# Patient Record
Sex: FEMALE | Race: WHITE | NOT HISPANIC OR LATINO | Employment: OTHER | URBAN - METROPOLITAN AREA
[De-identification: names, ages, dates, MRNs, and addresses within clinical notes are randomized per-mention and may not be internally consistent; named-entity substitution may affect disease eponyms.]

---

## 2017-02-06 ENCOUNTER — HOSPITAL ENCOUNTER (OUTPATIENT)
Dept: GENERAL RADIOLOGY | Facility: HOSPITAL | Age: 67
Discharge: HOME | End: 2017-02-06
Admitting: INTERNAL MEDICINE

## 2017-02-06 ENCOUNTER — TRANSCRIBE ORDERS (OUTPATIENT)
Dept: GENERAL RADIOLOGY | Facility: HOSPITAL | Age: 67
End: 2017-02-06

## 2017-02-06 DIAGNOSIS — R05.9 COUGH: ICD-10-CM

## 2017-02-06 DIAGNOSIS — R05.9 COUGH: Primary | ICD-10-CM

## 2017-02-06 PROCEDURE — 71020 HC CHEST PA AND LATERAL: CPT

## 2017-02-10 ENCOUNTER — TRANSCRIBE ORDERS (OUTPATIENT)
Dept: OBSTETRICS AND GYNECOLOGY | Facility: CLINIC | Age: 67
End: 2017-02-10

## 2017-02-10 DIAGNOSIS — Z12.31 VISIT FOR SCREENING MAMMOGRAM: Primary | ICD-10-CM

## 2017-02-14 ENCOUNTER — OFFICE VISIT (OUTPATIENT)
Dept: OBSTETRICS AND GYNECOLOGY | Facility: CLINIC | Age: 67
End: 2017-02-14

## 2017-02-14 VITALS
DIASTOLIC BLOOD PRESSURE: 80 MMHG | SYSTOLIC BLOOD PRESSURE: 122 MMHG | BODY MASS INDEX: 51.91 KG/M2 | HEIGHT: 63 IN | WEIGHT: 293 LBS

## 2017-02-14 DIAGNOSIS — K46.9 ENTEROCELE: ICD-10-CM

## 2017-02-14 DIAGNOSIS — Z00.00 ANNUAL PHYSICAL EXAM: Primary | ICD-10-CM

## 2017-02-14 DIAGNOSIS — J40 BRONCHITIS: ICD-10-CM

## 2017-02-14 PROBLEM — E66.01 MORBID OBESITY (HCC): Status: ACTIVE | Noted: 2017-02-14

## 2017-02-14 PROBLEM — Z78.0 POST-MENOPAUSAL: Status: ACTIVE | Noted: 2017-02-14

## 2017-02-14 PROCEDURE — G0328 FECAL BLOOD SCRN IMMUNOASSAY: HCPCS | Performed by: OBSTETRICS & GYNECOLOGY

## 2017-02-14 PROCEDURE — G0101 CA SCREEN;PELVIC/BREAST EXAM: HCPCS | Performed by: OBSTETRICS & GYNECOLOGY

## 2017-02-14 RX ORDER — ESTRADIOL 0.1 MG/G
CREAM VAGINAL
Qty: 1 EACH | Refills: 4 | Status: SHIPPED | OUTPATIENT
Start: 2017-02-14 | End: 2017-02-15 | Stop reason: SDUPTHER

## 2017-02-14 RX ORDER — ATORVASTATIN CALCIUM 10 MG/1
10 TABLET, FILM COATED ORAL NIGHTLY
COMMUNITY
Start: 2017-02-13

## 2017-02-14 RX ORDER — VENLAFAXINE HYDROCHLORIDE 75 MG/1
75 CAPSULE, EXTENDED RELEASE ORAL DAILY
COMMUNITY
Start: 2017-02-10

## 2017-02-14 RX ORDER — LOSARTAN POTASSIUM 25 MG/1
25 TABLET ORAL DAILY
COMMUNITY
Start: 2017-02-06 | End: 2018-06-04 | Stop reason: SDUPTHER

## 2017-02-14 RX ORDER — ESTRADIOL 0.5 MG/1
0.5 TABLET ORAL DAILY
Status: ON HOLD | COMMUNITY
Start: 2017-01-09 | End: 2017-04-17

## 2017-02-14 NOTE — PROGRESS NOTES
"Subjective   Chief Complaint   Patient presents with   • Gynecologic Exam   • Bladder Prolapse     Imelda Elaine is a 66 y.o. year old  menopausal female presenting to be seen for her annual exam.  There has not been vaginal bleeding in the last 12 months.  Hot flashes and night sweats are not a significant problem.    SEXUAL Hx:  She is not sexually active.  Vaginal dryness is not a problem. Her bladder dropped after her incisional hernia surgery ( Dr MAZA) then she has had bronchitis since December and the bladder got worse,    HEALTH Hx:  She exercises regularly: no.  She wears her seat belt:yes.  She has concerns about domestic violence: no.  She has noticed changes in height: no.              Calcium intake is not adequate    The following portions of the patient's history were reviewed and updated as appropriate:problem list, current medications, allergies, past family history, past medical history, past social history and past surgical history.    Smoking status: Former Smoker                                                              Packs/day: 1.50      Years: 15.00        Types: Cigarettes     Quit date: 1986  Smokeless status: Never Used                        Review of Systems normal bowels on Miralax; bladder no leakage + frequency she is s/p anterior repair     Objective   Visit Vitals   • /80   • Ht 63\" (160 cm)   • Wt 293 lb (133 kg)   • BMI 51.9 kg/m2       General:  well developed; well nourished  no acute distress   Skin:  No suspicious lesions seen   Thyroid: normal to inspection and palpation   Breasts:  Examined in supine position  Symmetric without masses or skin dimpling  Nipples normal without inversion, lesions or discharge   Abdomen: soft, non-tender; no masses  no umbilical or inginual hernias are present  no hepato-splenomegaly  She has a fairly fresh vertical scar.   Pelvis: Clinical staff was present for exam  External genitalia:  normal appearance of the external " genitalia including Bartholin's and Central Aguirre's glands. There is a bulge consistent with either cystocele or enterocele that protrudes through the introitus  :  urethral meatus normal; urethral hypermobility is absent.  Vaginal:  atrophic mucosal changes are present;  Rectal:  anus visually normal appearing. recto-vaginal exam unremarkable and confirms findings; guaiac negative;  Cystocele GRADE 1  Rectocele GRADE 1  Enterocele GRADE 4        Assessment   1. Symptomatic enterocele.  She is status post abdominal hysterectomy in 1989.  In 2004 she underwent a laparotomy with lysis of adhesions abdominal sacral colpopexy and a posterior vaginal repair.  She was doing well until her recent hernia surgery.  I think this is more coincidental than anything else since its approximate 13 years later.  In any event the enterocele is protruding through the vagina.  I discussed repair.  Given the extensive adhesions Dr. Nj Painter encountered.  I would suggest really try to attempt this vaginally.  She is not sexually active and I discussed foreshortening of the vagina which might make any future attempts at intercourse uncomfortable.     Plan   1. I will add the estrogen cream 1/2 g nightly to thicken the vaginal mucosa prior to a vaginal enterocele and cystocele repair.  There is minimal rectocele.    New Medications Ordered This Visit   Medications   • atorvastatin (LIPITOR) 10 MG tablet     Sig: Take 10 mg by mouth Daily.   • estradiol (ESTRACE) 0.5 MG tablet     Sig: Take 0.5 mg by mouth Daily.   • HYDROcod Polst-CPM Polst ER (TUSSIONEX PENNKINETIC) 10-8 MG/5ML ER suspension     Sig: Take 1 mL by mouth 2 (Two) Times a Day.   • losartan (COZAAR) 25 MG tablet     Sig: Take 25 mg by mouth Daily.   • venlafaxine XR (EFFEXOR-XR) 75 MG 24 hr capsule     Sig: Take 75 mg by mouth Daily.          This note was electronically signed.    Jose Maria Chowdhury M.D.  February 14, 2017

## 2017-02-15 PROBLEM — Z90.710 STATUS POST ABDOMINAL HYSTERECTOMY: Status: ACTIVE | Noted: 2017-02-15

## 2017-02-15 RX ORDER — ESTRADIOL 0.1 MG/G
CREAM VAGINAL
Qty: 1 EACH | Refills: 4 | Status: SHIPPED | OUTPATIENT
Start: 2017-02-15 | End: 2017-04-03 | Stop reason: SDUPTHER

## 2017-02-17 DIAGNOSIS — K46.9 ENTEROCELE: Primary | ICD-10-CM

## 2017-02-17 RX ORDER — SODIUM CHLORIDE 0.9 % (FLUSH) 0.9 %
1-10 SYRINGE (ML) INJECTION AS NEEDED
Status: CANCELLED | OUTPATIENT
Start: 2017-02-17

## 2017-02-22 ENCOUNTER — RESULTS ENCOUNTER (OUTPATIENT)
Dept: OBSTETRICS AND GYNECOLOGY | Facility: CLINIC | Age: 67
End: 2017-02-22

## 2017-02-22 DIAGNOSIS — K46.9 ENTEROCELE: ICD-10-CM

## 2017-02-24 ENCOUNTER — TELEPHONE (OUTPATIENT)
Dept: OBSTETRICS AND GYNECOLOGY | Facility: CLINIC | Age: 67
End: 2017-02-24

## 2017-03-20 ENCOUNTER — APPOINTMENT (OUTPATIENT)
Dept: MAMMOGRAPHY | Facility: HOSPITAL | Age: 67
End: 2017-03-20
Attending: OBSTETRICS & GYNECOLOGY

## 2017-03-23 ENCOUNTER — HOSPITAL ENCOUNTER (OUTPATIENT)
Dept: MAMMOGRAPHY | Facility: HOSPITAL | Age: 67
Discharge: HOME | End: 2017-03-23
Attending: OBSTETRICS & GYNECOLOGY | Admitting: OBSTETRICS & GYNECOLOGY

## 2017-03-23 DIAGNOSIS — Z12.31 VISIT FOR SCREENING MAMMOGRAM: ICD-10-CM

## 2017-03-23 PROCEDURE — 77063 BREAST TOMOSYNTHESIS BI: CPT

## 2017-03-23 PROCEDURE — G0202 SCR MAMMO BI INCL CAD: HCPCS | Performed by: RADIOLOGY

## 2017-03-23 PROCEDURE — 77063 BREAST TOMOSYNTHESIS BI: CPT | Performed by: RADIOLOGY

## 2017-03-23 PROCEDURE — G0202 SCR MAMMO BI INCL CAD: HCPCS

## 2017-04-03 ENCOUNTER — TELEPHONE (OUTPATIENT)
Dept: OBSTETRICS AND GYNECOLOGY | Facility: CLINIC | Age: 67
End: 2017-04-03

## 2017-04-03 RX ORDER — ESTRADIOL 0.1 MG/G
CREAM VAGINAL
Qty: 1 EACH | Refills: 4 | Status: SHIPPED | OUTPATIENT
Start: 2017-04-03 | End: 2017-05-03

## 2017-04-03 NOTE — TELEPHONE ENCOUNTER
----- Message from Ruthy Christopher sent at 4/3/2017  2:51 PM EDT -----  PATIENT IS REQUESTING SAMPLE OF ESTRACE CREAM. CANT GET SCRIPT FILLED BECAUSE IT IS TOO SOON FOR INSURANCE TO PAY AND SHE WOULD HAVE TO PAY OUT OF POCKET    PLEASE CALL PATIENT [phone] TO DISCUSS

## 2017-04-05 ENCOUNTER — HOSPITAL ENCOUNTER (OUTPATIENT)
Dept: MAMMOGRAPHY | Facility: HOSPITAL | Age: 67
Discharge: HOME | End: 2017-04-05
Admitting: OBSTETRICS & GYNECOLOGY

## 2017-04-05 DIAGNOSIS — R92.8 ABNORMAL MAMMOGRAM: ICD-10-CM

## 2017-04-05 PROCEDURE — G0279 TOMOSYNTHESIS, MAMMO: HCPCS | Performed by: RADIOLOGY

## 2017-04-05 PROCEDURE — G0206 DX MAMMO INCL CAD UNI: HCPCS | Performed by: RADIOLOGY

## 2017-04-05 PROCEDURE — G0206 DX MAMMO INCL CAD UNI: HCPCS

## 2017-04-05 PROCEDURE — G0279 TOMOSYNTHESIS, MAMMO: HCPCS

## 2017-04-12 ENCOUNTER — OFFICE VISIT (OUTPATIENT)
Dept: OBSTETRICS AND GYNECOLOGY | Facility: CLINIC | Age: 67
End: 2017-04-12

## 2017-04-12 ENCOUNTER — APPOINTMENT (OUTPATIENT)
Dept: PREADMISSION TESTING | Facility: HOSPITAL | Age: 67
End: 2017-04-12

## 2017-04-12 VITALS
WEIGHT: 293 LBS | DIASTOLIC BLOOD PRESSURE: 80 MMHG | SYSTOLIC BLOOD PRESSURE: 128 MMHG | HEIGHT: 63 IN | BODY MASS INDEX: 51.91 KG/M2

## 2017-04-12 VITALS — WEIGHT: 293 LBS | HEIGHT: 64 IN | BODY MASS INDEX: 50.02 KG/M2

## 2017-04-12 DIAGNOSIS — N81.6 RECTOCELE: ICD-10-CM

## 2017-04-12 DIAGNOSIS — N81.11 MIDLINE CYSTOCELE: Primary | ICD-10-CM

## 2017-04-12 LAB
BASOPHILS # BLD AUTO: 0.02 10*3/MM3 (ref 0–0.2)
BASOPHILS NFR BLD AUTO: 0.3 % (ref 0–1)
DEPRECATED RDW RBC AUTO: 50.1 FL (ref 37–54)
EOSINOPHIL # BLD AUTO: 0.26 10*3/MM3 (ref 0.1–0.3)
EOSINOPHIL NFR BLD AUTO: 3.4 % (ref 0–3)
ERYTHROCYTE [DISTWIDTH] IN BLOOD BY AUTOMATED COUNT: 15 % (ref 11.3–14.5)
HCT VFR BLD AUTO: 43.1 % (ref 34.5–44)
HGB BLD-MCNC: 13.9 G/DL (ref 11.5–15.5)
IMM GRANULOCYTES # BLD: 0.01 10*3/MM3 (ref 0–0.03)
IMM GRANULOCYTES NFR BLD: 0.1 % (ref 0–0.6)
LYMPHOCYTES # BLD AUTO: 1.42 10*3/MM3 (ref 0.6–4.8)
LYMPHOCYTES NFR BLD AUTO: 18.8 % (ref 24–44)
MCH RBC QN AUTO: 29.3 PG (ref 27–31)
MCHC RBC AUTO-ENTMCNC: 32.3 G/DL (ref 32–36)
MCV RBC AUTO: 90.7 FL (ref 80–99)
MONOCYTES # BLD AUTO: 0.61 10*3/MM3 (ref 0–1)
MONOCYTES NFR BLD AUTO: 8.1 % (ref 0–12)
NEUTROPHILS # BLD AUTO: 5.24 10*3/MM3 (ref 1.5–8.3)
NEUTROPHILS NFR BLD AUTO: 69.3 % (ref 41–71)
PLATELET # BLD AUTO: 188 10*3/MM3 (ref 150–450)
PMV BLD AUTO: 11.6 FL (ref 6–12)
POTASSIUM BLD-SCNC: 4.4 MMOL/L (ref 3.5–5.5)
RBC # BLD AUTO: 4.75 10*6/MM3 (ref 3.89–5.14)
WBC NRBC COR # BLD: 7.56 10*3/MM3 (ref 3.5–10.8)

## 2017-04-12 PROCEDURE — 84132 ASSAY OF SERUM POTASSIUM: CPT

## 2017-04-12 PROCEDURE — 85025 COMPLETE CBC W/AUTO DIFF WBC: CPT | Performed by: OBSTETRICS & GYNECOLOGY

## 2017-04-12 PROCEDURE — S0260 H&P FOR SURGERY: HCPCS | Performed by: OBSTETRICS & GYNECOLOGY

## 2017-04-12 PROCEDURE — 93005 ELECTROCARDIOGRAM TRACING: CPT

## 2017-04-12 PROCEDURE — 93010 ELECTROCARDIOGRAM REPORT: CPT | Performed by: INTERNAL MEDICINE

## 2017-04-12 PROCEDURE — 36415 COLL VENOUS BLD VENIPUNCTURE: CPT

## 2017-04-12 RX ORDER — TRAMADOL HYDROCHLORIDE 50 MG/1
50 TABLET ORAL AS NEEDED
COMMUNITY
Start: 2017-03-15 | End: 2017-04-12

## 2017-04-12 NOTE — H&P
"Subjective   Imelda Elaine is a 66 y.o. year old  who is scheduled  for surgery due to pelvic relaxation and cystocele and symptomatic rectocele. She is s/p Hysterectomy and BSO. She had a bladder sling with mesh approximately 4590-1910. No stool or urine incontinence.Miralx helpful. She declined a pessary and likely would not have stayed in place. Not sexually active. She has to sit back and push rectocele in place.    Past Medical History:   Diagnosis Date   • Abdominal hernia    • Arthritis    • Depression    • GERD (gastroesophageal reflux disease)    • Hiatal hernia    • High cholesterol    • Hypertension    • Knee pain     BILATERAL   • Menopausal state    • Seasonal allergies    • Wears glasses      Past Surgical History:   Procedure Laterality Date   • BLADDER AUGMENTATION      MESH PLACED   • COLONOSCOPY     • EXPLORATORY LAPAROTOMY      \"BOWEL INTO PELVIC BONE\"  WRAPPED WITH MESH????   • HYSTERECTOMY      WITH BSO    • KNEE SURGERY      Left   • LAPAROSCOPIC CHOLECYSTECTOMY     • OOPHORECTOMY     • TONSILLECTOMY     • VENTRAL HERNIA REPAIR N/A 2016    Procedure: LAPAROSCOPIC REPAIR OF ABDOMINAL WALL HERNIA WITH MESH, CONVERTED TO OPEN ;  Surgeon: Bertha Delacruz MD;  Location: [facility];  Service:      Social History     Social History   • Marital status:      Spouse name: N/A   • Number of children: N/A   • Years of education: N/A     Social History Main Topics   • Smoking status: Former Smoker     Packs/day: 1.50     Years: 15.00     Types: Cigarettes     Quit date: 1986   • Smokeless tobacco: Never Used   • Alcohol use No   • Drug use: No   • Sexual activity: No     Other Topics Concern   • None     Social History Narrative       Current Outpatient Prescriptions:   •  atorvastatin (LIPITOR) 10 MG tablet, Take 10 mg by mouth Daily., Disp: , Rfl:   •  celecoxib (CeleBREX) 200 MG capsule, Take 200 mg by mouth daily., Disp: , Rfl:   •  cetirizine (ZyrTEC) 10 MG " "tablet, Take 10 mg by mouth daily., Disp: , Rfl:   •  estradiol (ESTRACE VAGINAL) 0.1 MG/GM vaginal cream, Insert 0.5  gm intravaginally nightly, Disp: 1 each, Rfl: 4  •  fluticasone (FLONASE) 50 MCG/ACT nasal spray, 2 sprays into each nostril daily., Disp: , Rfl:   •  lansoprazole (PREVACID) 30 MG capsule, Take 30 mg by mouth daily., Disp: , Rfl:   •  losartan (COZAAR) 25 MG tablet, Take 25 mg by mouth Daily., Disp: , Rfl:   •  traMADol (ULTRAM) 50 MG tablet, 50 mg As Needed., Disp: , Rfl:   •  venlafaxine XR (EFFEXOR-XR) 75 MG 24 hr capsule, Take 75 mg by mouth Daily., Disp: , Rfl:   •  estradiol (ESTRACE) 0.5 MG tablet, Take 0.5 mg by mouth Daily., Disp: , Rfl:     No Known Allergies  Smoking status: Former Smoker                                                              Packs/day: 1.50      Years: 15.00        Types: Cigarettes     Quit date: 6/17/1986  Smokeless status: Never Used                        Review of Systems normal bladder / bowels tend towards constipation.       Objective   /80  Ht 63\" (160 cm)  Wt (!) 304 lb (138 kg)  BMI 53.85 kg/m2  General: well developed; well nourished  no acute distress  obese - Body mass index is 53.85 kg/(m^2).   Heart: regular rate and rhythm, S1, S2 normal, no murmur, click, rub or gallop   Lungs: breathing is unlabored  clear to auscultation bilaterally   Abdomen: soft, non-tender; no masses  no umbilical or inginual hernias are present  no hepato-splenomegaly  multiple scars ( mesh for hernia repair)   Pelvis:: Not performed. she has significant pelvic relaxation on past exams        Assessment   1. Symptomatic cystocele and rectocele- too many adhesions and abdominal mesh etc to consider sacrocolpopexy and pessary not option.     Plan            Anterior and posterior repair          Take her Cozaar, Effexor and Prevacid Monday am with a sip of water          PAT Weds 4/12/17    Jose Maria Chowdhury M.D.  4/12/2017             "

## 2017-04-17 ENCOUNTER — ANESTHESIA EVENT (OUTPATIENT)
Dept: PERIOP | Facility: HOSPITAL | Age: 67
End: 2017-04-17

## 2017-04-17 ENCOUNTER — HOSPITAL ENCOUNTER (OUTPATIENT)
Facility: HOSPITAL | Age: 67
Discharge: HOME | End: 2017-04-18
Attending: OBSTETRICS & GYNECOLOGY | Admitting: OBSTETRICS & GYNECOLOGY

## 2017-04-17 ENCOUNTER — ANESTHESIA (OUTPATIENT)
Dept: PERIOP | Facility: HOSPITAL | Age: 67
End: 2017-04-17

## 2017-04-17 DIAGNOSIS — K46.9 ENTEROCELE: ICD-10-CM

## 2017-04-17 LAB — POTASSIUM BLD-SCNC: 4.3 MMOL/L (ref 3.5–5.5)

## 2017-04-17 PROCEDURE — 25010000002 PROPOFOL 1000 MG/ML EMULSION: Performed by: NURSE ANESTHETIST, CERTIFIED REGISTERED

## 2017-04-17 PROCEDURE — 25010000003 CEFAZOLIN IN DEXTROSE 2-4 GM/100ML-% SOLUTION: Performed by: OBSTETRICS & GYNECOLOGY

## 2017-04-17 PROCEDURE — 25010000002 NEOSTIGMINE 10 MG/10ML SOLUTION: Performed by: NURSE ANESTHETIST, CERTIFIED REGISTERED

## 2017-04-17 PROCEDURE — 25010000002 PROPOFOL 10 MG/ML EMULSION: Performed by: NURSE ANESTHETIST, CERTIFIED REGISTERED

## 2017-04-17 PROCEDURE — 94799 UNLISTED PULMONARY SVC/PX: CPT

## 2017-04-17 PROCEDURE — 84132 ASSAY OF SERUM POTASSIUM: CPT | Performed by: ANESTHESIOLOGY

## 2017-04-17 PROCEDURE — 25010000002 DEXAMETHASONE PER 1 MG: Performed by: NURSE ANESTHETIST, CERTIFIED REGISTERED

## 2017-04-17 PROCEDURE — 57265 CMBN AP COLPRHY W/NTRCL RPR: CPT | Performed by: OBSTETRICS & GYNECOLOGY

## 2017-04-17 PROCEDURE — 99999 PR OFFICE/OUTPT VISIT,PROCEDURE ONLY: CPT | Performed by: OBSTETRICS & GYNECOLOGY

## 2017-04-17 PROCEDURE — 25010000002 ONDANSETRON PER 1 MG: Performed by: NURSE ANESTHETIST, CERTIFIED REGISTERED

## 2017-04-17 PROCEDURE — 25010000002 MIDAZOLAM PER 1 MG: Performed by: NURSE ANESTHETIST, CERTIFIED REGISTERED

## 2017-04-17 PROCEDURE — 25010000002 FENTANYL CITRATE (PF) 100 MCG/2ML SOLUTION: Performed by: NURSE ANESTHETIST, CERTIFIED REGISTERED

## 2017-04-17 PROCEDURE — G0378 HOSPITAL OBSERVATION PER HR: HCPCS

## 2017-04-17 RX ORDER — IBUPROFEN 400 MG/1
400 TABLET ORAL EVERY 6 HOURS PRN
Status: DISCONTINUED | OUTPATIENT
Start: 2017-04-17 | End: 2017-04-18 | Stop reason: HOSPADM

## 2017-04-17 RX ORDER — ONDANSETRON 4 MG/1
4 TABLET, FILM COATED ORAL EVERY 6 HOURS PRN
Status: DISCONTINUED | OUTPATIENT
Start: 2017-04-17 | End: 2017-04-18 | Stop reason: HOSPADM

## 2017-04-17 RX ORDER — BUPIVACAINE HYDROCHLORIDE AND EPINEPHRINE 2.5; 5 MG/ML; UG/ML
INJECTION, SOLUTION EPIDURAL; INFILTRATION; INTRACAUDAL; PERINEURAL AS NEEDED
Status: DISCONTINUED | OUTPATIENT
Start: 2017-04-17 | End: 2017-04-17 | Stop reason: HOSPADM

## 2017-04-17 RX ORDER — DOCUSATE SODIUM 100 MG/1
100 CAPSULE, LIQUID FILLED ORAL 2 TIMES DAILY PRN
Status: DISCONTINUED | OUTPATIENT
Start: 2017-04-17 | End: 2017-04-18 | Stop reason: HOSPADM

## 2017-04-17 RX ORDER — NEOSTIGMINE METHYLSULFATE 1 MG/ML
INJECTION, SOLUTION INTRAVENOUS AS NEEDED
Status: DISCONTINUED | OUTPATIENT
Start: 2017-04-17 | End: 2017-04-17 | Stop reason: SURG

## 2017-04-17 RX ORDER — PROMETHAZINE HYDROCHLORIDE 25 MG/ML
12.5 INJECTION, SOLUTION INTRAMUSCULAR; INTRAVENOUS EVERY 6 HOURS PRN
Status: DISCONTINUED | OUTPATIENT
Start: 2017-04-17 | End: 2017-04-18 | Stop reason: HOSPADM

## 2017-04-17 RX ORDER — SODIUM CHLORIDE, SODIUM LACTATE, POTASSIUM CHLORIDE, CALCIUM CHLORIDE 600; 310; 30; 20 MG/100ML; MG/100ML; MG/100ML; MG/100ML
9 INJECTION, SOLUTION INTRAVENOUS CONTINUOUS PRN
Status: DISCONTINUED | OUTPATIENT
Start: 2017-04-17 | End: 2017-04-17 | Stop reason: HOSPADM

## 2017-04-17 RX ORDER — PROMETHAZINE HYDROCHLORIDE 25 MG/1
25 TABLET ORAL ONCE AS NEEDED
Status: DISCONTINUED | OUTPATIENT
Start: 2017-04-17 | End: 2017-04-17 | Stop reason: HOSPADM

## 2017-04-17 RX ORDER — ONDANSETRON 2 MG/ML
4 INJECTION INTRAMUSCULAR; INTRAVENOUS ONCE AS NEEDED
Status: DISCONTINUED | OUTPATIENT
Start: 2017-04-17 | End: 2017-04-17 | Stop reason: HOSPADM

## 2017-04-17 RX ORDER — BISACODYL 5 MG/1
10 TABLET, DELAYED RELEASE ORAL DAILY PRN
Status: DISCONTINUED | OUTPATIENT
Start: 2017-04-17 | End: 2017-04-18 | Stop reason: HOSPADM

## 2017-04-17 RX ORDER — LOSARTAN POTASSIUM 25 MG/1
25 TABLET ORAL DAILY
Status: DISCONTINUED | OUTPATIENT
Start: 2017-04-18 | End: 2017-04-18 | Stop reason: HOSPADM

## 2017-04-17 RX ORDER — KETOROLAC TROMETHAMINE 30 MG/ML
30 INJECTION, SOLUTION INTRAMUSCULAR; INTRAVENOUS EVERY 6 HOURS PRN
Status: DISCONTINUED | OUTPATIENT
Start: 2017-04-17 | End: 2017-04-18 | Stop reason: HOSPADM

## 2017-04-17 RX ORDER — CEFAZOLIN SODIUM 2 G/100ML
2 INJECTION, SOLUTION INTRAVENOUS
Status: DISCONTINUED | OUTPATIENT
Start: 2017-04-17 | End: 2017-04-17 | Stop reason: HOSPADM

## 2017-04-17 RX ORDER — HYDROCODONE BITARTRATE AND ACETAMINOPHEN 10; 325 MG/1; MG/1
1 TABLET ORAL EVERY 4 HOURS PRN
Status: DISCONTINUED | OUTPATIENT
Start: 2017-04-17 | End: 2017-04-18 | Stop reason: HOSPADM

## 2017-04-17 RX ORDER — VENLAFAXINE HYDROCHLORIDE 75 MG/1
75 CAPSULE, EXTENDED RELEASE ORAL DAILY
Status: DISCONTINUED | OUTPATIENT
Start: 2017-04-18 | End: 2017-04-18 | Stop reason: HOSPADM

## 2017-04-17 RX ORDER — FLUTICASONE PROPIONATE 50 MCG
2 SPRAY, SUSPENSION (ML) NASAL DAILY
Status: DISCONTINUED | OUTPATIENT
Start: 2017-04-17 | End: 2017-04-18 | Stop reason: HOSPADM

## 2017-04-17 RX ORDER — ACETAMINOPHEN 650 MG/1
650 SUPPOSITORY RECTAL EVERY 4 HOURS PRN
Status: DISCONTINUED | OUTPATIENT
Start: 2017-04-17 | End: 2017-04-18 | Stop reason: HOSPADM

## 2017-04-17 RX ORDER — FAMOTIDINE 20 MG/1
20 TABLET, FILM COATED ORAL
Status: DISCONTINUED | OUTPATIENT
Start: 2017-04-17 | End: 2017-04-17 | Stop reason: HOSPADM

## 2017-04-17 RX ORDER — ACETAMINOPHEN 325 MG/1
650 TABLET ORAL EVERY 4 HOURS PRN
Status: DISCONTINUED | OUTPATIENT
Start: 2017-04-17 | End: 2017-04-18 | Stop reason: HOSPADM

## 2017-04-17 RX ORDER — PROMETHAZINE HYDROCHLORIDE 25 MG/ML
6.25 INJECTION, SOLUTION INTRAMUSCULAR; INTRAVENOUS ONCE AS NEEDED
Status: DISCONTINUED | OUTPATIENT
Start: 2017-04-17 | End: 2017-04-17 | Stop reason: HOSPADM

## 2017-04-17 RX ORDER — HYDROMORPHONE HYDROCHLORIDE 1 MG/ML
0.5 INJECTION, SOLUTION INTRAMUSCULAR; INTRAVENOUS; SUBCUTANEOUS
Status: DISCONTINUED | OUTPATIENT
Start: 2017-04-17 | End: 2017-04-17 | Stop reason: HOSPADM

## 2017-04-17 RX ORDER — FENTANYL CITRATE 50 UG/ML
INJECTION, SOLUTION INTRAMUSCULAR; INTRAVENOUS AS NEEDED
Status: DISCONTINUED | OUTPATIENT
Start: 2017-04-17 | End: 2017-04-17 | Stop reason: SURG

## 2017-04-17 RX ORDER — HYDRALAZINE HYDROCHLORIDE 20 MG/ML
5 INJECTION INTRAMUSCULAR; INTRAVENOUS
Status: DISCONTINUED | OUTPATIENT
Start: 2017-04-17 | End: 2017-04-17 | Stop reason: HOSPADM

## 2017-04-17 RX ORDER — DEXTROSE AND SODIUM CHLORIDE 5; .45 G/100ML; G/100ML
100 INJECTION, SOLUTION INTRAVENOUS CONTINUOUS
Status: DISCONTINUED | OUTPATIENT
Start: 2017-04-17 | End: 2017-04-18 | Stop reason: HOSPADM

## 2017-04-17 RX ORDER — FENTANYL CITRATE 50 UG/ML
50 INJECTION, SOLUTION INTRAMUSCULAR; INTRAVENOUS
Status: DISCONTINUED | OUTPATIENT
Start: 2017-04-17 | End: 2017-04-17 | Stop reason: HOSPADM

## 2017-04-17 RX ORDER — ONDANSETRON 2 MG/ML
4 INJECTION INTRAMUSCULAR; INTRAVENOUS EVERY 6 HOURS PRN
Status: DISCONTINUED | OUTPATIENT
Start: 2017-04-17 | End: 2017-04-18 | Stop reason: HOSPADM

## 2017-04-17 RX ORDER — LIDOCAINE HYDROCHLORIDE 10 MG/ML
INJECTION, SOLUTION EPIDURAL; INFILTRATION; INTRACAUDAL; PERINEURAL AS NEEDED
Status: DISCONTINUED | OUTPATIENT
Start: 2017-04-17 | End: 2017-04-17 | Stop reason: SURG

## 2017-04-17 RX ORDER — PROPOFOL 10 MG/ML
VIAL (ML) INTRAVENOUS AS NEEDED
Status: DISCONTINUED | OUTPATIENT
Start: 2017-04-17 | End: 2017-04-17 | Stop reason: SURG

## 2017-04-17 RX ORDER — ROCURONIUM BROMIDE 10 MG/ML
INJECTION, SOLUTION INTRAVENOUS AS NEEDED
Status: DISCONTINUED | OUTPATIENT
Start: 2017-04-17 | End: 2017-04-17 | Stop reason: SURG

## 2017-04-17 RX ORDER — PROMETHAZINE HYDROCHLORIDE 12.5 MG/1
12.5 TABLET ORAL EVERY 6 HOURS PRN
Status: DISCONTINUED | OUTPATIENT
Start: 2017-04-17 | End: 2017-04-18 | Stop reason: HOSPADM

## 2017-04-17 RX ORDER — DEXAMETHASONE SODIUM PHOSPHATE 4 MG/ML
INJECTION, SOLUTION INTRA-ARTICULAR; INTRALESIONAL; INTRAMUSCULAR; INTRAVENOUS; SOFT TISSUE AS NEEDED
Status: DISCONTINUED | OUTPATIENT
Start: 2017-04-17 | End: 2017-04-17 | Stop reason: SURG

## 2017-04-17 RX ORDER — MAGNESIUM HYDROXIDE 1200 MG/15ML
LIQUID ORAL AS NEEDED
Status: DISCONTINUED | OUTPATIENT
Start: 2017-04-17 | End: 2017-04-17 | Stop reason: HOSPADM

## 2017-04-17 RX ORDER — SODIUM CHLORIDE 0.9 % (FLUSH) 0.9 %
1-10 SYRINGE (ML) INJECTION AS NEEDED
Status: DISCONTINUED | OUTPATIENT
Start: 2017-04-17 | End: 2017-04-17 | Stop reason: HOSPADM

## 2017-04-17 RX ORDER — ZOLPIDEM TARTRATE 5 MG/1
5 TABLET ORAL NIGHTLY PRN
Status: DISCONTINUED | OUTPATIENT
Start: 2017-04-17 | End: 2017-04-18 | Stop reason: HOSPADM

## 2017-04-17 RX ORDER — HYDROCODONE BITARTRATE AND ACETAMINOPHEN 5; 325 MG/1; MG/1
1 TABLET ORAL EVERY 4 HOURS PRN
Status: DISCONTINUED | OUTPATIENT
Start: 2017-04-17 | End: 2017-04-18 | Stop reason: HOSPADM

## 2017-04-17 RX ORDER — CETIRIZINE HYDROCHLORIDE 10 MG/1
10 TABLET ORAL DAILY
Status: DISCONTINUED | OUTPATIENT
Start: 2017-04-17 | End: 2017-04-18 | Stop reason: HOSPADM

## 2017-04-17 RX ORDER — MIDAZOLAM HYDROCHLORIDE 1 MG/ML
INJECTION INTRAMUSCULAR; INTRAVENOUS AS NEEDED
Status: DISCONTINUED | OUTPATIENT
Start: 2017-04-17 | End: 2017-04-17 | Stop reason: SURG

## 2017-04-17 RX ORDER — CEFAZOLIN SODIUM 2 G/100ML
2 INJECTION, SOLUTION INTRAVENOUS EVERY 8 HOURS
Status: COMPLETED | OUTPATIENT
Start: 2017-04-17 | End: 2017-04-18

## 2017-04-17 RX ORDER — PROMETHAZINE HYDROCHLORIDE 25 MG/1
25 SUPPOSITORY RECTAL ONCE AS NEEDED
Status: DISCONTINUED | OUTPATIENT
Start: 2017-04-17 | End: 2017-04-17 | Stop reason: HOSPADM

## 2017-04-17 RX ORDER — GLYCOPYRROLATE 0.2 MG/ML
INJECTION INTRAMUSCULAR; INTRAVENOUS AS NEEDED
Status: DISCONTINUED | OUTPATIENT
Start: 2017-04-17 | End: 2017-04-17 | Stop reason: SURG

## 2017-04-17 RX ORDER — SIMETHICONE 80 MG
80 TABLET,CHEWABLE ORAL 4 TIMES DAILY PRN
Status: DISCONTINUED | OUTPATIENT
Start: 2017-04-17 | End: 2017-04-18 | Stop reason: HOSPADM

## 2017-04-17 RX ORDER — LIDOCAINE HYDROCHLORIDE 10 MG/ML
0.5 INJECTION, SOLUTION EPIDURAL; INFILTRATION; INTRACAUDAL; PERINEURAL ONCE AS NEEDED
Status: COMPLETED | OUTPATIENT
Start: 2017-04-17 | End: 2017-04-17

## 2017-04-17 RX ORDER — PROMETHAZINE HYDROCHLORIDE 12.5 MG/1
12.5 SUPPOSITORY RECTAL EVERY 6 HOURS PRN
Status: DISCONTINUED | OUTPATIENT
Start: 2017-04-17 | End: 2017-04-18 | Stop reason: HOSPADM

## 2017-04-17 RX ORDER — ONDANSETRON 2 MG/ML
INJECTION INTRAMUSCULAR; INTRAVENOUS AS NEEDED
Status: DISCONTINUED | OUTPATIENT
Start: 2017-04-17 | End: 2017-04-17 | Stop reason: SURG

## 2017-04-17 RX ADMIN — FAMOTIDINE 20 MG: 20 TABLET ORAL at 11:27

## 2017-04-17 RX ADMIN — FENTANYL CITRATE 100 MCG: 50 INJECTION, SOLUTION INTRAMUSCULAR; INTRAVENOUS at 13:22

## 2017-04-17 RX ADMIN — ROBINUL 0.1 MG: 0.2 INJECTION INTRAMUSCULAR; INTRAVENOUS at 15:04

## 2017-04-17 RX ADMIN — LIDOCAINE HYDROCHLORIDE 100 MG: 10 INJECTION, SOLUTION EPIDURAL; INFILTRATION; INTRACAUDAL; PERINEURAL at 13:28

## 2017-04-17 RX ADMIN — FENTANYL CITRATE 50 MCG: 50 INJECTION, SOLUTION INTRAMUSCULAR; INTRAVENOUS at 14:30

## 2017-04-17 RX ADMIN — CEFAZOLIN SODIUM 2 G: 2 INJECTION, SOLUTION INTRAVENOUS at 21:12

## 2017-04-17 RX ADMIN — SODIUM CHLORIDE, POTASSIUM CHLORIDE, SODIUM LACTATE AND CALCIUM CHLORIDE: 600; 310; 30; 20 INJECTION, SOLUTION INTRAVENOUS at 14:00

## 2017-04-17 RX ADMIN — CETIRIZINE HYDROCHLORIDE 10 MG: 10 TABLET, FILM COATED ORAL at 17:36

## 2017-04-17 RX ADMIN — PROPOFOL 25 MCG/KG/MIN: 10 INJECTION, EMULSION INTRAVENOUS at 13:33

## 2017-04-17 RX ADMIN — ONDANSETRON 4 MG: 2 INJECTION INTRAMUSCULAR; INTRAVENOUS at 14:45

## 2017-04-17 RX ADMIN — IBUPROFEN 400 MG: 400 TABLET ORAL at 21:19

## 2017-04-17 RX ADMIN — DEXTROSE AND SODIUM CHLORIDE 100 ML/HR: 5; 450 INJECTION, SOLUTION INTRAVENOUS at 16:25

## 2017-04-17 RX ADMIN — PROPOFOL 200 MG: 10 INJECTION, EMULSION INTRAVENOUS at 13:28

## 2017-04-17 RX ADMIN — CEFAZOLIN SODIUM 2 G: 2 INJECTION, SOLUTION INTRAVENOUS at 13:21

## 2017-04-17 RX ADMIN — SODIUM CHLORIDE, POTASSIUM CHLORIDE, SODIUM LACTATE AND CALCIUM CHLORIDE: 600; 310; 30; 20 INJECTION, SOLUTION INTRAVENOUS at 13:27

## 2017-04-17 RX ADMIN — SODIUM CHLORIDE, POTASSIUM CHLORIDE, SODIUM LACTATE AND CALCIUM CHLORIDE 9 ML/HR: 600; 310; 30; 20 INJECTION, SOLUTION INTRAVENOUS at 11:27

## 2017-04-17 RX ADMIN — MIDAZOLAM HYDROCHLORIDE 2 MG: 1 INJECTION, SOLUTION INTRAMUSCULAR; INTRAVENOUS at 13:22

## 2017-04-17 RX ADMIN — NEOSTIGMINE METHYLSULFATE 1 MG: 1 INJECTION, SOLUTION INTRAVENOUS at 15:00

## 2017-04-17 RX ADMIN — SODIUM CHLORIDE 500 ML: 9 INJECTION, SOLUTION INTRAVENOUS at 17:36

## 2017-04-17 RX ADMIN — FENTANYL CITRATE 50 MCG: 50 INJECTION, SOLUTION INTRAMUSCULAR; INTRAVENOUS at 14:45

## 2017-04-17 RX ADMIN — DEXAMETHASONE SODIUM PHOSPHATE 8 MG: 4 INJECTION, SOLUTION INTRAMUSCULAR; INTRAVENOUS at 13:22

## 2017-04-17 RX ADMIN — FENTANYL CITRATE 50 MCG: 50 INJECTION, SOLUTION INTRAMUSCULAR; INTRAVENOUS at 13:30

## 2017-04-17 RX ADMIN — LIDOCAINE HYDROCHLORIDE 0.5 ML: 10 INJECTION, SOLUTION EPIDURAL; INFILTRATION; INTRACAUDAL; PERINEURAL at 11:27

## 2017-04-17 RX ADMIN — ROCURONIUM BROMIDE 50 MG: 10 INJECTION INTRAVENOUS at 13:28

## 2017-04-17 NOTE — ANESTHESIA POSTPROCEDURE EVALUATION
Patient: Imelda Elaine    Procedure Summary     Date Anesthesia Start Anesthesia Stop Room / Location    04/17/17 1321 1521  [facility] / [facility]       Procedure Diagnosis Surgeon Provider     Vaginal enterocele repair with anterior posterior colporrhaphy (N/A Uterus) Enterocele  (Enterocele [K46.9]) MD Polo Benz MD          Anesthesia Type: general  Last vitals  BP     Temp      Pulse     Resp      SpO2        Post Anesthesia Care and Evaluation    Patient location during evaluation: PACU  Patient participation: complete - patient participated  Level of consciousness: awake and alert  Pain score: 0  Pain management: adequate  Airway patency: patent  Anesthetic complications: No anesthetic complications  PONV Status: none  Cardiovascular status: hemodynamically stable and acceptable  Respiratory status: nonlabored ventilation, acceptable and nasal cannula  Hydration status: acceptable

## 2017-04-17 NOTE — PLAN OF CARE
Problem: Patient Care Overview (Adult)  Goal: Plan of Care Review  Outcome: Ongoing (interventions implemented as appropriate)    04/17/17 1836   Coping/Psychosocial Response Interventions   Plan Of Care Reviewed With patient   Patient Care Overview   Progress progress toward functional goals as expected   Outcome Evaluation   Outcome Summary/Follow up Plan pain controlled, vss         Problem: Pain, Acute (Adult)  Goal: Acceptable Pain Control/Comfort Level  Outcome: Ongoing (interventions implemented as appropriate)    04/17/17 1836   Pain, Acute (Adult)   Acceptable Pain Control/Comfort Level making progress toward outcome

## 2017-04-17 NOTE — OP NOTE
DATE OF PROCEDURE:  2017    PREOPERATIVE DIAGNOSES:  1. Symptomatic enterocele.   2. Status post hysterectomy, status post abdominal sacrocolpopexy posterior repair.    POSTOPERATIVE DIAGNOSES:  1. Symptomatic enterocele.   2. Status post hysterectomy, status post abdominal sacrocolpopexy posterior repair.    PROCEDURE PERFORMED: Vaginal enterocele repair.     SURGEON: Jose Maria Chowdhury MD    ESTIMATED BLOOD LOSS: Less than 100 mL.     ANESTHESIA: General anesthesia.     INDICATIONS: The patient is a 66-year-old  2, para 2 who is status post abdominal hysterectomy in . In , she underwent laparotomy, lysis of adhesions, abdominal sacrocolpopexy and posterior vaginal repair. She had been doing well until recent hernia surgery which may be coincidental. She reported having a bad case of bronchitis that is only controlled with Tussionex. She developed symptoms with the vagina protruding through her introitus. She has to replace this to have a bowel movement. She does not have any stress incontinence and is not sexually active. Options were discussed with the patient and due to recent hernia repair surgery with mesh the decision was made to avoid an abdominal approach in an attempt to correct this vaginally. She understood the risks and complications of the planned procedure, the options in her care and was agreeable to proceed. The large size of a pessary was not an option.     DESCRIPTION OF PROCEDURE: The patient was taken to the operating room and placed under general anesthesia without any complication. She was prepped and draped in the standard fashion. A large protrusion was noted through the introitus. I identified the apex of the vagina and did a rectovaginal examination confirming that this was an enterocele. After re-gloving, I grasped the midpoint of the enterocele with 2 Allis clamps and injected local anesthesia, and made linear incisions in both directions. I freed up the enterocele  contents which was fatty. No bowel was seen. The tissue was freed up using Metzenbaum scissors and wet Ray-Gauri over a gloved finger. I was able to reduce this and close the enterocele with a two pursestring  0 Ethibond sutures. In the dissection I did encounter prior Ethibond suture. The enterocele was closed adequately. I used Vicryl suture to reapproximate some of the more distal/ caudad portions of the defect where the fascia could be brought back together. For the most part, there was no obvious fascia to re-approximate in the defect. I closed these areas with interrupted Ethibond horizontal mattress sutures to reinforce the closure. The excess vaginal mucosa was trimmed away and the vaginal mucosa was closed with a running Vicryl suture. Interrupted Vicryl was used to reapproximate a few gaps in the vaginal closure. The vagina was irrigated and suctioned for blood clots and found to be hemostatic. It did freely admit 2  fingers. A Betadine pack was placed in the vagina. A Donald catheter had been in place the entire case. The patient was awakened and taken to the postoperative recovery room in stable condition. Sponge and needle counts were correct x2.      MD LESLIE Cobos/cam  DD: 04/17/2017 15:40:04  DT: 04/17/2017 16:47:15  Voice Rec. ID #[id number]  Voice Original ID #[id number]  Doc ID #[id number]  Rev. #0  cc:

## 2017-04-17 NOTE — ANESTHESIA PREPROCEDURE EVALUATION
Anesthesia Evaluation     Patient summary reviewed and Nursing notes reviewed      Airway   Mallampati: I  TM distance: <3 FB  Neck ROM: full  no difficulty expected  Dental - normal exam     Pulmonary - negative pulmonary ROS and normal exam   Cardiovascular - negative cardio ROS and normal exam        Neuro/Psych- negative ROS  GI/Hepatic/Renal/Endo - negative ROS     Musculoskeletal (-) negative ROS    Abdominal  - normal exam    Bowel sounds: normal.   Substance History - negative use     OB/GYN negative ob/gyn ROS         Other                                    Anesthesia Plan    ASA 3     general     intravenous induction   Anesthetic plan and risks discussed with patient.

## 2017-04-17 NOTE — H&P
There are no changes in her history or physical exam. She does not have any questions regarding the planned procedure; anterior and posterior vaginal repairs.

## 2017-04-17 NOTE — ANESTHESIA PROCEDURE NOTES
Airway  Urgency: elective    Date/Time: 4/17/2017 1:30 PM  Airway not difficult    General Information and Staff    Patient location during procedure: OR  Anesthesiologist: KRISTINA GUTIERREZ  CRNA: LUIS ENRIQUE CASTILLO    Indications and Patient Condition  Indications for airway management: airway protection    Preoxygenated: yes  MILS maintained throughout  Mask difficulty assessment: 1 - vent by mask    Final Airway Details  Final airway type: endotracheal airway      Successful airway: ETT  Cuffed: yes   Successful intubation technique: direct laryngoscopy  Facilitating devices/methods: cricoid pressure  Endotracheal tube insertion site: oral  Blade: Viola  Blade size: #3  ETT size: 7.0 mm  Cormack-Lehane Classification: grade IIb - view of arytenoids or posterior of glottis only  Placement verified by: chest auscultation   Cuff volume (mL): 6  Measured from: lips  ETT to lips (cm): 20  Number of attempts at approach: 1

## 2017-04-17 NOTE — BRIEF OP NOTE
ANTERIOR AND POSTERIOR VAGINAL REPAIR  Procedure Note    Imelda Elaine  4/17/2017    Pre-op Diagnosis:   Enterocele [K46.9]    Post-op Diagnosis:     Post-Op Diagnosis Codes:     * Enterocele [K46.9]    Procedure/CPT® Codes:      Procedure(s):   Vaginal enterocele repair with anterior posterior colporrhaphy    Surgeon(s):  Jose Maria Chowdhury MD    Anesthesia: General    Staff:   Circulator: Abimael Amaya RN; Sue Lemus RN  Scrub Person: Danin Corrales; Rocio Ashraf RN  Nursing Assistant: Camille Francis  Assistant: Farzaneh Wang PA-C; SHELLEY Angulo    Estimated Blood Loss: * No values recorded between 4/17/2017  1:21 PM and 4/17/2017  3:15 PM *  Urine Voided: * No values recorded between 4/17/2017  1:21 PM and 4/17/2017  3:15 PM *    Specimens:                * No specimens in log *      Drains:   Urethral Catheter 04/17/17 1343 100% silicone 16 (Active)       [REMOVED] Drain/Device Site 06/21/16 1213 other (see comments) lower abdomen collapsible closed device right ----1 (Removed)   Removed 04/17/17 1121       Findings:large enterocele    Complications: none      Jose Maria Chowdhury MD     Date: 4/17/2017  Time: 3:16 PM    Dictated note #[id number]

## 2017-04-18 VITALS
WEIGHT: 293 LBS | RESPIRATION RATE: 18 BRPM | HEIGHT: 63 IN | TEMPERATURE: 98.7 F | BODY MASS INDEX: 51.91 KG/M2 | SYSTOLIC BLOOD PRESSURE: 119 MMHG | OXYGEN SATURATION: 96 % | DIASTOLIC BLOOD PRESSURE: 53 MMHG | HEART RATE: 88 BPM

## 2017-04-18 PROCEDURE — 25010000002 ENOXAPARIN PER 10 MG: Performed by: OBSTETRICS & GYNECOLOGY

## 2017-04-18 PROCEDURE — G0378 HOSPITAL OBSERVATION PER HR: HCPCS

## 2017-04-18 PROCEDURE — 25010000003 CEFAZOLIN IN DEXTROSE 2-4 GM/100ML-% SOLUTION: Performed by: OBSTETRICS & GYNECOLOGY

## 2017-04-18 RX ORDER — ACETAMINOPHEN 325 MG/1
650 TABLET ORAL EVERY 4 HOURS PRN
Refills: 0
Start: 2017-04-18 | End: 2017-11-07

## 2017-04-18 RX ORDER — HYDROCODONE BITARTRATE AND ACETAMINOPHEN 5; 325 MG/1; MG/1
1 TABLET ORAL EVERY 6 HOURS PRN
Qty: 15 TABLET | Refills: 0 | Status: SHIPPED | OUTPATIENT
Start: 2017-04-18 | End: 2017-05-03

## 2017-04-18 RX ADMIN — VENLAFAXINE HYDROCHLORIDE 75 MG: 75 CAPSULE, EXTENDED RELEASE ORAL at 08:45

## 2017-04-18 RX ADMIN — DEXTROSE AND SODIUM CHLORIDE 100 ML/HR: 5; 450 INJECTION, SOLUTION INTRAVENOUS at 03:10

## 2017-04-18 RX ADMIN — ENOXAPARIN SODIUM 40 MG: 40 INJECTION SUBCUTANEOUS at 05:05

## 2017-04-18 RX ADMIN — LOSARTAN POTASSIUM 25 MG: 25 TABLET, FILM COATED ORAL at 08:45

## 2017-04-18 RX ADMIN — CETIRIZINE HYDROCHLORIDE 10 MG: 10 TABLET, FILM COATED ORAL at 08:45

## 2017-04-18 RX ADMIN — CEFAZOLIN SODIUM 2 G: 2 INJECTION, SOLUTION INTRAVENOUS at 05:05
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
## 2017-04-18 NOTE — PLAN OF CARE
Problem: Patient Care Overview (Adult)  Goal: Discharge Needs Assessment  Outcome: Ongoing (interventions implemented as appropriate)    04/17/17 1933 04/18/17 0323   Discharge Needs Assessment   Concerns To Be Addressed --  no discharge needs identified   Readmission Within The Last 30 Days --  no previous admission in last 30 days   Equipment Needed After Discharge --  none   Discharge Disposition --  home or self-care;still a patient   Discharge Planning Comments --  Shabana herrera dc'd this am with vaginal packing removed. IVFs. ABXC. Anticipate discharge today   Current Health   Anticipated Changes Related to Illness --  none   Self-Care   Equipment Currently Used at Home --  none   Living Environment   Transportation Available car;family or friend will provide --          Problem: Pain, Acute (Adult)  Goal: Identify Related Risk Factors and Signs and Symptoms  Outcome: Ongoing (interventions implemented as appropriate)

## 2017-05-03 ENCOUNTER — OFFICE VISIT (OUTPATIENT)
Dept: OBSTETRICS AND GYNECOLOGY | Facility: CLINIC | Age: 67
End: 2017-05-03

## 2017-05-03 VITALS
WEIGHT: 293 LBS | BODY MASS INDEX: 51.91 KG/M2 | SYSTOLIC BLOOD PRESSURE: 130 MMHG | RESPIRATION RATE: 14 BRPM | HEIGHT: 63 IN | DIASTOLIC BLOOD PRESSURE: 84 MMHG

## 2017-05-03 DIAGNOSIS — Z09 SURGERY FOLLOW-UP: Primary | ICD-10-CM

## 2017-05-03 PROCEDURE — 99024 POSTOP FOLLOW-UP VISIT: CPT | Performed by: OBSTETRICS & GYNECOLOGY

## 2017-05-03 RX ORDER — ESTRADIOL 0.5 MG/1
0.5 TABLET ORAL DAILY
COMMUNITY
End: 2017-06-05 | Stop reason: SDUPTHER

## 2017-05-17 ENCOUNTER — OFFICE VISIT (OUTPATIENT)
Dept: OBSTETRICS AND GYNECOLOGY | Facility: CLINIC | Age: 67
End: 2017-05-17

## 2017-05-17 VITALS
RESPIRATION RATE: 14 BRPM | BODY MASS INDEX: 54.28 KG/M2 | SYSTOLIC BLOOD PRESSURE: 120 MMHG | WEIGHT: 293 LBS | DIASTOLIC BLOOD PRESSURE: 80 MMHG

## 2017-05-17 DIAGNOSIS — Z09 SURGERY FOLLOW-UP: Primary | ICD-10-CM

## 2017-05-17 PROCEDURE — 99024 POSTOP FOLLOW-UP VISIT: CPT | Performed by: OBSTETRICS & GYNECOLOGY

## 2017-06-05 RX ORDER — ESTRADIOL 0.5 MG/1
TABLET ORAL
Qty: 90 TABLET | Refills: 0 | Status: SHIPPED | OUTPATIENT
Start: 2017-06-05 | End: 2017-06-28 | Stop reason: SDUPTHER

## 2017-06-28 ENCOUNTER — OFFICE VISIT (OUTPATIENT)
Dept: OBSTETRICS AND GYNECOLOGY | Facility: CLINIC | Age: 67
End: 2017-06-28

## 2017-06-28 VITALS
DIASTOLIC BLOOD PRESSURE: 80 MMHG | RESPIRATION RATE: 16 BRPM | SYSTOLIC BLOOD PRESSURE: 124 MMHG | WEIGHT: 293 LBS | BODY MASS INDEX: 54.1 KG/M2

## 2017-06-28 DIAGNOSIS — Z09 SURGERY FOLLOW-UP: Primary | ICD-10-CM

## 2017-06-28 PROCEDURE — 99024 POSTOP FOLLOW-UP VISIT: CPT | Performed by: OBSTETRICS & GYNECOLOGY

## 2017-06-28 RX ORDER — ESTRADIOL 0.5 MG/1
0.5 TABLET ORAL DAILY
Qty: 90 TABLET | Refills: 1 | Status: SHIPPED | OUTPATIENT
Start: 2017-06-28 | End: 2018-01-02 | Stop reason: SDUPTHER

## 2017-06-28 NOTE — PROGRESS NOTES
Subjective   Chief Complaint   Patient presents with   • Post-op     s/p vag enterocele repair 17     Imelda Elaine is a 66 y.o. year old  presenting to be seen for her post-operative visit.  She had a repair of enterocele.  Currently she reports no problems with eating, bowel movements, voiding, or wound drainage and pain is well controlled.    She is not sexually active.    The following portions of the patient's history were reviewed and updated as appropriate:current medications, allergies and past surgical history    Review of Systems no discharge      Objective   /80  Resp 16  Wt (!) 303 lb (137 kg)  BMI 54.1 kg/m2    General:  well developed; well nourished  no acute distress  obese - Body mass index is 54.1 kg/(m^2).   Abdomen: soft, non-tender; no masses  no umbilical or inginual hernias are present  no hepato-splenomegaly   Pelvis: Clinical staff was present for exam  External genitalia:  normal appearance of the external genitalia including Bartholin's and Anna's glands.  :  urethral meatus normal; urethral hypermobility is absent.  Vaginal:  atrophic mucosal changes are present; there is a bulge c/w rectocele  Adnexa:  normal bimanual exam of the adnexa.  Rectal:  anus visually normal appearing. recto-vaginal exam unremarkable and confirms findings; guaiac negative;   On r/v it confirms a bulge into the vagina ie rectocele          Assessment    clinically she is improved postoperatively.  She has no problems with her bowel movements or urination.  She is on a stool softer and not having problems with defecation no straining.  I can see a bulge in the vagina and an on rectovaginal examination today this is thinner.  I think this does represent a rectocele into the vagina which is asymptomatic.  I explained to her that there must a been a breakdown in the repair were some of the rectum was able to bulge through the sutures.  Since this is asymptomatic I don't think evening else  needs to be done will follow this up if symptoms do develop and we'll reassess what she would like to do.  She is not sexually active.    Plan   1. Follow-up in 6-12 months.  Sooner should she have any difficulties.    Medications Rx this encounter:  New Medications Ordered This Visit   Medications   • estradiol (ESTRACE) 0.5 MG tablet     Sig: Take 1 tablet by mouth Daily.     Dispense:  90 tablet     Refill:  1          This note was electronically signed.    Jose Maria Chowdhury MD  June 28, 2017

## 2017-07-27 ENCOUNTER — OFFICE VISIT (OUTPATIENT)
Dept: CARDIOLOGY | Facility: HOSPITAL | Age: 67
End: 2017-07-27

## 2017-07-27 ENCOUNTER — APPOINTMENT (OUTPATIENT)
Dept: CARDIOLOGY | Facility: HOSPITAL | Age: 67
End: 2017-07-27

## 2017-07-27 VITALS
BODY MASS INDEX: 51.91 KG/M2 | RESPIRATION RATE: 20 BRPM | TEMPERATURE: 97.8 F | DIASTOLIC BLOOD PRESSURE: 95 MMHG | HEIGHT: 63 IN | SYSTOLIC BLOOD PRESSURE: 146 MMHG | WEIGHT: 293 LBS | OXYGEN SATURATION: 98 % | HEART RATE: 91 BPM

## 2017-07-27 DIAGNOSIS — R00.2 PALPITATIONS: Primary | ICD-10-CM

## 2017-07-27 DIAGNOSIS — E66.01 MORBID OBESITY, UNSPECIFIED OBESITY TYPE (HCC): ICD-10-CM

## 2017-07-27 DIAGNOSIS — I10 ESSENTIAL HYPERTENSION: ICD-10-CM

## 2017-07-27 DIAGNOSIS — R00.2 PALPITATIONS: ICD-10-CM

## 2017-07-27 PROCEDURE — 99204 OFFICE O/P NEW MOD 45 MIN: CPT | Performed by: NURSE PRACTITIONER

## 2017-07-27 NOTE — PROGRESS NOTES
"[facility]  [facility]      Encounter Date:07/27/2017     Imelda Elaine  [address] [facility] [address]      1950    Comfort Colunga MD    Imelda Elaine is a 66 y.o. female.      Subjective:     Chief Complaint:  Palpitations       Palpitations    This is a recurrent (1-2 times per year) problem. The current episode started more than 1 month ago (56-8 weeks ago). The problem occurs intermittently. The problem has been gradually worsening. On average, each episode lasts 5 minutes (longer in duration). Exacerbated by: noticed at night.  After waking to go to restroom or waking to turn in bed then starts. Associated symptoms include an irregular heartbeat and shortness of breath (baseline). Pertinent negatives include no anxiety, chest pain, coughing, diaphoresis, dizziness, fever, malaise/fatigue, nausea, near-syncope, syncope, vomiting or weakness. Associated symptoms comments: Described palpitations as hard and fast, pounding.  Feels in neck, head, chest \"Very uncomfortable\"  No pain, dizziness, sob.  Reports being inactive.  Mild activity causes SOB, but not worsening.  No exertional Chest Pain or pressure.    Hx of stress test [facility], remote. No LHC or stents.  No MI. She has tried nothing for the symptoms. Risk factors include obesity, dyslipidemia, post menopause and sedentary lifestyle. There is no history of anemia, anxiety, drug use, heart disease, hyperthyroidism or a valve disorder.        No problems updated.    Past Surgical History:   Procedure Laterality Date   • ANTERIOR AND POSTERIOR VAGINAL REPAIR N/A 4/17/2017    Procedure:  Vaginal enterocele repair with anterior posterior colporrhaphy;  Surgeon: Jose Maria Chowdhury MD;  [facility];  Service:    • BLADDER AUGMENTATION      MESH PLACED   • COLONOSCOPY      2012   • EXPLORATORY LAPAROTOMY      \"BOWEL INTO PELVIC BONE\"  WRAPPED WITH MESH????   • HYSTERECTOMY  1989    WITH BSO    • KNEE ARTHROSCOPY Left  "    meniscus repair   • LAPAROSCOPIC CHOLECYSTECTOMY     • OOPHORECTOMY  1989   • TONSILLECTOMY     • TUBAL ABDOMINAL LIGATION     • VENTRAL HERNIA REPAIR N/A 6/21/2016    Procedure: LAPAROSCOPIC REPAIR OF ABDOMINAL WALL HERNIA WITH MESH, CONVERTED TO OPEN ;  Surgeon: Bertha Delacruz MD;  Location: [facility];  Service:        No Known Allergies      Current Outpatient Prescriptions:   •  acetaminophen (TYLENOL) 325 MG tablet, Take 2 tablets by mouth Every 4 (Four) Hours As Needed for Mild Pain (1-3)., Disp: , Rfl: 0  •  atorvastatin (LIPITOR) 10 MG tablet, Take 10 mg by mouth Daily., Disp: , Rfl:   •  celecoxib (CeleBREX) 200 MG capsule, Take 200 mg by mouth daily., Disp: , Rfl:   •  cetirizine (ZyrTEC) 10 MG tablet, Take 10 mg by mouth daily., Disp: , Rfl:   •  estradiol (ESTRACE) 0.5 MG tablet, Take 1 tablet by mouth Daily., Disp: 90 tablet, Rfl: 1  •  fluticasone (FLONASE) 50 MCG/ACT nasal spray, 2 sprays into each nostril daily., Disp: , Rfl:   •  lansoprazole (PREVACID) 30 MG capsule, Take 30 mg by mouth daily., Disp: , Rfl:   •  losartan (COZAAR) 25 MG tablet, Take 25 mg by mouth Daily., Disp: , Rfl:   •  venlafaxine XR (EFFEXOR-XR) 75 MG 24 hr capsule, Take 75 mg by mouth Daily., Disp: , Rfl:     The following portions of the patient's history were reviewed and updated as appropriate: allergies, current medications, past family history, past medical history, past social history, past surgical history and problem list.    Review of Systems   Constitution: Negative for chills, decreased appetite, diaphoresis, fever, weakness, malaise/fatigue, night sweats, weight gain and weight loss.   HENT: Negative for congestion, headaches and nosebleeds.    Eyes: Negative for blurred vision, visual disturbance and visual halos.   Cardiovascular: Positive for dyspnea on exertion, irregular heartbeat and palpitations (at night). Negative for chest pain, claudication, cyanosis, leg swelling, near-syncope, orthopnea,  "paroxysmal nocturnal dyspnea and syncope.   Respiratory: Positive for shortness of breath (baseline). Negative for cough, hemoptysis, sleep disturbances due to breathing, snoring, sputum production and wheezing.    Endocrine: Negative for cold intolerance, heat intolerance, polydipsia, polyphagia and polyuria.   Hematologic/Lymphatic: Does not bruise/bleed easily.   Skin: Negative for dry skin, itching and rash.   Musculoskeletal: Positive for back pain and joint pain (chronic back and knee pain.  ). Negative for falls, joint swelling, muscle weakness and myalgias.   Gastrointestinal: Positive for heartburn (controlled). Negative for bloating, abdominal pain, constipation, diarrhea, dysphagia, melena, nausea and vomiting.   Genitourinary: Negative for dysuria, flank pain, hematuria and nocturia.   Neurological: Negative for difficulty with concentration, excessive daytime sleepiness, dizziness and loss of balance.   Psychiatric/Behavioral: Negative for altered mental status and depression. The patient is not nervous/anxious.    Allergic/Immunologic: Positive for environmental allergies (seasonal allergies).       Objective:     Vitals:    07/27/17 0826 07/27/17 0828 07/27/17 0834   BP: 144/75 137/73 146/95   BP Location: Right arm Left arm Left arm   Patient Position: Sitting Sitting Sitting   Pulse: 85 83 91   Resp: 20     Temp: 97.8 °F (36.6 °C)     TempSrc: Temporal Artery      SpO2: 98%     Weight: (!) 310 lb 6.4 oz (141 kg)     Height: 63\" (160 cm)           Physical Exam   Constitutional: She is oriented to person, place, and time. She appears well-developed. No distress.   HENT:   Head: Normocephalic and atraumatic.   Mouth/Throat: Oropharynx is clear and moist.   Eyes: Conjunctivae are normal. Pupils are equal, round, and reactive to light. No scleral icterus.   Neck: No hepatojugular reflux and no JVD present. Carotid bruit is not present. No tracheal deviation present. No thyromegaly present. "   Cardiovascular: Normal rate, regular rhythm, normal heart sounds and intact distal pulses.  Exam reveals no friction rub.    No murmur heard.  Pulmonary/Chest: Effort normal and breath sounds normal.   Abdominal: Soft. Bowel sounds are normal. She exhibits no distension. There is no tenderness.   Musculoskeletal: She exhibits no edema.   Lymphadenopathy:     She has no cervical adenopathy.   Neurological: She is alert and oriented to person, place, and time.   Skin: Skin is warm, dry and intact. No rash noted. No cyanosis or erythema. No pallor.   Psychiatric: She has a normal mood and affect. Her behavior is normal. Thought content normal.   Vitals reviewed.      Lab and Diagnostic Review:  EKG sinus rhythm, 74 bpm, 7/12/17    Reports having recent labs with PCP, will request for review.  If needed will order labs.  Assessment and Plan:         1. Palpitations    - Cardiac Event Monitor; 30 day, Dr. Sanchez to read    - Adult Transthoracic Echo Complete; Future      Discussed risk for VINNIE.  Will discuss further at next office visit.      2. Essential hypertension  Controlled today  Losartan    3. Morbid obesity, unspecified obesity type  BMI 55  Diet and exercise modification for weight loss.    F/u 6 weeks and PRN      *Please note that portions of this note were completed with a voice recognition program. Efforts were made to edit the dictations, but occasionally words are mistranscribed.

## 2017-08-08 ENCOUNTER — HOSPITAL ENCOUNTER (OUTPATIENT)
Dept: CARDIOLOGY | Facility: HOSPITAL | Age: 67
Discharge: HOME | End: 2017-08-08
Admitting: NURSE PRACTITIONER

## 2017-08-08 VITALS — BODY MASS INDEX: 51.91 KG/M2 | HEIGHT: 63 IN | WEIGHT: 293 LBS

## 2017-08-08 DIAGNOSIS — R00.2 PALPITATIONS: ICD-10-CM

## 2017-08-08 LAB
BH CV ECHO MEAS - AO ROOT AREA (BSA CORRECTED): 1.3
BH CV ECHO MEAS - AO ROOT AREA: 7.1 CM^2
BH CV ECHO MEAS - AO ROOT DIAM: 3 CM
BH CV ECHO MEAS - BSA(HAYCOCK): 2.6 M^2
BH CV ECHO MEAS - BSA: 2.3 M^2
BH CV ECHO MEAS - BZI_BMI: 54.9 KILOGRAMS/M^2
BH CV ECHO MEAS - BZI_METRIC_HEIGHT: 160 CM
BH CV ECHO MEAS - BZI_METRIC_WEIGHT: 140.6 KG
BH CV ECHO MEAS - CONTRAST EF (2CH): 69.5 ML/M^2
BH CV ECHO MEAS - CONTRAST EF 4CH: 56.5 ML/M^2
BH CV ECHO MEAS - EDV(CUBED): 66.4 ML
BH CV ECHO MEAS - EDV(MOD-SP2): 82 ML
BH CV ECHO MEAS - EDV(MOD-SP4): 46 ML
BH CV ECHO MEAS - EDV(TEICH): 72.1 ML
BH CV ECHO MEAS - EF(CUBED): 72.6 %
BH CV ECHO MEAS - EF(MOD-SP2): 69.5 %
BH CV ECHO MEAS - EF(MOD-SP4): 56 %
BH CV ECHO MEAS - EF(TEICH): 64.9 %
BH CV ECHO MEAS - ESV(CUBED): 18.2 ML
BH CV ECHO MEAS - ESV(MOD-SP2): 25 ML
BH CV ECHO MEAS - ESV(MOD-SP4): 20 ML
BH CV ECHO MEAS - ESV(TEICH): 25.3 ML
BH CV ECHO MEAS - FS: 35.1 %
BH CV ECHO MEAS - IVS/LVPW: 1.1
BH CV ECHO MEAS - IVSD: 1.1 CM
BH CV ECHO MEAS - LA DIMENSION: 4.4 CM
BH CV ECHO MEAS - LA/AO: 1.5
BH CV ECHO MEAS - LAT PEAK E' VEL: 11.4 CM/SEC
BH CV ECHO MEAS - LV DIASTOLIC VOL/BSA (35-75): 19.7 ML/M^2
BH CV ECHO MEAS - LV MASS(C)D: 147.5 GRAMS
BH CV ECHO MEAS - LV MASS(C)DI: 63.3 GRAMS/M^2
BH CV ECHO MEAS - LV SYSTOLIC VOL/BSA (12-30): 8.6 ML/M^2
BH CV ECHO MEAS - LVIDD: 4.1 CM
BH CV ECHO MEAS - LVIDS: 2.6 CM
BH CV ECHO MEAS - LVLD AP2: 8.2 CM
BH CV ECHO MEAS - LVLD AP4: 7 CM
BH CV ECHO MEAS - LVLS AP2: 6.5 CM
BH CV ECHO MEAS - LVLS AP4: 6.4 CM
BH CV ECHO MEAS - LVPWD: 1.1 CM
BH CV ECHO MEAS - MED PEAK E' VEL: 7.9 CM/SEC
BH CV ECHO MEAS - MV A MAX VEL: 72.6 CM/SEC
BH CV ECHO MEAS - MV DEC SLOPE: 221 CM/SEC^2
BH CV ECHO MEAS - MV DEC TIME: 0.25 SEC
BH CV ECHO MEAS - MV E MAX VEL: 65.6 CM/SEC
BH CV ECHO MEAS - MV E/A: 0.9
BH CV ECHO MEAS - PA ACC SLOPE: 613 CM/SEC^2
BH CV ECHO MEAS - PA ACC TIME: 0.15 SEC
BH CV ECHO MEAS - PA PR(ACCEL): 11.1 MMHG
BH CV ECHO MEAS - RAP SYSTOLE: 3 MMHG
BH CV ECHO MEAS - RVDD: 3.6 CM
BH CV ECHO MEAS - RVSP: 31.6 MMHG
BH CV ECHO MEAS - SI(CUBED): 20.7 ML/M^2
BH CV ECHO MEAS - SI(MOD-SP2): 24.5 ML/M^2
BH CV ECHO MEAS - SI(MOD-SP4): 11.2 ML/M^2
BH CV ECHO MEAS - SI(TEICH): 20.1 ML/M^2
BH CV ECHO MEAS - SV(CUBED): 48.2 ML
BH CV ECHO MEAS - SV(MOD-SP2): 57 ML
BH CV ECHO MEAS - SV(MOD-SP4): 26 ML
BH CV ECHO MEAS - SV(TEICH): 46.8 ML
BH CV ECHO MEAS - TAPSE (>1.6): 2.4 CM2
BH CV ECHO MEAS - TR MAX VEL: 267 CM/SEC
BH CV VAS BP LEFT ARM: NORMAL MMHG
BH CV XLRA - RV BASE: 3.1 CM
BH CV XLRA - RV LENGTH: 6.8 CM
BH CV XLRA - RV MID: 2.6 CM
BH CV XLRA - TDI S': 18.9 CM/SEC
E/E' RATIO: 9.6
LEFT ATRIUM VOLUME INDEX: 21 ML/M2
LV EF 2D ECHO EST: 70 %

## 2017-08-08 PROCEDURE — 93306 TTE W/DOPPLER COMPLETE: CPT

## 2017-08-08 PROCEDURE — 93306 TTE W/DOPPLER COMPLETE: CPT | Performed by: INTERNAL MEDICINE

## 2017-08-15 PROBLEM — R00.2 PALPITATIONS: Status: ACTIVE | Noted: 2017-08-15

## 2017-09-07 ENCOUNTER — OFFICE VISIT (OUTPATIENT)
Dept: CARDIOLOGY | Facility: HOSPITAL | Age: 67
End: 2017-09-07

## 2017-09-07 VITALS
TEMPERATURE: 97.9 F | RESPIRATION RATE: 20 BRPM | OXYGEN SATURATION: 96 % | HEIGHT: 63 IN | DIASTOLIC BLOOD PRESSURE: 94 MMHG | SYSTOLIC BLOOD PRESSURE: 142 MMHG | WEIGHT: 293 LBS | HEART RATE: 89 BPM | BODY MASS INDEX: 51.91 KG/M2

## 2017-09-07 DIAGNOSIS — R53.83 OTHER FATIGUE: ICD-10-CM

## 2017-09-07 DIAGNOSIS — I10 ESSENTIAL HYPERTENSION: ICD-10-CM

## 2017-09-07 DIAGNOSIS — R06.83 SNORING: ICD-10-CM

## 2017-09-07 DIAGNOSIS — E66.01 MORBID OBESITY DUE TO EXCESS CALORIES (HCC): ICD-10-CM

## 2017-09-07 DIAGNOSIS — R00.2 PALPITATIONS: Primary | ICD-10-CM

## 2017-09-07 PROCEDURE — 99214 OFFICE O/P EST MOD 30 MIN: CPT | Performed by: NURSE PRACTITIONER

## 2017-09-07 RX ORDER — IPRATROPIUM BROMIDE 42 UG/1
1 SPRAY, METERED NASAL AS NEEDED
COMMUNITY
Start: 2017-09-06

## 2017-09-07 RX ORDER — METOPROLOL SUCCINATE 25 MG/1
25 TABLET, EXTENDED RELEASE ORAL DAILY
Qty: 30 TABLET | Refills: 3 | Status: SHIPPED | OUTPATIENT
Start: 2017-09-07 | End: 2020-10-15 | Stop reason: DRUGHIGH

## 2017-09-07 RX ORDER — LORATADINE 10 MG/1
10 TABLET ORAL DAILY
COMMUNITY
Start: 2017-07-27 | End: 2017-11-07

## 2017-09-07 NOTE — PROGRESS NOTES
"[facility]  [facility]      Encounter Date:09/07/2017     Imelda Elaine  [address]  [facility] [address]      1950    Comfort Colunga MD    Imelda Elaine is a 66 y.o. female.      Subjective:     Chief Complaint:  Follow-up (palpitations)       HPI     Palpitations    This is a recurrent (1-2 times per year) problem. The current episode started more than 2 months ago. The problem occurs intermittently. The problem waxing and waning.  Reports having 2 episodes since last office visit 07/27/17.   On average, each episode lasts 5 minutes or less. Exacerbated by: noticed more at night, but can occur during the day.  After waking to go to restroom or waking to turn in bed then starts. Associated symptoms include an irregular heartbeat and shortness of breath (baseline, mild with exertion). Pertinent negatives include no anxiety, exertional CP or worsening exertional dyspnea, coughing, diaphoresis, dizziness, fever, malaise/fatigue, nausea, near-syncope, syncope, vomiting or weakness. Associated symptoms comments: Described palpitations as hard and fast, pounding.  Feels in neck, head, chest \"Very uncomfortable\"  No pain, dizziness, sob.  Reports being inactive/sedintary (due to weight, knee and back pain).       Hx of stress test [facility], remote (unable to obtain records). No LHC or stents.  No MI. She has tried nothing for the symptoms. Risk factors include morbid obesity, dyslipidemia, post menopause and sedentary lifestyle, hypertesion.    Wore 30 day event monitor.  Only able to wear for 10 days (\"aggrevating\").  Recent echo completed as well.  Pt is not taking home HR or BP log.  She is participating in \"H[facility] weight loss program\".  Has lost 6 lbs since last office visit.    Pt reports occasional snoring (per spouse report), Wakes 2x per night.  Day time fatigue and napping.  No hx of sleep study.    Patient Active Problem List    Diagnosis   • Essential hypertension " "[I10]   • Palpitations [R00.2]     Overview Note:     · Echocardiogram 8/8/17: EF 70%, grade 1 diastolic dysfunction, mild MR, mild TR, aortic valve sclerosis noted without aortic regurgitation or stenosis  · 30 day event monitor 7/27/17.  Patient only wore for 10 days.  No manual detected events.  Sinus rhythm and sinus arrhythmia noted with one PVC.     • Status post abdominal hysterectomy [Z90.710]     Overview Note:     1989     • Post-menopausal [Z78.0]   • Enterocele [K46.9]   • Morbid obesity [E66.01]   • Incisional hernia [K43.2]         Past Surgical History:   Procedure Laterality Date   • ANTERIOR AND POSTERIOR VAGINAL REPAIR N/A 4/17/2017    Procedure:  Vaginal enterocele repair with anterior posterior colporrhaphy;  Surgeon: Jose Maria Chowdhury MD;  Location: [facility] OR;  Service:    • BLADDER AUGMENTATION      MESH PLACED   • COLONOSCOPY      2012   • EXPLORATORY LAPAROTOMY      \"BOWEL INTO PELVIC BONE\"  WRAPPED WITH MESH????   • HYSTERECTOMY  1989    WITH BSO    • KNEE ARTHROSCOPY Left     meniscus repair   • LAPAROSCOPIC CHOLECYSTECTOMY     • OOPHORECTOMY  1989   • TONSILLECTOMY     • TUBAL ABDOMINAL LIGATION     • VENTRAL HERNIA REPAIR N/A 6/21/2016    Procedure: LAPAROSCOPIC REPAIR OF ABDOMINAL WALL HERNIA WITH MESH, CONVERTED TO OPEN ;  Surgeon: Bertha Delacruz MD;  Location: [facility] OR;  Service:        No Known Allergies      Current Outpatient Prescriptions:   •  acetaminophen (TYLENOL) 325 MG tablet, Take 2 tablets by mouth Every 4 (Four) Hours As Needed for Mild Pain (1-3)., Disp: , Rfl: 0  •  atorvastatin (LIPITOR) 10 MG tablet, Take 10 mg by mouth Daily., Disp: , Rfl:   •  celecoxib (CeleBREX) 200 MG capsule, Take 200 mg by mouth daily., Disp: , Rfl:   •  cetirizine (ZyrTEC) 10 MG tablet, Take 10 mg by mouth daily., Disp: , Rfl:   •  estradiol (ESTRACE) 0.5 MG tablet, Take 1 tablet by mouth Daily., Disp: 90 tablet, Rfl: 1  •  fluticasone (FLONASE) 50 MCG/ACT nasal spray, 2 sprays into each " nostril daily., Disp: , Rfl:   •  lansoprazole (PREVACID) 30 MG capsule, Take 30 mg by mouth daily., Disp: , Rfl:   •  losartan (COZAAR) 25 MG tablet, Take 25 mg by mouth Daily., Disp: , Rfl:   •  venlafaxine XR (EFFEXOR-XR) 75 MG 24 hr capsule, Take 75 mg by mouth Daily., Disp: , Rfl:     The following portions of the patient's history were reviewed and updated as appropriate: allergies, current medications, past family history, past medical history, past social history, past surgical history and problem list.    Review of Systems   Constitution: Negative for chills, decreased appetite, diaphoresis, fever, weakness, malaise/fatigue, night sweats, weight gain and weight loss.   HENT: Negative for congestion, headaches and nosebleeds.    Eyes: Negative for blurred vision, visual disturbance and visual halos.   Cardiovascular: Positive for claudication, dyspnea on exertion, irregular heartbeat and leg swelling. Negative for chest pain, cyanosis, near-syncope, orthopnea, palpitations, paroxysmal nocturnal dyspnea and syncope.   Respiratory: Negative for cough, hemoptysis, shortness of breath, sleep disturbances due to breathing, snoring, sputum production and wheezing.    Endocrine: Negative for cold intolerance, heat intolerance, polydipsia, polyphagia and polyuria.   Hematologic/Lymphatic: Does not bruise/bleed easily.   Skin: Negative for dry skin, itching and rash.   Musculoskeletal: Positive for falls. Negative for joint pain, joint swelling, muscle weakness and myalgias.   Gastrointestinal: Negative for bloating, abdominal pain, constipation, diarrhea, dysphagia, heartburn, melena, nausea and vomiting.   Genitourinary: Negative for dysuria, flank pain, hematuria and nocturia.   Neurological: Negative for difficulty with concentration, excessive daytime sleepiness, dizziness and loss of balance.   Psychiatric/Behavioral: Negative for altered mental status and depression. The patient is not nervous/anxious.   "  Allergic/Immunologic: Positive for environmental allergies (seasonal).       Objective:     Vitals:    09/07/17 0928 09/07/17 0929 09/07/17 0931 09/07/17 0934   BP: 161/98 158/94 (!) 173/117 142/94   BP Location: Right arm Left arm Left arm Left arm   Patient Position: Sitting Sitting Standing Sitting   Pulse: 81 79 89    Resp: 20      Temp: 97.9 °F (36.6 °C)      TempSrc: Temporal Artery       SpO2: 96%      Weight: (!) 304 lb (138 kg)      Height: 63\" (160 cm)            Physical Exam   Constitutional: She is oriented to person, place, and time. No distress.   HENT:   Head: Normocephalic and atraumatic.   Mouth/Throat: Oropharynx is clear and moist.   Eyes: Conjunctivae are normal. Pupils are equal, round, and reactive to light. No scleral icterus.   Neck: No hepatojugular reflux and no JVD present. Carotid bruit is not present. No tracheal deviation present. No thyromegaly present.   Cardiovascular: Normal rate, regular rhythm, normal heart sounds and intact distal pulses.  Exam reveals no friction rub.    No murmur heard.  Pulmonary/Chest: Effort normal and breath sounds normal.   Abdominal: Soft. Bowel sounds are normal. She exhibits no distension. There is no tenderness.   Musculoskeletal: She exhibits no edema.   Lymphadenopathy:     She has no cervical adenopathy.   Neurological: She is alert and oriented to person, place, and time.   Skin: Skin is warm, dry and intact. No rash noted. No cyanosis or erythema. No pallor.   Psychiatric: She has a normal mood and affect. Her behavior is normal. Thought content normal.   Vitals reviewed.      Lab and Diagnostic Review:  Request recent labs from PCP  REquest recent stress test    Reviewed echo and event monitor results and discussed with pt.  Assessment and Plan:         1. Palpitations    - metoprolol succinate XL (TOPROL-XL) 25 MG 24 hr tablet; Take 1 tablet by mouth Daily.  Dispense: 30 tablet; Refill: 3    2. Essential hypertension  On ARB    Keep BP and HR " log    3. Other fatigue    - Ambulatory Referral to Sleep Lab    4. Snoring    5.  Morbid obesity  Discussed diet and exercise for weight loss  Encouraged pt to consider water exercises due to knee pain.    F/u 3 months.  ? Consideration for PET scan in future.    *Please note that portions of this note were completed with a voice recognition program. Efforts were made to edit the dictations, but occasionally words are mistranscribed.

## 2017-09-11 ENCOUNTER — DOCUMENTATION (OUTPATIENT)
Dept: CARDIOLOGY | Facility: HOSPITAL | Age: 67
End: 2017-09-11

## 2017-09-11 ENCOUNTER — TELEPHONE (OUTPATIENT)
Dept: CARDIOLOGY | Facility: HOSPITAL | Age: 67
End: 2017-09-11

## 2017-09-11 NOTE — PROGRESS NOTES
Requested Labs results reviewed    06/15/17:  Na 143, K 5.0, Chloride 105, Carbon dioxide 30, Glucose 100, Bun 17, creatinine 0.68, calcioum 9.5, GFR >60  TSH 0.46  AlT 11, AST 14  Cholesterol 183, Trig 83, HDL 74.6, LDL 92

## 2017-09-11 NOTE — TELEPHONE ENCOUNTER
----- Message from Peg Anaya MA sent at 9/7/2017  2:48 PM EDT -----  9/7/17- med rec has no stress test prior to epic on this pt.    Labs received from PCP.  ----- Message -----     From: FLOR Medina     Sent: 9/7/2017  10:35 AM       To: Peg Anaya MA    See if stress test in medical records for [facility] prior to epic.      PCP:  Request last CMP/BMP, Lipid, TSH, CBC

## 2017-11-07 ENCOUNTER — APPOINTMENT (OUTPATIENT)
Dept: PREADMISSION TESTING | Facility: HOSPITAL | Age: 67
End: 2017-11-07

## 2017-11-07 ENCOUNTER — HOSPITAL ENCOUNTER (OUTPATIENT)
Dept: GENERAL RADIOLOGY | Facility: HOSPITAL | Age: 67
Discharge: HOME | End: 2017-11-07
Admitting: ORTHOPAEDIC SURGERY

## 2017-11-07 VITALS — BODY MASS INDEX: 51.91 KG/M2 | HEIGHT: 63 IN | WEIGHT: 293 LBS

## 2017-11-07 LAB
ANION GAP SERPL CALCULATED.3IONS-SCNC: 1 MMOL/L (ref 3–11)
BUN BLD-MCNC: 19 MG/DL (ref 9–23)
BUN/CREAT SERPL: 27.1 (ref 7–25)
CALCIUM SPEC-SCNC: 8.9 MG/DL (ref 8.7–10.4)
CHLORIDE SERPL-SCNC: 107 MMOL/L (ref 99–109)
CO2 SERPL-SCNC: 31 MMOL/L (ref 20–31)
CREAT BLD-MCNC: 0.7 MG/DL (ref 0.6–1.3)
DEPRECATED RDW RBC AUTO: 47.7 FL (ref 37–54)
ERYTHROCYTE [DISTWIDTH] IN BLOOD BY AUTOMATED COUNT: 14.9 % (ref 11.3–14.5)
GFR SERPL CREATININE-BSD FRML MDRD: 84 ML/MIN/1.73
GLUCOSE BLD-MCNC: 88 MG/DL (ref 70–100)
HBA1C MFR BLD: 5.8 % (ref 4.8–5.6)
HCT VFR BLD AUTO: 40.2 % (ref 34.5–44)
HGB BLD-MCNC: 13.3 G/DL (ref 11.5–15.5)
MCH RBC QN AUTO: 29.2 PG (ref 27–31)
MCHC RBC AUTO-ENTMCNC: 33.1 G/DL (ref 32–36)
MCV RBC AUTO: 88.2 FL (ref 80–99)
PLATELET # BLD AUTO: 179 10*3/MM3 (ref 150–450)
PMV BLD AUTO: 11.3 FL (ref 6–12)
POTASSIUM BLD-SCNC: 4.6 MMOL/L (ref 3.5–5.5)
RBC # BLD AUTO: 4.56 10*6/MM3 (ref 3.89–5.14)
SODIUM BLD-SCNC: 139 MMOL/L (ref 132–146)
WBC NRBC COR # BLD: 6.46 10*3/MM3 (ref 3.5–10.8)

## 2017-11-07 PROCEDURE — 80048 BASIC METABOLIC PNL TOTAL CA: CPT | Performed by: ORTHOPAEDIC SURGERY

## 2017-11-07 PROCEDURE — 36415 COLL VENOUS BLD VENIPUNCTURE: CPT

## 2017-11-07 PROCEDURE — 71020 HC CHEST PA AND LATERAL: CPT

## 2017-11-07 PROCEDURE — 83036 HEMOGLOBIN GLYCOSYLATED A1C: CPT | Performed by: ORTHOPAEDIC SURGERY

## 2017-11-07 PROCEDURE — 85027 COMPLETE CBC AUTOMATED: CPT | Performed by: ORTHOPAEDIC SURGERY

## 2017-11-07 RX ORDER — LEVOCETIRIZINE DIHYDROCHLORIDE 5 MG/1
5 TABLET, FILM COATED ORAL EVERY EVENING
COMMUNITY
End: 2020-10-15

## 2017-11-07 RX ORDER — ECHINACEA PURPUREA EXTRACT 125 MG
2 TABLET ORAL DAILY
COMMUNITY
End: 2020-10-15

## 2017-11-07 ASSESSMENT — KOOS JR
KOOS JR SCORE: 15
KOOS JR SCORE: 50.012

## 2017-11-07 NOTE — DISCHARGE INSTRUCTIONS
The following information and instructions were given:    NPO after MN except sips of water with routine prescribed medication (except blood thinner, diabetes, or weight reducing medication) unless otherwise instructed by your physician.  Do not eat, drink, smoke or chew gum after MN the night before surgery. This also includes no mints.    DO NOT shave for two days before your procedure.  Do not wear makeup.  Fingernail polish, gel nail polish, acrylic nails must be removed for surgery.  If you are having surgery on a lower extremity, you must remove toenail polish from all toes on both feet.  (If you have had a recent manicure and do not want to remove polish or nails, you must remove polish or nails from the middle finger on each hand).    Remove all jewelry (advised to go to jeweler if unable to remove).    Leave anything you consider valuable at home.    Leave your suitcase in the car until after your surgery.    Bring the following with you (if applicable)   -picture ID and insurance cards   -Co-pay/deductible required by insurance   -Medications in the original bottles (not a list) including all over-the-counter  medications if not brought to PAT   -Copy of advance directive, living will or power of  documents if not  brought to PAT   -CPAP or BIPAP mask and tubing (do not bring machine)   -Skin prep instructions sheet   -PAT Pass   Education booklet, brochure, handout or binder given to patient.    Pain Control After Surgery handout given to patient.    Respirex use (handout given to patient) and pneumonia prevention.    Signs and Symptoms of infection.    DVT Prevention stressing the importance of ambulation.    Patient to apply Chlorhexadine wipes to surgical area (as instructed) the night before procedure and the AM of procedure.

## 2017-11-07 NOTE — PAT
MEASURED FOR TEDS/SCDS IN PAT    CALF MEASUREMENT    24IN  LENGTH MEASUREMENT 14IN    EKG ON CHART FROM 7-2017 PER PCP     PT ATTENDED JOINT CLASS TODAY    PASSED MEMORY SCREEN 5/5

## 2017-11-16 ENCOUNTER — APPOINTMENT (OUTPATIENT)
Dept: GENERAL RADIOLOGY | Facility: HOSPITAL | Age: 67
End: 2017-11-16

## 2017-11-16 ENCOUNTER — ANESTHESIA EVENT (OUTPATIENT)
Dept: PERIOP | Facility: HOSPITAL | Age: 67
End: 2017-11-16

## 2017-11-16 ENCOUNTER — HOSPITAL ENCOUNTER (INPATIENT)
Facility: HOSPITAL | Age: 67
LOS: 2 days | Discharge: HOME HEALTH | End: 2017-11-18
Attending: ORTHOPAEDIC SURGERY | Admitting: ORTHOPAEDIC SURGERY

## 2017-11-16 ENCOUNTER — ANESTHESIA (OUTPATIENT)
Dept: PERIOP | Facility: HOSPITAL | Age: 67
End: 2017-11-16

## 2017-11-16 DIAGNOSIS — M17.12 PRIMARY OSTEOARTHRITIS OF LEFT KNEE: ICD-10-CM

## 2017-11-16 DIAGNOSIS — Z74.09 IMPAIRED FUNCTIONAL MOBILITY, BALANCE, GAIT, AND ENDURANCE: Primary | ICD-10-CM

## 2017-11-16 DIAGNOSIS — Z96.652 S/P TOTAL KNEE ARTHROPLASTY, LEFT: ICD-10-CM

## 2017-11-16 DIAGNOSIS — E66.01 MORBID OBESITY (HCC): ICD-10-CM

## 2017-11-16 DIAGNOSIS — Z74.09 IMPAIRED MOBILITY AND ADLS: ICD-10-CM

## 2017-11-16 DIAGNOSIS — Z78.9 IMPAIRED MOBILITY AND ADLS: ICD-10-CM

## 2017-11-16 PROBLEM — R73.03 PREDIABETES: Status: ACTIVE | Noted: 2017-11-16

## 2017-11-16 PROBLEM — E78.5 HLD (HYPERLIPIDEMIA): Status: ACTIVE | Noted: 2017-11-16

## 2017-11-16 LAB
GLUCOSE BLDC GLUCOMTR-MCNC: 78 MG/DL (ref 70–130)
POTASSIUM BLDA-SCNC: 3.97 MMOL/L (ref 3.5–5.3)

## 2017-11-16 PROCEDURE — C1713 ANCHOR/SCREW BN/BN,TIS/BN: HCPCS | Performed by: ORTHOPAEDIC SURGERY

## 2017-11-16 PROCEDURE — 25010000002 PROPOFOL 1000 MG/ML EMULSION: Performed by: NURSE ANESTHETIST, CERTIFIED REGISTERED

## 2017-11-16 PROCEDURE — 25010000002 HYDROMORPHONE PER 4 MG: Performed by: ORTHOPAEDIC SURGERY

## 2017-11-16 PROCEDURE — 73560 X-RAY EXAM OF KNEE 1 OR 2: CPT

## 2017-11-16 PROCEDURE — C1755 CATHETER, INTRASPINAL: HCPCS | Performed by: ORTHOPAEDIC SURGERY

## 2017-11-16 PROCEDURE — 25010000002 ONDANSETRON PER 1 MG: Performed by: NURSE ANESTHETIST, CERTIFIED REGISTERED

## 2017-11-16 PROCEDURE — 0SRD0J9 REPLACEMENT OF LEFT KNEE JOINT WITH SYNTHETIC SUBSTITUTE, CEMENTED, OPEN APPROACH: ICD-10-PCS | Performed by: ORTHOPAEDIC SURGERY

## 2017-11-16 PROCEDURE — 25010000002 ROPIVACAINE PER 1 MG: Performed by: NURSE ANESTHETIST, CERTIFIED REGISTERED

## 2017-11-16 PROCEDURE — 25010000003 CEFAZOLIN IN DEXTROSE 2-4 GM/100ML-% SOLUTION: Performed by: ORTHOPAEDIC SURGERY

## 2017-11-16 PROCEDURE — 82962 GLUCOSE BLOOD TEST: CPT

## 2017-11-16 PROCEDURE — 84132 ASSAY OF SERUM POTASSIUM: CPT | Performed by: ORTHOPAEDIC SURGERY

## 2017-11-16 PROCEDURE — 25010000002 DEXAMETHASONE PER 1 MG: Performed by: NURSE ANESTHETIST, CERTIFIED REGISTERED

## 2017-11-16 PROCEDURE — 25010000002 DIPHENHYDRAMINE PER 50 MG: Performed by: ORTHOPAEDIC SURGERY

## 2017-11-16 PROCEDURE — C1776 JOINT DEVICE (IMPLANTABLE): HCPCS | Performed by: ORTHOPAEDIC SURGERY

## 2017-11-16 PROCEDURE — 25010000002 MORPHINE PER 10 MG: Performed by: ORTHOPAEDIC SURGERY

## 2017-11-16 PROCEDURE — 25010000002 ROPIVACAINE PER 1 MG: Performed by: ORTHOPAEDIC SURGERY

## 2017-11-16 DEVICE — ATTUNE PATELLA MEDIALIZED ANATOMIC 32MM CEMENTED AOX
Type: IMPLANTABLE DEVICE | Site: KNEE | Status: FUNCTIONAL
Brand: ATTUNE

## 2017-11-16 DEVICE — ATTUNE KNEE SYSTEM TIBIAL BASE FIXED BEARING SIZE 5 CEMENTED
Type: IMPLANTABLE DEVICE | Site: KNEE | Status: FUNCTIONAL
Brand: ATTUNE

## 2017-11-16 DEVICE — ATTUNE KNEE SYSTEM FEMORAL POSTERIOR STABILIZED NARROW SIZE 6N LEFT CEMENTED
Type: IMPLANTABLE DEVICE | Site: KNEE | Status: FUNCTIONAL
Brand: ATTUNE

## 2017-11-16 DEVICE — TOTL KN ATTUNE DEPUY 9527038: Type: IMPLANTABLE DEVICE | Site: KNEE | Status: FUNCTIONAL

## 2017-11-16 DEVICE — SMARTSET HIGH PERFORMANCE MV MEDIUM VISCOSITY BONE CEMENT 40G
Type: IMPLANTABLE DEVICE | Status: FUNCTIONAL
Brand: SMARTSET

## 2017-11-16 DEVICE — ATTUNE KNEE SYSTEM TIBIAL INSERT FIXED BEARING POSTERIOR STABILIZED 6 7MM AOX
Type: IMPLANTABLE DEVICE | Site: KNEE | Status: FUNCTIONAL
Brand: ATTUNE

## 2017-11-16 RX ORDER — CETIRIZINE HYDROCHLORIDE 10 MG/1
10 TABLET ORAL DAILY
Status: DISCONTINUED | OUTPATIENT
Start: 2017-11-16 | End: 2017-11-18 | Stop reason: HOSPADM

## 2017-11-16 RX ORDER — SODIUM CHLORIDE 0.9 % (FLUSH) 0.9 %
1-10 SYRINGE (ML) INJECTION AS NEEDED
Status: DISCONTINUED | OUTPATIENT
Start: 2017-11-16 | End: 2017-11-16 | Stop reason: HOSPADM

## 2017-11-16 RX ORDER — HYDROMORPHONE HYDROCHLORIDE 1 MG/ML
0.5 INJECTION, SOLUTION INTRAMUSCULAR; INTRAVENOUS; SUBCUTANEOUS
Status: DISCONTINUED | OUTPATIENT
Start: 2017-11-16 | End: 2017-11-18 | Stop reason: HOSPADM

## 2017-11-16 RX ORDER — DOCUSATE SODIUM 100 MG/1
100 CAPSULE, LIQUID FILLED ORAL 2 TIMES DAILY PRN
Status: DISCONTINUED | OUTPATIENT
Start: 2017-11-16 | End: 2017-11-18 | Stop reason: HOSPADM

## 2017-11-16 RX ORDER — LABETALOL HYDROCHLORIDE 5 MG/ML
5 INJECTION, SOLUTION INTRAVENOUS
Status: DISCONTINUED | OUTPATIENT
Start: 2017-11-16 | End: 2017-11-16 | Stop reason: HOSPADM

## 2017-11-16 RX ORDER — SODIUM CHLORIDE, SODIUM LACTATE, POTASSIUM CHLORIDE, CALCIUM CHLORIDE 600; 310; 30; 20 MG/100ML; MG/100ML; MG/100ML; MG/100ML
9 INJECTION, SOLUTION INTRAVENOUS CONTINUOUS
Status: DISCONTINUED | OUTPATIENT
Start: 2017-11-16 | End: 2017-11-18 | Stop reason: HOSPADM

## 2017-11-16 RX ORDER — DIPHENHYDRAMINE HCL 25 MG
25 CAPSULE ORAL EVERY 6 HOURS PRN
Status: DISCONTINUED | OUTPATIENT
Start: 2017-11-16 | End: 2017-11-18 | Stop reason: HOSPADM

## 2017-11-16 RX ORDER — HYDRALAZINE HYDROCHLORIDE 20 MG/ML
5 INJECTION INTRAMUSCULAR; INTRAVENOUS
Status: DISCONTINUED | OUTPATIENT
Start: 2017-11-16 | End: 2017-11-16 | Stop reason: HOSPADM

## 2017-11-16 RX ORDER — HYDROMORPHONE HYDROCHLORIDE 1 MG/ML
0.5 INJECTION, SOLUTION INTRAMUSCULAR; INTRAVENOUS; SUBCUTANEOUS
Status: DISCONTINUED | OUTPATIENT
Start: 2017-11-16 | End: 2017-11-16 | Stop reason: HOSPADM

## 2017-11-16 RX ORDER — MEPERIDINE HYDROCHLORIDE 25 MG/ML
12.5 INJECTION INTRAMUSCULAR; INTRAVENOUS; SUBCUTANEOUS
Status: DISCONTINUED | OUTPATIENT
Start: 2017-11-16 | End: 2017-11-16 | Stop reason: HOSPADM

## 2017-11-16 RX ORDER — IPRATROPIUM BROMIDE AND ALBUTEROL SULFATE 2.5; .5 MG/3ML; MG/3ML
3 SOLUTION RESPIRATORY (INHALATION) ONCE AS NEEDED
Status: DISCONTINUED | OUTPATIENT
Start: 2017-11-16 | End: 2017-11-16 | Stop reason: HOSPADM

## 2017-11-16 RX ORDER — ONDANSETRON 2 MG/ML
4 INJECTION INTRAMUSCULAR; INTRAVENOUS ONCE AS NEEDED
Status: DISCONTINUED | OUTPATIENT
Start: 2017-11-16 | End: 2017-11-16 | Stop reason: HOSPADM

## 2017-11-16 RX ORDER — ROPIVACAINE HYDROCHLORIDE 2 MG/ML
12 INJECTION, SOLUTION EPIDURAL; INFILTRATION CONTINUOUS
Status: DISCONTINUED | OUTPATIENT
Start: 2017-11-16 | End: 2017-11-17

## 2017-11-16 RX ORDER — MAGNESIUM HYDROXIDE 1200 MG/15ML
LIQUID ORAL AS NEEDED
Status: DISCONTINUED | OUTPATIENT
Start: 2017-11-16 | End: 2017-11-16 | Stop reason: HOSPADM

## 2017-11-16 RX ORDER — PROMETHAZINE HYDROCHLORIDE 25 MG/1
25 TABLET ORAL ONCE AS NEEDED
Status: DISCONTINUED | OUTPATIENT
Start: 2017-11-16 | End: 2017-11-16 | Stop reason: HOSPADM

## 2017-11-16 RX ORDER — ACETAMINOPHEN 325 MG/1
650 TABLET ORAL EVERY 4 HOURS PRN
Status: DISCONTINUED | OUTPATIENT
Start: 2017-11-16 | End: 2017-11-18 | Stop reason: HOSPADM

## 2017-11-16 RX ORDER — ONDANSETRON 2 MG/ML
INJECTION INTRAMUSCULAR; INTRAVENOUS AS NEEDED
Status: DISCONTINUED | OUTPATIENT
Start: 2017-11-16 | End: 2017-11-16 | Stop reason: SURG

## 2017-11-16 RX ORDER — BISACODYL 10 MG
10 SUPPOSITORY, RECTAL RECTAL DAILY PRN
Status: DISCONTINUED | OUTPATIENT
Start: 2017-11-16 | End: 2017-11-18 | Stop reason: HOSPADM

## 2017-11-16 RX ORDER — OXYCODONE AND ACETAMINOPHEN 7.5; 325 MG/1; MG/1
1 TABLET ORAL ONCE AS NEEDED
Status: DISCONTINUED | OUTPATIENT
Start: 2017-11-16 | End: 2017-11-16 | Stop reason: HOSPADM

## 2017-11-16 RX ORDER — DIPHENHYDRAMINE HYDROCHLORIDE 50 MG/ML
25 INJECTION INTRAMUSCULAR; INTRAVENOUS EVERY 6 HOURS PRN
Status: DISCONTINUED | OUTPATIENT
Start: 2017-11-16 | End: 2017-11-18 | Stop reason: HOSPADM

## 2017-11-16 RX ORDER — PROMETHAZINE HYDROCHLORIDE 25 MG/ML
6.25 INJECTION, SOLUTION INTRAMUSCULAR; INTRAVENOUS ONCE AS NEEDED
Status: DISCONTINUED | OUTPATIENT
Start: 2017-11-16 | End: 2017-11-16 | Stop reason: HOSPADM

## 2017-11-16 RX ORDER — BUPIVACAINE HYDROCHLORIDE 5 MG/ML
INJECTION, SOLUTION EPIDURAL; INTRACAUDAL AS NEEDED
Status: DISCONTINUED | OUTPATIENT
Start: 2017-11-16 | End: 2017-11-16 | Stop reason: SURG

## 2017-11-16 RX ORDER — SENNA AND DOCUSATE SODIUM 50; 8.6 MG/1; MG/1
2 TABLET, FILM COATED ORAL 2 TIMES DAILY
Status: DISCONTINUED | OUTPATIENT
Start: 2017-11-16 | End: 2017-11-18 | Stop reason: HOSPADM

## 2017-11-16 RX ORDER — ALBUTEROL SULFATE 90 UG/1
AEROSOL, METERED RESPIRATORY (INHALATION) AS NEEDED
Status: DISCONTINUED | OUTPATIENT
Start: 2017-11-16 | End: 2017-11-16 | Stop reason: SURG

## 2017-11-16 RX ORDER — FENTANYL CITRATE 50 UG/ML
50 INJECTION, SOLUTION INTRAMUSCULAR; INTRAVENOUS
Status: DISCONTINUED | OUTPATIENT
Start: 2017-11-16 | End: 2017-11-16 | Stop reason: HOSPADM

## 2017-11-16 RX ORDER — PROMETHAZINE HYDROCHLORIDE 25 MG/1
25 SUPPOSITORY RECTAL ONCE AS NEEDED
Status: DISCONTINUED | OUTPATIENT
Start: 2017-11-16 | End: 2017-11-16 | Stop reason: HOSPADM

## 2017-11-16 RX ORDER — HYDROCODONE BITARTRATE AND ACETAMINOPHEN 7.5; 325 MG/1; MG/1
1 TABLET ORAL EVERY 4 HOURS PRN
Status: DISCONTINUED | OUTPATIENT
Start: 2017-11-16 | End: 2017-11-18 | Stop reason: HOSPADM

## 2017-11-16 RX ORDER — VENLAFAXINE HYDROCHLORIDE 75 MG/1
75 CAPSULE, EXTENDED RELEASE ORAL DAILY
Status: DISCONTINUED | OUTPATIENT
Start: 2017-11-16 | End: 2017-11-18 | Stop reason: HOSPADM

## 2017-11-16 RX ORDER — ATORVASTATIN CALCIUM 10 MG/1
10 TABLET, FILM COATED ORAL NIGHTLY
Status: DISCONTINUED | OUTPATIENT
Start: 2017-11-16 | End: 2017-11-18 | Stop reason: HOSPADM

## 2017-11-16 RX ORDER — ESTRADIOL 0.5 MG/1
0.5 TABLET ORAL DAILY
Status: DISCONTINUED | OUTPATIENT
Start: 2017-11-16 | End: 2017-11-18 | Stop reason: HOSPADM

## 2017-11-16 RX ORDER — BISACODYL 5 MG/1
10 TABLET, DELAYED RELEASE ORAL DAILY PRN
Status: DISCONTINUED | OUTPATIENT
Start: 2017-11-16 | End: 2017-11-18 | Stop reason: HOSPADM

## 2017-11-16 RX ORDER — FAMOTIDINE 20 MG/1
20 TABLET, FILM COATED ORAL ONCE
Status: DISCONTINUED | OUTPATIENT
Start: 2017-11-16 | End: 2017-11-16

## 2017-11-16 RX ORDER — CEFAZOLIN SODIUM 2 G/100ML
2 INJECTION, SOLUTION INTRAVENOUS ONCE
Status: COMPLETED | OUTPATIENT
Start: 2017-11-16 | End: 2017-11-16

## 2017-11-16 RX ORDER — NALOXONE HCL 0.4 MG/ML
0.4 VIAL (ML) INJECTION AS NEEDED
Status: DISCONTINUED | OUTPATIENT
Start: 2017-11-16 | End: 2017-11-16 | Stop reason: HOSPADM

## 2017-11-16 RX ORDER — LOSARTAN POTASSIUM 25 MG/1
25 TABLET ORAL DAILY
Status: DISCONTINUED | OUTPATIENT
Start: 2017-11-17 | End: 2017-11-18 | Stop reason: HOSPADM

## 2017-11-16 RX ORDER — LIDOCAINE HYDROCHLORIDE 10 MG/ML
0.5 INJECTION, SOLUTION INFILTRATION; PERINEURAL AS NEEDED
Status: DISCONTINUED | OUTPATIENT
Start: 2017-11-16 | End: 2017-11-16

## 2017-11-16 RX ORDER — OXYCODONE HYDROCHLORIDE AND ACETAMINOPHEN 5; 325 MG/1; MG/1
1 TABLET ORAL ONCE AS NEEDED
Status: DISCONTINUED | OUTPATIENT
Start: 2017-11-16 | End: 2017-11-16 | Stop reason: HOSPADM

## 2017-11-16 RX ORDER — PANTOPRAZOLE SODIUM 40 MG/1
40 TABLET, DELAYED RELEASE ORAL EVERY MORNING
Status: DISCONTINUED | OUTPATIENT
Start: 2017-11-17 | End: 2017-11-18 | Stop reason: HOSPADM

## 2017-11-16 RX ORDER — CEFAZOLIN SODIUM IN 0.9 % NACL 3 G/100 ML
3 INTRAVENOUS SOLUTION, PIGGYBACK (ML) INTRAVENOUS EVERY 8 HOURS
Status: COMPLETED | OUTPATIENT
Start: 2017-11-16 | End: 2017-11-17

## 2017-11-16 RX ORDER — ONDANSETRON 4 MG/1
4 TABLET, FILM COATED ORAL EVERY 6 HOURS PRN
Status: DISCONTINUED | OUTPATIENT
Start: 2017-11-16 | End: 2017-11-18 | Stop reason: HOSPADM

## 2017-11-16 RX ORDER — NALOXONE HCL 0.4 MG/ML
0.1 VIAL (ML) INJECTION
Status: DISCONTINUED | OUTPATIENT
Start: 2017-11-16 | End: 2017-11-18 | Stop reason: HOSPADM

## 2017-11-16 RX ORDER — METOPROLOL SUCCINATE 25 MG/1
25 TABLET, EXTENDED RELEASE ORAL DAILY
Status: DISCONTINUED | OUTPATIENT
Start: 2017-11-17 | End: 2017-11-18 | Stop reason: HOSPADM

## 2017-11-16 RX ORDER — ONDANSETRON 2 MG/ML
4 INJECTION INTRAMUSCULAR; INTRAVENOUS EVERY 6 HOURS PRN
Status: DISCONTINUED | OUTPATIENT
Start: 2017-11-16 | End: 2017-11-18 | Stop reason: HOSPADM

## 2017-11-16 RX ORDER — SODIUM CHLORIDE 9 MG/ML
100 INJECTION, SOLUTION INTRAVENOUS CONTINUOUS
Status: DISCONTINUED | OUTPATIENT
Start: 2017-11-16 | End: 2017-11-18 | Stop reason: HOSPADM

## 2017-11-16 RX ORDER — HYDROCODONE BITARTRATE AND ACETAMINOPHEN 7.5; 325 MG/1; MG/1
2 TABLET ORAL EVERY 4 HOURS PRN
Status: DISCONTINUED | OUTPATIENT
Start: 2017-11-16 | End: 2017-11-18 | Stop reason: HOSPADM

## 2017-11-16 RX ORDER — DEXAMETHASONE SODIUM PHOSPHATE 4 MG/ML
INJECTION, SOLUTION INTRA-ARTICULAR; INTRALESIONAL; INTRAMUSCULAR; INTRAVENOUS; SOFT TISSUE AS NEEDED
Status: DISCONTINUED | OUTPATIENT
Start: 2017-11-16 | End: 2017-11-16 | Stop reason: SURG

## 2017-11-16 RX ADMIN — CETIRIZINE HYDROCHLORIDE 10 MG: 10 TABLET, FILM COATED ORAL at 18:48

## 2017-11-16 RX ADMIN — ALBUTEROL SULFATE 1 PUFF: 90 AEROSOL, METERED RESPIRATORY (INHALATION) at 15:49

## 2017-11-16 RX ADMIN — ALBUTEROL SULFATE 1 PUFF: 90 AEROSOL, METERED RESPIRATORY (INHALATION) at 15:15

## 2017-11-16 RX ADMIN — FAMOTIDINE 20 MG: 20 TABLET, FILM COATED ORAL at 12:49

## 2017-11-16 RX ADMIN — CEFAZOLIN SODIUM 2 G: 2 INJECTION, SOLUTION INTRAVENOUS at 14:23

## 2017-11-16 RX ADMIN — CEFAZOLIN SODIUM IN SODIUM CHLORIDE 0.9% IV SOLN 3 GM/100ML 3 G: 3-0.9/1 SOLUTION at 21:21

## 2017-11-16 RX ADMIN — HYDROMORPHONE HYDROCHLORIDE 0.5 MG: 1 INJECTION, SOLUTION INTRAMUSCULAR; INTRAVENOUS; SUBCUTANEOUS at 18:09

## 2017-11-16 RX ADMIN — ESTRADIOL 0.5 MG: 0.5 TABLET ORAL at 21:20

## 2017-11-16 RX ADMIN — LIDOCAINE HYDROCHLORIDE 0.5 ML: 10 INJECTION, SOLUTION EPIDURAL; INFILTRATION; INTRACAUDAL; PERINEURAL at 12:49

## 2017-11-16 RX ADMIN — HYDROCODONE BITARTRATE AND ACETAMINOPHEN 2 TABLET: 7.5; 325 TABLET ORAL at 21:21

## 2017-11-16 RX ADMIN — PROPOFOL 50 MCG/KG/MIN: 10 INJECTION, EMULSION INTRAVENOUS at 14:42

## 2017-11-16 RX ADMIN — DIPHENHYDRAMINE HYDROCHLORIDE 25 MG: 50 INJECTION INTRAMUSCULAR; INTRAVENOUS at 23:19

## 2017-11-16 RX ADMIN — ATORVASTATIN CALCIUM 10 MG: 10 TABLET, FILM COATED ORAL at 21:20

## 2017-11-16 RX ADMIN — SODIUM CHLORIDE, POTASSIUM CHLORIDE, SODIUM LACTATE AND CALCIUM CHLORIDE: 600; 310; 30; 20 INJECTION, SOLUTION INTRAVENOUS at 16:34

## 2017-11-16 RX ADMIN — Medication 2 TABLET: at 18:48

## 2017-11-16 RX ADMIN — ALBUTEROL SULFATE 1 PUFF: 90 AEROSOL, METERED RESPIRATORY (INHALATION) at 15:19

## 2017-11-16 RX ADMIN — ROPIVACAINE HYDROCHLORIDE 12 ML/HR: 2 INJECTION, SOLUTION EPIDURAL; INFILTRATION at 17:42

## 2017-11-16 RX ADMIN — TRANEXAMIC ACID 1000 MG: 100 INJECTION, SOLUTION INTRAVENOUS at 14:40

## 2017-11-16 RX ADMIN — VENLAFAXINE HYDROCHLORIDE 75 MG: 75 CAPSULE, EXTENDED RELEASE ORAL at 18:48

## 2017-11-16 RX ADMIN — SODIUM CHLORIDE, POTASSIUM CHLORIDE, SODIUM LACTATE AND CALCIUM CHLORIDE 9 ML/HR: 600; 310; 30; 20 INJECTION, SOLUTION INTRAVENOUS at 12:50

## 2017-11-16 RX ADMIN — HYDROMORPHONE HYDROCHLORIDE 0.5 MG: 1 INJECTION, SOLUTION INTRAMUSCULAR; INTRAVENOUS; SUBCUTANEOUS at 21:20

## 2017-11-16 RX ADMIN — DEXAMETHASONE SODIUM PHOSPHATE 4 MG: 4 INJECTION, SOLUTION INTRAMUSCULAR; INTRAVENOUS at 15:50

## 2017-11-16 RX ADMIN — BUPIVACAINE HYDROCHLORIDE 2.5 ML: 5 INJECTION, SOLUTION EPIDURAL; INTRACAUDAL; PERINEURAL at 14:42

## 2017-11-16 RX ADMIN — EPHEDRINE SULFATE 5 MG: 50 INJECTION INTRAMUSCULAR; INTRAVENOUS; SUBCUTANEOUS at 15:38

## 2017-11-16 RX ADMIN — TRANEXAMIC ACID 1000 MG: 100 INJECTION, SOLUTION INTRAVENOUS at 16:00

## 2017-11-16 RX ADMIN — ONDANSETRON 4 MG: 2 INJECTION INTRAMUSCULAR; INTRAVENOUS at 15:50

## 2017-11-16 RX ADMIN — ALBUTEROL SULFATE 1 PUFF: 90 AEROSOL, METERED RESPIRATORY (INHALATION) at 15:51

## 2017-11-16 RX ADMIN — ALBUTEROL SULFATE 1 PUFF: 90 AEROSOL, METERED RESPIRATORY (INHALATION) at 15:17

## 2017-11-16 RX ADMIN — HYDROMORPHONE HYDROCHLORIDE 0.5 MG: 1 INJECTION, SOLUTION INTRAMUSCULAR; INTRAVENOUS; SUBCUTANEOUS at 23:21

## 2017-11-16 NOTE — H&P
Patient Name: Imelda Elaine  MRN: [id number]  : 1950  DOS: 2017    Attending: Sunny Reynoso, *    Primary Care Provider: Comfort Colunga MD      Chief complaint:  Left knee pain    Subjective   Patient is a 66 y.o. female presented for scheduled surgery by Dr. Reynoso. She anticipates a left total knee replacement today. Her knee has been painful for many years. Conservative treatments failed to provide pain relief. She uses a cane for ambulation. She has a fall a few months ago due to the knee giving out.    When seen preop she is doing well. She denies pain or other complaints. She denies nausea, shortness of breath or chest pain. No hx of DVT or PE.     ( Above is noted/agree.  tolerated left TKA under SA, receiving ACB when seen in PACU. Doing well. No complaints. LEs under SA effects when seen. No sob, nausea , vomiting.)wy    Allergies:  Allergies   Allergen Reactions   • Pneumococcal Vaccines Swelling and Rash     Swelling on entire arm    • Other Swelling and Rash     Shingles vaccine  Swelling of injection site of arm,    • Tuberculin Tests Swelling and Rash     Pt has chest xrays to check        Meds:  Prescriptions Prior to Admission   Medication Sig Dispense Refill Last Dose   • atorvastatin (LIPITOR) 10 MG tablet Take 10 mg by mouth Every Night.   11/15/2017 at Unknown time   • estradiol (ESTRACE) 0.5 MG tablet Take 1 tablet by mouth Daily. 90 tablet 1 11/15/2017 at Unknown time   • ipratropium (ATROVENT) 0.06 % nasal spray 1 spray into each nostril Daily.   11/15/2017 at Unknown time   • lansoprazole (PREVACID) 30 MG capsule Take 30 mg by mouth daily.   11/15/2017 at Unknown time   • levocetirizine (XYZAL) 5 MG tablet Take 5 mg by mouth Every Evening.   11/15/2017 at Unknown time   • losartan (COZAAR) 25 MG tablet Take 25 mg by mouth Daily.   2017 at 0900   • metoprolol succinate XL (TOPROL-XL) 25 MG 24 hr tablet Take 1 tablet by mouth Daily. 30 tablet 3  "2017 at 0900   • sodium chloride (OCEAN) 0.65 % nasal spray 2 sprays into each nostril Daily.   11/15/2017 at Unknown time   • venlafaxine XR (EFFEXOR-XR) 75 MG 24 hr capsule Take 75 mg by mouth Daily.   11/15/2017 at Unknown time   • celecoxib (CeleBREX) 200 MG capsule Take 200 mg by mouth Daily. Pt to stop 5 days prior to surgery per dr parker's request per pt report   2017       History:   Past Medical History:   Diagnosis Date   • Anesthesia     low bp only with foot surgery, has had surgeries since and no issue per pt report   • Arthritis    • Back pain    • Depression    • GERD (gastroesophageal reflux disease)    • High cholesterol    • Hypertension    • Knee pain     BILATERAL   • Menopausal state    • Seasonal allergies    • Wears eyeglasses    • Wears glasses    • Wears partial dentures     implants     Past Surgical History:   Procedure Laterality Date   • ANTERIOR AND POSTERIOR VAGINAL REPAIR N/A 2017    Procedure:  Vaginal enterocele repair with anterior posterior colporrhaphy;  Surgeon: Jose Maria Chowdhury MD;  Location:  [facility];  Service:    • BLADDER AUGMENTATION      MESH PLACED   • COLONOSCOPY     • EXPLORATORY LAPAROTOMY      \"BOWEL INTO PELVIC BONE\"  WRAPPED WITH MESH????   • FOOT SURGERY Left    • HYSTERECTOMY      WITH BSO    • KNEE ARTHROSCOPY Left     meniscus repair   • LAPAROSCOPIC CHOLECYSTECTOMY     • OOPHORECTOMY     • TONSILLECTOMY     • TUBAL ABDOMINAL LIGATION     • VENTRAL HERNIA REPAIR N/A 2016    Procedure: LAPAROSCOPIC REPAIR OF ABDOMINAL WALL HERNIA WITH MESH, CONVERTED TO OPEN ;  Surgeon: Bertha Delacruz MD;  Location:  [facility];  Service:      Family History   Problem Relation Age of Onset   • Hypertension Mother    • Diabetes Mother    • Heart attack Mother 59      from MI   • Cancer Father    • Aneurysm Father    • Hepatitis Brother      Hep B   • Kidney disease Brother    • Alzheimer's disease Maternal Grandmother    • Pneumonia Maternal " "Grandmother    • Leukemia Maternal Grandfather    • No Known Problems Paternal Grandfather    • No Known Problems Daughter    • No Known Problems Son    • Ovarian cancer Neg Hx    • Breast cancer Neg Hx      Social History   Substance Use Topics   • Smoking status: Former Smoker     Packs/day: 1.50     Years: 15.00     Types: Cigarettes     Quit date: 6/17/1986   • Smokeless tobacco: Never Used   • Alcohol use No   She is  with 2 children. She is retired from the [employer].    Review of Systems  Pertinent items are noted in HPI, all other systems reviewed and negative    Vital Signs  /87 (BP Location: Right arm, Patient Position: Lying)  Pulse 97  Temp 98 °F (36.7 °C) (Oral)   Resp 16  Ht 63\" (160 cm)  Wt (!) 314 lb 2.5 oz (143 kg)  SpO2 92%  BMI 55.65 kg/m2    Physical Exam:    General Appearance:    Alert, cooperative, in no acute distress   Head:    Normocephalic, without obvious abnormality, atraumatic   Eyes:            Lids and lashes normal, conjunctivae and sclerae normal, no   icterus, no pallor, corneas clear, PERRLA   Ears:    Ears appear intact with no abnormalities noted   Neck:   No adenopathy, supple, trachea midline, no thyromegaly, no     carotid bruit, no JVD   Lungs:     Clear to auscultation,respirations regular, even and                   unlabored    Heart:    Regular rhythm and normal rate, normal S1 and S2, no            murmur, no gallop, no rub, no click   Abdomen:     Normal bowel sounds, no masses, no organomegaly, soft        non-tender, non-distended, no guarding, no rebound                 Tenderness. obese   Genitalia:    Deferred   Extremities:   Moves all extremities well, no edema, no cyanosis, no              Redness    ( Postop: CDI ACE on left knee. )   Pulses:   Pulses palpable and equal bilaterally   Skin:   No bleeding, bruising or rash   Neurologic:   Cranial nerves 2 - 12 grossly intact, sensation intact          I reviewed the patient's new clinical " results.     Results for GAIL KHAN (MRN [id number]) as of 11/16/2017 12:45   Ref. Range 11/7/2017 11:25   Glucose Latest Ref Range: 70 - 100 mg/dL 88   Sodium Latest Ref Range: 132 - 146 mmol/L 139   Potassium Latest Ref Range: 3.5 - 5.5 mmol/L 4.6   CO2 Latest Ref Range: 20.0 - 31.0 mmol/L 31.0   Chloride Latest Ref Range: 99 - 109 mmol/L 107   Anion Gap Latest Ref Range: 3.0 - 11.0 mmol/L 1.0 (L)   Creatinine Latest Ref Range: 0.60 - 1.30 mg/dL 0.70   BUN Latest Ref Range: 9 - 23 mg/dL 19   BUN/Creatinine Ratio Latest Ref Range: 7.0 - 25.0  27.1 (H)   Calcium Latest Ref Range: 8.7 - 10.4 mg/dL 8.9   eGFR Non African Amer Latest Ref Range: >60 mL/min/1.73 84   Hemoglobin A1C Latest Ref Range: 4.80 - 5.60 % 5.80 (H)   WBC Latest Ref Range: 3.50 - 10.80 10*3/mm3 6.46   RBC Latest Ref Range: 3.89 - 5.14 10*6/mm3 4.56   Hemoglobin Latest Ref Range: 11.5 - 15.5 g/dL 13.3   Hematocrit Latest Ref Range: 34.5 - 44.0 % 40.2   RDW Latest Ref Range: 11.3 - 14.5 % 14.9 (H)   MCV Latest Ref Range: 80.0 - 99.0 fL 88.2   MCH Latest Ref Range: 27.0 - 31.0 pg 29.2   MCHC Latest Ref Range: 32.0 - 36.0 g/dL 33.1   MPV Latest Ref Range: 6.0 - 12.0 fL 11.3   Platelets Latest Ref Range: 150 - 450 10*3/mm3 179     Assessment and Plan:     Left TKA     Morbid obesity    Essential hypertension    HLD (hyperlipidemia)    Prediabetes    Osteoarthritis of left knee      Plan  1. PT/OT- early ambulation post op  2. Pain control-prns, AC nerve block  3. IS-encourage  4. DVT proph- Mechs/Lovenox SQ  5. Bowel regimen  6. Resume home medications as appropriate  7. Monitor post-op labs  8. DC planning for home    HTN, HLD  - Continue statin, cozaar and toprol  - Monitor BP q4 hrs  - Holding parameters for BP meds  - PRN hydralazine for SBP >170    PreDM  - hgb A1c on 11/7/17 5.8  - Accuchecks ACHS with low dose SSI  - DM educator consult    Seen and examined by me. Agree with above. Discussed with patient.     Valerie Concepcion,  MD  11/16/17  9:36 PM

## 2017-11-16 NOTE — ANESTHESIA PROCEDURE NOTES
Spinal Block    Patient location during procedure: OR  Start Time: 11/16/2017 2:30 PM  Stop Time: 11/16/2017 2:42 PM  Indication:at surgeon's request  Performed By  CRNA: MARISSA DILL  Preanesthetic Checklist  Completed: patient identified, site marked, surgical consent, pre-op evaluation, timeout performed, IV checked, risks and benefits discussed and monitors and equipment checked  Spinal Block Prep:  Patient Position:sitting  Sterile Tech:cap, gloves, sterile barriers and mask  Prep:Chloraprep  Patient Monitoring:blood pressure monitoring, continuous pulse oximetry and EKG  Spinal Block Procedure  Approach:midline  Guidance:landmark technique and palpation technique  Location:L4-L5  Needle Type:Quincke (Introducer:   no)  Needle Gauge:22 G  Placement of Spinal needle event:cerebrospinal fluid aspirated  Paresthesia: no  Fluid Appearance:clear  Post Assessment  Patient Tolerance:patient tolerated the procedure well with no apparent complications  Complications no  Additional Notes  Procedure:  Pt assisted to sitting position, with legs in position of comfort over side of bed.  Pt. instructed in optimal spine presentation, the spine was prepped/ Draped and the skin at insertion site was anesthetized with 1% Lidocaine 2 ml.  Multiple needle advancements where attempted in order to locate midline and osseous structures where repeatedly encountered. The spinal needle was then advanced until CSF flow was obtained and LA was injected:      Marcaine 0.5% PSF injected 12.5 Mg.

## 2017-11-16 NOTE — ANESTHESIA PREPROCEDURE EVALUATION
Anesthesia Evaluation     Patient summary reviewed and Nursing notes reviewed   history of anesthetic complications:  NPO Solid Status: > 8 hours  NPO Liquid Status: > 2 hours     Airway   Mallampati: II  TM distance: >3 FB  Neck ROM: full  possible difficult intubation  Dental - normal exam     Pulmonary    (+) sleep apnea, decreased breath sounds,   Cardiovascular   Exercise tolerance: good (4-7 METS)    Rhythm: regular  Rate: normal    (+) hypertension well controlled, hyperlipidemia      Neuro/Psych  (+) psychiatric history Depression,    GI/Hepatic/Renal/Endo    (+) morbid obesity, hiatal hernia, GERD well controlled,   (-)  obesity    Musculoskeletal     (+) back pain,   Abdominal   (+) obese,     Abdomen: soft.   Substance History      OB/GYN          Other   (+) arthritis                                     Anesthesia Plan    ASA 3     general and regional     intravenous induction   Anesthetic plan and risks discussed with patient.    Plan discussed with CRNA.

## 2017-11-16 NOTE — BRIEF OP NOTE
TOTAL KNEE ARTHROPLASTY  Progress Note    Iemlda Elaine  11/16/2017    Pre-op Diagnosis:   Left knee osteoarthritis       Post-Op Diagnosis Codes:     * Osteoarthritis of left knee [M17.12]    Procedure/CPT® Codes:  [facility] TOTAL KNEE ARTHROPLASTY [98037]    Procedure(s):  TOTAL KNEE ARTHROPLASTY LEFT    Surgeon(s):  Sunny Reynoso MD     Asst.: ISAI Dixon    Anesthesia: Spinal, local and adductor canal catheter    Staff:   Circulator: Te Desai RN; Soraya Todd RN  Scrub Person: Demetrio Schulte; Lashawn Linares  Nursing Assistant: Erasto Jacobs  Assistant: ISAI Scott    Estimated Blood Loss: 25 mL    Urine Voided: * No values recorded between 11/16/2017  2:23 PM and 11/16/2017  4:42 PM *    Specimens:                None      Drains: None          Findings: Severe left knee tricompartmental degenerative changes with varus deformity    Complications: None    Tourniquet time: 93 minutes at 300 mmHg    Implants: Depuy Attune PS TKA size 6 femur, 5 tibia, 7 poly and 32 patella      Sunny Reynoso MD     Date: 11/16/2017  Time: 4:54 PM

## 2017-11-16 NOTE — ANESTHESIA PROCEDURE NOTES
Adductor Canal Block    Patient location during procedure: post-op  Start time: 11/16/2017 5:15 PM  Reason for block: at surgeon's request and post-op pain management  Performed by  CRNA: MARISSA DILL  Preanesthetic Checklist  Completed: patient identified, site marked, surgical consent, pre-op evaluation, timeout performed, IV checked, risks and benefits discussed and monitors and equipment checked  Prep:  Pt Position: supine  Sterile barriers:cap, gloves, mask and sterile barriers  Prep: ChloraPrep and alcohol swabs  Patient monitoring: blood pressure monitoring, continuous pulse oximetry and EKG  Procedure  Performed under: spinal  Guidance:ultrasound guided  Images:still images obtained    Laterality:left  Block Type:adductor canal block  Injection Technique:catheter  Needle Type:Tuohy and echogenic  Needle Gauge:18 G    Catheter Size:20 G (20g)  Catheter at skin depth (cm): 25.  Medications  Local Injected:bupivacaine 0.25% Local Amount Injected:20 (ml)mL  Post Assessment  Injection Assessment: negative aspiration for heme, incremental injection and no paresthesia on injection  Patient Tolerance:comfortable throughout block  Complications:no  Additional Notes  Procedure:             The pt was placed in the Supine position.  The Insertion site was  prepped and Draped in sterile fashion.  The pt was anesthetized with  IV Sedation( see meds).  Skin and cutaneous tissue was infiltrated and anesthetized with 1% Lidocaine 3 mls via a 25g needle.  A BBraun 4 inch 18g echogenic needle was then  inserted approximately midline, mid-thigh and advanced In-plane with Ultrasound guidance.  Normal Saline PSF was utilized for hydrodissection of tissue.  The Vastus medialis and Sartorius muscle where visualized and the needle tip was placed in the adductor canal,  lateral to the femoral artery.  LA injection spread was poorly visualized due to excessisve tissue and depth of structures, injection was incremental 1-5ml,  injection pressure was normal or little, no intraneural injection, no vascular injection.  LA dose was injected thru the needle(see dose above).  A BBraun 20g wire stylet catheter was placed via the needle with ultrasound visualization and confirmation with NS fluid bolus. The labeled Catheter was then secured to skin at insertion site with skin afix and steristrips to curled catheter and CHG transparent dressing.  Thank you.

## 2017-11-16 NOTE — ANESTHESIA POSTPROCEDURE EVALUATION
Patient: Imelda Elaine    Procedure Summary     Date Anesthesia Start Anesthesia Stop Room / Location    11/16/17 1423 1705 [facility] / [facility]       Procedure Diagnosis Surgeon Provider    TOTAL KNEE ARTHROPLASTY LEFT (Left Knee) Osteoarthritis of left knee MD Jose Maria Yusuf MD          Anesthesia Type: general, regional  Last vitals  BP   153/95 (11/16/17 1715)   Temp   98.5 °F (36.9 °C) (11/16/17 1700)   Pulse   80 (11/16/17 1715)   Resp         SpO2   98 % (11/16/17 1715)     Post Anesthesia Care and Evaluation    Patient location during evaluation: PACU  Patient participation: complete - patient participated  Level of consciousness: awake and alert  Pain score: 0  Pain management: adequate  Airway patency: patent  Anesthetic complications: No anesthetic complications  PONV Status: none  Cardiovascular status: hemodynamically stable and acceptable  Respiratory status: nonlabored ventilation, acceptable and nasal cannula  Hydration status: acceptable  Post Neuraxial Block status: No signs or symptoms of PDPH

## 2017-11-16 NOTE — OP NOTE
Preoperative diagnosis: Left knee end stage osteoarthritis    Postoperative diagnosis: Left knee end stage osteoarthritis    Operative procedure: Left total knee arthroplasty    Surgeon: Dr. Samuel Reynoso    Assistant: ISAI Dixon.  Necessary during the case for positioning of the patient, exposure, implantation of components and closure.    Anesthesia: Spinal with local and adductor canal catheter    Estimated blood loss: Minimal    Implants: Depuy Attune  posterior stabilized total knee arthroplasty size 6 femur, 5 tibia, 32 patella, 7 poly    Tourniquet time: 93 minutes at 300 mmHg    Indications for procedure: Imelda is a very pleasant 66-year-old female who has struggled with end-stage activity limiting right knee pain secondary to osteoarthritis.  X-rays in August revealed severe tricompartmental degenerative changes in a varus knee with complete loss of medial joint space and marginal osteophyte formation.  She has failed exhaustive conservative treatment measures including cortisone injections, viscosupplementation injections in 2016 and physical therapy.  After undergoing a shared decision-making process they agreed to proceed with left total knee arthroplasty.  Surgical consent form was signed.    Description of procedure: The patient was seen in the preoperative holding area and the left leg was signed to confirm the correct operative site.  They were seen by anesthesia.  They received Ancef 3g IV prophylactic antibiotics within 1 hour of incision time.  They were brought back to the operating room.  Spinal was performed without difficulty and was given sedation throughout the case.  Patient placed in the supine position and all of  bony prominences were well-padded.  A bump was placed underneath the right hip.  Nonsterile tourniquet was applied to the thigh.  The left lower extremity was prepped and draped in the usual sterile fashion and timeout was performed to confirm left total knee arthroplasty for  patient Imelda Elaine.    The left lower extremity was exsanguinated with an Esmarch.  Tourniquet was inflated to 300 mmHg.  With the knee flexed a midline incision was made with a 10 blade scalpel.  Adequate hemostasis maintained with Bovie electrocautery.  Full-thickness medial and lateral flaps were elevated.  Using a fresh 10 blade scalpel I made a medial parapatellar arthrotomy.  The patella was everted.  Patella fat pad and anterior femoral fat pads were excised.  Marginal osteophytes were removed.  Medial release was performed for this varus knee using Bovie electrocautery.  [place]'s line was marked.  Z retractors were placed medially and laterally.  The distal femur was then drilled and intramedullary distal femoral cutting guide was pinned in place set at a 5° valgus cut for a 9 mm cut.  This cut was then made with the oscillating saw.  The femur was then sized to a size 6 set at 3° of external rotation.  4-in-1 cutting guide was pinned in place.  Anterior and posterior cuts were made as were the chamfer cuts.  Cut bone was removed.  We then turned our attention to the tibia.    The tibia was subluxed anteriorly. PCL retractor was placed as were medial and lateral Hohmann retractors.  We then used the extramedullary tibial cutting guide set at 3° posterior slope for the proximal tibial cut.  5 mm of bone was removed from the low medial side and a centimeter from the high lateral side.  Medial and lateral menisci were then excised as were posterior osteophytes.  Flexion and extension gaps were then checked and with a 7 mm block we achieved full extension and flexion with excellent alignment. The tibia was sized to a 5 tibial tray centered off the medial third of the tibial tubercle.  The tray was pinned in place.  We then reamed the tibia and impacted the tibial fins which were left in place for trialing.  Size 6 cutting guide was then used to make the box femoral cut with the reciprocal saw.  The trial  femur was then placed and held in place with pins. We trialed with a size 7 poly, 6 femur and 5 tibia.  With these trial components in place we achieved full extension and flexion with excellent alignment and stable throughout.  Lug holes for the femur were drilled.    We then turned our attention to the patella.  Patella was sized to 25 mm in thickness so I made a 9-1/2 mm patellar cut with the reciprocal saw.  A 32 trial was placed and the patella drill holes were made.  With the trial button in place there was excellent tracking with the no thumbs technique.    Trial components were then removed.  Final components were opened on the back table.  Posterior capsule was injected with 20 cc of half percent Ropivicaine and 5 mg of morphine.  Cement was mixed on the back table.  The knee was copiously irrigated with Pulsavac lavage.  The knee was thoroughly dried and a bone plug was placed in the distal femoral drill hole.  Cement was then applied to the tibia and final size 5 tibial tray was impacted in place and excess cement was removed.  Cement was applied to the femur and final size 6 femur was impacted in place and excess cement removed.  We then placed a 7 mm poly-this was seen to be fully seated in the tibial tray.  Knee was then placed in extension and we applied cement to the cut patellar surface and 32 patella was held in place with patellar clamp.  All excess cement was removed.  The knee was left in extension while cement cured.    Once the cement was fully cured we irrigated the knee with Betadine solution and Pulsavac lavage.  The knee was taken through full range of motion and seen to achieve full extension and flexion with excellent patellar tracking.    We then proceeded with closure.  The deep fascia was closed with a #1 Stratafix barbed suture.  Dermis was closed with 2-0 Vicryl.  Skin was closed with a running 3-0 Stratafix barbed subcuticular suture.  A sterile dressing was then applied with  Pirineo mesh dressing and Dermabond.  We then applied 4 x 4's, ABDs, soft roll and a six-inch Ace bandage.    Tourniquet was deflated at 93 minutes.  Patient was transferred to recovery in stable condition.  All sponge and needle counts were correct ×2.  Patient received first dose of TXA just prior to incision and the second dose 5-10 minutes prior to letting down the tourniquet to minimize bleeding.    Postoperative plan:  Patient will be admitted to Dr. AYALA for medical management  Mobilize starting today with PT/OT as tolerated  Start Lovenox for DVT prophylaxis tomorrow   consult for home physical therapy and home equipment needs  Follow-up with me in 2-3 weeks.    Sunny Reynoso MD  11/16/17  4:56 PM

## 2017-11-16 NOTE — H&P
Patient Care Team:      Chief complaint: Left knee pain    Subjective:    Patient is a 66 y.o.female presents with worsening pain in her left knee is here for surgical intervention.     Review of Systems:  General ROS: negative fatigue, fever and chills  Cardiovascular ROS: no chest pain or dyspnea on exertion  Respiratory ROS: no cough, shortness of breath, or wheezing      Allergies:   Allergies   Allergen Reactions   • Pneumococcal Vaccines Swelling and Rash     Swelling on entire arm    • Other Swelling and Rash     Shingles vaccine  Swelling of injection site of arm,    • Tuberculin Tests Swelling and Rash     Pt has chest xrays to check           Latex: Denies  Contrast Dye:Denies    Home Meds    Prescriptions Prior to Admission   Medication Sig Dispense Refill Last Dose   • atorvastatin (LIPITOR) 10 MG tablet Take 10 mg by mouth Every Night.   11/15/2017 at Unknown time   • estradiol (ESTRACE) 0.5 MG tablet Take 1 tablet by mouth Daily. 90 tablet 1 11/15/2017 at Unknown time   • ipratropium (ATROVENT) 0.06 % nasal spray 1 spray into each nostril Daily.   11/15/2017 at Unknown time   • lansoprazole (PREVACID) 30 MG capsule Take 30 mg by mouth daily.   11/15/2017 at Unknown time   • levocetirizine (XYZAL) 5 MG tablet Take 5 mg by mouth Every Evening.   11/15/2017 at Unknown time   • losartan (COZAAR) 25 MG tablet Take 25 mg by mouth Daily.   11/16/2017 at 0900   • metoprolol succinate XL (TOPROL-XL) 25 MG 24 hr tablet Take 1 tablet by mouth Daily. 30 tablet 3 11/16/2017 at 0900   • sodium chloride (OCEAN) 0.65 % nasal spray 2 sprays into each nostril Daily.   11/15/2017 at Unknown time   • venlafaxine XR (EFFEXOR-XR) 75 MG 24 hr capsule Take 75 mg by mouth Daily.   11/15/2017 at Unknown time   • celecoxib (CeleBREX) 200 MG capsule Take 200 mg by mouth Daily. Pt to stop 5 days prior to surgery per dr parker's request per pt report   11/12/2017     PMH:   Past Medical History:   Diagnosis Date   • Anesthesia 2002  "   low bp only with foot surgery, has had surgeries since and no issue per pt report   • Arthritis    • Back pain    • Depression    • GERD (gastroesophageal reflux disease)    • High cholesterol    • Hypertension    • Knee pain     BILATERAL   • Menopausal state    • Seasonal allergies    • Wears eyeglasses    • Wears glasses    • Wears partial dentures     implants     PSH:    Past Surgical History:   Procedure Laterality Date   • ANTERIOR AND POSTERIOR VAGINAL REPAIR N/A 4/17/2017    Procedure:  Vaginal enterocele repair with anterior posterior colporrhaphy;  Surgeon: Jose Maria Chowdhury MD;  Location:  [facility];  Service:    • BLADDER AUGMENTATION      MESH PLACED   • COLONOSCOPY  2012   • EXPLORATORY LAPAROTOMY      \"BOWEL INTO PELVIC BONE\"  WRAPPED WITH MESH????   • FOOT SURGERY Left    • HYSTERECTOMY  1989    WITH BSO    • KNEE ARTHROSCOPY Left     meniscus repair   • LAPAROSCOPIC CHOLECYSTECTOMY     • OOPHORECTOMY  1989   • TONSILLECTOMY     • TUBAL ABDOMINAL LIGATION     • VENTRAL HERNIA REPAIR N/A 6/21/2016    Procedure: LAPAROSCOPIC REPAIR OF ABDOMINAL WALL HERNIA WITH MESH, CONVERTED TO OPEN ;  Surgeon: Bertha Delacruz MD;  Location:  [facility];  Service:      Immunization History: pneumo: No   Flu: No  Tetanus: Unknown    Social History:   Tobacco: Former. Quit 1986   Alcohol: No      Physical Exam:/93 (BP Location: Right arm, Patient Position: Lying)  Pulse 78  Temp 98.6 °F (37 °C) (Temporal Artery )   Ht 63\" (160 cm)  Wt (!) 314 lb 2.5 oz (143 kg)  SpO2 96%  BMI 55.65 kg/m2      General Appearance:    Alert, cooperative, no distress, appears stated age   Head:    Normocephalic, without obvious abnormality, atraumatic   Lungs:     Clear to auscultation bilaterally, respirations unlabored    Heart: Regular rate and rhythm, S1 and S2 normal, no murmur, rub    or gallop    Abdomen:    Soft without tenderness   Breast Exam:    deferred   Genitalia:    deferred   Extremities:   Extremities normal, " atraumatic, no cyanosis or edema   Skin:   Skin color, texture, turgor normal, no rashes or lesions   Neurologic:   Grossly intact     Results Review: Labs were reviewed    Impression:Left Knee OA    Plan: Left TKA    FLOR Stapleton 11/16/2017 12:50 PM

## 2017-11-17 PROBLEM — D72.829 LEUKOCYTOSIS: Status: ACTIVE | Noted: 2017-11-17

## 2017-11-17 PROBLEM — Z96.652 S/P TOTAL KNEE ARTHROPLASTY, LEFT: Status: ACTIVE | Noted: 2017-11-17

## 2017-11-17 LAB
ANION GAP SERPL CALCULATED.3IONS-SCNC: 6 MMOL/L (ref 3–11)
BASOPHILS # BLD AUTO: 0 10*3/MM3 (ref 0–0.2)
BASOPHILS NFR BLD AUTO: 0 % (ref 0–1)
BUN BLD-MCNC: 15 MG/DL (ref 9–23)
BUN/CREAT SERPL: 18.8 (ref 7–25)
CALCIUM SPEC-SCNC: 8.9 MG/DL (ref 8.7–10.4)
CHLORIDE SERPL-SCNC: 105 MMOL/L (ref 99–109)
CO2 SERPL-SCNC: 28 MMOL/L (ref 20–31)
CREAT BLD-MCNC: 0.8 MG/DL (ref 0.6–1.3)
DEPRECATED RDW RBC AUTO: 48.9 FL (ref 37–54)
EOSINOPHIL # BLD AUTO: 0 10*3/MM3 (ref 0–0.3)
EOSINOPHIL NFR BLD AUTO: 0 % (ref 0–3)
ERYTHROCYTE [DISTWIDTH] IN BLOOD BY AUTOMATED COUNT: 15.2 % (ref 11.3–14.5)
GFR SERPL CREATININE-BSD FRML MDRD: 72 ML/MIN/1.73
GLUCOSE BLD-MCNC: 179 MG/DL (ref 70–100)
HCT VFR BLD AUTO: 37.1 % (ref 34.5–44)
HGB BLD-MCNC: 11.9 G/DL (ref 11.5–15.5)
IMM GRANULOCYTES # BLD: 0.03 10*3/MM3 (ref 0–0.03)
IMM GRANULOCYTES NFR BLD: 0.3 % (ref 0–0.6)
LYMPHOCYTES # BLD AUTO: 0.61 10*3/MM3 (ref 0.6–4.8)
LYMPHOCYTES NFR BLD AUTO: 5.6 % (ref 24–44)
MCH RBC QN AUTO: 28.3 PG (ref 27–31)
MCHC RBC AUTO-ENTMCNC: 32.1 G/DL (ref 32–36)
MCV RBC AUTO: 88.3 FL (ref 80–99)
MONOCYTES # BLD AUTO: 0.63 10*3/MM3 (ref 0–1)
MONOCYTES NFR BLD AUTO: 5.8 % (ref 0–12)
NEUTROPHILS # BLD AUTO: 9.68 10*3/MM3 (ref 1.5–8.3)
NEUTROPHILS NFR BLD AUTO: 88.3 % (ref 41–71)
PLATELET # BLD AUTO: 165 10*3/MM3 (ref 150–450)
PMV BLD AUTO: 12.2 FL (ref 6–12)
POTASSIUM BLD-SCNC: 4.4 MMOL/L (ref 3.5–5.5)
RBC # BLD AUTO: 4.2 10*6/MM3 (ref 3.89–5.14)
SODIUM BLD-SCNC: 139 MMOL/L (ref 132–146)
WBC NRBC COR # BLD: 10.95 10*3/MM3 (ref 3.5–10.8)

## 2017-11-17 PROCEDURE — 85025 COMPLETE CBC W/AUTO DIFF WBC: CPT | Performed by: ORTHOPAEDIC SURGERY

## 2017-11-17 PROCEDURE — 25010000002 ROPIVACAINE PER 1 MG

## 2017-11-17 PROCEDURE — 25010000002 ROPIVACAINE PER 1 MG: Performed by: NURSE ANESTHETIST, CERTIFIED REGISTERED

## 2017-11-17 PROCEDURE — 25010000002 DIPHENHYDRAMINE PER 50 MG: Performed by: ORTHOPAEDIC SURGERY

## 2017-11-17 PROCEDURE — 80048 BASIC METABOLIC PNL TOTAL CA: CPT | Performed by: ORTHOPAEDIC SURGERY

## 2017-11-17 PROCEDURE — 97162 PT EVAL MOD COMPLEX 30 MIN: CPT

## 2017-11-17 PROCEDURE — 97530 THERAPEUTIC ACTIVITIES: CPT

## 2017-11-17 PROCEDURE — 97110 THERAPEUTIC EXERCISES: CPT

## 2017-11-17 PROCEDURE — 97165 OT EVAL LOW COMPLEX 30 MIN: CPT

## 2017-11-17 PROCEDURE — 25010000002 ENOXAPARIN PER 10 MG: Performed by: ORTHOPAEDIC SURGERY

## 2017-11-17 PROCEDURE — 97116 GAIT TRAINING THERAPY: CPT

## 2017-11-17 RX ORDER — CELECOXIB 200 MG/1
200 CAPSULE ORAL DAILY
Start: 2017-11-17 | End: 2022-05-17

## 2017-11-17 RX ORDER — PSEUDOEPHEDRINE HCL 30 MG
100 TABLET ORAL 2 TIMES DAILY PRN
Qty: 60 CAPSULE | Refills: 0 | Status: SHIPPED | OUTPATIENT
Start: 2017-11-17

## 2017-11-17 RX ORDER — HYDROCODONE BITARTRATE AND ACETAMINOPHEN 7.5; 325 MG/1; MG/1
1 TABLET ORAL EVERY 4 HOURS PRN
Qty: 40 TABLET | Refills: 0 | Status: SHIPPED | OUTPATIENT
Start: 2017-11-17 | End: 2017-11-26

## 2017-11-17 RX ORDER — ROPIVACAINE HYDROCHLORIDE 2 MG/ML
12 INJECTION, SOLUTION EPIDURAL; INFILTRATION CONTINUOUS
Status: DISCONTINUED | OUTPATIENT
Start: 2017-11-17 | End: 2017-11-18 | Stop reason: HOSPADM

## 2017-11-17 RX ORDER — ROPIVACAINE IN 0.9% SOD CHL/PF 0.2% 545ML
12 ELASTOMERIC PUMP, HI VARIABLE RATE INJECTION CONTINUOUS
Status: DISCONTINUED | OUTPATIENT
Start: 2017-11-18 | End: 2017-11-18 | Stop reason: HOSPADM

## 2017-11-17 RX ORDER — CELECOXIB 200 MG/1
200 CAPSULE ORAL DAILY
Status: DISCONTINUED | OUTPATIENT
Start: 2017-11-17 | End: 2017-11-18 | Stop reason: HOSPADM

## 2017-11-17 RX ADMIN — Medication 2 TABLET: at 17:33

## 2017-11-17 RX ADMIN — HYDROCODONE BITARTRATE AND ACETAMINOPHEN 2 TABLET: 7.5; 325 TABLET ORAL at 15:57

## 2017-11-17 RX ADMIN — DIPHENHYDRAMINE HYDROCHLORIDE 25 MG: 50 INJECTION INTRAMUSCULAR; INTRAVENOUS at 05:32

## 2017-11-17 RX ADMIN — ROPIVACAINE HYDROCHLORIDE 12 ML/HR: 2 INJECTION, SOLUTION EPIDURAL; INFILTRATION at 11:33

## 2017-11-17 RX ADMIN — ATORVASTATIN CALCIUM 10 MG: 10 TABLET, FILM COATED ORAL at 20:07

## 2017-11-17 RX ADMIN — CELECOXIB 200 MG: 200 CAPSULE ORAL at 08:32

## 2017-11-17 RX ADMIN — LOSARTAN POTASSIUM 25 MG: 25 TABLET, FILM COATED ORAL at 08:33

## 2017-11-17 RX ADMIN — ROPIVACAINE HYDROCHLORIDE 12 ML/HR: 2 INJECTION, SOLUTION EPIDURAL; INFILTRATION at 20:13

## 2017-11-17 RX ADMIN — HYDROCODONE BITARTRATE AND ACETAMINOPHEN 2 TABLET: 7.5; 325 TABLET ORAL at 05:32

## 2017-11-17 RX ADMIN — ESTRADIOL 0.5 MG: 0.5 TABLET ORAL at 08:33

## 2017-11-17 RX ADMIN — VENLAFAXINE HYDROCHLORIDE 75 MG: 75 CAPSULE, EXTENDED RELEASE ORAL at 08:32

## 2017-11-17 RX ADMIN — METOPROLOL SUCCINATE 25 MG: 25 TABLET, EXTENDED RELEASE ORAL at 08:33

## 2017-11-17 RX ADMIN — HYDROCODONE BITARTRATE AND ACETAMINOPHEN 2 TABLET: 7.5; 325 TABLET ORAL at 23:48

## 2017-11-17 RX ADMIN — HYDROCODONE BITARTRATE AND ACETAMINOPHEN 2 TABLET: 7.5; 325 TABLET ORAL at 20:07

## 2017-11-17 RX ADMIN — ROPIVACAINE HYDROCHLORIDE 12 ML/HR: 2 INJECTION, SOLUTION EPIDURAL; INFILTRATION at 02:05

## 2017-11-17 RX ADMIN — CETIRIZINE HYDROCHLORIDE 10 MG: 10 TABLET, FILM COATED ORAL at 08:33

## 2017-11-17 RX ADMIN — ENOXAPARIN SODIUM 40 MG: 40 INJECTION SUBCUTANEOUS at 13:40

## 2017-11-17 RX ADMIN — Medication 2 TABLET: at 08:33

## 2017-11-17 RX ADMIN — CEFAZOLIN SODIUM IN SODIUM CHLORIDE 0.9% IV SOLN 3 GM/100ML 3 G: 3-0.9/1 SOLUTION at 05:32

## 2017-11-17 NOTE — PLAN OF CARE
Problem: Patient Care Overview (Adult)  Goal: Plan of Care Review  Outcome: Ongoing (interventions implemented as appropriate)    11/17/17 9169   Coping/Psychosocial Response Interventions   Plan Of Care Reviewed With patient   Patient Care Overview   Progress progress toward functional goals as expected   Outcome Evaluation   Outcome Summary/Follow up Plan Pt awake for most of the 7p shift. Up to BSC with assist x 2,, gait bon and cane. Ropivacaine infusing wtithout problems. Pain controlled with IV and PO pain meds. Pt alert, cooperative. VSS         Problem: Perioperative Period (Adult)  Goal: Signs and Symptoms of Listed Potential Problems Will be Absent or Manageable (Perioperative Period)  Outcome: Ongoing (interventions implemented as appropriate)    Problem: Knee Replacement, Total (Adult)  Goal: Signs and Symptoms of Listed Potential Problems Will be Absent or Manageable (Knee Replacement, Total)  Outcome: Ongoing (interventions implemented as appropriate)

## 2017-11-17 NOTE — PLAN OF CARE
Problem: Patient Care Overview (Adult)  Goal: Plan of Care Review  Outcome: Ongoing (interventions implemented as appropriate)    11/17/17 1500   Coping/Psychosocial Response Interventions   Plan Of Care Reviewed With patient;spouse   Patient Care Overview   Progress progress toward functional goals as expected   Outcome Evaluation   Outcome Summary/Follow up Plan Pt increased ambulation distance to 256 feet with CGA and RW, limited by fatigue. L knee ROM 14-76 degrees. Pt anticipating d/c home with assist and HHPT tomorrow.          Problem: Inpatient Physical Therapy  Goal: Bed Mobility Goal LTG- PT  Outcome: Ongoing (interventions implemented as appropriate)    11/17/17 0926 11/17/17 1500   Bed Mobility PT LTG   Bed Mobility PT LTG, Date Established 11/17/17 --    Bed Mobility PT LTG, Time to Achieve 3 days --    Bed Mobility PT LTG, Activity Type supine to sit/sit to supine --    Bed Mobility PT LTG, [place] Level independent --    Bed Mobility PT LTG, Date Goal Reviewed --  11/17/17   Bed Mobility PT LTG, Outcome --  goal ongoing       Goal: Transfer Training Goal 1 LTG- PT  Outcome: Ongoing (interventions implemented as appropriate)    11/17/17 0926 11/17/17 1500   Transfer Training PT LTG   Transfer Training PT LTG, Date Established 11/17/17 --    Transfer Training PT LTG, Time to Achieve 3 days --    Transfer Training PT LTG, Activity Type sit to stand/stand to sit --    Transfer Training PT LTG, [place] Level conditional independence --    Transfer Training PT LTG, Assist Device walker, rolling --    Transfer Training PT LTG, Date Goal Reviewed --  11/17/17   Transfer Training PT LTG, Outcome --  goal ongoing       Goal: Gait Training Goal LTG- PT  Outcome: Ongoing (interventions implemented as appropriate)    11/17/17 0926 11/17/17 1500   Gait Training PT LTG   Gait Training Goal PT LTG, Date Established 11/17/17 --    Gait Training Goal PT LTG, Time to Achieve 3 days --    Gait Training Goal PT  LTG, [place] Level conditional independence --    Gait Training Goal PT LTG, Assist Device walker, rolling --    Gait Training Goal PT LTG, Distance to Achieve 500 feet --    Gait Training Goal PT LTG, Date Goal Reviewed --  11/17/17   Gait Training Goal PT LTG, Outcome --  goal ongoing       Goal: Range of Motion Goal LTG- PT  Outcome: Ongoing (interventions implemented as appropriate)    11/17/17 0926 11/17/17 1500   Range of Motion PT LTG   Range of Motion Goal PT LTG, Date Established 11/17/17 --    Range of Motion Goal PT LTG, Time to Achieve 3 days --    Range fo Motion Goal PT LTG, Joint L knee --    Range of Motion Goal PT LTG, AAROM Fxnal Goal 0-90 degrees --    Range of Motion Goal PT LTG, Date Goal Reviewed --  11/17/17   Range of Motion Goal PT LTG, Outcome --  goal ongoing

## 2017-11-17 NOTE — THERAPY TREATMENT NOTE
Acute Care - Physical Therapy Treatment Note  [facility]     Patient Name: Imelda Elaine  : 1950  MRN: [id number]  Today's Date: 2017  Onset of Illness/Injury or Date of Surgery Date: 17  Date of Referral to PT: 17  Referring Physician: MD Edgardo    Admit Date: 2017    Visit Dx:    ICD-10-CM ICD-9-CM   1. Impaired functional mobility, balance, gait, and endurance Z74.09 V49.89   2. Impaired mobility and ADLs Z74.09 799.89   3. Morbid obesity E66.01 278.01   4. Primary osteoarthritis of left knee M17.12 715.16   5. S/P total knee arthroplasty, left Z96.652 V43.65     Patient Active Problem List   Diagnosis   • Incisional hernia   • Post-menopausal   • Enterocele   • Morbid obesity   • Status post abdominal hysterectomy   • Palpitations   • Essential hypertension   • HLD (hyperlipidemia)   • Prediabetes   • Osteoarthritis of left knee   • S/P total knee arthroplasty, left   • Leukocytosis, mild, likely reactive               Adult Rehabilitation Note       17 1426          Rehab Assessment/Intervention    Discipline physical therapist  -LM      Document Type therapy note (daily note)  -LM      Subjective Information agree to therapy;no complaints  -LM      Patient Effort, Rehab Treatment excellent  -LM      Symptoms Noted During/After Treatment none  -LM      Precautions/Limitations fall precautions;other (see comments)   L adductor nerve canal cath  -LM      Recorded by [LM] Rema Real, PT      Pain Assessment    Pain Assessment No/denies pain  -LM      Recorded by [LM] Rema Real PT      Cognitive Assessment/Intervention    Current Cognitive/Communication Assessment functional  -LM      Orientation Status oriented x 4  -LM      Follows Commands/Answers Questions 100% of the time;able to follow single-step instructions;needs cueing  -LM      Personal Safety WNL/WFL  -LM      Personal Safety Interventions fall prevention program maintained;gait belt;nonskid  shoes/slippers when out of bed;supervised activity  -LM      Recorded by [LM] Rema Real PT      ROM (Range of Motion)    General ROM Detail L knee ROM 14-76 degrees; pt lacking 14 degrees from full extension; AAROM flexion measured in sitting  -LM      Recorded by [LM] Rema Real PT      Mobility Assessment/Training    Extremity Weight-Bearing Status left lower extremity  -LM      Left Lower Extremity Weight-Bearing weight-bearing as tolerated  -LM      Recorded by [LM] Rema Real PT      Bed Mobility, Assessment/Treatment    Bed Mobility, Assistive Device head of bed elevated;leg   -LM      Bed Mobility, Scoot/Bridge, [place] contact guard assist;verbal cues required  -LM      Bed Mob, Supine to Sit, [place] contact guard assist;verbal cues required  -LM      Bed Mob, Sit to Supine, [place] contact guard assist;verbal cues required  -LM      Bed Mobility, Safety Issues decreased use of arms for pushing/pulling;decreased use of legs for bridging/pushing  -LM      Bed Mobility, Impairments ROM decreased;strength decreased  -LM      Bed Mobility, Comment Pt utilized leg  to move LLE off EOB; also to bring LLE back onto bed to return to supine. No physical assist required.  -LM      Recorded by [LM] Rema Real PT      Transfer Assessment/Treatment    Transfers, Sit-Stand [place] minimum assist (75% patient effort);verbal cues required  -LM      Transfers, Stand-Sit [place] minimum assist (75% patient effort);verbal cues required  -LM      Transfers, Sit-Stand-Sit, Assist Device rolling walker  -LM      Toilet Transfer, [place] contact guard assist;verbal cues required  -LM      Toilet Transfer, Assistive Device rolling walker;other (see comments)   grab bar  -LM      Transfer, Safety Issues sequencing ability decreased;step length decreased;weight-shifting ability decreased  -LM      Transfer, Impairments ROM decreased;strength decreased  -LM       Transfer, Comment Verbal cues for proper hand placement and sequencing.  -LM      Recorded by [LM] Rema Real, PT      Gait Assessment/Treatment    Gait, [place] Level contact guard assist;verbal cues required  -LM      Gait, Assistive Device rolling walker  -LM      Gait, Distance (Feet) 256  -LM      Gait, Gait Pattern Analysis swing-through gait  -LM      Gait, Gait Deviations left:;antalgic;mable decreased;decreased heel strike;forward flexed posture;step length decreased;weight-shifting ability decreased  -LM      Gait, Safety Issues sequencing ability decreased;step length decreased;weight-shifting ability decreased  -LM      Gait, Impairments ROM decreased;strength decreased  -LM      Gait, Comment Pt ambulated with step-through pattern at decreased pace. Verbal cues to increase LLE weight bearing, upright posture, decrease BUE weight bearing. Gait distance limited by fatigue and soreness.  -LM      Recorded by [LM] Rema Real PT      Therapy Exercises    Exercise Protocols total knee  -LM      Total Knee Exercises left:;10 reps;completed protocol;with assist;ankle pumps/circles;quad set;glut set;heel slide stretch;SLR;SAQ;LAQ   cues for technique; [place] SLR  -LM      Recorded by [LM] Rema Real PT      Positioning and Restraints    Pre-Treatment Position in bed  -LM      Post Treatment Position bed  -LM      In Bed notified nsg;supine;call light within reach;encouraged to call for assist;exit alarm on;with family/caregiver  -LM      Recorded by [LM] Rema Real PT        User Key  (r) = Recorded By, (t) = Taken By, (c) = Cosigned By    Initials Name Effective Dates    LM Rema Real PT 06/09/17 -                 IP PT Goals       11/17/17 1500 11/17/17 0926       Bed Mobility PT LTG    Bed Mobility PT LTG, Date Established  11/17/17  -LM     Bed Mobility PT LTG, Time to Achieve  3 days  -LM     Bed Mobility PT LTG, Activity Type  supine to sit/sit to supine  -     Bed  Mobility PT LTG, [place] Level  independent  -LM     Bed Mobility PT LTG, Date Goal Reviewed 11/17/17  -LM      Bed Mobility PT LTG, Outcome goal ongoing  -LM      Transfer Training PT LTG    Transfer Training PT LTG, Date Established  11/17/17  -LM     Transfer Training PT LTG, Time to Achieve  3 days  -LM     Transfer Training PT LTG, Activity Type  sit to stand/stand to sit  -LM     Transfer Training PT LTG, [place] Level  conditional independence  -LM     Transfer Training PT LTG, Assist Device  walker, rolling  -LM     Transfer Training PT  LTG, Date Goal Reviewed 11/17/17  -LM      Transfer Training PT LTG, Outcome goal ongoing  -LM      Gait Training PT LTG    Gait Training Goal PT LTG, Date Established  11/17/17  -LM     Gait Training Goal PT LTG, Time to Achieve  3 days  -LM     Gait Training Goal PT LTG, [place] Level  conditional independence  -LM     Gait Training Goal PT LTG, Assist Device  walker, rolling  -LM     Gait Training Goal PT LTG, Distance to Achieve  500 feet  -LM     Gait Training Goal PT LTG, Date Goal Reviewed 11/17/17  -LM      Gait Training Goal PT LTG, Outcome goal ongoing  -LM      Range of Motion PT LTG    Range of Motion Goal PT LTG, Date Established  11/17/17  -LM     Range of Motion Goal PT LTG, Time to Achieve  3 days  -LM     Range fo Motion Goal PT LTG, Joint  L knee  -LM     Range of Motion Goal PT LTG, AAROM Fxnal Goal  0-90 degrees  -LM     Range of Motion Goal PT LTG, Date Goal Reviewed 11/17/17  -LM      Range of Motion Goal PT LTG, Outcome goal ongoing  -LM        User Key  (r) = Recorded By, (t) = Taken By, (c) = Cosigned By    Initials Name Provider Type    ARIANNA Real PT Physical Therapist          Physical Therapy Education     Title: PT OT SLP Therapies (Active)     Topic: Physical Therapy (Active)     Point: Mobility training (Active)    Learning Progress Summary    Learner Readiness Method Response Comment Documented by Status   Patient  Acceptance E,D NR Reviewed HEP, precautions, correct gait mechanics.  11/17/17 1459 Active    Acceptance E,D VU,NR Reviewed HEP, precautions, correct gait mechanics, benefits of activity.  11/17/17 0925 Done   Significant Other Acceptance E,D NR Reviewed HEP, precautions, correct gait mechanics.  11/17/17 1459 Active               Point: Home exercise program (Active)    Learning Progress Summary    Learner Readiness Method Response Comment Documented by Status   Patient Acceptance E,D NR Reviewed HEP, precautions, correct gait mechanics.  11/17/17 1459 Active    Acceptance E,D VU,NR Reviewed HEP, precautions, correct gait mechanics, benefits of activity.  11/17/17 0925 Done   Significant Other Acceptance E,D NR Reviewed HEP, precautions, correct gait mechanics.  11/17/17 1459 Active               Point: Body mechanics (Active)    Learning Progress Summary    Learner Readiness Method Response Comment Documented by Status   Patient Acceptance E,D NR Reviewed HEP, precautions, correct gait mechanics.  11/17/17 1459 Active    Acceptance E,D VU,NR Reviewed HEP, precautions, correct gait mechanics, benefits of activity.  11/17/17 0925 Done   Significant Other Acceptance E,D NR Reviewed HEP, precautions, correct gait mechanics.  11/17/17 1459 Active               Point: Precautions (Active)    Learning Progress Summary    Learner Readiness Method Response Comment Documented by Status   Patient Acceptance E,D NR Reviewed HEP, precautions, correct gait mechanics.  11/17/17 1459 Active    Acceptance E,D VU,NR Reviewed HEP, precautions, correct gait mechanics, benefits of activity.  11/17/17 0925 Done   Significant Other Acceptance E,D NR Reviewed HEP, precautions, correct gait mechanics.  11/17/17 1459 Active                      User Key     Initials Effective Dates Name Provider Type Discipline     06/09/17 -  Rema Real, PT Physical Therapist PT                    PT Recommendation and  Plan  Anticipated Equipment Needs At Discharge: front wheeled walker (bariatric)  Anticipated Discharge Disposition: home with assist, home with home health  Planned Therapy Interventions: balance training, bed mobility training, gait training, home exercise program, patient/family education, ROM (Range of Motion), strengthening, transfer training  PT Frequency: 2 times/day  Plan of Care Review  Plan Of Care Reviewed With: patient, spouse  Progress: progress toward functional goals as expected  Outcome Summary/Follow up Plan: Pt increased ambulation distance to 256 feet with CGA and RW, limited by fatigue. L knee ROM 14-76 degrees. Pt anticipating d/c home with assist and HHPT tomorrow.           Outcome Measures       11/17/17 1426 11/17/17 0850 11/17/17 0847    How much help from another person do you currently need...    Turning from your back to your side while in flat bed without using bedrails? 3  -LM  3  -LM    Moving from lying on back to sitting on the side of a flat bed without bedrails? 3  -LM  3  -LM    Moving to and from a bed to a chair (including a wheelchair)? 3  -LM  3  -LM    Standing up from a chair using your arms (e.g., wheelchair, bedside chair)? 3  -LM  3  -LM    Climbing 3-5 steps with a railing? 2  -LM  2  -LM    To walk in hospital room? 3  -LM  3  -LM    AM-PAC 6 Clicks Score 17  -LM  17  -LM    How much help from another is currently needed...    Putting on and taking off regular lower body clothing?  3  -HK     Bathing (including washing, rinsing, and drying)  3  -HK     Toileting (which includes using toilet bed pan or urinal)  3  -HK     Putting on and taking off regular upper body clothing  3  -HK     Taking care of personal grooming (such as brushing teeth)  3  -HK     Eating meals  4  -HK     Score  19  -HK     Functional Assessment    Outcome Measure Options AM-PAC 6 Clicks Basic Mobility (PT)  -LM AM-PAC 6 Clicks Daily Activity (OT)  -HK AM-PAC 6 Clicks Basic Mobility (PT)  -LM       User Key  (r) = Recorded By, (t) = Taken By, (c) = Cosigned By    Initials Name Provider Type    ARIANNA Real PT Physical Therapist    HK Radha Lopez, OT Occupational Therapist           Time Calculation:         PT Charges       11/17/17 1501 11/17/17 0928       Time Calculation    Start Time 1426  -LM 0847  -LM     PT Received On 11/17/17  -LM 11/17/17  -LM     PT Goal Re-Cert Due Date 11/27/17  -LM 11/27/17  -LM     Time Calculation- PT    Total Timed Code Minutes- PT 26 minute(s)  -LM 0 minute(s)  -LM       User Key  (r) = Recorded By, (t) = Taken By, (c) = Cosigned By    Initials Name Provider Type    ARIANNA Real PT Physical Therapist          Therapy Charges for Today     Code Description Service Date Service Provider Modifiers Qty    54920795929 HC PT EVAL MOD COMPLEXITY 4 11/17/2017 Rema Real, PT GP 1    32173076443 HC GAIT TRAINING EA 15 MIN 11/17/2017 Rema Real, PT GP 1    54017743510 HC PT THER PROC EA 15 MIN 11/17/2017 Rema Real, PT GP 1          PT G-Codes  Outcome Measure Options: AM-PAC 6 Clicks Basic Mobility (PT)    Rema Real PT  11/17/2017

## 2017-11-17 NOTE — PROGRESS NOTES
[facility]    Acute pain service Inpatient Progress Note    Patient Name: Imelda Elaine  :  1950  MRN:  [id number]        Acute Pain  Service Inpatient Progress Note:    Analgesia:Good  Pain Score:0/10  LOC: alert and awake  Resp Status: room air  Cardiac: VS stable  Side Effects:None  Catheter Site:clean  Cath type: peripheral nerve cath(MOOG pump)  Infusion rate: 12ml/hr  Catheter Plan:Catheter to remain Insitu and Continue catheter infusion rate unchanged

## 2017-11-17 NOTE — PROGRESS NOTES
"IM progress note      Imelda Elaine  [id number]  1950     LOS: 1 day     Attending: Sunny Reynoso, *    Primary Care Provider: Comfort Colunga MD      Chief Complaint/Reason for visit:  Left knee pain    Subjective   Doing well. Pain control is good. Denies f/c/n/v/sob/cp.  ( fair progress with rehab.)wy  Objective     Vital Signs  Blood pressure 135/82, pulse 85, temperature 97.8 °F (36.6 °C), resp. rate 16, height 63\" (160 cm), weight (!) 314 lb 2.5 oz (143 kg), SpO2 96 %, not currently breastfeeding.  Temp (24hrs), Av.1 °F (36.7 °C), Min:97.5 °F (36.4 °C), Max:98.6 °F (37 °C)      Nutrition: PO    Respiratory: RA    Physical Therapy: Pt ambulated 80 feet with CGAx2 and RW, limited by fatigue. ROM to be measured in PM session. Recommend d/c home with assist and HHPT.    Physical Exam:     General Appearance:    Alert, cooperative, in no acute distress   Head:    Normocephalic, without obvious abnormality, atraumatic    Lungs:     Normal effort, symmetric chest rise, no crepitus, clear to      auscultation bilaterally             Heart:    Regular rhythm and normal rate, normal S1 and S2   Abdomen:     Normal bowel sounds, no masses, no organomegaly, soft        non-tender, non-distended, no guarding, no rebound                Tenderness. obese   Extremities:   No clubbing, cyanosis or edema.  No deformities. Left knee Prineo CDI. Nerve block present   Pulses:   Pulses palpable and equal bilaterally   Skin:   No bleeding, bruising or rash   Neurologic:   Moves all extremities with no obvious focal motor deficit.  Cranial nerves 2 - 12 grossly intact. Flexion and dorsiflexion intact bilateral feet.       Results Review:     I reviewed the patient's new clinical results.     Results from last 7 days  Lab Units 17  0429   WBC 10*3/mm3 10.95*   HEMOGLOBIN g/dL 11.9   HEMATOCRIT % 37.1   PLATELETS 10*3/mm3 165       Results from last 7 days  Lab Units 17  0429   SODIUM mmol/L 139 "   POTASSIUM mmol/L 4.4   CHLORIDE mmol/L 105   CO2 mmol/L 28.0   BUN mg/dL 15   CREATININE mg/dL 0.80   CALCIUM mg/dL 8.9   GLUCOSE mg/dL 179*     Results for GAIL KHAN (MRN [id number]) as of 11/17/2017 09:56   Ref. Range 11/16/2017 13:35 11/16/2017 17:32 11/17/2017 04:29   Glucose Latest Ref Range: 70 - 100 mg/dL 78  179 (H)     I reviewed the patient's new imaging including images and reports.    All medications reviewed.     atorvastatin 10 mg Oral Nightly   celecoxib 200 mg Oral Daily   cetirizine 10 mg Oral Daily   enoxaparin 40 mg Subcutaneous Daily   estradiol 0.5 mg Oral Daily   losartan 25 mg Oral Daily   metoprolol succinate XL 25 mg Oral Daily   pantoprazole 40 mg Oral QAM   sennosides-docusate sodium 2 tablet Oral BID   venlafaxine XR 75 mg Oral Daily       Assessment/Plan   Principal Problem:    S/P total knee arthroplasty, left  Active Problems:    Osteoarthritis of left knee    Morbid obesity    Essential hypertension    HLD (hyperlipidemia)    Prediabetes    Leukocytosis, mild, likely reactive      Plan  1. PT/OT-WBAT LLE  2. Pain control-prns, AC nerve block   3. IS-encouraged  4. DVT proph- mechs/Lovenox  5. Bowel regimen  6. Monitor post-op labs  7. DC planning for home, likely tomorrow    HTN, HLD  - Continue statin, cozaar and toprol  - Monitor BP q4 hrs  - Holding parameters for BP meds  - PRN hydralazine for SBP >170     PreDM  - hgb A1c on 11/7/17 5.8  - Accuchecks ACHS with low dose SSI  - DM educator consult    Seen and examined by me. Agree with above. Discussed with patient. cory Leonardo, FLOR  11/17/17  9:58 AM

## 2017-11-17 NOTE — PROGRESS NOTES
"/77 (BP Location: Right arm, Patient Position: Lying)  Pulse 91  Temp 97.5 °F (36.4 °C) (Oral)   Resp 16  Ht 63\" (160 cm)  Wt (!) 314 lb 2.5 oz (143 kg)  SpO2 95%  BMI 55.65 kg/m2    Lab Results (last 24 hours)     Procedure Component Value Units Date/Time    OR Potassium [[id number]]  (Normal) Collected:  11/16/17 1219    Specimen:  Blood Updated:  11/16/17 1250     Potassium, OR 3.97 mmol/L     POC Glucose Fingerstick [[id number]]  (Normal) Collected:  11/16/17 1335    Specimen:  Blood Updated:  11/16/17 1340     Glucose 78 mg/dL     Narrative:       Meter: [id number] : [id number] [facility]    Basic Metabolic Panel [[id number]]  (Abnormal) Collected:  11/17/17 0429    Specimen:  Blood Updated:  11/17/17 0601     Glucose 179 (H) mg/dL      BUN 15 mg/dL      Creatinine 0.80 mg/dL      Sodium 139 mmol/L      Potassium 4.4 mmol/L      Chloride 105 mmol/L      CO2 28.0 mmol/L      Calcium 8.9 mg/dL      eGFR Non African Amer 72 mL/min/1.73      BUN/Creatinine Ratio 18.8     Anion Gap 6.0 mmol/L     Narrative:       [employer] Guidelines    Stage     Description        GFR  1         Normal or High     90+  2         Mild decrease      60-89  3         Moderate decrease  30-59  4         Severe decrease    15-29  5         Kidney failure     <15    CBC & Differential [[id number]] Collected:  11/17/17 0429    Specimen:  Blood Updated:  11/17/17 0604    Narrative:       The following orders were created for panel order CBC & Differential.  Procedure                               Abnormality         Status                     ---------                               -----------         ------                     CBC Auto Differential[[id number]]        Abnormal            Final result                 Please view results for these tests on the individual orders.    CBC Auto Differential [[id number]]  (Abnormal) Collected:  11/17/17 0429    Specimen:  Blood Updated:  11/17/17 0604     WBC 10.95 (H) " 10*3/mm3      RBC 4.20 10*6/mm3      Hemoglobin 11.9 g/dL      Hematocrit 37.1 %      MCV 88.3 fL      MCH 28.3 pg      MCHC 32.1 g/dL      RDW 15.2 (H) %      RDW-SD 48.9 fl      MPV 12.2 (H) fL      Platelets 165 10*3/mm3      Neutrophil % 88.3 (H) %      Lymphocyte % 5.6 (L) %      Monocyte % 5.8 %      Eosinophil % 0.0 %      Basophil % 0.0 %      Immature Grans % 0.3 %      Neutrophils, Absolute 9.68 (H) 10*3/mm3      Lymphocytes, Absolute 0.61 10*3/mm3      Monocytes, Absolute 0.63 10*3/mm3      Eosinophils, Absolute 0.00 10*3/mm3      Basophils, Absolute 0.00 10*3/mm3      Immature Grans, Absolute 0.03 10*3/mm3           Imaging Results (last 24 hours)     Procedure Component Value Units Date/Time    XR Knee 1 or 2 View Left [[id number]] Updated:  11/16/17 7778          Patient Care Team:  Comfort Colunga MD as PCP - General (Internal Medicine)    SUBJECTIVE: Imelda reports she is doing well.  Pain is well-controlled.  Tolerating regular diet.    PHYSICAL EXAM  Left knee incision is clean, dry and intact with mesh dressing in place  Thigh and calf soft nontender  Neurovascular intact distally     Active Problems:    Morbid obesity    Essential hypertension    HLD (hyperlipidemia)    Prediabetes    Osteoarthritis of left knee      PLAN / DISPOSITION: 66-year-old female postop day #1 status post left total knee arthroplasty  -Mobilize with therapy as tolerated  -Lovenox for DVT prophylaxis  -Celebrex resumed today  -H&H stable  - for home equipment needs and home physical therapy  -Plan d/c home Saturday or Sunday  -Follow-up in 2 weeks    Sunny Reynoso MD  11/17/17  7:59 AM

## 2017-11-17 NOTE — PLAN OF CARE
Problem: Patient Care Overview (Adult)  Goal: Plan of Care Review  Outcome: Ongoing (interventions implemented as appropriate)    11/17/17 1128   Coping/Psychosocial Response Interventions   Plan Of Care Reviewed With patient   Patient Care Overview   Progress improving   Outcome Evaluation   Outcome Summary/Follow up Plan OT arsenio complete. Pt completing bed mobility with CGA and tranfers with min A. OT educated pt on use of AE for LB bathing and dressing. PT states she would like to purchase ADL kit. Reommend discharge home with assist and HH.          Problem: Inpatient Occupational Therapy  Goal: Bed Mobility Goal LTG- OT  Outcome: Ongoing (interventions implemented as appropriate)    11/17/17 1128   Bed Mobility OT LTG   Bed Mobility OT LTG, Date Established 11/17/17   Bed Mobility OT LTG, Time to Achieve by discharge   Bed Mobility OT LTG, Activity Type supine to sit/sit to supine;scoot/bridge   Bed Mobility OT LTG, [place] Level conditional independence   Bed Mobility OT LTG, Assist Device leg ;bed rails   Bed Mobility OT LTG, Outcome goal ongoing       Goal: Transfer Training Goal 1 LTG- OT  Outcome: Ongoing (interventions implemented as appropriate)    11/17/17 1128   Transfer Training OT LTG   Transfer Training OT LTG, Date Established 11/17/17   Transfer Training OT LTG, Time to Achieve by discharge   Transfer Training OT LTG, Activity Type sit to stand/stand to sit;bed to chair /chair to bed;toilet;tub   Transfer Training OT LTG, [place] Level contact guard assist;verbal cues required   Transfer Training OT LTG, Outcome goal ongoing       Goal: Patient Education Goal LTG- OT  Outcome: Ongoing (interventions implemented as appropriate)    11/17/17 1128   Patient Education OT LTG   Patient Education OT LTG, Date Established 11/17/17   Patient Education OT LTG, Time to Achieve by discharge   Patient Education OT LTG, Education Type posture/body mechanics;1 hand/walter technique;adaptive  equipment mgmt;precautions per surgeon   Patient Education OT LTG, Education Understanding demonstrates adequately   Patient Education OT LTG Outcome goal ongoing       Goal: LB Dressing Goal LTG- OT  Outcome: Ongoing (interventions implemented as appropriate)    11/17/17 1128   LB Dressing OT LTG   LB Dressing Goal OT LTG, Date Established 11/17/17   LB Dressing Goal OT LTG, Time to Achieve by discharge   LB Dressing Goal OT LTG, Activity Type don socks and pants   LB Dressing Goal OT LTG, [place] Level supervision required;verbal cues required   LB Dressing Goal OT LTG, Adaptive Equipment reacher;sock-aid   LB Dressing Goal OT LTG, Outcome goal ongoing

## 2017-11-17 NOTE — THERAPY EVALUATION
"Acute Care - Occupational Therapy Initial Evaluation  [facility]     Patient Name: Imelda Elaine  : 1950  MRN: [id number]  Today's Date: 2017  Onset of Illness/Injury or Date of Surgery Date: 17  Date of Referral to OT: 17  Referring Physician: MD Edgardo    Admit Date: 2017       ICD-10-CM ICD-9-CM   1. Impaired functional mobility, balance, gait, and endurance Z74.09 V49.89   2. Impaired mobility and ADLs Z74.09 799.89     Patient Active Problem List   Diagnosis   • Incisional hernia   • Post-menopausal   • Enterocele   • Morbid obesity   • Status post abdominal hysterectomy   • Palpitations   • Essential hypertension   • HLD (hyperlipidemia)   • Prediabetes   • Osteoarthritis of left knee   • S/P total knee arthroplasty, left   • Leukocytosis, mild, likely reactive     Past Medical History:   Diagnosis Date   • Anesthesia     low bp only with foot surgery, has had surgeries since and no issue per pt report   • Arthritis    • Back pain    • Depression    • GERD (gastroesophageal reflux disease)    • High cholesterol    • Hypertension    • Knee pain     BILATERAL   • Menopausal state    • Seasonal allergies    • Wears eyeglasses    • Wears glasses    • Wears partial dentures     implants     Past Surgical History:   Procedure Laterality Date   • ANTERIOR AND POSTERIOR VAGINAL REPAIR N/A 2017    Procedure:  Vaginal enterocele repair with anterior posterior colporrhaphy;  Surgeon: Jose Maria Chowdhury MD;  Location: [facility] OR;  Service:    • BLADDER AUGMENTATION      MESH PLACED   • COLONOSCOPY     • EXPLORATORY LAPAROTOMY      \"BOWEL INTO PELVIC BONE\"  WRAPPED WITH MESH????   • FOOT SURGERY Left    • HYSTERECTOMY      WITH BSO    • KNEE ARTHROSCOPY Left     meniscus repair   • LAPAROSCOPIC CHOLECYSTECTOMY     • OOPHORECTOMY     • UT TOTAL KNEE ARTHROPLASTY Left 2017    Procedure: TOTAL KNEE ARTHROPLASTY LEFT;  Surgeon: Sunny Reynoso MD;  Location: [facility] " OR;  Service: Orthopedics   • TONSILLECTOMY     • TUBAL ABDOMINAL LIGATION     • VENTRAL HERNIA REPAIR N/A 6/21/2016    Procedure: LAPAROSCOPIC REPAIR OF ABDOMINAL WALL HERNIA WITH MESH, CONVERTED TO OPEN ;  Surgeon: Bertha Delacruz MD;  Location: [facility] OR;  Service:           OT ASSESSMENT FLOWSHEET (last 72 hours)      OT Evaluation       11/17/17 0850 11/17/17 0847 11/16/17 1932 11/16/17 1747 11/16/17 1214    Rehab Evaluation    Document Type evaluation  -HK evaluation  -LM       Subjective Information no complaints;agree to therapy  -HK no complaints;agree to therapy  -LM       Evaluation Not Performed    patient unavailable for evaluation   Still in PACU. Will check back in AM to complete eval.   -LR     Patient Effort, Rehab Treatment excellent  -HK excellent  -LM       Symptoms Noted During/After Treatment none  -HK none  -LM       General Information    Patient Profile Review yes  -HK yes  -LM       Onset of Illness/Injury or Date of Surgery Date 11/16/17  -HK 11/16/17  -LM       Referring Physician MD Edgardo  -HK MD Edgardo  -LM       General Observations Pt received supine in bed with L adductor nerve canal catheter in place. No visitors.   -HK Pt received supine in bed, L adductor nerve canal catheter. No visitors at bedside.  -LM       Pertinent History Of Current Problem Pt is a 66 YOF who presents for surigcal management of intractable knee pain that failed to improve with conservative measures. Pt is POD #1 s/p   -HK Pt presents for surgical management of L knee pain and dysfunction that failed to improve with conservative management.  -LM       Precautions/Limitations fall precautions;other (see comments)   L adductor nerve canal cath  -HK fall precautions;other (see comments)   L adductor nerve canal cath  -LM       Prior Level of Function min assist:;community mobility;all household mobility;ADL's;feeding;grooming;dressing;bathing;home management;cooking;cleaning;driving;shopping  -HK min  assist:;all household mobility;community mobility;gait;transfer;bed mobility;ADL's;dressing;bathing;home management  -LM       Equipment Currently Used at Home cane, straight  -HK cane, straight  -LM   cane, straight  -KM    Plans/Goals Discussed With patient;agreed upon  -HK patient;agreed upon  -LM       Risks Reviewed patient:;LOB;nausea/vomiting;dizziness;increased discomfort;change in vital signs  -HK patient:;LOB;nausea/vomiting;dizziness;increased discomfort;change in vital signs  -LM       Benefits Reviewed patient:;improve function;increase independence;increase strength;increase balance;decrease pain  -HK patient:;improve function;increase independence;increase strength;increase balance;decrease pain  -LM       Barriers to Rehab none identified  -HK none identified  -LM       Living Environment    Lives With spouse  -HK spouse  -LM   spouse  -KM    Living Arrangements apartment  -HK apartment  -LM   apartment  -KM    Home Accessibility no concerns;tub/shower is not walk in  -HK no concerns;tub/shower is not walk in   elevator  -LM   no concerns;bed and bath on same level  -KM    Stair Railings at Home     none  -KM    Type of Financial/Environmental Concern     none  -KM    Transportation Available     car;family or friend will provide  -KM    Living Environment Comment Pt lives in wheelchair accessible apartment complex with elevator.. Spouse and son will be present to assist when needed.  -HK Pt lives in apartment complex; wheelchair accessible. Elevator present. Spouse and son able to assist as needed.  -LM       Clinical Impression    Date of Referral to OT 11/16/17  -HK        OT Diagnosis Decreased independence with ADLS and Mobility  -HK        Patient/Family Goals Statement Pt would like to improve and return home  -HK        Criteria for Skilled Therapeutic Interventions Met yes;treatment indicated  -HK        Rehab Potential good, to achieve stated therapy goals  -HK        Therapy Frequency  daily   per priority policy  -HK        Anticipated Equipment Needs At Discharge bathing equipment;dressing equipment   Pt would like to purchase ADL kit  -HK        Anticipated Discharge Disposition home with assist;home with home health  -HK        Functional Level Prior    Ambulation   1-->assistive equipment  -LF  1-->assistive equipment  -KM    Transferring   1-->assistive equipment  -LF  1-->assistive equipment  -KM    Toileting   0-->independent  -LF  0-->independent  -KM    Bathing   0-->independent  -LF  0-->independent  -KM    Dressing   0-->independent  -LF  0-->independent  -KM    Eating   0-->independent  -LF  0-->independent  -KM    Communication   0-->understands/communicates without difficulty  -LF  0-->understands/communicates without difficulty  -KM    Swallowing   0-->swallows foods/liquids without difficulty  -LF  0-->swallows foods/liquids without difficulty  -KM    Prior Functional Level Comment   baseline  -LF      Vital Signs    Pre Patient Position Supine  -HK        Intra Patient Position Standing  -HK        Post Patient Position Sitting  -HK        Pain Assessment    Pain Assessment No/denies pain  -HK No/denies pain  -LM       Cognitive Assessment/Intervention    Current Cognitive/Communication Assessment functional  -HK functional  -LM       Orientation Status oriented x 4  -HK oriented x 4  -LM       Follows Commands/Answers Questions 100% of the time;able to follow single-step instructions;needs cueing  -% of the time;able to follow single-step instructions;needs cueing  -LM       Personal Safety WNL/WFL  -HK WNL/WFL  -LM       Personal Safety Interventions fall prevention program maintained;gait belt;nonskid shoes/slippers when out of bed;supervised activity  -HK fall prevention program maintained;gait belt;nonskid shoes/slippers when out of bed;supervised activity  -LM       ROM (Range of Motion)    General ROM --  -HK lower extremity range of motion deficits identified  -LM        General ROM Detail --  -HK LLE impaired 25%  -LM       MMT (Manual Muscle Testing)    General MMT Assessment --  -HK lower extremity strength deficits identified  -LM       General MMT Assessment Detail  LLE not formally assessed; RLE grossly 4/5  -LM       Mobility Assessment/Training    Extremity Weight-Bearing Status left lower extremity  -HK left lower extremity  -LM       Left Lower Extremity Weight-Bearing weight-bearing as tolerated  -HK weight-bearing as tolerated  -LM       Bed Mobility, Assessment/Treatment    Bed Mobility, Assistive Device head of bed elevated;leg   -HK head of bed elevated;leg   -LM       Bed Mobility, Scoot/Bridge, [place] contact guard assist;verbal cues required  -HK contact guard assist;verbal cues required  -LM       Bed Mob, Supine to Sit, [place] contact guard assist;verbal cues required  -HK contact guard assist;verbal cues required  -LM       Bed Mob, Sit to Supine, [place] not tested  -HK not tested   Pt left UIC  -LM       Bed Mobility, Safety Issues decreased use of arms for pushing/pulling;decreased use of legs for bridging/pushing  -HK decreased use of arms for pushing/pulling;decreased use of legs for bridging/pushing  -LM       Bed Mobility, Impairments ROM decreased;strength decreased  -HK ROM decreased;strength decreased  -LM       Bed Mobility, Comment Pt utilizing lef  to advance to EOB. Verbal cues for sequencing.   -HK Pt utilized leg  to move LLE toward EOB. Verbal cues for sequencing.  -LM       Transfer Assessment/Treatment    Transfers, Sit-Stand [place] minimum assist (75% patient effort);2 person assist required;verbal cues required  -HK minimum assist (75% patient effort);2 person assist required;verbal cues required  -LM       Transfers, Stand-Sit [place] contact guard assist;2 person assist required;verbal cues required  -HK contact guard assist;2 person assist required;verbal cues required  -LM        Transfers, Sit-Stand-Sit, Assist Device rolling walker  -HK rolling walker  -LM       Transfer, Safety Issues sequencing ability decreased;step length decreased;weight-shifting ability decreased  -HK sequencing ability decreased;step length decreased;weight-shifting ability decreased  -LM       Transfer, Impairments ROM decreased;strength decreased  -HK ROM decreased;strength decreased  -LM       Transfer, Comment verbal cues for safe hand placment during transfers and sequencing.  OT edcated pt on use of lef  fo car transfers  -HK Verbal cues for proper hand placement and sequencing.  -LM       Functional Mobility    Functional Mobility- Ind. Level contact guard assist;2 person assist required;verbal cues required  -HK        Functional Mobility- Device rolling walker  -HK        Functional Mobility-Distance (Feet) 80  -HK        Functional Mobility- Safety Issues step length decreased;weight-shifting ability decreased  -HK        Functional Mobility- Comment Pt ambulated from bed into mullins and back to chair  -HK        Lower Body Bathing Assessment/Training    LB Bathing Assess/Train, Comment OT educated pt on use of LH sponge for LB bathing, Pt woul dlik to purchase ADL kit.   -HK        Lower Body Dressing Assessment/Training    LB Dressing Assess/Train, Clothing Type doffing:;donning:;slipper socks  -HK        LB Dressing Assess/Train, Assist Device reacher;sock-aid  -HK        LB Dressing Assess/Train, Position sitting  -HK        LB Dressing Assess/Train, [place] minimum assist (75% patient effort);verbal cues required  -HK        LB Dressing Assess/Train, Impairments ROM decreased;strength decreased;pain  -HK        LB Dressing Assess/Train, Comment OT educated pt on use of LH shoe horn, sock aid, and reacher for LB dressing. Pt states she would like to puchase ADL kit.   -HK        Therapy Exercises    Exercise Protocols  total knee  -LM       Total Knee Exercises  left:;10 reps;ankle  pumps/circles;quad set;glut set  -LM       Sensory Assessment/Intervention    Sensory Impairment  --   Pt denies n/t in BLEs  -LM       General Therapy Interventions    Planned Therapy Interventions adaptive equipment training;ADL retraining;transfer training;bed mobility training  -HK        Positioning and Restraints    Pre-Treatment Position in bed  -HK in bed  -LM       Post Treatment Position chair  -HK chair  -LM       In Chair reclined;call light within reach;encouraged to call for assist;exit alarm on;with nsg  -HK sitting;exit alarm on;with OT  -LM         User Key  (r) = Recorded By, (t) = Taken By, (c) = Cosigned By    Initials Name Effective Dates    LR Simona Farley, PT 06/19/15 -     KM Sridevi Birch, RN 06/16/16 -     LF Ruthy Curtis, RN 03/20/17 -     LM Rema Real, PT 06/09/17 -     HK Radha Lopez, OT 09/28/17 -            Occupational Therapy Education     Title: PT OT SLP Therapies (Done)     Topic: Occupational Therapy (Done)     Point: ADL training (Done)    Description: Instruct learner(s) on proper safety adaptation and remediation techniques during self care or transfers.   Instruct in proper use of assistive devices.    Learning Progress Summary    Learner Readiness Method Response Comment Documented by Status   Patient Acceptance E VU Pt educated on ADL retraining with AE, safety precautions and appropriate body mechanics to maintain knee precautions.  11/17/17 1126 Done               Point: Precautions (Done)    Description: Instruct learner(s) on prescribed precautions during self-care and functional transfers.    Learning Progress Summary    Learner Readiness Method Response Comment Documented by Status   Patient Acceptance E VU Pt educated on ADL retraining with AE, safety precautions and appropriate body mechanics to maintain knee precautions.  11/17/17 1126 Done               Point: Body mechanics (Done)    Description: Instruct learner(s) on proper positioning and  spine alignment during self-care, functional mobility activities and/or exercises.    Learning Progress Summary    Learner Readiness Method Response Comment Documented by Status   Patient Acceptance E VU Pt educated on ADL retraining with AE, safety precautions and appropriate body mechanics to maintain knee precautions.  11/17/17 1126 Done                      User Key     Initials Effective Dates Name Provider Type Discipline     09/28/17 -  Radha Lopez, OT Occupational Therapist OT                  OT Recommendation and Plan  Anticipated Equipment Needs At Discharge: bathing equipment, dressing equipment (Pt would like to purchase ADL kit)  Anticipated Discharge Disposition: home with assist, home with home health  Planned Therapy Interventions: adaptive equipment training, ADL retraining, transfer training, bed mobility training  Therapy Frequency: daily (per priority policy)  Plan of Care Review  Plan Of Care Reviewed With: patient  Progress: improving  Outcome Summary/Follow up Plan: OT eval complete. Pt completing bed mobility with CGA and tranfers with min A. OT educated pt on use of AE for LB bathing and dressing. PT states she would like to purchase ADL kit. Reommend discharge home with assist and HH.           OT Goals       11/17/17 1128          Bed Mobility OT LTG    Bed Mobility OT LTG, Date Established 11/17/17  -      Bed Mobility OT LTG, Time to Achieve by discharge  -HK      Bed Mobility OT LTG, Activity Type supine to sit/sit to supine;scoot/bridge  -HK      Bed Mobility OT LTG, [place] Level conditional independence  -HK      Bed Mobility OT LTG, Assist Device leg ;bed rails  -HK      Bed Mobility OT LTG, Outcome goal ongoing  -HK      Transfer Training OT LTG    Transfer Training OT LTG, Date Established 11/17/17  -HK      Transfer Training OT LTG, Time to Achieve by discharge  -HK      Transfer Training OT LTG, Activity Type sit to stand/stand to sit;bed to chair /chair to  bed;toilet;tub  -HK      Transfer Training OT LTG, [place] Level contact guard assist;verbal cues required  -HK      Transfer Training OT LTG, Outcome goal ongoing  -HK      Patient Education OT LTG    Patient Education OT LTG, Date Established 11/17/17  -HK      Patient Education OT LTG, Time to Achieve by discharge  -HK      Patient Education OT LTG, Education Type posture/body mechanics;1 hand/walter technique;adaptive equipment mgmt;precautions per surgeon  -      Patient Education OT LTG, Education Understanding demonstrates adequately  -HK      Patient Education OT LTG Outcome goal ongoing  -HK      LB Dressing OT LTG    LB Dressing Goal OT LTG, Date Established 11/17/17  -HK      LB Dressing Goal OT LTG, Time to Achieve by discharge  -HK      LB Dressing Goal OT LTG, Activity Type don socks and pants  -HK      LB Dressing Goal OT LTG, [place] Level supervision required;verbal cues required  -HK      LB Dressing Goal OT LTG, Adaptive Equipment reacher;sock-aid  -HK      LB Dressing Goal OT LTG, Outcome goal ongoing  -        User Key  (r) = Recorded By, (t) = Taken By, (c) = Cosigned By    Initials Name Provider Type    TUCKER Lopez, YADIEL Occupational Therapist                Outcome Measures       11/17/17 0850 11/17/17 0847       How much help from another person do you currently need...    Turning from your back to your side while in flat bed without using bedrails?  3  -LM     Moving from lying on back to sitting on the side of a flat bed without bedrails?  3  -LM     Moving to and from a bed to a chair (including a wheelchair)?  3  -LM     Standing up from a chair using your arms (e.g., wheelchair, bedside chair)?  3  -LM     Climbing 3-5 steps with a railing?  2  -LM     To walk in hospital room?  3  -LM     AM-PAC 6 Clicks Score  17  -LM     How much help from another is currently needed...    Putting on and taking off regular lower body clothing? 3  -HK      Bathing (including washing,  rinsing, and drying) 3  -HK      Toileting (which includes using toilet bed pan or urinal) 3  -HK      Putting on and taking off regular upper body clothing 3  -HK      Taking care of personal grooming (such as brushing teeth) 3  -HK      Eating meals 4  -HK      Score 19  -HK      Functional Assessment    Outcome Measure Options AM-PAC 6 Clicks Daily Activity (OT)  -HK AM-PAC 6 Clicks Basic Mobility (PT)  -LM       User Key  (r) = Recorded By, (t) = Taken By, (c) = Cosigned By    Initials Name Provider Type    LM Rema Real, PT Physical Therapist     Radha Lopez OT Occupational Therapist          Time Calculation:   OT Start Time: 0850    Therapy Charges for Today     Code Description Service Date Service Provider Modifiers Qty    [id number]  OT EVAL LOW COMPLEXITY 2 11/17/2017 Radha Lopez OT GO 1    [id number]  OT THERAPEUTIC ACT EA 15 MIN 11/17/2017 Radha Lopez OT GO 2               Radha Lopez OT  11/17/2017

## 2017-11-17 NOTE — NURSING NOTE
Acute Pain Service:  Patient plans to discharge home tomorrow. On-Q teaching completed with patient and sister.  Video demonstration, handout and bracelet provided with [facility] on call central phone number.  Instructed to call with any questions or concerns.  Patient verbalized understanding.  Service will continue to follow until catheter DC'd.  Please contact patient at [phone] if needed.

## 2017-11-17 NOTE — PLAN OF CARE
Problem: Patient Care Overview (Adult)  Goal: Plan of Care Review  Outcome: Ongoing (interventions implemented as appropriate)    11/17/17 0926   Coping/Psychosocial Response Interventions   Plan Of Care Reviewed With patient   Patient Care Overview   Progress progress toward functional goals as expected   Outcome Evaluation   Outcome Summary/Follow up Plan Pt ambulated 80 feet with CGAx2 and RW, limited by fatigue. ROM to be measured in PM session. Recommend d/c home with assist and HHPT.          Problem: Inpatient Physical Therapy  Goal: Bed Mobility Goal LTG- PT  Outcome: Ongoing (interventions implemented as appropriate)    11/17/17 0926   Bed Mobility PT LTG   Bed Mobility PT LTG, Date Established 11/17/17   Bed Mobility PT LTG, Time to Achieve 3 days   Bed Mobility PT LTG, Activity Type supine to sit/sit to supine   Bed Mobility PT LTG, [place] Level independent       Goal: Transfer Training Goal 1 LTG- PT  Outcome: Ongoing (interventions implemented as appropriate)    11/17/17 0926   Transfer Training PT LTG   Transfer Training PT LTG, Date Established 11/17/17   Transfer Training PT LTG, Time to Achieve 3 days   Transfer Training PT LTG, Activity Type sit to stand/stand to sit   Transfer Training PT LTG, [place] Level conditional independence   Transfer Training PT LTG, Assist Device walker, rolling       Goal: Gait Training Goal LTG- PT  Outcome: Ongoing (interventions implemented as appropriate)    11/17/17 0926   Gait Training PT LTG   Gait Training Goal PT LTG, Date Established 11/17/17   Gait Training Goal PT LTG, Time to Achieve 3 days   Gait Training Goal PT LTG, [place] Level conditional independence   Gait Training Goal PT LTG, Assist Device walker, rolling   Gait Training Goal PT LTG, Distance to Achieve 500 feet       Goal: Range of Motion Goal LTG- PT  Outcome: Ongoing (interventions implemented as appropriate)    11/17/17 0926   Range of Motion PT LTG   Range of Motion Goal PT  LTG, Date Established 11/17/17   Range of Motion Goal PT LTG, Time to Achieve 3 days   Range fo Motion Goal PT LTG, Joint L knee   Range of Motion Goal PT LTG, AAROM Fxnal Goal 0-90 degrees

## 2017-11-17 NOTE — THERAPY EVALUATION
"Acute Care - Physical Therapy Initial Evaluation  [facility]     Patient Name: Imelda Elaine  : 1950  MRN: [id number]  Today's Date: 2017   Onset of Illness/Injury or Date of Surgery Date: 17  Date of Referral to PT: 17  Referring Physician: MD Edgardo      Admit Date: 2017     Visit Dx:    ICD-10-CM ICD-9-CM   1. Impaired functional mobility, balance, gait, and endurance Z74.09 V49.89     Patient Active Problem List   Diagnosis   • Incisional hernia   • Post-menopausal   • Enterocele   • Morbid obesity   • Status post abdominal hysterectomy   • Palpitations   • Essential hypertension   • HLD (hyperlipidemia)   • Prediabetes   • Osteoarthritis of left knee     Past Medical History:   Diagnosis Date   • Anesthesia     low bp only with foot surgery, has had surgeries since and no issue per pt report   • Arthritis    • Back pain    • Depression    • GERD (gastroesophageal reflux disease)    • High cholesterol    • Hypertension    • Knee pain     BILATERAL   • Menopausal state    • Seasonal allergies    • Wears eyeglasses    • Wears glasses    • Wears partial dentures     implants     Past Surgical History:   Procedure Laterality Date   • ANTERIOR AND POSTERIOR VAGINAL REPAIR N/A 2017    Procedure:  Vaginal enterocele repair with anterior posterior colporrhaphy;  Surgeon: Jose Maria Chowdhury MD;  Location: formerly [facility];  Service:    • BLADDER AUGMENTATION      MESH PLACED   • COLONOSCOPY     • EXPLORATORY LAPAROTOMY      \"BOWEL INTO PELVIC BONE\"  WRAPPED WITH MESH????   • FOOT SURGERY Left    • HYSTERECTOMY      WITH BSO    • KNEE ARTHROSCOPY Left     meniscus repair   • LAPAROSCOPIC CHOLECYSTECTOMY     • OOPHORECTOMY     • CA TOTAL KNEE ARTHROPLASTY Left 2017    Procedure: TOTAL KNEE ARTHROPLASTY LEFT;  Surgeon: Sunny Reynoso MD;  Location: formerly [facility];  Service: Orthopedics   • TONSILLECTOMY     • TUBAL ABDOMINAL LIGATION     • VENTRAL HERNIA REPAIR N/A " 6/21/2016    Procedure: LAPAROSCOPIC REPAIR OF ABDOMINAL WALL HERNIA WITH MESH, CONVERTED TO OPEN ;  Surgeon: Bertha Delacruz MD;  Location: [facility] OR;  Service:           PT ASSESSMENT (last 72 hours)      PT Evaluation       11/17/17 0847 11/16/17 0219    Rehab Evaluation    Document Type evaluation  -LM     Subjective Information no complaints;agree to therapy  -LM     Evaluation Not Performed  patient unavailable for evaluation   Still in PACU. Will check back in AM to complete eval.   -LR    Patient Effort, Rehab Treatment excellent  -LM     Symptoms Noted During/After Treatment none  -LM     General Information    Patient Profile Review yes  -LM     Onset of Illness/Injury or Date of Surgery Date 11/16/17  -LM     Referring Physician MD Edgardo  -LM     General Observations Pt received supine in bed, L adductor nerve canal catheter. No visitors at bedside.  -LM     Pertinent History Of Current Problem Pt presents for surgical management of L knee pain and dysfunction that failed to improve with conservative management.  -LM     Precautions/Limitations fall precautions;other (see comments)   L adductor nerve canal cath  -LM     Prior Level of Function min assist:;all household mobility;community mobility;gait;transfer;bed mobility;ADL's;dressing;bathing;home management  -LM     Equipment Currently Used at Home cane, straight  -LM     Plans/Goals Discussed With patient;agreed upon  -LM     Risks Reviewed patient:;LOB;nausea/vomiting;dizziness;increased discomfort;change in vital signs  -LM     Benefits Reviewed patient:;improve function;increase independence;increase strength;increase balance;decrease pain  -LM     Barriers to Rehab none identified  -LM     Living Environment    Lives With spouse  -LM     Living Arrangements apartment  -LM     Home Accessibility no concerns;tub/shower is not walk in   elevator  -LM     Living Environment Comment Pt lives in apartment complex; wheelchair accessible. Elevator  present. Spouse and son able to assist as needed.  -LM     Clinical Impression    Date of Referral to PT 11/16/17  -LM     PT Diagnosis s/p elective L TKA  -LM     Prognosis good  -LM     Patient/Family Goals Statement return home  -LM     Criteria for Skilled Therapeutic Interventions Met yes;treatment indicated  -LM     Rehab Potential good, to achieve stated therapy goals  -LM     Pain Assessment    Pain Assessment No/denies pain  -LM     Cognitive Assessment/Intervention    Current Cognitive/Communication Assessment functional  -LM     Orientation Status oriented x 4  -LM     Follows Commands/Answers Questions 100% of the time;able to follow single-step instructions;needs cueing  -     Personal Safety WNL/WFL  -LM     Personal Safety Interventions fall prevention program maintained;gait belt;nonskid shoes/slippers when out of bed;supervised activity  -LM     ROM (Range of Motion)    General ROM lower extremity range of motion deficits identified  -LM     General ROM Detail LLE impaired 25%  -LM     MMT (Manual Muscle Testing)    General MMT Assessment lower extremity strength deficits identified  -LM     General MMT Assessment Detail LLE not formally assessed; RLE grossly 4/5  -LM     Mobility Assessment/Training    Extremity Weight-Bearing Status left lower extremity  -LM     Left Lower Extremity Weight-Bearing weight-bearing as tolerated  -LM     Bed Mobility, Assessment/Treatment    Bed Mobility, Assistive Device head of bed elevated;leg   -     Bed Mobility, Scoot/Bridge, [place] contact guard assist;verbal cues required  -LM     Bed Mob, Supine to Sit, [place] contact guard assist;verbal cues required  -LM     Bed Mob, Sit to Supine, [place] not tested   Pt left UIC  -LM     Bed Mobility, Safety Issues decreased use of arms for pushing/pulling;decreased use of legs for bridging/pushing  -     Bed Mobility, Impairments ROM decreased;strength decreased  -     Bed Mobility, Comment  Pt utilized leg  to move LLE toward EOB. Verbal cues for sequencing.  -LM     Transfer Assessment/Treatment    Transfers, Sit-Stand [place] minimum assist (75% patient effort);2 person assist required;verbal cues required  -LM     Transfers, Stand-Sit [place] contact guard assist;2 person assist required;verbal cues required  -LM     Transfers, Sit-Stand-Sit, Assist Device rolling walker  -LM     Transfer, Safety Issues sequencing ability decreased;step length decreased;weight-shifting ability decreased  -LM     Transfer, Impairments ROM decreased;strength decreased  -LM     Transfer, Comment Verbal cues for proper hand placement and sequencing.  -LM     Gait Assessment/Treatment    Gait, [place] Level contact guard assist;2 person assist required;verbal cues required  -LM     Gait, Assistive Device rolling walker  -LM     Gait, Distance (Feet) 80  -LM     Gait, Gait Pattern Analysis swing-through gait  -LM     Gait, Gait Deviations left:;antalgic;mable decreased;decreased heel strike;forward flexed posture;step length decreased;toe-to-floor clearance decreased;weight-shifting ability decreased  -LM     Gait, Safety Issues sequencing ability decreased;step length decreased;weight-shifting ability decreased;steps too close front assistive device  -LM     Gait, Impairments ROM decreased;strength decreased  -LM     Gait, Comment Pt ambulated with step-through pattern at slow pace. Verbal cues to increase L foot clearance from floor when advancing foot forward. Verbal cues for upright posture and not stepping too close to RW. Gait limited by fatigue.  -LM     Therapy Exercises    Exercise Protocols total knee  -LM     Total Knee Exercises left:;10 reps;ankle pumps/circles;quad set;glut set  -LM     Sensory Assessment/Intervention    Sensory Impairment --   Pt denies n/t in BLEs  -LM     Positioning and Restraints    Pre-Treatment Position in bed  -LM     Post Treatment Position chair  -LM     In  Chair sitting;exit alarm on;with OT  -       11/16/17 1214       General Information    Equipment Currently Used at Home cane, straight  -KM     Living Environment    Lives With spouse  -KM     Living Arrangements apartment  -KM     Home Accessibility no concerns;bed and bath on same level  -KM     Stair Railings at Home none  -KM     Type of Financial/Environmental Concern none  -KM     Transportation Available car;family or friend will provide  -KM       User Key  (r) = Recorded By, (t) = Taken By, (c) = Cosigned By    Initials Name Provider Type    LR Simona Farley, PT Physical Therapist    JOVANA Birch, RN Registered Nurse    ARIANNA Real, PT Physical Therapist          Physical Therapy Education     Title: PT OT SLP Therapies (Done)     Topic: Physical Therapy (Done)     Point: Mobility training (Done)    Learning Progress Summary    Learner Readiness Method Response Comment Documented by Status   Patient Acceptance E,D VU,NR Reviewed HEP, precautions, correct gait mechanics, benefits of activity.  11/17/17 0925 Done               Point: Home exercise program (Done)    Learning Progress Summary    Learner Readiness Method Response Comment Documented by Status   Patient Acceptance E,D VU,NR Reviewed HEP, precautions, correct gait mechanics, benefits of activity.  11/17/17 0925 Done               Point: Body mechanics (Done)    Learning Progress Summary    Learner Readiness Method Response Comment Documented by Status   Patient Acceptance E,D VU,NR Reviewed HEP, precautions, correct gait mechanics, benefits of activity.  11/17/17 0925 Done               Point: Precautions (Done)    Learning Progress Summary    Learner Readiness Method Response Comment Documented by Status   Patient Acceptance E,D VU,NR Reviewed HEP, precautions, correct gait mechanics, benefits of activity.  11/17/17 0925 Done                      User Key     Initials Effective Dates Name Provider Type Discipline    ARIANNA  06/09/17 -  Rema Real, PT Physical Therapist PT                PT Recommendation and Plan  Anticipated Equipment Needs At Discharge: front wheeled walker (bariatric)  Anticipated Discharge Disposition: home with assist, home with home health  Planned Therapy Interventions: balance training, bed mobility training, gait training, home exercise program, patient/family education, ROM (Range of Motion), strengthening, transfer training  PT Frequency: 2 times/day  Plan of Care Review  Plan Of Care Reviewed With: patient  Progress: progress toward functional goals as expected  Outcome Summary/Follow up Plan: Pt ambulated 80 feet with CGAx2 and RW, limited by fatigue. ROM to be measured in PM session. Recommend d/c home with assist and HHPT.           IP PT Goals       11/17/17 0926          Bed Mobility PT LTG    Bed Mobility PT LTG, Date Established 11/17/17  -LM      Bed Mobility PT LTG, Time to Achieve 3 days  -LM      Bed Mobility PT LTG, Activity Type supine to sit/sit to supine  -LM      Bed Mobility PT LTG, [place] Level independent  -LM      Transfer Training PT LTG    Transfer Training PT LTG, Date Established 11/17/17  -LM      Transfer Training PT LTG, Time to Achieve 3 days  -LM      Transfer Training PT LTG, Activity Type sit to stand/stand to sit  -LM      Transfer Training PT LTG, [place] Level conditional independence  -LM      Transfer Training PT LTG, Assist Device walker, rolling  -LM      Gait Training PT LTG    Gait Training Goal PT LTG, Date Established 11/17/17  -LM      Gait Training Goal PT LTG, Time to Achieve 3 days  -LM      Gait Training Goal PT LTG, [place] Level conditional independence  -LM      Gait Training Goal PT LTG, Assist Device walker, rolling  -LM      Gait Training Goal PT LTG, Distance to Achieve 500 feet  -LM      Range of Motion PT LTG    Range of Motion Goal PT LTG, Date Established 11/17/17  -LM      Range of Motion Goal PT LTG, Time to Achieve 3 days  -LM       Range fo Motion Goal PT LTG, Joint L knee  -LM      Range of Motion Goal PT LTG, AAROM Fxnal Goal 0-90 degrees  -LM        User Key  (r) = Recorded By, (t) = Taken By, (c) = Cosigned By    Initials Name Provider Type    ARIANNA Real PT Physical Therapist                Outcome Measures       11/17/17 0847          How much help from another person do you currently need...    Turning from your back to your side while in flat bed without using bedrails? 3  -LM      Moving from lying on back to sitting on the side of a flat bed without bedrails? 3  -LM      Moving to and from a bed to a chair (including a wheelchair)? 3  -LM      Standing up from a chair using your arms (e.g., wheelchair, bedside chair)? 3  -LM      Climbing 3-5 steps with a railing? 2  -LM      To walk in hospital room? 3  -LM      AM-PAC 6 Clicks Score 17  -LM      Functional Assessment    Outcome Measure Options AM-PAC 6 Clicks Basic Mobility (PT)  -LM        User Key  (r) = Recorded By, (t) = Taken By, (c) = Cosigned By    Initials Name Provider Type    ARIANNA Real PT Physical Therapist           Time Calculation:         PT Charges       11/17/17 0928          Time Calculation    Start Time 0847  -LM      PT Received On 11/17/17  -LM      PT Goal Re-Cert Due Date 11/27/17  -LM      Time Calculation- PT    Total Timed Code Minutes- PT 0 minute(s)  -LM        User Key  (r) = Recorded By, (t) = Taken By, (c) = Cosigned By    Initials Name Provider Type    ARIANNA Real PT Physical Therapist          Therapy Charges for Today     Code Description Service Date Service Provider Modifiers Qty    [id number]  PT EVAL MOD COMPLEXITY 4 11/17/2017 Rema Real PT GP 1          PT G-Codes  Outcome Measure Options: AM-PAC 6 Clicks Basic Mobility (PT)      Rema Real PT  11/17/2017

## 2017-11-18 VITALS
SYSTOLIC BLOOD PRESSURE: 112 MMHG | RESPIRATION RATE: 16 BRPM | WEIGHT: 293 LBS | DIASTOLIC BLOOD PRESSURE: 58 MMHG | HEART RATE: 76 BPM | OXYGEN SATURATION: 96 % | TEMPERATURE: 98.2 F | HEIGHT: 63 IN | BODY MASS INDEX: 51.91 KG/M2

## 2017-11-18 LAB
BASOPHILS # BLD AUTO: 0.02 10*3/MM3 (ref 0–0.2)
BASOPHILS NFR BLD AUTO: 0.2 % (ref 0–1)
DEPRECATED RDW RBC AUTO: 50.3 FL (ref 37–54)
EOSINOPHIL # BLD AUTO: 0.32 10*3/MM3 (ref 0–0.3)
EOSINOPHIL NFR BLD AUTO: 3.6 % (ref 0–3)
ERYTHROCYTE [DISTWIDTH] IN BLOOD BY AUTOMATED COUNT: 15.5 % (ref 11.3–14.5)
HCT VFR BLD AUTO: 35.2 % (ref 34.5–44)
HGB BLD-MCNC: 10.9 G/DL (ref 11.5–15.5)
IMM GRANULOCYTES # BLD: 0.02 10*3/MM3 (ref 0–0.03)
IMM GRANULOCYTES NFR BLD: 0.2 % (ref 0–0.6)
LYMPHOCYTES # BLD AUTO: 1.8 10*3/MM3 (ref 0.6–4.8)
LYMPHOCYTES NFR BLD AUTO: 20 % (ref 24–44)
MCH RBC QN AUTO: 27.5 PG (ref 27–31)
MCHC RBC AUTO-ENTMCNC: 31 G/DL (ref 32–36)
MCV RBC AUTO: 88.7 FL (ref 80–99)
MONOCYTES # BLD AUTO: 1.2 10*3/MM3 (ref 0–1)
MONOCYTES NFR BLD AUTO: 13.3 % (ref 0–12)
NEUTROPHILS # BLD AUTO: 5.65 10*3/MM3 (ref 1.5–8.3)
NEUTROPHILS NFR BLD AUTO: 62.7 % (ref 41–71)
PLATELET # BLD AUTO: 168 10*3/MM3 (ref 150–450)
PMV BLD AUTO: 12.7 FL (ref 6–12)
RBC # BLD AUTO: 3.97 10*6/MM3 (ref 3.89–5.14)
WBC NRBC COR # BLD: 9.01 10*3/MM3 (ref 3.5–10.8)

## 2017-11-18 PROCEDURE — 25010000002 ENOXAPARIN PER 10 MG: Performed by: ORTHOPAEDIC SURGERY

## 2017-11-18 PROCEDURE — 97110 THERAPEUTIC EXERCISES: CPT

## 2017-11-18 PROCEDURE — 25010000002 ROPIVACAINE PER 1 MG

## 2017-11-18 PROCEDURE — 85025 COMPLETE CBC W/AUTO DIFF WBC: CPT | Performed by: ORTHOPAEDIC SURGERY

## 2017-11-18 PROCEDURE — 25010000002 ROPIVACAINE HCL-NACL 0.2-0.9 % SOLUTION: Performed by: NURSE ANESTHETIST, CERTIFIED REGISTERED

## 2017-11-18 RX ORDER — ROPIVACAINE IN 0.9% SOD CHL/PF 0.2% 545ML
12 ELASTOMERIC PUMP, HI VARIABLE RATE INJECTION CONTINUOUS
Start: 2017-11-18 | End: 2018-06-04

## 2017-11-18 RX ADMIN — Medication 2 TABLET: at 09:18

## 2017-11-18 RX ADMIN — CETIRIZINE HYDROCHLORIDE 10 MG: 10 TABLET, FILM COATED ORAL at 09:18

## 2017-11-18 RX ADMIN — CELECOXIB 200 MG: 200 CAPSULE ORAL at 09:18

## 2017-11-18 RX ADMIN — ENOXAPARIN SODIUM 40 MG: 40 INJECTION SUBCUTANEOUS at 12:43

## 2017-11-18 RX ADMIN — PANTOPRAZOLE SODIUM 40 MG: 40 TABLET, DELAYED RELEASE ORAL at 05:30

## 2017-11-18 RX ADMIN — ESTRADIOL 0.5 MG: 0.5 TABLET ORAL at 09:18

## 2017-11-18 RX ADMIN — ROPIVACAINE HYDROCHLORIDE 12 ML/HR: 2 INJECTION, SOLUTION EPIDURAL; INFILTRATION at 03:55

## 2017-11-18 RX ADMIN — VENLAFAXINE HYDROCHLORIDE 75 MG: 75 CAPSULE, EXTENDED RELEASE ORAL at 09:18

## 2017-11-18 RX ADMIN — METOPROLOL SUCCINATE 25 MG: 25 TABLET, EXTENDED RELEASE ORAL at 09:18

## 2017-11-18 RX ADMIN — HYDROCODONE BITARTRATE AND ACETAMINOPHEN 1 TABLET: 7.5; 325 TABLET ORAL at 12:43

## 2017-11-18 RX ADMIN — LOSARTAN POTASSIUM 25 MG: 25 TABLET, FILM COATED ORAL at 09:18

## 2017-11-18 RX ADMIN — Medication 12 ML/HR: at 12:44

## 2017-11-18 NOTE — PROGRESS NOTES
"/58 (BP Location: Right arm, Patient Position: Lying)  Pulse 76  Temp 98.2 °F (36.8 °C) (Oral)   Resp 16  Ht 63\" (160 cm)  Wt (!) 314 lb 2.5 oz (143 kg)  SpO2 96%  BMI 55.65 kg/m2    Lab Results (last 24 hours)     Procedure Component Value Units Date/Time    CBC & Differential [[id number]] Collected:  11/18/17 0616    Specimen:  Blood Updated:  11/18/17 0725    Narrative:       The following orders were created for panel order CBC & Differential.  Procedure                               Abnormality         Status                     ---------                               -----------         ------                     CBC Auto Differential[[id number]]        Abnormal            Final result                 Please view results for these tests on the individual orders.    CBC Auto Differential [[id number]]  (Abnormal) Collected:  11/18/17 0616    Specimen:  Blood Updated:  11/18/17 0725     WBC 9.01 10*3/mm3      RBC 3.97 10*6/mm3      Hemoglobin 10.9 (L) g/dL      Hematocrit 35.2 %      MCV 88.7 fL      MCH 27.5 pg      MCHC 31.0 (L) g/dL      RDW 15.5 (H) %      RDW-SD 50.3 fl      MPV 12.7 (H) fL      Platelets 168 10*3/mm3      Neutrophil % 62.7 %      Lymphocyte % 20.0 (L) %      Monocyte % 13.3 (H) %      Eosinophil % 3.6 (H) %      Basophil % 0.2 %      Immature Grans % 0.2 %      Neutrophils, Absolute 5.65 10*3/mm3      Lymphocytes, Absolute 1.80 10*3/mm3      Monocytes, Absolute 1.20 (H) 10*3/mm3      Eosinophils, Absolute 0.32 (H) 10*3/mm3      Basophils, Absolute 0.02 10*3/mm3      Immature Grans, Absolute 0.02 10*3/mm3           Imaging Results (last 24 hours)     Procedure Component Value Units Date/Time    XR Knee 1 or 2 View Left [[id number]] Collected:  11/17/17 0830     Updated:  11/17/17 0957    Narrative:       EXAMINATION: XR KNEE 1 OR 2 VW, LEFT-11/16/2017:      INDICATION: Postop knee arthoplasty.      COMPARISON: NONE.     FINDINGS: Status post left total knee arthroplasty without " evidence of  hardware complication in satisfactory alignment with expected  postsurgical changes in the adjacent soft tissues including soft tissue  emphysema.       Impression:       Status post left total knee arthroplasty without evidence of  complication in satisfactory alignment.     D:  11/17/2017  E:  11/17/2017     This report was finalized on 11/17/2017 9:55 AM by Dr. Rogelio Lux.             Patient Care Team:  Comfort Colunga MD as PCP - General (Internal Medicine)    SUBJECTIVE: Imelda reports she is doing well.  She walked 250 feet with therapy yesterday.  Pain is well-controlled.  She would like to go home today.    PHYSICAL EXAM  Left knee incision is clean, dry and intact with mesh dressing in place  Thigh and calf soft and nontender  Neurovascularly intact distally     Principal Problem:    S/P total knee arthroplasty, left  Active Problems:    Morbid obesity    Essential hypertension    HLD (hyperlipidemia)    Prediabetes    Osteoarthritis of left knee    Leukocytosis, mild, likely reactive      PLAN / DISPOSITION: 66-year-old female postop day #2 status post left total knee arthroplasty  -Okay with me to discharge home today  -Continue Lovenox for DVT prophylaxis  -Religion home physical therapy  -Okay to shower with mesh dressing in place once pain catheter removed  -Follow-up in 2 weeks    Sunny Reynoso MD  11/18/17  9:31 AM

## 2017-11-18 NOTE — PLAN OF CARE
Problem: Knee Replacement, Total (Adult)  Goal: Signs and Symptoms of Listed Potential Problems Will be Absent or Manageable (Knee Replacement, Total)  Outcome: Ongoing (interventions implemented as appropriate)

## 2017-11-18 NOTE — DISCHARGE INSTRUCTIONS
You may shower after your nerve catheter has been removed. Absolutely NO tub bathing or submersing your incision in water until released by your surgeon.

## 2017-11-18 NOTE — PROGRESS NOTES
Continued Stay Note  [facility]     Patient Name: Imelda Elaine  MRN: [id number]  Today's Date: 11/18/2017    Admit Date: 11/16/2017          Discharge Plan       11/18/17 1234    Case Management/Social Work Plan    Plan Home with     Patient/Family In Agreement With Plan yes    Additional Comments Patient is medically ready for discharge today.  Plan remains to return home with  services via [facility] [place] for PT.  CM called and notified [facility] of patient's discharge today.  No other discharge needs.               Discharge Codes     None        Expected Discharge Date and Time     Expected Discharge Date Expected Discharge Time    Nov 18, 2017             Grisel Vasquez RN

## 2017-11-18 NOTE — PLAN OF CARE
Problem: Patient Care Overview (Adult)  Goal: Plan of Care Review  Outcome: Ongoing (interventions implemented as appropriate)    11/18/17 0313   Coping/Psychosocial Response Interventions   Plan Of Care Reviewed With patient   Patient Care Overview   Progress improving   Outcome Evaluation   Outcome Summary/Follow up Plan pain managed with prn meds; increased ropivocaine to 16ml/hr for 2 hours during night; ambulated in hallway with stand by assist; dressing cdi (prineo); vss        Goal: Adult Individualization and Mutuality  Outcome: Ongoing (interventions implemented as appropriate)  Goal: Discharge Needs Assessment  Outcome: Ongoing (interventions implemented as appropriate)    Problem: Perioperative Period (Adult)  Goal: Signs and Symptoms of Listed Potential Problems Will be Absent or Manageable (Perioperative Period)  Outcome: Ongoing (interventions implemented as appropriate)    Problem: Knee Replacement, Total (Adult)  Goal: Signs and Symptoms of Listed Potential Problems Will be Absent or Manageable (Knee Replacement, Total)  Outcome: Ongoing (interventions implemented as appropriate)

## 2017-11-18 NOTE — PLAN OF CARE
Problem: Patient Care Overview (Adult)  Goal: Plan of Care Review  Outcome: Ongoing (interventions implemented as appropriate)    11/18/17 1401   Coping/Psychosocial Response Interventions   Plan Of Care Reviewed With patient;spouse   Patient Care Overview   Progress progress toward functional goals as expected   Outcome Evaluation   Outcome Summary/Follow up Plan Pt ambulated 150 feet with CGA and RW, limited by pain and fatigue. L knee ROM 12-76 degrees. Pt anticipating d/c home with HHPT today.          Problem: Inpatient Physical Therapy  Goal: Bed Mobility Goal LTG- PT  Outcome: Unable to achieve outcome(s) by discharge Date Met:  11/18/17 11/17/17 0926 11/18/17 1401   Bed Mobility PT LTG   Bed Mobility PT LTG, Date Established 11/17/17 --    Bed Mobility PT LTG, Time to Achieve 3 days --    Bed Mobility PT LTG, Activity Type supine to sit/sit to supine --    Bed Mobility PT LTG, [place] Level independent --    Bed Mobility PT LTG, Date Goal Reviewed --  11/18/17   Bed Mobility PT LTG, Outcome --  goal not met   Bed Mobility PT LTG, Reason Goal Not Met --  discharged from facility       Goal: Transfer Training Goal 1 LTG- PT  Outcome: Unable to achieve outcome(s) by discharge Date Met:  11/18/17 11/17/17 0926 11/18/17 1401   Transfer Training PT LTG   Transfer Training PT LTG, Date Established 11/17/17 --    Transfer Training PT LTG, Time to Achieve 3 days --    Transfer Training PT LTG, Activity Type sit to stand/stand to sit --    Transfer Training PT LTG, [place] Level conditional independence --    Transfer Training PT LTG, Assist Device walker, rolling --    Transfer Training PT LTG, Date Goal Reviewed --  11/18/17   Transfer Training PT LTG, Outcome --  goal not met   Transfer Training PT LTG, Reason Goal Not Met --  discharged from facility       Goal: Gait Training Goal LTG- PT  Outcome: Unable to achieve outcome(s) by discharge Date Met:  11/18/17 11/17/17 0926 11/18/17 1401   Gait  Training PT LTG   Gait Training Goal PT LTG, Date Established 11/17/17 --    Gait Training Goal PT LTG, Time to Achieve 3 days --    Gait Training Goal PT LTG, [place] Level conditional independence --    Gait Training Goal PT LTG, Assist Device walker, rolling --    Gait Training Goal PT LTG, Distance to Achieve 500 feet --    Gait Training Goal PT LTG, Date Goal Reviewed --  11/18/17   Gait Training Goal PT LTG, Outcome --  goal not met   Gait Training Goal PT LTG, Reason Goal Not Met --  discharged from facility       Goal: Range of Motion Goal LTG- PT  Outcome: Unable to achieve outcome(s) by discharge Date Met:  11/18/17 11/17/17 0926 11/18/17 1401   Range of Motion PT LTG   Range of Motion Goal PT LTG, Date Established 11/17/17 --    Range of Motion Goal PT LTG, Time to Achieve 3 days --    Range fo Motion Goal PT LTG, Joint L knee --    Range of Motion Goal PT LTG, AAROM Fxnal Goal 0-90 degrees --    Range of Motion Goal PT LTG, Date Goal Reviewed --  11/18/17   Range of Motion Goal PT LTG, Outcome --  goal not met   Reason Goal Not Met (ROM) PT LTG --  discharged from facility

## 2017-11-18 NOTE — THERAPY DISCHARGE NOTE
Acute Care - Physical Therapy Treatment Note/Discharge  [facility]     Patient Name: Imelda Elaine  : 1950  MRN: [id number]  Today's Date: 2017  Onset of Illness/Injury or Date of Surgery Date: 17  Date of Referral to PT: 17  Referring Physician: MD Edgardo    Admit Date: 2017    Visit Dx:    ICD-10-CM ICD-9-CM   1. Impaired functional mobility, balance, gait, and endurance Z74.09 V49.89   2. Impaired mobility and ADLs Z74.09 799.89   3. Morbid obesity E66.01 278.01   4. Primary osteoarthritis of left knee M17.12 715.16   5. S/P total knee arthroplasty, left Z96.652 V43.65     Patient Active Problem List   Diagnosis   • Incisional hernia   • Post-menopausal   • Enterocele   • Morbid obesity   • Status post abdominal hysterectomy   • Palpitations   • Essential hypertension   • HLD (hyperlipidemia)   • Prediabetes   • Osteoarthritis of left knee   • S/P total knee arthroplasty, left   • Leukocytosis, mild, likely reactive       Physical Therapy Education     Title: PT OT SLP Therapies (Done)     Topic: Physical Therapy (Done)     Point: Mobility training (Done)    Learning Progress Summary    Learner Readiness Method Response Comment Documented by Status   Patient Acceptance E,D,H NR,VU Reviewed HEP, correct gait mechanics, benefits of activity.  17 1401 Done    Acceptance E,D NR Reviewed HEP, precautions, correct gait mechanics.  17 Active    Acceptance E,D VU,NR Reviewed HEP, precautions, correct gait mechanics, benefits of activity.  17 Done   Significant Other Acceptance E,D,H NR,VU Reviewed HEP, correct gait mechanics, benefits of activity.  17 1401 Done    Acceptance E,D NR Reviewed HEP, precautions, correct gait mechanics.  17 Active               Point: Home exercise program (Done)    Learning Progress Summary    Learner Readiness Method Response Comment Documented by Status   Patient Acceptance E,D,H NR,VU Reviewed HEP,  correct gait mechanics, benefits of activity.  11/18/17 1401 Done    Acceptance E,D NR Reviewed HEP, precautions, correct gait mechanics.  11/17/17 1459 Active    Acceptance E,D VU,NR Reviewed HEP, precautions, correct gait mechanics, benefits of activity.  11/17/17 0925 Done   Significant Other Acceptance E,D,H NR,VU Reviewed HEP, correct gait mechanics, benefits of activity.  11/18/17 1401 Done    Acceptance E,D NR Reviewed HEP, precautions, correct gait mechanics.  11/17/17 1459 Active               Point: Body mechanics (Done)    Learning Progress Summary    Learner Readiness Method Response Comment Documented by Status   Patient Acceptance E,D,H NR,VU Reviewed HEP, correct gait mechanics, benefits of activity.  11/18/17 1401 Done    Acceptance E,D NR Reviewed HEP, precautions, correct gait mechanics.  11/17/17 1459 Active    Acceptance E,D VU,NR Reviewed HEP, precautions, correct gait mechanics, benefits of activity.  11/17/17 0925 Done   Significant Other Acceptance E,D,H NR,VU Reviewed HEP, correct gait mechanics, benefits of activity.  11/18/17 1401 Done    Acceptance E,D NR Reviewed HEP, precautions, correct gait mechanics.  11/17/17 1459 Active               Point: Precautions (Done)    Learning Progress Summary    Learner Readiness Method Response Comment Documented by Status   Patient Acceptance E,D,H NR,VU Reviewed HEP, correct gait mechanics, benefits of activity.  11/18/17 1401 Done    Acceptance E,D NR Reviewed HEP, precautions, correct gait mechanics.  11/17/17 1459 Active    Acceptance E,D VU,NR Reviewed HEP, precautions, correct gait mechanics, benefits of activity.  11/17/17 0925 Done   Significant Other Acceptance E,D,H NR,VU Reviewed HEP, correct gait mechanics, benefits of activity.  11/18/17 1401 Done    Acceptance E,D NR Reviewed HEP, precautions, correct gait mechanics.  11/17/17 1459 Active                      User Key     Initials Effective Dates Name  Provider Type Discipline    LM 06/09/17 -  Rema Real, PT Physical Therapist PT                    IP PT Goals       11/18/17 1401 11/17/17 1500 11/17/17 0926    Bed Mobility PT LTG    Bed Mobility PT LTG, Date Established   11/17/17  -LM    Bed Mobility PT LTG, Time to Achieve   3 days  -LM    Bed Mobility PT LTG, Activity Type   supine to sit/sit to supine  -LM    Bed Mobility PT LTG, [place] Level   independent  -LM    Bed Mobility PT LTG, Date Goal Reviewed 11/18/17  -LM 11/17/17  -LM     Bed Mobility PT LTG, Outcome goal not met  -LM goal ongoing  -LM     Bed Mobility PT LTG, Reason Goal Not Met discharged from facility  -LM      Transfer Training PT LTG    Transfer Training PT LTG, Date Established   11/17/17  -LM    Transfer Training PT LTG, Time to Achieve   3 days  -LM    Transfer Training PT LTG, Activity Type   sit to stand/stand to sit  -LM    Transfer Training PT LTG, [place] Level   conditional independence  -LM    Transfer Training PT LTG, Assist Device   walker, rolling  -LM    Transfer Training PT  LTG, Date Goal Reviewed 11/18/17  -LM 11/17/17  -LM     Transfer Training PT LTG, Outcome goal not met  -LM goal ongoing  -LM     Transfer Training PT LTG, Reason Goal Not Met discharged from facility  -LM      Gait Training PT LTG    Gait Training Goal PT LTG, Date Established   11/17/17  -LM    Gait Training Goal PT LTG, Time to Achieve   3 days  -LM    Gait Training Goal PT LTG, [place] Level   conditional independence  -LM    Gait Training Goal PT LTG, Assist Device   walker, rolling  -LM    Gait Training Goal PT LTG, Distance to Achieve   500 feet  -LM    Gait Training Goal PT LTG, Date Goal Reviewed 11/18/17  -LM 11/17/17  -LM     Gait Training Goal PT LTG, Outcome goal not met  -LM goal ongoing  -LM     Gait Training Goal PT LTG, Reason Goal Not Met discharged from facility  -LM      Range of Motion PT LTG    Range of Motion Goal PT LTG, Date Established   11/17/17  -LM    Range  of Motion Goal PT LTG, Time to Achieve   3 days  -LM    Range fo Motion Goal PT LTG, Joint   L knee  -LM    Range of Motion Goal PT LTG, AAROM Fxnal Goal   0-90 degrees  -LM    Range of Motion Goal PT LTG, Date Goal Reviewed 11/18/17  -LM 11/17/17  -LM     Range of Motion Goal PT LTG, Outcome goal not met  -LM goal ongoing  -LM     Reason Goal Not Met (ROM) PT LTG discharged from facility  -LM        User Key  (r) = Recorded By, (t) = Taken By, (c) = Cosigned By    Initials Name Provider Type    LM Rema Real PT Physical Therapist              Adult Rehabilitation Note       11/18/17 1024 11/17/17 1426       Rehab Assessment/Intervention    Discipline physical therapist  -LM physical therapist  -LM     Document Type discharge summary;therapy note (daily note)  -LM therapy note (daily note)  -LM     Subjective Information agree to therapy;no complaints  -LM agree to therapy;no complaints  -LM     Patient Effort, Rehab Treatment excellent  -LM excellent  -LM     Symptoms Noted During/After Treatment none  -LM none  -LM     Precautions/Limitations fall precautions;other (see comments)   L adductor nerve canal cath  -LM fall precautions;other (see comments)   L adductor nerve canal cath  -LM     Recorded by [LM] Rema Real, PT [LM] Rema Real PT     Pain Assessment    Pain Assessment 0-10  -LM No/denies pain  -LM     Pain Score 0   4/10 when ambulating  -LM      Post Pain Score 0  -LM      Pain Type Acute pain  -LM      Pain Location Knee  -LM      Pain Orientation Left;Anterior  -LM      Pain Intervention(s) Repositioned;Ambulation/increased activity  -LM      Recorded by [LM] Rema Real PT [LM] Rema Real PT     Cognitive Assessment/Intervention    Current Cognitive/Communication Assessment functional  -LM functional  -LM     Orientation Status oriented x 4  -LM oriented x 4  -LM     Follows Commands/Answers Questions 100% of the time;able to follow single-step instructions;needs cueing   -% of the time;able to follow single-step instructions;needs cueing  -LM     Personal Safety WNL/WFL  -LM WNL/WFL  -LM     Personal Safety Interventions fall prevention program maintained;gait belt;nonskid shoes/slippers when out of bed;supervised activity  -LM fall prevention program maintained;gait belt;nonskid shoes/slippers when out of bed;supervised activity  -LM     Recorded by [LM] Rema Real PT [LM] Rema Real PT     ROM (Range of Motion)    General ROM Detail L knee ROM 12-76 degrees; pt lacking 12 degrees from full extension; AAROM flexion measured in sitting  -LM L knee ROM 14-76 degrees; pt lacking 14 degrees from full extension; AAROM flexion measured in sitting  -LM     Recorded by [LM] Rema Real PT [LM] Rema Real PT     Mobility Assessment/Training    Extremity Weight-Bearing Status left lower extremity  -LM left lower extremity  -LM     Left Lower Extremity Weight-Bearing weight-bearing as tolerated  -LM weight-bearing as tolerated  -LM     Recorded by [LM] Rema Real PT [LM] Rema Real PT     Bed Mobility, Assessment/Treatment    Bed Mobility, Assistive Device  head of bed elevated;leg   -LM     Bed Mobility, Scoot/Bridge, [place]  contact guard assist;verbal cues required  -LM     Bed Mob, Supine to Sit, [place]  contact guard assist;verbal cues required  -LM     Bed Mob, Sit to Supine, [place]  contact guard assist;verbal cues required  -LM     Bed Mobility, Safety Issues  decreased use of arms for pushing/pulling;decreased use of legs for bridging/pushing  -LM     Bed Mobility, Impairments  ROM decreased;strength decreased  -LM     Bed Mobility, Comment Pt received and left UIC  -LM Pt utilized leg  to move LLE off EOB; also to bring LLE back onto bed to return to supine. No physical assist required.  -LM     Recorded by [LM] Rema Real PT [LM] Rema Real PT     Transfer Assessment/Treatment    Transfers, Sit-Stand  [place] contact guard assist;verbal cues required  -LM minimum assist (75% patient effort);verbal cues required  -LM     Transfers, Stand-Sit [place] contact guard assist;verbal cues required  -LM minimum assist (75% patient effort);verbal cues required  -LM     Transfers, Sit-Stand-Sit, Assist Device rolling walker  -LM rolling walker  -LM     Toilet Transfer, [place]  contact guard assist;verbal cues required  -LM     Toilet Transfer, Assistive Device  rolling walker;other (see comments)   grab bar  -LM     Transfer, Safety Issues sequencing ability decreased;step length decreased;weight-shifting ability decreased  -LM sequencing ability decreased;step length decreased;weight-shifting ability decreased  -LM     Transfer, Impairments ROM decreased;strength decreased  -LM ROM decreased;strength decreased  -LM     Transfer, Comment Verbal cues for proper hand placement and sequencing.  -LM Verbal cues for proper hand placement and sequencing.  -LM     Recorded by [LM] Rema Real PT [LM] Rema Real PT     Gait Assessment/Treatment    Gait, [place] Level contact guard assist;verbal cues required  -LM contact guard assist;verbal cues required  -LM     Gait, Assistive Device rolling walker  -LM rolling walker  -LM     Gait, Distance (Feet) 150  -  -LM     Gait, Gait Pattern Analysis swing-through gait  -LM swing-through gait  -LM     Gait, Gait Deviations left:;antalgic;mable decreased;decreased heel strike;forward flexed posture;step length decreased;weight-shifting ability decreased  -LM left:;antalgic;mable decreased;decreased heel strike;forward flexed posture;step length decreased;weight-shifting ability decreased  -LM     Gait, Safety Issues sequencing ability decreased;step length decreased;weight-shifting ability decreased  -LM sequencing ability decreased;step length decreased;weight-shifting ability decreased  -LM     Gait, Impairments ROM decreased;strength  decreased;pain  -LM ROM decreased;strength decreased  -LM     Gait, Comment Pt ambulated with step-through pattern at slow pace. Verbal cues to increase LLE weight bearing, decrease BUE weight bearing. Gait distance limited by fatigue.  -LM Pt ambulated with step-through pattern at decreased pace. Verbal cues to increase LLE weight bearing, upright posture, decrease BUE weight bearing. Gait distance limited by fatigue and soreness.  -LM     Recorded by [LM] Rema Real PT [LM] Rema Real PT     Therapy Exercises    Exercise Protocols total knee  -LM total knee  -LM     Total Knee Exercises left:;15 reps;completed protocol;with assist;ankle pumps/circles;quad set;glut set;heel slide stretch;SLR;SAQ;LAQ   cues for technique; [place] SLR  -LM left:;10 reps;completed protocol;with assist;ankle pumps/circles;quad set;glut set;heel slide stretch;SLR;SAQ;LAQ   cues for technique; [place] SLR  -LM     Recorded by [LM] Rema Real PT [LM] Rema Real PT     Positioning and Restraints    Pre-Treatment Position sitting in chair/recliner  -LM in bed  -LM     Post Treatment Position chair  -LM bed  -LM     In Bed  notified nsg;supine;call light within reach;encouraged to call for assist;exit alarm on;with family/caregiver  -LM     In Chair notified nsg;reclined;sitting;call light within reach;encouraged to call for assist;exit alarm on;with family/caregiver;legs elevated  -LM      Recorded by [LM] Rema Real PT [LM] Rema Real PT       User Key  (r) = Recorded By, (t) = Taken By, (c) = Cosigned By    Initials Name Effective Dates    ARIANNA Real PT 06/09/17 -           PT Recommendation and Plan  Anticipated Equipment Needs At Discharge: front wheeled walker (bariatric)  Anticipated Discharge Disposition: home with assist, home with home health  Planned Therapy Interventions: balance training, bed mobility training, gait training, home exercise program, patient/family education, ROM (Range of  Motion), strengthening, transfer training  PT Frequency: 2 times/day  Plan of Care Review  Plan Of Care Reviewed With: patient, spouse  Progress: progress toward functional goals as expected  Outcome Summary/Follow up Plan: Pt ambulated 150 feet with CGA and RW, limited by pain and fatigue. L knee ROM 12-76 degrees. Pt anticipating d/c home with HHPT today.           Outcome Measures       11/18/17 1024 11/17/17 1426 11/17/17 0850    How much help from another person do you currently need...    Turning from your back to your side while in flat bed without using bedrails? 3  -LM 3  -LM     Moving from lying on back to sitting on the side of a flat bed without bedrails? 3  -LM 3  -LM     Moving to and from a bed to a chair (including a wheelchair)? 3  -LM 3  -LM     Standing up from a chair using your arms (e.g., wheelchair, bedside chair)? 3  -LM 3  -LM     Climbing 3-5 steps with a railing? 2  -LM 2  -LM     To walk in hospital room? 3  -LM 3  -LM     AM-PAC 6 Clicks Score 17  -LM 17  -LM     How much help from another is currently needed...    Putting on and taking off regular lower body clothing?   3  -HK    Bathing (including washing, rinsing, and drying)   3  -HK    Toileting (which includes using toilet bed pan or urinal)   3  -HK    Putting on and taking off regular upper body clothing   3  -HK    Taking care of personal grooming (such as brushing teeth)   3  -HK    Eating meals   4  -HK    Score   19  -HK    Functional Assessment    Outcome Measure Options AM-PAC 6 Clicks Basic Mobility (PT)  -LM AM-PAC 6 Clicks Basic Mobility (PT)  -LM AM-PAC 6 Clicks Daily Activity (OT)  -HK      11/17/17 0847          How much help from another person do you currently need...    Turning from your back to your side while in flat bed without using bedrails? 3  -LM      Moving from lying on back to sitting on the side of a flat bed without bedrails? 3  -LM      Moving to and from a bed to a chair (including a wheelchair)? 3   -LM      Standing up from a chair using your arms (e.g., wheelchair, bedside chair)? 3  -LM      Climbing 3-5 steps with a railing? 2  -LM      To walk in hospital room? 3  -LM      AM-PAC 6 Clicks Score 17  -LM      Functional Assessment    Outcome Measure Options AM-PAC 6 Clicks Basic Mobility (PT)  -LM        User Key  (r) = Recorded By, (t) = Taken By, (c) = Cosigned By    Initials Name Provider Type    ARIANNA Real, PT Physical Therapist    TUCKER Lopez, OT Occupational Therapist           Time Calculation:         PT Charges       11/18/17 1403          Time Calculation    Start Time 1024  -LM      PT Received On 11/18/17  -LM      PT Goal Re-Cert Due Date 11/27/17  -LM      Time Calculation- PT    Total Timed Code Minutes- PT 22 minute(s)  -LM        User Key  (r) = Recorded By, (t) = Taken By, (c) = Cosigned By    Initials Name Provider Type    ARIANNA Real, PT Physical Therapist          Therapy Charges for Today     Code Description Service Date Service Provider Modifiers Qty    86465853490 HC PT EVAL MOD COMPLEXITY 4 11/17/2017 Rema Real, PT GP 1    84197476005 HC GAIT TRAINING EA 15 MIN 11/17/2017 Rema Real, PT GP 1    05912066874 HC PT THER PROC EA 15 MIN 11/17/2017 Rema Real, PT GP 1    60257420247 HC PT THER PROC EA 15 MIN 11/18/2017 Rema Real, PT GP 1          PT G-Codes  Outcome Measure Options: AM-PAC 6 Clicks Basic Mobility (PT)    PT Discharge Summary  Anticipated Discharge Disposition: home with assist, home with home health  Reason for Discharge: Discharge from facility  Outcomes Achieved: Patient able to partially acheive established goals  Discharge Destination: Home with home health, Home with assist    Rema Real PT  11/18/2017

## 2017-11-18 NOTE — DISCHARGE SUMMARY
Patient Name: Imelda Elaine  MRN: [id number]  : 1950  DOS: 2017    Attending: Sunny Reynoso, *    Primary Care Provider: Comfort Colunga MD    Date of Admission:.2017 10:53 AM    Date of Discharge:  2017    Discharge Diagnosis: Principal Problem:    S/P total knee arthroplasty, left  Active Problems:    Osteoarthritis of left knee    Morbid obesity    Essential hypertension    HLD (hyperlipidemia)    Prediabetes    Leukocytosis, mild, likely reactive      Hospital Course  Patient is a 66 y.o. female presented for left total knee arthroplasty by Dr. Reynoso under spinal anesthesia. She tolerated surgery well and was admitted for further medical management. Her knee has been painful for many years. Conservative treatments failed to provide pain relief. She uses a cane for ambulation. She has a fall a few months ago due to the knee giving out.    Patient was provided pain medications as needed for pain control, along with adductor canal nerve block infusion of Ropivacaine.    She was seen by PT and OT and has progressed well over her stay.  She used an IS for atelectasis prophylaxis and Lovenox along with mechanicals for DVT prophylaxis.  Home medications were resumed as appropriate, and labs were monitored and remained fairly stable.     With the progress she has made, she is ready for DC home today.    A prineo dressing is in place and is to remain for 2 weeks.  She will have an On Q pump ( instructed on it during this admit)  Discussed with patient regarding plan and she shows understanding and agreement.    Patient will have HHPT following discharge.        Procedures Performed  Preoperative diagnosis: Left knee end stage osteoarthritis     Postoperative diagnosis: Left knee end stage osteoarthritis     Operative procedure: Left total knee arthroplasty     Surgeon: Dr. Samuel Reynoso       Pertinent Test Results:    I reviewed the patient's new clinical results.     Results from last 7  "days  Lab Units 17  0616 17  0429   WBC 10*3/mm3 9.01 10.95*   HEMOGLOBIN g/dL 10.9* 11.9   HEMATOCRIT % 35.2 37.1   PLATELETS 10*3/mm3 168 165       Results from last 7 days  Lab Units 17  0429   SODIUM mmol/L 139   POTASSIUM mmol/L 4.4   CHLORIDE mmol/L 105   CO2 mmol/L 28.0   BUN mg/dL 15   CREATININE mg/dL 0.80   CALCIUM mg/dL 8.9   GLUCOSE mg/dL 179*     Results for IMELDA KHAN (MRN [id number]) as of 2017 15:14   Ref. Range 2017 13:35 2017 17:32 2017 04:29   Glucose Latest Ref Range: 70 - 100 mg/dL 78  179 (H)     I reviewed the patient's new imaging including images and reports.      Physical therapy: Pt ambulated 150 feet with CGA and RW, limited by pain and fatigue. L knee ROM 12-76 degrees. Pt anticipating d/c home with HHPT today.     Discharge Assessment:    Vital Signs  /58 (BP Location: Right arm, Patient Position: Lying)  Pulse 76  Temp 98.2 °F (36.8 °C) (Oral)   Resp 16  Ht 63\" (160 cm)  Wt (!) 314 lb 2.5 oz (143 kg)  SpO2 96%  BMI 55.65 kg/m2  Temp (24hrs), Av.1 °F (36.7 °C), Min:98 °F (36.7 °C), Max:98.2 °F (36.8 °C)      General Appearance:    Alert, cooperative, in no acute distress   Lungs:     Clear to auscultation,respirations regular, even and                   unlabored    Heart:    Regular rhythm and normal rate, normal S1 and S2   Abdomen:     Normal bowel sounds, no masses, no organomegaly, soft        non-tender, non-distended, no guarding, no rebound                 tenderness   Extremities:   Moves all extremities well, no edema, no cyanosis, no              Redness. Left knee Prineo CDI. Nerve block present   Pulses:   Pulses palpable and equal bilaterally   Skin:   No bleeding, bruising or rash   Neurologic:   Cranial nerves 2 - 12 grossly intact, sensation intact. Flexion and dorsiflexion intact bilateral feet.       Discharge Disposition: Home    Discharge Medications   Imelda Khan   Home Medication Instructions " MIGUELANGEL:[id number]    Printed on:11/18/17 1051   Medication Information                      atorvastatin (LIPITOR) 10 MG tablet  Take 10 mg by mouth Every Night.             celecoxib (CeleBREX) 200 MG capsule  Take 1 capsule by mouth Daily. Must take an hour after aspirin if needed.             docusate sodium 100 MG capsule  Take 100 mg by mouth 2 (Two) Times a Day As Needed for Constipation.             enoxaparin (LOVENOX) 40 MG/0.4ML solution syringe  Inject 0.4 mL under the skin Daily. For 2 weeks             estradiol (ESTRACE) 0.5 MG tablet  Take 1 tablet by mouth Daily.             HYDROcodone-acetaminophen (NORCO) 7.5-325 MG per tablet  Take 1 tablet by mouth Every 4 (Four) Hours As Needed for Moderate Pain  for up to 9 days.             ipratropium (ATROVENT) 0.06 % nasal spray  1 spray into each nostril Daily.             lansoprazole (PREVACID) 30 MG capsule  Take 30 mg by mouth daily.             levocetirizine (XYZAL) 5 MG tablet  Take 5 mg by mouth Every Evening.             losartan (COZAAR) 25 MG tablet  Take 25 mg by mouth Daily.             metoprolol succinate XL (TOPROL-XL) 25 MG 24 hr tablet  Take 1 tablet by mouth Daily.             Ropivacaine HCl-NaCl (NAROPIN) 0.2-0.9 %  24 mg/hr by Peripheral Nerve route Continuous.             sodium chloride (OCEAN) 0.65 % nasal spray  2 sprays into each nostril Daily.             venlafaxine XR (EFFEXOR-XR) 75 MG 24 hr capsule  Take 75 mg by mouth Daily.                 Discharge Diet: Consistent carb diet    Activity at Discharge: WBAT E    Follow-up Appointments  Dr. Reynoso per his orders    Discharge took over 30 min.    FLOR Conner  11/18/17  10:53 AM

## 2017-11-19 NOTE — PROGRESS NOTES
[facility]    Nerve Cath Post Op Call    Patient Name: Imelda Elaine  :  1950  MRN:  [id number]  Date of Discharge: 2017    Nerve Cath Post Op Call:    Analgesia:Good  Side Effects:None  Catheter Site:clean  Patient Controlled ON Q pump infusion rate: 12ml/hr  Catheter Plan:Will continue with plan at home without changes

## 2017-11-19 NOTE — PROGRESS NOTES
[facility]    Nerve Cath Post Op Call    Patient Name: Imelda Elaine  :  1950  MRN:  [id number]  Date of Discharge: 2017    Nerve Cath Post Op Call:    Analgesia:Good  Side Effects:None  Patient Controlled ON Q pump infusion rate: 12ml/hr  Catheter Plan:Will continue with plan at home without changes and The patient was instructed to call ON CALL Anesthesia provider for any questions or problems

## 2017-11-20 NOTE — PROGRESS NOTES
[facility]    Nerve Cath Post Op Call    Patient Name: Imelda Elaine  :  1950  MRN:  [id number]  Date of Discharge: 2017    Nerve Cath Post Op Call:    Catheter Plan:Patient/Family member report nerve catheter previously discontinued, tip intact

## 2017-12-13 LAB — TOAL ENROLLMENT DAYS: 30

## 2018-01-02 ENCOUNTER — OFFICE VISIT (OUTPATIENT)
Dept: OBSTETRICS AND GYNECOLOGY | Facility: CLINIC | Age: 68
End: 2018-01-02

## 2018-01-02 VITALS
DIASTOLIC BLOOD PRESSURE: 80 MMHG | WEIGHT: 293 LBS | SYSTOLIC BLOOD PRESSURE: 130 MMHG | RESPIRATION RATE: 16 BRPM | BODY MASS INDEX: 54.38 KG/M2

## 2018-01-02 DIAGNOSIS — Z90.710 STATUS POST ABDOMINAL HYSTERECTOMY: ICD-10-CM

## 2018-01-02 DIAGNOSIS — K46.9 ENTEROCELE: Primary | ICD-10-CM

## 2018-01-02 DIAGNOSIS — Z78.0 POST-MENOPAUSAL: ICD-10-CM

## 2018-01-02 PROBLEM — N81.6 RECTOCELE: Status: ACTIVE | Noted: 2018-01-02

## 2018-01-02 PROCEDURE — 99213 OFFICE O/P EST LOW 20 MIN: CPT | Performed by: OBSTETRICS & GYNECOLOGY

## 2018-01-02 RX ORDER — HYDROCODONE BITARTRATE AND ACETAMINOPHEN 7.5; 325 MG/1; MG/1
TABLET ORAL
Refills: 0 | COMMUNITY
Start: 2017-12-15 | End: 2018-03-20

## 2018-01-02 RX ORDER — ESTRADIOL 0.5 MG/1
0.5 TABLET ORAL DAILY
Qty: 90 TABLET | Refills: 3 | Status: SHIPPED | OUTPATIENT
Start: 2018-01-02 | End: 2019-01-04 | Stop reason: SDUPTHER

## 2018-01-02 NOTE — PROGRESS NOTES
Subjective   Chief Complaint   Patient presents with   • Follow-up     6 months f/u from surg     Imelda WILDER Chele is a 67 y.o. year old .  No LMP recorded. Patient has had a hysterectomy.  She presents to be seen For follow-up of a rectocele.  She had the vaginal repair and then fairly immediate breakdown posteriorly she is since had knee surgery.  I looked up and incision slight breakdown on the left knee.  This was cleaned and redressed with triple and robotic ointment and Band-Aid.  She reports her orthopedist office is aware and has seen this.  I do not think it looks infected.  She is doing fairly well using MiraLAX half capful daily with no real problems with a rectocele or straining.  She is not sexually active.    Past 6 month menstrual and contraceptive history:    No LMP recorded. Patient has had a hysterectomy.                              The following portions of the patient's history were reviewed and updated as appropriate:problem list, current medications and allergies    Review of Systems no fevers no vaginal discharge bowels control with MiraLAX recent knee surgery   Objective   /80  Resp 16  Wt (!) 139 kg (307 lb)  BMI 54.38 kg/m2    General:  well developed; well nourished  no acute distress  appears stated age  obese - Body mass index is 54.38 kg/(m^2).   Skin:  No suspicious lesions seen   Thyroid: not examined   Lungs:  breathing is unlabored   Heart:  Not performed.   Abdomen: soft, non-tender; no masses  no umbilical or inginual hernias are present  no hepato-splenomegaly   Pelvis: Clinical staff was present for exam  External genitalia:  normal appearance of the external genitalia including Bartholin's and [place]'s glands.  :  urethral meatus normal;  Vaginal:  atrophic mucosal changes are present;  Uterus:  absent.  Adnexa:  non palpable bilaterally.  Rectal:  anus visually normal appearing. recto-vaginal exam unremarkable and confirms findings; Confirms high  rectocele  Rectocele GRADE 2     Lab Review   No data reviewed    Imaging   No data reviewed       Assessment   1. Stable rectocele status post anterior posterior repair for enterocele.  There is adequate and apical support and no significant cystocele.  Given the fact that she is asymptomatic I would not suggest surgery and she is certainly not interested in surgery.  Does not require pessary at this point which would be the next step given her body habitus and potential for breakdown again.     Plan   1. I will refill her estradiol and we can cancel her annual appointment for next month and see her back in about 12 months.    Medications Rx this encounter:  New Medications Ordered This Visit   Medications   • HYDROcodone-acetaminophen (NORCO) 7.5-325 MG per tablet     Sig: TK 1-2 TS PO Q 4-6 H PRN P     Refill:  0   • estradiol (ESTRACE) 0.5 MG tablet     Sig: Take 1 tablet by mouth Daily.     Dispense:  90 tablet     Refill:  3          This note was electronically signed.    Jose Maria Chowdhury MD  January 2, 2018

## 2018-03-20 ENCOUNTER — CONSULT (OUTPATIENT)
Dept: SLEEP MEDICINE | Facility: HOSPITAL | Age: 68
End: 2018-03-20

## 2018-03-20 VITALS
HEIGHT: 63 IN | OXYGEN SATURATION: 95 % | HEART RATE: 81 BPM | WEIGHT: 293 LBS | DIASTOLIC BLOOD PRESSURE: 78 MMHG | BODY MASS INDEX: 51.91 KG/M2 | SYSTOLIC BLOOD PRESSURE: 140 MMHG

## 2018-03-20 DIAGNOSIS — R06.83 SNORING: ICD-10-CM

## 2018-03-20 DIAGNOSIS — E66.01 MORBID OBESITY (HCC): Primary | ICD-10-CM

## 2018-03-20 DIAGNOSIS — R29.818 SUSPECTED SLEEP APNEA: ICD-10-CM

## 2018-03-20 DIAGNOSIS — G47.10 HYPERSOMNIA: ICD-10-CM

## 2018-03-20 PROCEDURE — 99204 OFFICE O/P NEW MOD 45 MIN: CPT | Performed by: INTERNAL MEDICINE

## 2018-03-20 NOTE — PROGRESS NOTES
Imelda Elaine is a 67 y.o. female.   Chief Complaint   Patient presents with   • Sleeping Problem       HPI     67 y.o. female seen in consultation at the request of Comfort Colunga MD for evaluation of the above.     She presents with increasing daytime somnolence.  She thinks this is relatively mild though she does have an elevated Moreland Sleepiness Scale of 13.  She admits to daytime somnolence and falling asleep during times a day when she is quiet.    Her  has sleep apnea.  He has noted that she has occasional pauses in breathing and does snore heavily particularly on her back.  She does have some RLS type symptoms.    Further details are as follows:    Moreland Scale is: 13/24    Estimated average amount of sleep per night: 9  Number of times she wakes up at night: 3  Difficulty falling back asleep: no  It usually takes 20 minutes to go to sleep.  She feels sleepy upon waking up: yes  Rotating or night shift work: no    Drowsiness/Sleepiness:  She exhibits the following:  excessive daytime fatigue  falls asleep watching TV  falls asleep during times of the day when she is quiet  sleepy even when sleep time is increased    Snoring/Breathing:  She exhibits the following:  awoken with dry mouth  awakens gasping for breath    Reflux:  She describes the following:  takes medication for reflux    Narcolepsy:  She exhibits the following:  none    RLS/PLMs:  She describes the following:  moves or jerks during sleep  discomfort in legs with an urge to move them    Insomnia:  She describes the following:  frequent awakenings  restless sleep    Parasomnia:  She exhibits the following:  grinds teeth    Neurological:  She has a history of the following:  none    Weight:  Weight change in the last year:  gain: 26 lbs      The patient's relevant past medical, surgical, family, and social history reviewed and updated in Epic as appropriate.    Current medications are:   Current Outpatient Prescriptions:   •   "atorvastatin (LIPITOR) 10 MG tablet, Take 10 mg by mouth Every Night., Disp: , Rfl:   •  celecoxib (CeleBREX) 200 MG capsule, Take 1 capsule by mouth Daily. Must take an hour after aspirin if needed., Disp: , Rfl:   •  docusate sodium 100 MG capsule, Take 100 mg by mouth 2 (Two) Times a Day As Needed for Constipation., Disp: 60 capsule, Rfl: 0  •  estradiol (ESTRACE) 0.5 MG tablet, Take 1 tablet by mouth Daily., Disp: 90 tablet, Rfl: 3  •  ipratropium (ATROVENT) 0.06 % nasal spray, 1 spray into each nostril Daily., Disp: , Rfl:   •  lansoprazole (PREVACID) 30 MG capsule, Take 30 mg by mouth daily., Disp: , Rfl:   •  levocetirizine (XYZAL) 5 MG tablet, Take 5 mg by mouth Every Evening., Disp: , Rfl:   •  losartan (COZAAR) 25 MG tablet, Take 25 mg by mouth Daily., Disp: , Rfl:   •  metoprolol succinate XL (TOPROL-XL) 25 MG 24 hr tablet, Take 1 tablet by mouth Daily., Disp: 30 tablet, Rfl: 3  •  Ropivacaine HCl-NaCl (NAROPIN) 0.2-0.9 %, 24 mg/hr by Peripheral Nerve route Continuous., Disp: , Rfl:   •  sodium chloride (OCEAN) 0.65 % nasal spray, 2 sprays into each nostril Daily., Disp: , Rfl:   •  venlafaxine XR (EFFEXOR-XR) 75 MG 24 hr capsule, Take 75 mg by mouth Daily., Disp: , Rfl: .    Review of Systems    Review of Systems  ROS documented in patient questionnaire ×12 systems.  Reviewed with patient.  Otherwise negative except as noted in HPI.    Physical Exam    Blood pressure 140/78, pulse 81, height 160 cm (62.99\"), weight (!) 141 kg (311 lb), SpO2 95 %, not currently breastfeeding. Body mass index is 55.11 kg/m².    Physical Exam   Constitutional: She is oriented to person, place, and time. She appears well-developed and well-nourished.   HENT:   Head: Normocephalic and atraumatic.   Nose: Nose normal.   Mouth/Throat: Oropharynx is clear and moist. No oropharyngeal exudate.   Class III airway   Eyes: Conjunctivae are normal. No scleral icterus.   Neck: No tracheal deviation present. No thyromegaly present. "   Cardiovascular: Normal rate and regular rhythm.  Exam reveals no gallop and no friction rub.    No murmur heard.  Pulmonary/Chest: Effort normal. No respiratory distress. She has no wheezes. She has no rales.   Musculoskeletal: She exhibits no edema or deformity.   Neurological: She is alert and oriented to person, place, and time.   Skin: Skin is warm and dry. No rash noted.   Psychiatric: She has a normal mood and affect. Her behavior is normal.   Nursing note and vitals reviewed.      ASSESSMENT:    Problem List Items Addressed This Visit        Pulmonary Problems    Snoring       Other    Suspected sleep apnea    Hypersomnia    Morbid obesity - Primary      Other Visit Diagnoses    None.         Evidence of obstructive sleep apnea based upon patient's increasing sleep time and increasing daytime somnolence with snoring and witnessed apneas.  She has an at risk body habitus with BMI of 55.  She has unexplained palpitations.    PLAN:    1. Home sleep apnea test  2. I have discussed the likely diagnosis of obstructive sleep apnea with the patient and have discussed the short and long-term benefits of treating obstructive sleep apnea as well as the short and long-term risks of not treating it  3. She seemed amenable to CPAP therapy.  Her  uses this and she is aware of what it is.  4. Further reformations after her HSAT is done    I have reviewed the results of my evaluation and impression and discussed my recommendations in detail with the patient.      Signed by  Lloyd Doll MD    March 20, 2018      CC: MD Keanu Hall Susan M., MD

## 2018-03-28 ENCOUNTER — HOSPITAL ENCOUNTER (OUTPATIENT)
Dept: SLEEP MEDICINE | Facility: HOSPITAL | Age: 68
Discharge: HOME | End: 2018-03-28
Attending: INTERNAL MEDICINE | Admitting: INTERNAL MEDICINE

## 2018-03-28 VITALS
BODY MASS INDEX: 51.91 KG/M2 | WEIGHT: 293 LBS | SYSTOLIC BLOOD PRESSURE: 160 MMHG | HEIGHT: 63 IN | HEART RATE: 68 BPM | OXYGEN SATURATION: 94 % | DIASTOLIC BLOOD PRESSURE: 78 MMHG

## 2018-03-28 DIAGNOSIS — R06.83 SNORING: ICD-10-CM

## 2018-03-28 DIAGNOSIS — G47.10 HYPERSOMNIA: ICD-10-CM

## 2018-03-28 DIAGNOSIS — R29.818 SUSPECTED SLEEP APNEA: ICD-10-CM

## 2018-03-28 PROCEDURE — 95800 SLP STDY UNATTENDED: CPT

## 2018-03-28 PROCEDURE — 95800 SLP STDY UNATTENDED: CPT | Performed by: INTERNAL MEDICINE

## 2018-03-28 RX ORDER — ASPIRIN 81 MG/1
81 TABLET ORAL EVERY OTHER DAY
COMMUNITY
End: 2020-12-03

## 2018-04-30 ENCOUNTER — TELEPHONE (OUTPATIENT)
Dept: CARDIOLOGY | Facility: HOSPITAL | Age: 68
End: 2018-04-30

## 2018-04-30 NOTE — TELEPHONE ENCOUNTER
----- Message from Peg Anaya MA sent at 4/30/2018 11:01 AM EDT -----  Regarding: RE: sleep study results will be ready only May 29th  Spoke with pt, she had already rescheduled appointment with you for 6/4/18 for palpitations.  States her palpittions are less frequent now that she has eliminated caffeine.  Unsure if palpitations had to do with sleep apnea, so she will receive sleep study results on 5/29/18, and follow up with you on  6/4/18.  Informed pt she can call if she needs us between now and then.     ----- Message -----  From: FLOR Medina  Sent: 4/30/2018  10:26 AM  To: Peg Anaya MA  Subject: RE: sleep study results will be ready only M#    I am following up on palpitations.  Sleep study results will come from sleep study provider.  I would like to see her, but she can reschedule if needed for travel or other reason.  But her sleep study results do not have bearing on her f/u with me.  ----- Message -----  From: Peg Anaya MA  Sent: 4/30/2018   9:53 AM  To: FLOR Medina  Subject: FW: sleep study results will be ready only M#    Please see message below from Brielle  ----- Message -----  From: Eugenie Jackson MA  Sent: 4/30/2018   8:41 AM  To: Peg Anaya MA  Subject: sleep study results will be ready only May 2#    Pt has an jaelyn with Radha tomorrow @ 8.45. Radha wanted to see her after her Sleep study. Pt stated that she had her sleep study but the results wont be ready until May 29th. Pt wants to know if she has to keep her jaelyn tomorrow or reschedule after her sleep study results are available.

## 2018-05-17 ENCOUNTER — TRANSCRIBE ORDERS (OUTPATIENT)
Dept: ADMINISTRATIVE | Facility: HOSPITAL | Age: 68
End: 2018-05-17

## 2018-05-17 DIAGNOSIS — Z12.31 VISIT FOR SCREENING MAMMOGRAM: Primary | ICD-10-CM

## 2018-05-29 ENCOUNTER — OFFICE VISIT (OUTPATIENT)
Dept: SLEEP MEDICINE | Facility: HOSPITAL | Age: 68
End: 2018-05-29

## 2018-05-29 VITALS
WEIGHT: 293 LBS | BODY MASS INDEX: 51.91 KG/M2 | HEART RATE: 80 BPM | SYSTOLIC BLOOD PRESSURE: 138 MMHG | OXYGEN SATURATION: 93 % | DIASTOLIC BLOOD PRESSURE: 80 MMHG | HEIGHT: 63 IN

## 2018-05-29 DIAGNOSIS — E66.01 MORBID OBESITY (HCC): ICD-10-CM

## 2018-05-29 DIAGNOSIS — R06.83 SNORING: Primary | ICD-10-CM

## 2018-05-29 PROCEDURE — 99213 OFFICE O/P EST LOW 20 MIN: CPT | Performed by: INTERNAL MEDICINE

## 2018-05-29 NOTE — PROGRESS NOTES
Subjective:     Chief Complaint:   Chief Complaint   Patient presents with   • Follow-up       HPI:    Imelda Elaine is a 67 y.o. female here for follow-up of Her home sleep apnea test.    She had a [employer] home sleep apnea test.  This revealed a normal number of apneas and hypopnea is.  There is slight elevation during REM sleep only.  Oxygen saturations were infrequent and overall acceptable with only 1% of the night being spent below 90%.  Snoring was present.  She was studied in the supine position.    She has cut back on caffeine and her palpitations have mostly resolved.  She says that she feels refreshed by her sleep and her Channing Sleepiness Scale today is only 3.      Current medications are:   Current Outpatient Prescriptions:   •  aspirin 81 MG EC tablet, Take 81 mg by mouth Daily., Disp: , Rfl:   •  atorvastatin (LIPITOR) 10 MG tablet, Take 10 mg by mouth Every Night., Disp: , Rfl:   •  celecoxib (CeleBREX) 200 MG capsule, Take 1 capsule by mouth Daily. Must take an hour after aspirin if needed., Disp: , Rfl:   •  docusate sodium 100 MG capsule, Take 100 mg by mouth 2 (Two) Times a Day As Needed for Constipation., Disp: 60 capsule, Rfl: 0  •  estradiol (ESTRACE) 0.5 MG tablet, Take 1 tablet by mouth Daily., Disp: 90 tablet, Rfl: 3  •  ipratropium (ATROVENT) 0.06 % nasal spray, 1 spray into each nostril Daily., Disp: , Rfl:   •  lansoprazole (PREVACID) 30 MG capsule, Take 30 mg by mouth daily., Disp: , Rfl:   •  levocetirizine (XYZAL) 5 MG tablet, Take 5 mg by mouth Every Evening., Disp: , Rfl:   •  losartan (COZAAR) 25 MG tablet, Take 25 mg by mouth Daily., Disp: , Rfl:   •  metoprolol succinate XL (TOPROL-XL) 25 MG 24 hr tablet, Take 1 tablet by mouth Daily., Disp: 30 tablet, Rfl: 3  •  Ropivacaine HCl-NaCl (NAROPIN) 0.2-0.9 %, 24 mg/hr by Peripheral Nerve route Continuous., Disp: , Rfl:   •  sodium chloride (OCEAN) 0.65 % nasal spray, 2 sprays into each nostril Daily., Disp: , Rfl:   •   venlafaxine XR (EFFEXOR-XR) 75 MG 24 hr capsule, Take 75 mg by mouth Daily., Disp: , Rfl: .      The patient's relevant past medical, surgical, family and social history were reviewed and updated in Epic as appropriate.     ROS:    Review of Systems   Constitutional: Negative for unexpected weight change.   Respiratory: Negative for apnea.    Psychiatric/Behavioral: Negative for sleep disturbance.         Objective:    Physical Exam   Constitutional: She is oriented to person, place, and time. She appears well-developed and well-nourished.   HENT:   Head: Normocephalic and atraumatic.   Mouth/Throat: Oropharynx is clear and moist.   Class III airway   Neck: Neck supple. No thyromegaly present.   Cardiovascular: Normal rate and regular rhythm.  Exam reveals no gallop and no friction rub.    No murmur heard.  Pulmonary/Chest: Effort normal. No respiratory distress. She has no wheezes. She has no rales.   Musculoskeletal: She exhibits no edema.   Neurological: She is alert and oriented to person, place, and time.   Skin: Skin is warm and dry.   Psychiatric: She has a normal mood and affect. Her behavior is normal.   Vitals reviewed.        Assessment:    Problem List Items Addressed This Visit        Pulmonary Problems    Snoring - Primary       Other    Morbid obesity          Her home sleep apnea test was a valid study.  It was benign from a respiratory standpoint.  She was studied in the supine position.  I did discuss results with her in detail.  She understands this is not a gold standard test but is quite good.  If we had concerns about her sleep she should have an in lab polysomnogram but at this point she is not complaining of much daytime somnolence and she feels her sleep is refreshing.  Her palpitations also been reduced with decreasing caffeine.    Plan:     1. If she develops any new sleep symptoms or nonrestorative sleep she should have an in lab polysomnogram  2. Encouraged her towards long-term weight  loss  3. I will see her on an as needed basis at this point    Discussed in detail with the patient.      Signed by  Llody Doll MD

## 2018-06-04 ENCOUNTER — OFFICE VISIT (OUTPATIENT)
Dept: CARDIOLOGY | Facility: HOSPITAL | Age: 68
End: 2018-06-04

## 2018-06-04 ENCOUNTER — HOSPITAL ENCOUNTER (OUTPATIENT)
Dept: CARDIOLOGY | Facility: HOSPITAL | Age: 68
Discharge: HOME | End: 2018-06-04

## 2018-06-04 VITALS
OXYGEN SATURATION: 98 % | SYSTOLIC BLOOD PRESSURE: 161 MMHG | DIASTOLIC BLOOD PRESSURE: 91 MMHG | HEIGHT: 63 IN | RESPIRATION RATE: 19 BRPM | WEIGHT: 293 LBS | HEART RATE: 74 BPM | TEMPERATURE: 97.6 F | BODY MASS INDEX: 51.91 KG/M2

## 2018-06-04 DIAGNOSIS — I10 ESSENTIAL HYPERTENSION: ICD-10-CM

## 2018-06-04 DIAGNOSIS — R00.2 PALPITATIONS: ICD-10-CM

## 2018-06-04 DIAGNOSIS — R00.2 PALPITATIONS: Primary | ICD-10-CM

## 2018-06-04 DIAGNOSIS — E66.01 MORBID OBESITY (HCC): ICD-10-CM

## 2018-06-04 PROCEDURE — 99214 OFFICE O/P EST MOD 30 MIN: CPT | Performed by: NURSE PRACTITIONER

## 2018-06-04 RX ORDER — LOSARTAN POTASSIUM 50 MG/1
50 TABLET ORAL DAILY
Qty: 30 TABLET | Refills: 3 | Status: SHIPPED | OUTPATIENT
Start: 2018-06-04 | End: 2018-10-29 | Stop reason: SDUPTHER

## 2018-06-04 NOTE — PROGRESS NOTES
[facility]      Encounter Date:06/04/2018     Imelda Elaine  [address]  [facility] [address]  [phone]    1950    Comfort Colunga MD    Imelda Elaine is a 67 y.o. female.      Subjective:     Chief Complaint:  Palpitations       HPI     Patient with a history of palpitations.  Recurrent problem for greater than 2 years.  Intermittent.  Waxing and waning.  Duration 5 minutes or less.  Exacerbated with activity, but worse at rest and at night.  Associated symptoms feelings of palpitation.  Denies chest pain, pressure, worsening dyspnea (baseline, mild with exertion).  Denies anxiety or restlessness.  Diaphoresis, dizziness, fevers, near-syncope, syncope.  Oertli on metoprolol.  Had a referral to the sleep center for snoring and fatigue.  Reports history of remote stress test, unable to obtain records.  No left heart catheterization or stents.  No history of MI.  History of wearing a cardiac event monitor, only wore for 10 days due to aggravation, no arrhythmias.  Echocardiogram completed with normal EF, no significant valvular heart disease, grade 1 diastolic dysfunction.  Recent home sleep test negative for apnea.    Pt has decreased caffeine.  Continues BB.  Palps are doing much better.  Pt denies, HA, vision changes.      Patient Active Problem List    Diagnosis   • Snoring [R06.83]   • Rectocele [N81.6]   • S/P total knee arthroplasty, left [Z96.652]   • Leukocytosis, mild, likely reactive [D72.829]   • HLD (hyperlipidemia) [E78.5]   • Prediabetes [R73.03]   • Osteoarthritis of left knee [M17.12]   • Essential hypertension [I10]   • Palpitations [R00.2]     Overview Note:     · Echocardiogram 8/8/17: EF 70%, grade 1 diastolic dysfunction, mild MR, mild TR, aortic valve sclerosis noted without aortic regurgitation or stenosis  · 30 day event monitor 7/27/17.  Patient only wore for 10 days.  No manual detected events.  Sinus rhythm and sinus  "arrhythmia noted with one PVC.     • Status post abdominal hysterectomy [Z90.710]     Overview Note:     1989     • Post-menopausal [Z78.0]   • Enterocele [K46.9]   • Morbid obesity [E66.01]   • Bladder prolapse, female, acquired [N81.10]   • Incisional hernia [K43.2]         Past Surgical History:   Procedure Laterality Date   • ANTERIOR AND POSTERIOR VAGINAL REPAIR N/A 4/17/2017    Procedure:  Vaginal enterocele repair with anterior posterior colporrhaphy;  Surgeon: Jose Maria Chowdhury MD;  Location:  [facility];  Service:    • BLADDER AUGMENTATION      MESH PLACED   • COLONOSCOPY  2012   • EXPLORATORY LAPAROTOMY      \"BOWEL INTO PELVIC BONE\"  WRAPPED WITH MESH????   • FOOT SURGERY Left    • HYSTERECTOMY  1989    WITH BSO    • KNEE ARTHROSCOPY Left     meniscus repair   • LAPAROSCOPIC CHOLECYSTECTOMY     • OOPHORECTOMY  1989   • MT TOTAL KNEE ARTHROPLASTY Left 11/16/2017    Procedure: TOTAL KNEE ARTHROPLASTY LEFT;  Surgeon: Sunny Reynoso MD;  Location:  [facility];  Service: Orthopedics   • TONSILLECTOMY     • TUBAL ABDOMINAL LIGATION     • VENTRAL HERNIA REPAIR N/A 6/21/2016    Procedure: LAPAROSCOPIC REPAIR OF ABDOMINAL WALL HERNIA WITH MESH, CONVERTED TO OPEN ;  Surgeon: Bertha Delacruz MD;  Location:  [facility];  Service:        Allergies   Allergen Reactions   • Pneumococcal Vaccines Swelling and Rash     Swelling on entire arm    • Other Swelling and Rash     Shingles vaccine  Swelling of injection site of arm,    • Tuberculin Tests Swelling and Rash     Pt has chest xrays to check          Current Outpatient Prescriptions:   •  aspirin 81 MG EC tablet, Take 81 mg by mouth Daily., Disp: , Rfl:   •  atorvastatin (LIPITOR) 10 MG tablet, Take 10 mg by mouth Every Night., Disp: , Rfl:   •  celecoxib (CeleBREX) 200 MG capsule, Take 1 capsule by mouth Daily. Must take an hour after aspirin if needed., Disp: , Rfl:   •  docusate sodium 100 MG capsule, Take 100 mg by mouth 2 (Two) Times a Day As Needed for " Constipation., Disp: 60 capsule, Rfl: 0  •  estradiol (ESTRACE) 0.5 MG tablet, Take 1 tablet by mouth Daily., Disp: 90 tablet, Rfl: 3  •  ipratropium (ATROVENT) 0.06 % nasal spray, 1 spray into each nostril Daily., Disp: , Rfl:   •  lansoprazole (PREVACID) 30 MG capsule, Take 30 mg by mouth daily., Disp: , Rfl:   •  levocetirizine (XYZAL) 5 MG tablet, Take 5 mg by mouth Every Evening., Disp: , Rfl:   •  losartan (COZAAR) 25 MG tablet, Take 1 tablet by mouth Daily., Disp: 30 tablet, Rfl: 3  •  metoprolol succinate XL (TOPROL-XL) 25 MG 24 hr tablet, Take 1 tablet by mouth Daily., Disp: 30 tablet, Rfl: 3  •  Ropivacaine HCl-NaCl (NAROPIN) 0.2-0.9 %, 24 mg/hr by Peripheral Nerve route Continuous., Disp: , Rfl:   •  sodium chloride (OCEAN) 0.65 % nasal spray, 2 sprays into each nostril Daily., Disp: , Rfl:   •  venlafaxine XR (EFFEXOR-XR) 75 MG 24 hr capsule, Take 75 mg by mouth Daily., Disp: , Rfl:     The following portions of the patient's history were reviewed and updated as appropriate: allergies, current medications, past family history, past medical history, past social history, past surgical history and problem list.    Review of Systems   Constitution: Negative for chills, decreased appetite, diaphoresis, fever, weakness, malaise/fatigue, night sweats, weight gain and weight loss.   HENT: Negative for congestion and nosebleeds.    Eyes: Negative for blurred vision, visual disturbance and visual halos.   Cardiovascular: Negative for chest pain, claudication, cyanosis, dyspnea on exertion, irregular heartbeat, leg swelling, near-syncope, orthopnea, palpitations, paroxysmal nocturnal dyspnea and syncope.   Respiratory: Negative for cough, hemoptysis, shortness of breath, sleep disturbances due to breathing, snoring, sputum production and wheezing.    Endocrine: Negative for cold intolerance, heat intolerance, polydipsia, polyphagia and polyuria.   Hematologic/Lymphatic: Does not bruise/bleed easily.   Skin:  "Negative for dry skin, itching and rash.   Musculoskeletal: Negative for falls, joint pain, joint swelling, muscle weakness and myalgias.   Gastrointestinal: Negative for bloating, abdominal pain, constipation, diarrhea, dysphagia, heartburn, melena, nausea and vomiting.   Genitourinary: Negative for dysuria, flank pain, hematuria and nocturia.   Neurological: Negative for difficulty with concentration, excessive daytime sleepiness, dizziness, headaches and loss of balance.   Psychiatric/Behavioral: Negative for altered mental status and depression. The patient is not nervous/anxious.    Allergic/Immunologic: Positive for environmental allergies (seasonal).       Objective:     Vitals:    06/04/18 1054 06/04/18 1058 06/04/18 1059   BP: 170/84 167/96 161/91   BP Location: Right arm Left arm Left arm   Patient Position: Sitting Sitting Standing   Cuff Size: Large Adult     Pulse: 80 82 74   Resp: 19     Temp: 97.6 °F (36.4 °C)     TempSrc: Temporal Artery      SpO2: 98%     Weight: (!) 144 kg (317 lb)     Height: 160 cm (63\")           Physical Exam   Constitutional: She is oriented to person, place, and time. She appears well-developed. No distress.   HENT:   Head: Normocephalic and atraumatic.   Mouth/Throat: Oropharynx is clear and moist.   Eyes: Conjunctivae are normal. Pupils are equal, round, and reactive to light. No scleral icterus.   Neck: No hepatojugular reflux and no JVD present. Carotid bruit is not present. No tracheal deviation present. No thyromegaly present.   Cardiovascular: Normal rate, regular rhythm, normal heart sounds and intact distal pulses.  Exam reveals no friction rub.    No murmur heard.  Pulmonary/Chest: Effort normal and breath sounds normal.   Abdominal: Soft. Bowel sounds are normal. She exhibits no distension. There is no tenderness.   Musculoskeletal: She exhibits no edema.   Lymphadenopathy:     She has no cervical adenopathy.   Neurological: She is alert and oriented to person, " place, and time.   Skin: Skin is warm, dry and intact. No rash noted. No cyanosis or erythema. No pallor.   Psychiatric: She has a normal mood and affect. Her behavior is normal. Thought content normal.   Vitals reviewed.      Lab and Diagnostic Review:    Assessment and Plan:         1. Palpitations    Improved on BB and decreased caffeine    2. Essential hypertension  increase  - losartan (COZAAR) 50 MG tablet; Take 1 tablet by mouth Daily.  Dispense: 30 tablet; Refill: 3    Keep home BP log    3. Morbid obesity  Diet and exercise modifications  HMR diet plan    F/u with PCP 1 month          *Please note that portions of this note were completed with a voice recognition program. Efforts were made to edit the dictations, but occasionally words are mistranscribed.

## 2018-06-04 NOTE — PROGRESS NOTES
"[facility]      Encounter Date:06/04/2018     Imelda Elaine  [address]  [facility] [address]  [phone]    1950    Comfort Colunga MD    Imelda Elaine is a 67 y.o. female.      Subjective:     Chief Complaint:  Follow-up       HPI     No problems updated.    Past Surgical History:   Procedure Laterality Date   • ANTERIOR AND POSTERIOR VAGINAL REPAIR N/A 4/17/2017    Procedure:  Vaginal enterocele repair with anterior posterior colporrhaphy;  Surgeon: Jose Maria Chowdhury MD;  Location:  [facility];  Service:    • BLADDER AUGMENTATION      MESH PLACED   • COLONOSCOPY  2012   • EXPLORATORY LAPAROTOMY      \"BOWEL INTO PELVIC BONE\"  WRAPPED WITH MESH????   • FOOT SURGERY Left    • HYSTERECTOMY  1989    WITH BSO    • KNEE ARTHROSCOPY Left     meniscus repair   • LAPAROSCOPIC CHOLECYSTECTOMY     • OOPHORECTOMY  1989   • GA TOTAL KNEE ARTHROPLASTY Left 11/16/2017    Procedure: TOTAL KNEE ARTHROPLASTY LEFT;  Surgeon: Sunny Reynoso MD;  Location:  [facility];  Service: Orthopedics   • TONSILLECTOMY     • TUBAL ABDOMINAL LIGATION     • VENTRAL HERNIA REPAIR N/A 6/21/2016    Procedure: LAPAROSCOPIC REPAIR OF ABDOMINAL WALL HERNIA WITH MESH, CONVERTED TO OPEN ;  Surgeon: Bertha Delacruz MD;  Location:  [facility];  Service:        Allergies   Allergen Reactions   • Pneumococcal Vaccines Swelling and Rash     Swelling on entire arm    • Other Swelling and Rash     Shingles vaccine  Swelling of injection site of arm,    • Tuberculin Tests Swelling and Rash     Pt has chest xrays to check          Current Outpatient Prescriptions:   •  aspirin 81 MG EC tablet, Take 81 mg by mouth Daily., Disp: , Rfl:   •  atorvastatin (LIPITOR) 10 MG tablet, Take 10 mg by mouth Every Night., Disp: , Rfl:   •  celecoxib (CeleBREX) 200 MG capsule, Take 1 capsule by mouth Daily. Must take an hour after aspirin if needed., Disp: , Rfl:   •  docusate sodium 100 MG capsule, Take 100 " mg by mouth 2 (Two) Times a Day As Needed for Constipation., Disp: 60 capsule, Rfl: 0  •  estradiol (ESTRACE) 0.5 MG tablet, Take 1 tablet by mouth Daily., Disp: 90 tablet, Rfl: 3  •  ipratropium (ATROVENT) 0.06 % nasal spray, 1 spray into each nostril Daily., Disp: , Rfl:   •  lansoprazole (PREVACID) 30 MG capsule, Take 30 mg by mouth daily., Disp: , Rfl:   •  levocetirizine (XYZAL) 5 MG tablet, Take 5 mg by mouth Every Evening., Disp: , Rfl:   •  losartan (COZAAR) 25 MG tablet, Take 25 mg by mouth Daily., Disp: , Rfl:   •  metoprolol succinate XL (TOPROL-XL) 25 MG 24 hr tablet, Take 1 tablet by mouth Daily., Disp: 30 tablet, Rfl: 3  •  Ropivacaine HCl-NaCl (NAROPIN) 0.2-0.9 %, 24 mg/hr by Peripheral Nerve route Continuous., Disp: , Rfl:   •  sodium chloride (OCEAN) 0.65 % nasal spray, 2 sprays into each nostril Daily., Disp: , Rfl:   •  venlafaxine XR (EFFEXOR-XR) 75 MG 24 hr capsule, Take 75 mg by mouth Daily., Disp: , Rfl:     The following portions of the patient's history were reviewed and updated as appropriate: allergies, current medications, past family history, past medical history, past social history, past surgical history and problem list.    Review of Systems   Constitution: Negative for chills, decreased appetite, diaphoresis, fever, weakness, malaise/fatigue, night sweats, weight gain and weight loss.   HENT: Negative for congestion and nosebleeds.    Eyes: Negative for blurred vision, visual disturbance and visual halos.   Cardiovascular: Negative for chest pain, claudication, cyanosis, dyspnea on exertion, irregular heartbeat, leg swelling, near-syncope, orthopnea, palpitations, paroxysmal nocturnal dyspnea and syncope.   Respiratory: Negative for cough, hemoptysis, shortness of breath, sleep disturbances due to breathing, snoring, sputum production and wheezing.    Endocrine: Negative for cold intolerance, heat intolerance, polydipsia, polyphagia and polyuria.   Hematologic/Lymphatic: Does not  bruise/bleed easily.   Skin: Negative for dry skin, itching and rash.   Musculoskeletal: Negative for falls, joint pain, joint swelling, muscle weakness and myalgias.   Gastrointestinal: Negative for bloating, abdominal pain, constipation, diarrhea, dysphagia, heartburn, melena, nausea and vomiting.   Genitourinary: Negative for dysuria, flank pain, hematuria and nocturia.   Neurological: Negative for difficulty with concentration, excessive daytime sleepiness, dizziness, headaches and loss of balance.   Psychiatric/Behavioral: Negative for altered mental status and depression. The patient is not nervous/anxious.    Allergic/Immunologic: Negative for environmental allergies.        Seasonal allergies       Objective:     There were no vitals filed for this visit.      Physical Exam    Lab and Diagnostic Review:    Assessment and Plan:         There are no diagnoses linked to this encounter.      *Please note that portions of this note were completed with a voice recognition program. Efforts were made to edit the dictations, but occasionally words are mistranscribed.

## 2018-06-06 ENCOUNTER — HOSPITAL ENCOUNTER (OUTPATIENT)
Dept: MAMMOGRAPHY | Facility: HOSPITAL | Age: 68
Discharge: HOME | End: 2018-06-06
Attending: OBSTETRICS & GYNECOLOGY | Admitting: OBSTETRICS & GYNECOLOGY

## 2018-06-06 DIAGNOSIS — Z12.31 VISIT FOR SCREENING MAMMOGRAM: ICD-10-CM

## 2018-06-06 PROCEDURE — 77067 SCR MAMMO BI INCL CAD: CPT

## 2018-06-06 PROCEDURE — 77063 BREAST TOMOSYNTHESIS BI: CPT

## 2018-06-06 PROCEDURE — 77067 SCR MAMMO BI INCL CAD: CPT | Performed by: RADIOLOGY

## 2018-06-06 PROCEDURE — 77063 BREAST TOMOSYNTHESIS BI: CPT | Performed by: RADIOLOGY

## 2018-10-29 DIAGNOSIS — I10 ESSENTIAL HYPERTENSION: ICD-10-CM

## 2018-10-29 RX ORDER — LOSARTAN POTASSIUM 50 MG/1
50 TABLET ORAL DAILY
Qty: 90 TABLET | Refills: 1 | Status: SHIPPED | OUTPATIENT
Start: 2018-10-29 | End: 2020-10-15

## 2018-10-29 NOTE — TELEPHONE ENCOUNTER
Last seen on 6/4/18, sees FLOR Bo for hypertension. Sent a refill for losartan 50mg daily to [facility] [address] and instructed patient over the phone to make a follow-up appointment or contact PCP for further refills.    Danni Sánchez, RobD

## 2019-01-04 ENCOUNTER — OFFICE VISIT (OUTPATIENT)
Dept: OBSTETRICS AND GYNECOLOGY | Facility: CLINIC | Age: 69
End: 2019-01-04

## 2019-01-04 VITALS
BODY MASS INDEX: 51.91 KG/M2 | SYSTOLIC BLOOD PRESSURE: 140 MMHG | WEIGHT: 293 LBS | DIASTOLIC BLOOD PRESSURE: 90 MMHG | HEIGHT: 63 IN

## 2019-01-04 DIAGNOSIS — K46.9 ENTEROCELE: ICD-10-CM

## 2019-01-04 DIAGNOSIS — N39.3 SUI (STRESS URINARY INCONTINENCE, FEMALE): ICD-10-CM

## 2019-01-04 DIAGNOSIS — Z90.710 STATUS POST ABDOMINAL HYSTERECTOMY: ICD-10-CM

## 2019-01-04 DIAGNOSIS — Z01.419 WOMEN'S ANNUAL ROUTINE GYNECOLOGICAL EXAMINATION: ICD-10-CM

## 2019-01-04 DIAGNOSIS — N81.6 RECTOCELE: Primary | ICD-10-CM

## 2019-01-04 PROBLEM — Z78.0 POST-MENOPAUSAL: Status: RESOLVED | Noted: 2017-02-14 | Resolved: 2019-01-04

## 2019-01-04 PROCEDURE — G0101 CA SCREEN;PELVIC/BREAST EXAM: HCPCS | Performed by: OBSTETRICS & GYNECOLOGY

## 2019-01-04 RX ORDER — ESTRADIOL 0.5 MG/1
0.5 TABLET ORAL DAILY
Qty: 90 TABLET | Refills: 3 | Status: SHIPPED | OUTPATIENT
Start: 2019-01-04 | End: 2019-01-21 | Stop reason: SDUPTHER

## 2019-01-04 RX ORDER — METHYLPREDNISOLONE 4 MG/1
TABLET ORAL
Qty: 21 TABLET | Refills: 0 | Status: SHIPPED | OUTPATIENT
Start: 2019-01-04 | End: 2020-02-03

## 2019-01-04 NOTE — PROGRESS NOTES
Subjective   Chief Complaint   Patient presents with   • Annual Exam     Imelda Elaine is a 68 y.o. year old  who is post-menopausal.  She is S/P hysterectomy presenting to be seen for her annual exam.  This past year she has been on hormone replacement therapy.  There has not been vaginal bleeding in the last 12 months.  Menopausal symptoms are not present.  She has a bad URI - s/p Zpack, sounds hoarse etc; coughing here in office requests Rx due to INGE worsening     SEXUAL Hx:  She is not currently sexually active.    [place] is painful: not asked              She has concerns about domestic violence no    HEALTH Hx:  She exercises regularly: no (and has no plans to become more active).  She wears her seat belt: yes.  Calcium servings per day: 1  Caffeine intake: 4 20 oz of tea sweet tea at [facility] for sore throat              Alcohol:does not drink              Smoking:non-smoker      The following portions of the patient's history were reviewed and updated as appropriate:problem list, current medications, allergies, past family history, past medical history, past social history and past surgical history.    Social History    Tobacco Use      Smoking status: Former Smoker        Packs/day: 1.50        Years: 15.00        Pack years: 22.5        Types: Cigarettes        Quit date: 1986        Years since quittin.5      Smokeless tobacco: Never Used    Review of Systems  Constitutional POS: cough and cold sx    NEG: anorexia or night sweats   Genitourinary POS: INGE is present and it IS effecting her ADL's    NEG: dysuria or hematuria   Gastointestinal POS: constipation (chronic)    NEG: bloating, change in bowel habits, melena or reflux symptoms   Integument POS: nothing reported    NEG: moles that are changing in size, shape, color or rashes   Breast POS: nothing reported    NEG: persistent breast lump, skin dimpling or nipple discharge        Objective   /90   Ht 158.8 cm  "(62.5\")   Wt (!) 149 kg (328 lb)   BMI 59.04 kg/m²     General:  well developed; well nourished  no acute distress  appears stated age  obese - Body mass index is 59.04 kg/m².   Skin:  No suspicious lesions seen   Thyroid: normal to inspection and palpation   Breasts:  Examined in supine position  Symmetric without masses or skin dimpling  Nipples normal without inversion, lesions or discharge  There are no palpable axillary nodes   There is red rash beneath the breasts on the abdominal wall left greater than right    Abdomen: soft, non-tender; no masses  no umbilical or inguinal hernias are present  no hepato-splenomegaly   Pelvis: Clinical staff was present for exam  External genitalia:  normal appearance of the external genitalia including Bartholin's and [place]'s glands.  :  urethral meatus normal; urethral hypermobility is absent.  Vaginal:  normal pink mucosa without prolapse or lesions. Relaxation and general  Cervix:  absent.  Uterus:  absent.  Adnexa:  non palpable bilaterally.  Rectal:  anus visually normal appearing. recto-vaginal exam unremarkable and confirms findings; no masses; guaiac negative;  Cystocele GRADE 1  Rectocele GRADE 2  Enterocele GRADE 1       Lab Review   No data reviewed/normal mammogram June 2018       Assessment   1. Post-hysterectomy examination with rectocele greater than enterocele/cystocele; these are not symptomatic enough to rodríguez repeat repair at this time that she is prone to recurrence.  2. Stress urinary incontinence aggravated by upper respiratory infection.  She had received a Z-Caden etc. from primary care.  No steroids or Tussionex.  She is felt Tessalon Perles.  I will give her both of these in hopes of improving her symptoms and decreasing stress  urinary incontinence  3. Morbid obesity with recent weight gain.  She is encouraged to exercise as much as possible not a lot of motivation.  Also cut back on the [employer] sweet tea visit increased due to this " cough.  4. Suggest she use over-the-counter antifungal medication such as Tinactin rash beneath her breast.  She has done this in the past.  5. Recent negative colonoscopy with Dr Chung      Plan   1. Please see above  2. The importance of keeping all planned follow-up and taking all medications as prescribed was emphasized.  3. Self breast awareness and mammogram protocols discussed.  4. Regular exercise and calcium ( ideally dietary) discussed  5. Follow up for annual exam or PRN     New Medications Ordered This Visit   Medications   • estradiol (ESTRACE) 0.5 MG tablet     Sig: Take 1 tablet by mouth Daily.     Dispense:  90 tablet     Refill:  3   • MethylPREDNISolone (MEDROL, DARRYL,) 4 MG tablet     Sig: Take as directed on package instructions.     Dispense:  21 tablet     Refill:  0   • HYDROcod Polst-CPM Polst ER (TUSSIONEX PENNKINETIC ER) 10-8 MG/5ML ER suspension     Sig: Take 5 mL by mouth Every 12 (Twelve) Hours As Needed for Cough.     Dispense:  473 mL     Refill:  0          This note was electronically signed.    Jose Maria Chowdhury MD  January 4, 2019    Note: Speech recognition transcription software may have been used to create portions of this document.  An attempt at proofreading has been made but errors in transcription could still be present.

## 2019-01-07 ENCOUNTER — TRANSCRIBE ORDERS (OUTPATIENT)
Dept: ADMINISTRATIVE | Facility: HOSPITAL | Age: 69
End: 2019-01-07

## 2019-01-07 ENCOUNTER — HOSPITAL ENCOUNTER (OUTPATIENT)
Dept: GENERAL RADIOLOGY | Facility: HOSPITAL | Age: 69
Discharge: HOME | End: 2019-01-07
Admitting: INTERNAL MEDICINE

## 2019-01-07 DIAGNOSIS — J40 BRONCHITIS: Primary | ICD-10-CM

## 2019-01-07 DIAGNOSIS — J40 BRONCHITIS: ICD-10-CM

## 2019-01-07 PROCEDURE — 71046 X-RAY EXAM CHEST 2 VIEWS: CPT

## 2019-01-21 RX ORDER — ESTRADIOL 0.5 MG/1
TABLET ORAL
Qty: 90 TABLET | Refills: 3 | Status: SHIPPED | OUTPATIENT
Start: 2019-01-21 | End: 2020-02-03 | Stop reason: SDUPTHER

## 2019-04-28 DIAGNOSIS — I10 ESSENTIAL HYPERTENSION: ICD-10-CM

## 2019-04-29 RX ORDER — LOSARTAN POTASSIUM 50 MG/1
TABLET ORAL
Qty: 90 TABLET | Refills: 0 | OUTPATIENT
Start: 2019-04-29

## 2019-08-23 ENCOUNTER — TRANSCRIBE ORDERS (OUTPATIENT)
Dept: OBSTETRICS AND GYNECOLOGY | Facility: CLINIC | Age: 69
End: 2019-08-23

## 2019-08-23 DIAGNOSIS — Z12.31 VISIT FOR SCREENING MAMMOGRAM: Primary | ICD-10-CM

## 2019-09-03 NOTE — PROGRESS NOTES
Caller would like to discuss an/a Appointment for Swollen tonsils. Writer advised caller of callback within 24-72 hours. Patient Name: Roger Kent  Caller Name: Delia Castaneda  Name of Facility:   Callback Number: [phone]  Best Availability:   Can A Detailed Message Be left? yes  Fax Number:   Additional Info: Caller would like to get the patient in today due to swollen tonsils. Please advise.   Did you confirm the message with the caller?: yes    Thank you,  Ligia Fontanez Discharge Planning Assessment  [facility]     Patient Name: Imelda Elaine  MRN: [id number]  Today's Date: 11/17/2017    Admit Date: 11/16/2017          Discharge Needs Assessment       11/17/17 1200    Living Environment    Lives With spouse    Living Arrangements apartment    Home Accessibility no concerns;tub/shower is not walk in   lives in 4th floor apt, elevators in building    Stair Railings at Home none    Transportation Available family or friend will provide;car    Living Environment    Provides Primary Care For no one    Primary Care Provided By spouse/significant other    Quality Of Family Relationships supportive;helpful;involved    Able to Return to Prior Living Arrangements yes    Discharge Needs Assessment    Concerns To Be Addressed basic needs concerns;discharge planning concerns    Readmission Within The Last 30 Days no previous admission in last 30 days    Anticipated Changes Related to Illness inability to care for self    Equipment Currently Used at Home cane, straight;raised toilet    Equipment Needed After Discharge walker, rolling    Discharge Disposition home healthcare service    Discharge Contact Information if Applicable Rangel Elaine ( spouse) [phone]            Discharge Plan       11/17/17 5863    Case Management/Social Work Plan    Plan Home with Home Health    Patient/Family In Agreement With Plan yes    Additional Comments I met with Mrs Elaine and  at bedside to discuss d/c plan. Her plan is to return home.  will be available to assist with care needs. She has a cane and raised toilet seat. Arrangements made for  a rolling walker to be delivered from [employer]. We discussed options for PT. She has requested Home Health PT with [facility]. I spoke with Brandi who accepted referral . No other d/c needs identifed.        Discharge Placement     No information found        Expected Discharge Date and Time     Expected Discharge Date Expected  Discharge Time    Nov 19, 2017               Demographic Summary       11/17/17 1158    Referral Information    Admission Type inpatient    Arrived From home or self-care    Referral Source physician    Reason For Consult discharge planning    Record Reviewed history and physical;medical record    Contact Information    Permission Granted to Share Information With ;family/designee    Primary Care Physician Information    Name Comfort Bartholomew            Functional Status       11/17/17 1158    Functional Status Prior    Ambulation 1-->assistive equipment   cane    Transferring 0-->independent    Toileting 0-->independent    Bathing 0-->independent    Dressing 0-->independent    Eating 0-->independent    Communication 0-->understands/communicates without difficulty    Swallowing 0-->swallows foods/liquids without difficulty    IADL    Medications independent    Meal Preparation independent    Housekeeping independent    Laundry independent    Shopping independent    Oral Care independent    Cognitive/Perceptual/Developmental    Current Mental Status/Cognitive Functioning no deficits noted    Recent Changes in Mental Status/Cognitive Functioning no changes    Employment/Financial    Current Occupation (Previous Occupation if Retired) --   retired    Source Of Income social security    Financial Concerns none    Employment/Finance Comments [employer] Medicare replacement            Psychosocial     None            Abuse/Neglect     None            Legal     None            Substance Abuse     None            Patient Forms     None          Sonja C Kellerman, RN

## 2019-10-14 ENCOUNTER — APPOINTMENT (OUTPATIENT)
Dept: MAMMOGRAPHY | Facility: HOSPITAL | Age: 69
End: 2019-10-14

## 2019-12-02 RX ORDER — ESTRADIOL 0.5 MG/1
0.5 TABLET ORAL DAILY
Qty: 90 TABLET | Refills: 0 | Status: SHIPPED | OUTPATIENT
Start: 2019-12-02 | End: 2020-02-03

## 2019-12-11 ENCOUNTER — APPOINTMENT (OUTPATIENT)
Dept: MAMMOGRAPHY | Facility: HOSPITAL | Age: 69
End: 2019-12-11

## 2019-12-17 ENCOUNTER — HOSPITAL ENCOUNTER (OUTPATIENT)
Dept: MAMMOGRAPHY | Facility: HOSPITAL | Age: 69
Discharge: HOME | End: 2019-12-17
Admitting: OBSTETRICS & GYNECOLOGY

## 2019-12-17 DIAGNOSIS — Z12.31 VISIT FOR SCREENING MAMMOGRAM: ICD-10-CM

## 2019-12-17 PROCEDURE — 77067 SCR MAMMO BI INCL CAD: CPT

## 2019-12-17 PROCEDURE — 77063 BREAST TOMOSYNTHESIS BI: CPT | Performed by: RADIOLOGY

## 2019-12-17 PROCEDURE — 77067 SCR MAMMO BI INCL CAD: CPT | Performed by: RADIOLOGY

## 2019-12-17 PROCEDURE — 77063 BREAST TOMOSYNTHESIS BI: CPT

## 2020-02-03 ENCOUNTER — OFFICE VISIT (OUTPATIENT)
Dept: OBSTETRICS AND GYNECOLOGY | Facility: CLINIC | Age: 70
End: 2020-02-03

## 2020-02-03 VITALS
BODY MASS INDEX: 53.92 KG/M2 | WEIGHT: 293 LBS | HEIGHT: 62 IN | DIASTOLIC BLOOD PRESSURE: 80 MMHG | SYSTOLIC BLOOD PRESSURE: 140 MMHG

## 2020-02-03 DIAGNOSIS — N95.1 MENOPAUSAL SYMPTOMS: ICD-10-CM

## 2020-02-03 DIAGNOSIS — N81.6 RECTOCELE: Primary | ICD-10-CM

## 2020-02-03 DIAGNOSIS — N39.41 URGE INCONTINENCE: ICD-10-CM

## 2020-02-03 DIAGNOSIS — K46.9 ENTEROCELE: ICD-10-CM

## 2020-02-03 PROCEDURE — 99214 OFFICE O/P EST MOD 30 MIN: CPT | Performed by: OBSTETRICS & GYNECOLOGY

## 2020-02-03 RX ORDER — ESTRADIOL 0.5 MG/1
0.5 TABLET ORAL DAILY
Qty: 90 TABLET | Refills: 3 | Status: SHIPPED | OUTPATIENT
Start: 2020-02-03 | End: 2021-02-08 | Stop reason: SDUPTHER

## 2020-02-03 NOTE — PROGRESS NOTES
DataSubjective   Chief Complaint   Patient presents with   • Menopause     Imelda Elaine is a 69 y.o. year old  who is post-menopausal.  She is S/P hysterectomy presenting to be seen for her annual exam.  This past year she has been on hormone replacement therapy.  There has not been vaginal bleeding in the last 12 months.  Menopausal symptoms are not present. As long as she is on ERT!  Not really complaining of any kind of pelvic relaxation symptoms.    SEXUAL Hx:  She is not currently sexually active.  This is because of her .  [place] is painful: not asked              She has concerns about domestic violence no    HEALTH Hx:  Level of weekly physical activity: 30 minutes  She wears her seat belt: yes.  Self breast awareness: no  Calcium servings per day: none constipated on supplements  Caffeine intake:3 equivalent to a cup of coffee  Social History     Substance and Sexual Activity   Alcohol Use No                 Social History    Tobacco Use      Smoking status: Former Smoker        Packs/day: 1.50        Years: 15.00        Pack years: 22.5        Types: Cigarettes        Quit date: 1986        Years since quittin.6      Smokeless tobacco: Never Used      The following portions of the patient's history were reviewed and updated as appropriate:problem list, current medications, allergies, past family history, past medical history, past social history and past surgical history    Current Outpatient Medications:   •  aspirin 81 MG EC tablet, Take 81 mg by mouth Daily., Disp: , Rfl:   •  atorvastatin (LIPITOR) 10 MG tablet, Take 10 mg by mouth Every Night., Disp: , Rfl:   •  celecoxib (CeleBREX) 200 MG capsule, Take 1 capsule by mouth Daily. Must take an hour after aspirin if needed., Disp: , Rfl:   •  docusate sodium 100 MG capsule, Take 100 mg by mouth 2 (Two) Times a Day As Needed for Constipation., Disp: 60 capsule, Rfl: 0  •  estradiol (ESTRACE) 0.5 MG tablet, Take 1 tablet by  "mouth Daily., Disp: 90 tablet, Rfl: 3  •  ipratropium (ATROVENT) 0.06 % nasal spray, 1 spray into each nostril Daily., Disp: , Rfl:   •  lansoprazole (PREVACID) 30 MG capsule, Take 30 mg by mouth daily., Disp: , Rfl:   •  levocetirizine (XYZAL) 5 MG tablet, Take 5 mg by mouth Every Evening., Disp: , Rfl:   •  losartan (COZAAR) 50 MG tablet, Take 1 tablet by mouth Daily., Disp: 90 tablet, Rfl: 1  •  metoprolol succinate XL (TOPROL-XL) 25 MG 24 hr tablet, Take 1 tablet by mouth Daily., Disp: 30 tablet, Rfl: 3  •  sodium chloride (OCEAN) 0.65 % nasal spray, 2 sprays into each nostril Daily., Disp: , Rfl:   •  venlafaxine XR (EFFEXOR-XR) 75 MG 24 hr capsule, Take 75 mg by mouth Daily., Disp: , Rfl:   •  Mirabegron ER (MYRBETRIQ) 25 MG tablet sustained-release 24 hour 24 hr tablet, Take 1 tablet by mouth Daily., Disp: 30 tablet, Rfl: 11    Review of Systems  Constitutional POS: weight gain 27# over 2 years     NEG: anorexia, malaise or night sweats   Genitourinary POS: urge incontinene is present but it IS NOT effecting her ADL's    NEG: dysuria, frequency or hematuria   Gastointestinal POS: constipation (chronic)    NEG: bloating, change in bowel habits, melena or reflux symptoms   Breast                       POS: nothing reported  NEG: persistent breast lump, skin dimpling, breast tenderness or nipple discharge               No fevers coughs colds.     Objective   /80   Ht 158.1 cm (62.25\")   Wt (!) 152 kg (334 lb)   Breastfeeding No   BMI 60.60 kg/m²     General:  well developed; well nourished  no acute distress  appears stated age  obese - Body mass index is 60.6 kg/m².   Skin:  No suspicious lesions seen   Thyroid: not examined   Breasts:  Examined in supine position  Symmetric without masses or skin dimpling  Nipples normal without inversion, lesions or discharge  Fibrocystic changes are present both breasts without a discrete mass   Abdomen: soft, non-tender; no masses  no umbilical or inguinal hernias " are present  no hepato-splenomegaly   Pelvis: Clinical staff was present for exam  External genitalia:  normal appearance of the external genitalia including Bartholin's and [place]'s glands.  :  urethral meatus normal;  Vaginal:  normal pink mucosa without prolapse or lesions. she is not able to perform a Kegel contraction upon request;  Uterus:  absent.  Adnexa:  non palpable bilaterally.  Rectal:  digital rectal exam not performed; anus visually normal appearing.  Cystocele GRADE 1  Rectocele GRADE 1  Enterocele GRADE 1       Lab and Imaging Review   BMP and CBC  Mammogram report       Assessment   1. Post hysterectomy postmenopausal examination doing relatively well on hormone replacement therapy.  Couple years ago she tried to stop and did not do well at all.  She would like to remain on the estradiol.  Took this remained on this even around her knee surgery.  2. Gynecologic, breast and colorectal screening protocols were reviewed.  She has a mammogram scheduled and is up-to-date on colonoscopies.  3. Menopausal symptoms stable on estrogen replacement therapy and she would like to continue  4.   Urge incontinence.  Discussed we could try medication she is on multiple medications already no history of glaucoma.  We will try Myrbetriq with may be safer safety profile.  Hopefully will be covered by Medicare.  5.    Morbid obesity discussed weight gain will increase pressure cardiovascular disease high cholesterol diabetes which artery problem along with orthopedic issues that she has had knee replacement surgery.     Plan   1. Samples of Myrbetriq 25 mg #21 time voiding this she is unable to do a Kegel.  Discussed weight gain particularly in the abdomen may worsen her bladder issues  2. The importance of keeping all planned follow-up and taking all medications as prescribed was emphasized.  3. Self breast awareness and mammogram protocols discussed.  4. Regular exercise and calcium ( ideally dietary)  discussed  5. Follow up for annual exam or PRN     New Medications Ordered This Visit   Medications   • estradiol (ESTRACE) 0.5 MG tablet     Sig: Take 1 tablet by mouth Daily.     Dispense:  90 tablet     Refill:  3   • Mirabegron ER (MYRBETRIQ) 25 MG tablet sustained-release 24 hour 24 hr tablet     Sig: Take 1 tablet by mouth Daily.     Dispense:  30 tablet     Refill:  11     No orders of the defined types were placed in this encounter.         This note was electronically signed.    Jos eMaria Chowdhury MD  February 3, 2020    Note: Speech recognition transcription software may have been used to create portions of this document.  An attempt at proofreading has been made but errors in transcription could still be present.

## 2020-09-06 ENCOUNTER — HOSPITAL ENCOUNTER (EMERGENCY)
Facility: HOSPITAL | Age: 70
Discharge: HOME | End: 2020-09-07
Attending: EMERGENCY MEDICINE | Admitting: FAMILY MEDICINE

## 2020-09-06 ENCOUNTER — APPOINTMENT (OUTPATIENT)
Dept: GENERAL RADIOLOGY | Facility: HOSPITAL | Age: 70
End: 2020-09-06

## 2020-09-06 ENCOUNTER — APPOINTMENT (OUTPATIENT)
Dept: CT IMAGING | Facility: HOSPITAL | Age: 70
End: 2020-09-06

## 2020-09-06 ENCOUNTER — APPOINTMENT (OUTPATIENT)
Dept: MRI IMAGING | Facility: HOSPITAL | Age: 70
End: 2020-09-06

## 2020-09-06 DIAGNOSIS — Z86.79 HISTORY OF HYPERTENSION: ICD-10-CM

## 2020-09-06 DIAGNOSIS — Z86.39 HISTORY OF HYPERLIPIDEMIA: ICD-10-CM

## 2020-09-06 DIAGNOSIS — R42 VERTIGO: Primary | ICD-10-CM

## 2020-09-06 DIAGNOSIS — R11.2 NON-INTRACTABLE VOMITING WITH NAUSEA, UNSPECIFIED VOMITING TYPE: ICD-10-CM

## 2020-09-06 LAB
ALBUMIN SERPL-MCNC: 3.7 G/DL (ref 3.5–5.2)
ALBUMIN/GLOB SERPL: 1.1 G/DL
ALP SERPL-CCNC: 124 U/L (ref 39–117)
ALT SERPL W P-5'-P-CCNC: 11 U/L (ref 1–33)
ANION GAP SERPL CALCULATED.3IONS-SCNC: 9 MMOL/L (ref 5–15)
AST SERPL-CCNC: 15 U/L (ref 1–32)
BASOPHILS # BLD AUTO: 0.01 10*3/MM3 (ref 0–0.2)
BASOPHILS NFR BLD AUTO: 0.1 % (ref 0–1.5)
BILIRUB SERPL-MCNC: 0.3 MG/DL (ref 0–1.2)
BUN SERPL-MCNC: 17 MG/DL (ref 8–23)
BUN/CREAT SERPL: 18.5 (ref 7–25)
CALCIUM SPEC-SCNC: 9 MG/DL (ref 8.6–10.5)
CHLORIDE SERPL-SCNC: 103 MMOL/L (ref 98–107)
CO2 SERPL-SCNC: 27 MMOL/L (ref 22–29)
CREAT SERPL-MCNC: 0.92 MG/DL (ref 0.57–1)
DEPRECATED RDW RBC AUTO: 47 FL (ref 37–54)
EOSINOPHIL # BLD AUTO: 0.31 10*3/MM3 (ref 0–0.4)
EOSINOPHIL NFR BLD AUTO: 4.4 % (ref 0.3–6.2)
ERYTHROCYTE [DISTWIDTH] IN BLOOD BY AUTOMATED COUNT: 14.5 % (ref 12.3–15.4)
GFR SERPL CREATININE-BSD FRML MDRD: 61 ML/MIN/1.73
GLOBULIN UR ELPH-MCNC: 3.3 GM/DL
GLUCOSE SERPL-MCNC: 137 MG/DL (ref 65–99)
HCT VFR BLD AUTO: 40.8 % (ref 34–46.6)
HGB BLD-MCNC: 12.8 G/DL (ref 12–15.9)
HOLD SPECIMEN: NORMAL
HOLD SPECIMEN: NORMAL
IMM GRANULOCYTES # BLD AUTO: 0.02 10*3/MM3 (ref 0–0.05)
IMM GRANULOCYTES NFR BLD AUTO: 0.3 % (ref 0–0.5)
LYMPHOCYTES # BLD AUTO: 1.41 10*3/MM3 (ref 0.7–3.1)
LYMPHOCYTES NFR BLD AUTO: 20.2 % (ref 19.6–45.3)
MAGNESIUM SERPL-MCNC: 1.8 MG/DL (ref 1.6–2.4)
MCH RBC QN AUTO: 27.9 PG (ref 26.6–33)
MCHC RBC AUTO-ENTMCNC: 31.4 G/DL (ref 31.5–35.7)
MCV RBC AUTO: 88.9 FL (ref 79–97)
MONOCYTES # BLD AUTO: 0.46 10*3/MM3 (ref 0.1–0.9)
MONOCYTES NFR BLD AUTO: 6.6 % (ref 5–12)
NEUTROPHILS NFR BLD AUTO: 4.77 10*3/MM3 (ref 1.7–7)
NEUTROPHILS NFR BLD AUTO: 68.4 % (ref 42.7–76)
NRBC BLD AUTO-RTO: 0 /100 WBC (ref 0–0.2)
PLATELET # BLD AUTO: 163 10*3/MM3 (ref 140–450)
PMV BLD AUTO: 11.5 FL (ref 6–12)
POTASSIUM SERPL-SCNC: 4.1 MMOL/L (ref 3.5–5.2)
PROT SERPL-MCNC: 7 G/DL (ref 6–8.5)
RBC # BLD AUTO: 4.59 10*6/MM3 (ref 3.77–5.28)
SODIUM SERPL-SCNC: 139 MMOL/L (ref 136–145)
TROPONIN T SERPL-MCNC: <0.01 NG/ML (ref 0–0.03)
WBC # BLD AUTO: 6.98 10*3/MM3 (ref 3.4–10.8)
WHOLE BLOOD HOLD SPECIMEN: NORMAL
WHOLE BLOOD HOLD SPECIMEN: NORMAL

## 2020-09-06 PROCEDURE — 83735 ASSAY OF MAGNESIUM: CPT

## 2020-09-06 PROCEDURE — 99284 EMERGENCY DEPT VISIT MOD MDM: CPT

## 2020-09-06 PROCEDURE — 70551 MRI BRAIN STEM W/O DYE: CPT

## 2020-09-06 PROCEDURE — 85025 COMPLETE CBC W/AUTO DIFF WBC: CPT

## 2020-09-06 PROCEDURE — 0042T HC CT CEREBRAL PERFUSION W/WO CONTRAST: CPT

## 2020-09-06 PROCEDURE — 84484 ASSAY OF TROPONIN QUANT: CPT

## 2020-09-06 PROCEDURE — 70498 CT ANGIOGRAPHY NECK: CPT

## 2020-09-06 PROCEDURE — 71045 X-RAY EXAM CHEST 1 VIEW: CPT

## 2020-09-06 PROCEDURE — 70496 CT ANGIOGRAPHY HEAD: CPT

## 2020-09-06 PROCEDURE — 0 IOPAMIDOL PER 1 ML: Performed by: EMERGENCY MEDICINE

## 2020-09-06 PROCEDURE — 93005 ELECTROCARDIOGRAM TRACING: CPT

## 2020-09-06 PROCEDURE — 80053 COMPREHEN METABOLIC PANEL: CPT

## 2020-09-06 PROCEDURE — 70450 CT HEAD/BRAIN W/O DYE: CPT

## 2020-09-06 RX ORDER — SODIUM CHLORIDE 0.9 % (FLUSH) 0.9 %
10 SYRINGE (ML) INJECTION AS NEEDED
Status: DISCONTINUED | OUTPATIENT
Start: 2020-09-06 | End: 2020-09-07 | Stop reason: HOSPADM

## 2020-09-06 RX ADMIN — IOPAMIDOL 115 ML: 755 INJECTION, SOLUTION INTRAVENOUS at 22:45

## 2020-09-07 VITALS
OXYGEN SATURATION: 98 % | WEIGHT: 293 LBS | BODY MASS INDEX: 53.92 KG/M2 | RESPIRATION RATE: 20 BRPM | SYSTOLIC BLOOD PRESSURE: 164 MMHG | DIASTOLIC BLOOD PRESSURE: 81 MMHG | HEIGHT: 62 IN | TEMPERATURE: 98.1 F | HEART RATE: 81 BPM

## 2020-09-07 PROCEDURE — 99283 EMERGENCY DEPT VISIT LOW MDM: CPT | Performed by: NURSE PRACTITIONER

## 2020-09-07 NOTE — ED PROVIDER NOTES
EMERGENCY DEPARTMENT ENCOUNTER      Pt Name: Imelda Elaine  MRN: [id number]  YOB: 1950  Date of evaluation: 9/6/2020  Provider: Kendall Beard MD    CHIEF COMPLAINT       Chief Complaint   Patient presents with   • Dizziness         HISTORY OF PRESENT ILLNESS  (Location/Symptom, Timing/Onset, Context/Setting, Quality, Duration, Modifying Factors, Severity.)   Imelda Elaine is a 69 y.o. female who presents to the emergency department with sudden onset vertigo that began about an hour ago and lasted constantly for about 30 minutes.  She describes it as moderate severity spinning sensation of the room with associated vomiting but no headache, neck pain, chest pain, shortness of breath.  She does not describe it as lightheadedness or feeling faint.  She has no previous history of CVA and denies any concomitant visual changes, difficulty speaking, difficulty swallowing, weakness, or numbness.      Nursing notes were reviewed.    REVIEW OF SYSTEMS    (2-9 systems for level 4, 10 or more for level 5)   ROS:  General:  No fevers, no chills, no weakness  Cardiovascular:  No chest pain, no palpitations  Respiratory:  No shortness of breath, no cough, no wheezing  Gastrointestinal:  No pain, no nausea, no vomiting, no diarrhea  Musculoskeletal:  No muscle pain, no joint pain  Skin:  No rash, no easy bruising  Neurologic:  No speech problems, no headache, no extremity numbness, no extremity tingling, no extremity weakness  Psychiatric:  No anxiety  Genitourinary:  No dysuria, no hematuria    Except as noted above the remainder of the review of systems was reviewed and negative.       PAST MEDICAL HISTORY     Past Medical History:   Diagnosis Date   • Anesthesia 2002    low bp only with foot surgery, has had surgeries since and no issue per pt report   • Arthritis    • Back pain    • Depression    • GERD (gastroesophageal reflux disease)    • High cholesterol    • Hypertension    • Knee pain      "BILATERAL   • Menopausal state    • Seasonal allergies    • Wears eyeglasses    • Wears glasses    • Wears partial dentures     implants         SURGICAL HISTORY       Past Surgical History:   Procedure Laterality Date   • ANTERIOR AND POSTERIOR VAGINAL REPAIR N/A 4/17/2017    Vaginal enterocele / anterior posterior colporrhaphy;  Jose Maria Chowdhury MD;  Location:  [facility];  Service:    • BLADDER AUGMENTATION      MESH PLACED   • COLONOSCOPY  2018    Dr Sheldon reynolds   • EXPLORATORY LAPAROTOMY  2010    \"BOWEL INTO PELVIC BONE\" /[place]    • EXPLORATORY LAPAROTOMY  04/2004    AQUILES/ abd sacrocolpopexy/post repair   • FOOT SURGERY Left    • KNEE ARTHROSCOPY Left     meniscus repair   • LAPAROSCOPIC CHOLECYSTECTOMY     • CA TOTAL KNEE ARTHROPLASTY Left 11/16/2017    Procedure: TOTAL KNEE ARTHROPLASTY LEFT;  Surgeon: Sunny Reynoso MD;  Location:  [facility];  Service: Orthopedics   • TONSILLECTOMY     • TOTAL ABDOMINAL HYSTERECTOMY WITH SALPINGO OOPHORECTOMY Bilateral 1989   • TUBAL ABDOMINAL LIGATION     • VENTRAL HERNIA REPAIR N/A 6/21/2016    Procedure: LAPAROSCOPIC REPAIR OF ABDOMINAL WALL HERNIA WITH MESH, CONVERTED TO OPEN ;  Surgeon: Bertha Delacruz MD;  Location:  [facility];  Service:          CURRENT MEDICATIONS       Current Facility-Administered Medications:   •  sodium chloride 0.9 % flush 10 mL, 10 mL, Intravenous, PRN, Emergency, Triage Protocol, MD    Current Outpatient Medications:   •  aspirin 81 MG EC tablet, Take 81 mg by mouth Daily., Disp: , Rfl:   •  atorvastatin (LIPITOR) 10 MG tablet, Take 10 mg by mouth Every Night., Disp: , Rfl:   •  celecoxib (CeleBREX) 200 MG capsule, Take 1 capsule by mouth Daily. Must take an hour after aspirin if needed., Disp: , Rfl:   •  docusate sodium 100 MG capsule, Take 100 mg by mouth 2 (Two) Times a Day As Needed for Constipation., Disp: 60 capsule, Rfl: 0  •  estradiol (ESTRACE) 0.5 MG tablet, Take 1 tablet by mouth Daily., Disp: 90 tablet, Rfl: " 3  •  ipratropium (ATROVENT) 0.06 % nasal spray, 1 spray into each nostril Daily., Disp: , Rfl:   •  lansoprazole (PREVACID) 30 MG capsule, Take 30 mg by mouth daily., Disp: , Rfl:   •  levocetirizine (XYZAL) 5 MG tablet, Take 5 mg by mouth Every Evening., Disp: , Rfl:   •  losartan (COZAAR) 50 MG tablet, Take 1 tablet by mouth Daily. (Patient taking differently: Take 100 mg by mouth Daily.), Disp: 90 tablet, Rfl: 1  •  metoprolol succinate XL (TOPROL-XL) 25 MG 24 hr tablet, Take 1 tablet by mouth Daily., Disp: 30 tablet, Rfl: 3  •  Mirabegron ER (MYRBETRIQ) 25 MG tablet sustained-release 24 hour 24 hr tablet, Take 1 tablet by mouth Daily., Disp: 30 tablet, Rfl: 11  •  sodium chloride (OCEAN) 0.65 % nasal spray, 2 sprays into each nostril Daily., Disp: , Rfl:   •  venlafaxine XR (EFFEXOR-XR) 75 MG 24 hr capsule, Take 75 mg by mouth Daily., Disp: , Rfl:     ALLERGIES     Pneumococcal vaccines; Other; and Tuberculin tests    FAMILY HISTORY       Family History   Problem Relation Age of Onset   • Hypertension Mother    • Diabetes Mother    • Heart attack Mother 59         from MI   • Cancer Father    • Aneurysm Father 57   • Hepatitis Brother         Hep B   • Kidney disease Brother 58   • Alzheimer's disease Maternal Grandmother    • Pneumonia Maternal Grandmother    • Leukemia Maternal Grandfather    • No Known Problems Paternal Grandfather    • No Known Problems Daughter    • No Known Problems Son    • Ovarian cancer Neg Hx    • Breast cancer Neg Hx           SOCIAL HISTORY       Social History     Socioeconomic History   • Marital status:      Spouse name: Not on file   • Number of children: Not on file   • Years of education: Not on file   • Highest education level: Not on file   Tobacco Use   • Smoking status: Former Smoker     Packs/day: 1.50     Years: 15.00     Pack years: 22.50     Types: Cigarettes     Last attempt to quit: 1986     Years since quittin.2   • Smokeless tobacco: Never Used  "  Substance and Sexual Activity   • Alcohol use: No   • Drug use: No   • Sexual activity: Never   Social History Narrative    Patient consumes 1-2 cups high-caffeine coffee, 1 [employer] sweet tea daily.     Patient lives at home with her .          PHYSICAL EXAM    (up to 7 for level 4, 8 or more for level 5)     Vitals:    09/06/20 2213 09/06/20 2216 09/06/20 2218 09/06/20 2330   BP:   159/85 170/88   BP Location:   Right arm    Patient Position:   Lying    Pulse: 79   85   Resp: 20      Temp:  98.1 °F (36.7 °C)     TempSrc:  Oral     SpO2: 95%   97%   Weight: (!) 154 kg (340 lb)      Height: 157.5 cm (62\")          Physical Exam  General: Awake, alert, no acute distress  HEENT: Pupils equal round and reactive, full range of extraocular movements, no nystagmus  Neck: Neck is supple, full range of motion, trachea midline  Cardiac: Heart regular rate, rhythm, no murmurs, rubs, or gallops  Lungs: Lungs are clear to auscultation, there is no wheezing, rhonchi, or rales. There is no use of accessory muscles.  Chest wall: There is no tenderness to palpation over the chest wall or over ribs  Abdomen: Abdomen is soft, nontender, nondistended. There is no firm or pulsatile masses, no rebound rigidity or guarding.   Musculoskeletal: No deformity  Neuro: Alert and oriented x4, motor 5 out of 5 throughout, sensation symmetrical throughout cerebellar function intact with finger-to-nose and heel-to-shin testing  Dermatology: Skin is warm and dry  Psych: Cooperative, appropriate affect.      DIAGNOSTIC RESULTS     EKG: All EKG's are interpreted by the Emergency Department Physician who either signs or Co-signs this chart in the absence of a cardiologist.    Sinus rhythm rate of 79, no acute ST segment or T wave changes, right bundle branch block, QTc 486, no ectopy    RADIOLOGY:   Non-plain film images such as CT, Ultrasound and MRI are read by the radiologist. Plain radiographic images are visualized and preliminarily " interpreted by the emergency physician with the below findings:      [x] Radiologist's Report Reviewed:  MRI Brain Without Contrast   Final Result      1. No acute findings in the brain.   2. Mild atrophy with mild chronic small vessel ischemic disease in the white matter.      Signer Name: Ramy Wu MD    Signed: 9/7/2020 12:33 AM    Workstation Name: [facility]      XR Chest 1 View   Final Result   No acute cardiopulmonary findings.      Signer Name: Ramy Wu MD    Signed: 9/7/2020 12:11 AM    Workstation Name: [facility]      CT Head Without Contrast   Final Result   Normal, negative unenhanced head CT.               Signer Name: JOSE MANUEL Beard MD    Signed: 9/6/2020 11:03 PM    Workstation Name: [facility]     Radiology [facility]      CT Cerebral Perfusion With & Without Contrast   Final Result   Normal brain perfusion CT.         Signer Name: Ramy Wu MD    Signed: 9/6/2020 11:06 PM    Workstation Name: [facility]      CT Angiogram Neck   Final Result   Negative CT angiogram of the head and neck.      Signer Name: Ramy Wu MD    Signed: 9/6/2020 11:16 PM    Workstation Name: [facility]      CT Angiogram Head   Final Result   Negative CT angiogram of the head and neck.      Signer Name: Ramy Wu MD    Signed: 9/6/2020 11:16 PM    Workstation Name: [facility]            LABS:    I have reviewed and interpreted all of the currently available lab results from this visit (if applicable):  Results for orders placed or performed during the hospital encounter of 09/06/20   Comprehensive Metabolic Panel   Result Value Ref Range    Glucose 137 (H) 65 - 99 mg/dL    BUN 17 8 - 23 mg/dL    Creatinine 0.92 0.57 - 1.00 mg/dL    Sodium 139 136 - 145 mmol/L    Potassium 4.1 3.5  - 5.2 mmol/L    Chloride 103 98 - 107 mmol/L    CO2 27.0 22.0 - 29.0 mmol/L    Calcium 9.0 8.6 - 10.5 mg/dL    Total Protein 7.0 6.0 - 8.5 g/dL    Albumin 3.70 3.50 - 5.20 g/dL    ALT (SGPT) 11 1 - 33 U/L    AST (SGOT) 15 1 - 32 U/L    Alkaline Phosphatase 124 (H) 39 - 117 U/L    Total Bilirubin 0.3 0.0 - 1.2 mg/dL    eGFR Non African Amer 61 >60 mL/min/1.73    Globulin 3.3 gm/dL    A/G Ratio 1.1 g/dL    BUN/Creatinine Ratio 18.5 7.0 - 25.0    Anion Gap 9.0 5.0 - 15.0 mmol/L   Troponin   Result Value Ref Range    Troponin T <0.010 0.000 - 0.030 ng/mL   Magnesium   Result Value Ref Range    Magnesium 1.8 1.6 - 2.4 mg/dL   CBC Auto Differential   Result Value Ref Range    WBC 6.98 3.40 - 10.80 10*3/mm3    RBC 4.59 3.77 - 5.28 10*6/mm3    Hemoglobin 12.8 12.0 - 15.9 g/dL    Hematocrit 40.8 34.0 - 46.6 %    MCV 88.9 79.0 - 97.0 fL    MCH 27.9 26.6 - 33.0 pg    MCHC 31.4 (L) 31.5 - 35.7 g/dL    RDW 14.5 12.3 - 15.4 %    RDW-SD 47.0 37.0 - 54.0 fl    MPV 11.5 6.0 - 12.0 fL    Platelets 163 140 - 450 10*3/mm3    Neutrophil % 68.4 42.7 - 76.0 %    Lymphocyte % 20.2 19.6 - 45.3 %    Monocyte % 6.6 5.0 - 12.0 %    Eosinophil % 4.4 0.3 - 6.2 %    Basophil % 0.1 0.0 - 1.5 %    Immature Grans % 0.3 0.0 - 0.5 %    Neutrophils, Absolute 4.77 1.70 - 7.00 10*3/mm3    Lymphocytes, Absolute 1.41 0.70 - 3.10 10*3/mm3    Monocytes, Absolute 0.46 0.10 - 0.90 10*3/mm3    Eosinophils, Absolute 0.31 0.00 - 0.40 10*3/mm3    Basophils, Absolute 0.01 0.00 - 0.20 10*3/mm3    Immature Grans, Absolute 0.02 0.00 - 0.05 10*3/mm3    nRBC 0.0 0.0 - 0.2 /100 WBC   Light Blue Top   Result Value Ref Range    Extra Tube hold for add-on    Green Top (Gel)   Result Value Ref Range    Extra Tube Hold for add-ons.    Lavender Top   Result Value Ref Range    Extra Tube hold for add-on    Gold Top - SST   Result Value Ref Range    Extra Tube Hold for add-ons.         All other labs were within normal range or not returned as of this dictation.      EMERGENCY  "DEPARTMENT COURSE and DIFFERENTIAL DIAGNOSIS/MDM:   Vitals:    Vitals:    09/06/20 2213 09/06/20 2216 09/06/20 2218 09/06/20 2330   BP:   159/85 170/88   BP Location:   Right arm    Patient Position:   Lying    Pulse: 79   85   Resp: 20      Temp:  98.1 °F (36.7 °C)     TempSrc:  Oral     SpO2: 95%   97%   Weight: (!) 154 kg (340 lb)      Height: 157.5 cm (62\")          ED Course as of Sep 07 0149   Sun Sep 06, 2020   2222 Spoke w/ stroke navigator who will see patient. She is asymptomatic with no deficits at this time. NIHSS 0.    [NS]   2314 Spoke with stroke navigator again who agrees nothing on initial CT imaging.  Agrees with plan for MRI and outpatient follow-up with negative.    [NS]   Mon Sep 07, 2020   0017 CXR personally reviewed and agree w/ radiology report.    [NS]   0017 All imaging reviewed and negative for acute pathology. Patient remains asymptomatic.    [NS]      ED Course User Index  [NS] Kendall Beard MD       Patient asymptomatic throughout the duration of her stay in the emergency department.  Her symptoms had resolved prior to her arrival.  Physical examination demonstrates no evidence of focal neurologic deficit.  Her presentation and work-up most consistent with peripheral cause of vertigo.  CT imaging demonstrates no evidence of mass or hemorrhage as well as no significant vascular disease and MRI is negative for ischemia, making posterior circulation CVA very unlikely.  Her laboratory evaluation likewise is unremarkable without any evidence of myocardial injury, electrolyte derangement, or significant anemia.  ECG does not demonstrate any acute ischemic changes or any other concerning findings.  She will be discharged to follow-up with her primary care provider.    I had a discussion with the patient/family regarding diagnosis, diagnostic results, treatment plan, and medications.  The patient/family indicated understanding of these instructions.  I spent adequate time at the bedside " proceeding discharge necessary to personally discuss the aftercare instructions, giving patient education, providing explanations of the results of our evaluations/findings, and my decision making to assure that the patient/family understand the plan of care.  Time was allotted to answer questions at that time and throughout the ED course.  Emphasis was placed on timely follow-up after discharge.  I also discussed the potential for the development of an acute emergent condition requiring further evaluation, admission, or even surgical intervention. I discussed that we found nothing during the visit today indicating the need for further workup, admission, or the presence of an unstable medical condition.  I encouraged the patient to return to the emergency department immediately for ANY concerns, worsening, new complaints, or if symptoms persist and unable to seek follow-up in a timely fashion.  The patient/family expressed understanding and agreement with this plan.  The patient will follow-up with their PCP in 1-2 days for reevaluation.           FINAL IMPRESSION      1. Vertigo    2. Non-intractable vomiting with nausea, unspecified vomiting type    3. History of hypertension    4. History of hyperlipidemia          DISPOSITION/PLAN     ED Disposition     ED Disposition Condition Comment    Discharge Stable           PATIENT REFERRED TO:  Comfort Colunga MD  [address] [place] [address]  [phone]    In 1 week      [facility] Emergency Department  [address] [facility] [address] [phone]    If symptoms worsen      DISCHARGE MEDICATIONS:     Medication List      CHANGE how you take these medications    losartan 50 MG tablet  Commonly known as:  COZAAR  Take 1 tablet by mouth Daily.  What changed:  how much to take        CONTINUE taking these medications    aspirin 81 MG EC tablet     atorvastatin 10 MG tablet  Commonly known as:  LIPITOR      celecoxib 200 MG capsule  Commonly known as:  CeleBREX  Take 1 capsule by mouth Daily. Must take an hour after aspirin if needed.     docusate sodium 100 MG capsule  Take 100 mg by mouth 2 (Two) Times a Day As Needed for Constipation.     estradiol 0.5 MG tablet  Commonly known as:  ESTRACE  Take 1 tablet by mouth Daily.     ipratropium 0.06 % nasal spray  Commonly known as:  ATROVENT     levocetirizine 5 MG tablet  Commonly known as:  XYZAL     metoprolol succinate XL 25 MG 24 hr tablet  Commonly known as:  TOPROL-XL  Take 1 tablet by mouth Daily.     Mirabegron ER 25 MG tablet sustained-release 24 hour 24 hr tablet  Commonly known as:  Myrbetriq  Take 1 tablet by mouth Daily.     Prevacid 30 MG capsule  Generic drug:  lansoprazole     sodium chloride 0.65 % nasal spray     venlafaxine XR 75 MG 24 hr capsule  Commonly known as:  EFFEXOR-XR              Comment: Please note this report has been produced using speech recognition software.      Kendall Beard MD  Attending Emergency Physician               Kendall Beard MD  09/07/20 0150

## 2020-09-07 NOTE — CONSULTS
"Stroke Consult Note    Patient Name: Imelda Elaine   MRN: [id number]  Age: 69 y.o.  Sex: female  : 1950    Primary Care Physician: Comfort Colunga MD  Referring Physician:  No ref. provider found    Handedness: left    Race: white    Chief Complaint/Reason for Consultation: vertigo and vomiting, ataxia    HPI: Mrs Elaine is a 69 year old white, left handed female with known diagnosis of HTN, HLD and essential tremors.  States at approximately 8:30pm she had sudden onset of feeling like the room was spinning with associated nausea and vomiting. Came to ER for further evaluation, symptoms have now resolved.   Dr Beard also noted left upper ext ataxia on exam and asked me to see the patient.    Upon exam, pt does have strength 5/5 all ext. Alert and oriented. Denies dizziness, nausea, vomiting, headache. States she has had some \"electrical shocks\" in her head the past few days, lasting a few seconds, 1-2 times per day. She does have slight ataxia in left upper ext, but she also has tremors and she reports this is baseline for her.    Last Known Normal Date/Time:  EST     Review of Systems   Constitutional: Negative for fever.   HENT: Negative for trouble swallowing.    Eyes: Negative for visual disturbance.   Respiratory: Negative for chest tightness and shortness of breath.    Cardiovascular: Negative for chest pain and palpitations.   Gastrointestinal: Positive for nausea and vomiting.   Endocrine: Negative.    Genitourinary: Negative for difficulty urinating.   Musculoskeletal: Negative.    Skin: Negative.    Allergic/Immunologic: Negative.    Neurological: Positive for dizziness and tremors. Negative for seizures, syncope, facial asymmetry, speech difficulty, weakness, light-headedness, numbness and headaches.   Hematological: Negative.    Psychiatric/Behavioral: Negative.         Temp:  [98.1 °F (36.7 °C)] 98.1 °F (36.7 °C)  Heart Rate:  [79-85] 85  Resp:  [20] 20  BP: (159-170)/(85-88) " 170/88    Neurological Exam  Mental Status  Awake, alert and oriented to person, place and time.Alert. Recent and remote memory are intact. Speech is normal. Language is fluent with no aphasia. Attention and concentration are normal.    Cranial Nerves  CN II: Visual fields full to confrontation.  CN III, IV, VI: Extraocular movements intact bilaterally.  CN V: Facial sensation is normal.  CN VII: Full and symmetric facial movement.  CN IX, X: Palate elevates symmetrically  CN XI: Shoulder shrug strength is normal.  CN XII: Tongue midline without atrophy or fasciculations.    Motor  Normal muscle bulk throughout. No fasciculations present. Strength is 5/5 throughout all four extremities.    Sensory  Light touch is normal in upper and lower extremities.     Coordination  Right: Finger-to-nose normal. Heel-to-shin normal.  Left: Finger-to-nose abnormality: Heel-to-shin normal.    Gait  Not observed.      Physical Exam   Constitutional: She is oriented to person, place, and time. She appears well-developed and well-nourished.   HENT:   Head: Normocephalic and atraumatic.   Cardiovascular: Normal rate and regular rhythm.   Pulmonary/Chest: Effort normal.   Neurological: She is alert and oriented to person, place, and time. She has normal strength.   Skin: Skin is warm and dry.   Psychiatric: She has a normal mood and affect. Her speech is normal.   Vitals reviewed.      Acute Stroke Data    Alteplase (tPA) Inclusion / Exclusion Criteria    Time: 2225  Person Administering Scale: FLOR Starkey    Inclusion Criteria  [x]   18 years of age or greater   []   Onset of symptoms < 4.5 hours before beginning treatment (stroke onset = time patient was last seen well or without symptoms).   []   Diagnosis of acute ischemic stroke causing measurable disabling deficit (Complete Hemianopia, Any Aphasia, Visual or Sensory Extinction, Any weakness limiting sustained effort against gravity)   []   Any remaining deficit  considered potentially disabling in view of patient and practitioner   Exclusion criteria (Do not proceed with Alteplase if any are checked under exclusion criteria)  []   Onset unknown or GREATER than 4.5 hours   []   ICH on CT/MRI   []   CT demonstrates hypodensity representing acute or subacute infarct   []   Significant head trauma or prior stroke in the previous 3 months   []   Symptoms suggestive of subarachnoid hemorrhage   []   History of un-ruptured intracranial aneurysm GREATER than 10 mm   []   Recent intracranial or intraspinal surgery within the last 3 months   []   Arterial puncture at a non-compressible site in the previous 7 days   []   Active internal bleeding   []   Acute bleeding tendency   []   Platelet count LESS than 100,000 for known hematological diseases such as leukemia, thrombocytopenia or chronic cirrhosis   []   Current use of anticoagulant with INR GREATER than 1.7 or PT GREATER than 15 seconds, aPTT GREATER than 40 seconds   []   Heparin received within 48 hours, resulting in abnormally elevated aPTT GREATER than upper limit of normal   []   Current use of direct thrombin inhibitors or direct factor Xa inhibitors in the past 48 hours   []   Elevated blood pressure refractory to treatment (systolic GREATER than 185 mm/Hg or diastolic  GREATER than 110 mm/Hg   []   Suspected infective endocarditis and aortic arch dissection   []   Current use of therapeutic treatment dose of low-molecular-weight heparin (LMWH) within the previous 24 hours   []   Structural GI malignancy or bleed   Relative exclusion for all patients  [x]   Only minor non-disabling symptoms   []   Pregnancy   []   Seizure at onset with postictal residual neurological impairments   []   Major surgery or previous trauma within past 14 days   []   History of previous spontaneous ICH, intracranial neoplasm, or AV malformation   []   Postpartum (within previous 14 days)   []   Recent GI or urinary tract hemorrhage (within  "previous 21 days)   []   Recent acute MI (within previous 3 months)   []   History of un-ruptured intracranial aneurysm LESS than 10 mm   []   History of ruptured intracranial aneurysm   []   Blood glucose LESS than 50 mg/dL (2.7 mmol/L)   []   Dural puncture within the last 7 days   []   Known GREATER than 10 cerebral microbleeds   Additional exclusions for patients with symptoms onset between 3 and 4.5 hours.  []   Age > 80.   []   On any anticoagulants regardless of INR  >>> Warfarin (Coumadin), Heparin, Enoxaparin (Lovenox), fondaparinux (Arixtra), bivalirudin (Angiomax), Argatroban, dabigatran (Pradaxa), rivaroxaban (Xarelto), or apixaban (Eliquis)   []   Severe stroke (NIHSS > 25).   []   History of BOTH diabetes and previous ischemic stroke.   []   The risks and benefits have been discussed with the patient or family related to the administration of IV Alteplase for stroke symptoms.   []   I have discussed and reviewed the patient's case and imaging with the attending prior to IV Alteplase.   Not administered Time Alteplase administered       Past Medical History:   Diagnosis Date   • Anesthesia 2002    low bp only with foot surgery, has had surgeries since and no issue per pt report   • Arthritis    • Back pain    • Depression    • GERD (gastroesophageal reflux disease)    • High cholesterol    • Hypertension    • Knee pain     BILATERAL   • Menopausal state    • Seasonal allergies    • Wears eyeglasses    • Wears glasses    • Wears partial dentures     implants     Past Surgical History:   Procedure Laterality Date   • ANTERIOR AND POSTERIOR VAGINAL REPAIR N/A 4/17/2017    Vaginal enterocele / anterior posterior colporrhaphy;  Jose Maria Chowdhury MD;  Location: [facility], [facility];  Service:    • BLADDER AUGMENTATION      MESH PLACED   • COLONOSCOPY  2018    Dr Chung - normal   • EXPLORATORY LAPAROTOMY  2010    \"BOWEL INTO PELVIC BONE\" /Mesh [place]    • EXPLORATORY LAPAROTOMY  04/2004    AQUILES/ abd sacrocolpopexy/post " repair   • FOOT SURGERY Left    • KNEE ARTHROSCOPY Left     meniscus repair   • LAPAROSCOPIC CHOLECYSTECTOMY     • CT TOTAL KNEE ARTHROPLASTY Left 2017    Procedure: TOTAL KNEE ARTHROPLASTY LEFT;  Surgeon: Sunny Reynoso MD;  Location: [facility] OR;  Service: Orthopedics   • TONSILLECTOMY     • TOTAL ABDOMINAL HYSTERECTOMY WITH SALPINGO OOPHORECTOMY Bilateral    • TUBAL ABDOMINAL LIGATION     • VENTRAL HERNIA REPAIR N/A 2016    Procedure: LAPAROSCOPIC REPAIR OF ABDOMINAL WALL HERNIA WITH MESH, CONVERTED TO OPEN ;  Surgeon: Bertha Delacruz MD;  Location:  [facility];  Service:      Family History   Problem Relation Age of Onset   • Hypertension Mother    • Diabetes Mother    • Heart attack Mother 59         from MI   • Cancer Father    • Aneurysm Father 57   • Hepatitis Brother         Hep B   • Kidney disease Brother 58   • Alzheimer's disease Maternal Grandmother    • Pneumonia Maternal Grandmother    • Leukemia Maternal Grandfather    • No Known Problems Paternal Grandfather    • No Known Problems Daughter    • No Known Problems Son    • Ovarian cancer Neg Hx    • Breast cancer Neg Hx      Social History     Socioeconomic History   • Marital status:      Spouse name: Not on file   • Number of children: Not on file   • Years of education: Not on file   • Highest education level: Not on file   Tobacco Use   • Smoking status: Former Smoker     Packs/day: 1.50     Years: 15.00     Pack years: 22.50     Types: Cigarettes     Last attempt to quit: 1986     Years since quittin.2   • Smokeless tobacco: Never Used   Substance and Sexual Activity   • Alcohol use: No   • Drug use: No   • Sexual activity: Never   Social History Narrative    Patient consumes 1-2 cups high-caffeine coffee, 1 [employer] sweet tea daily.     Patient lives at home with her .      Allergies   Allergen Reactions   • Pneumococcal Vaccines Swelling and Rash     Swelling on entire arm    • Other  Swelling and Rash     Shingles vaccine  Swelling of injection site of arm,    • Tuberculin Tests Swelling and Rash     Pt has chest xrays to check      Prior to Admission medications    Medication Sig Start Date End Date Taking? Authorizing Provider   aspirin 81 MG EC tablet Take 81 mg by mouth Daily.    Gris Chapin MD   atorvastatin (LIPITOR) 10 MG tablet Take 10 mg by mouth Every Night. 2/13/17   Gris Chapin MD   celecoxib (CeleBREX) 200 MG capsule Take 1 capsule by mouth Daily. Must take an hour after aspirin if needed. 11/17/17   Valerie Concepcion MD   docusate sodium 100 MG capsule Take 100 mg by mouth 2 (Two) Times a Day As Needed for Constipation. 11/17/17   Valerie Concepcion MD   estradiol (ESTRACE) 0.5 MG tablet Take 1 tablet by mouth Daily. 2/3/20   Jose Maria Chowdhury MD   ipratropium (ATROVENT) 0.06 % nasal spray 1 spray into each nostril Daily. 9/6/17   Gris Chapin MD   lansoprazole (PREVACID) 30 MG capsule Take 30 mg by mouth daily. 10/19/14   Gris Chapin MD   levocetirizine (XYZAL) 5 MG tablet Take 5 mg by mouth Every Evening.    Gris Chapin MD   losartan (COZAAR) 50 MG tablet Take 1 tablet by mouth Daily.  Patient taking differently: Take 100 mg by mouth Daily. 10/29/18   Jason Christopher APRN   metoprolol succinate XL (TOPROL-XL) 25 MG 24 hr tablet Take 1 tablet by mouth Daily. 9/7/17   Jason Christopher APRN   Mirabegron ER (MYRBETRIQ) 25 MG tablet sustained-release 24 hour 24 hr tablet Take 1 tablet by mouth Daily. 2/3/20 2/2/21  Jose Maria Chowdhury MD   sodium chloride (OCEAN) 0.65 % nasal spray 2 sprays into each nostril Daily.    Gris Chapin MD   venlafaxine XR (EFFEXOR-XR) 75 MG 24 hr capsule Take 75 mg by mouth Daily. 2/10/17   Gris Chapin MD       Hospital Meds:  Scheduled-    Infusions-     PRNs- •  sodium chloride    Functional Status Prior to Current Stroke/[place] Score: 0    NIH Stroke Scale  Time: 2225  Person Administering  Scale: Nai Guillen, APRN    1a  Level of consciousness: 0=alert; keenly responsive   1b. LOC questions:  0=Performs both tasks correctly   1c. LOC commands: 0=Performs both tasks correctly   2.  Best Gaze: 0=normal   3.  Visual: 0=No visual loss   4. Facial Palsy: 0=Normal symmetric movement   5a.  Motor left arm: 0=No drift, limb holds 90 (or 45) degrees for full 10 seconds   5b.  Motor right arm: 0=No drift, limb holds 90 (or 45) degrees for full 10 seconds   6a. motor left le=No drift, limb holds 90 (or 45) degrees for full 10 seconds   6b  Motor right le=No drift, limb holds 90 (or 45) degrees for full 10 seconds   7. Limb Ataxia: 1=Present in one limb   8.  Sensory: 0=Normal; no sensory loss   9. Best Language:  0=No aphasia, normal   10. Dysarthria: 0=Normal   11. Extinction and Inattention: 0=No abnormality    Total:   1       Results Reviewed:  I have personally reviewed current lab, radiology, and data and agree with results.  Lab Results (last 24 hours)     Procedure Component Value Units Date/Time    [place] Draw [[id number]] Collected:  20    Specimen:  Blood Updated:  20    Narrative:       The following orders were created for panel order [place] Draw.  Procedure                               Abnormality         Status                     ---------                               -----------         ------                     Light Blue Top[[id number]]                                   Final result               Green Top (Gel)[[id number]]                                  Final result               Lavender Top[[id number]]                                     Final result               Gold Top - SST[[id number]]                                   Final result                 Please view results for these tests on the individual orders.    Light Blue Top [[id number]] Collected:  20    Specimen:  Blood Updated:  20     Extra Tube hold for add-on      Comment: Auto resulted       Green Top (Gel) [[id number]] Collected:  09/06/20 2221    Specimen:  Blood Updated:  09/06/20 2330     Extra Tube Hold for add-ons.     Comment: Auto resulted.       Lavender Top [[id number]] Collected:  09/06/20 2221    Specimen:  Blood Updated:  09/06/20 2330     Extra Tube hold for add-on     Comment: Auto resulted       Gold Top - SST [[id number]] Collected:  09/06/20 2221    Specimen:  Blood Updated:  09/06/20 2330     Extra Tube Hold for add-ons.     Comment: Auto resulted.       Comprehensive Metabolic Panel [[id number]]  (Abnormal) Collected:  09/06/20 2221    Specimen:  Blood Updated:  09/06/20 2309     Glucose 137 mg/dL      BUN 17 mg/dL      Creatinine 0.92 mg/dL      Sodium 139 mmol/L      Potassium 4.1 mmol/L      Chloride 103 mmol/L      CO2 27.0 mmol/L      Calcium 9.0 mg/dL      Total Protein 7.0 g/dL      Albumin 3.70 g/dL      ALT (SGPT) 11 U/L      AST (SGOT) 15 U/L      Alkaline Phosphatase 124 U/L      Total Bilirubin 0.3 mg/dL      eGFR Non African Amer 61 mL/min/1.73      Globulin 3.3 gm/dL      A/G Ratio 1.1 g/dL      BUN/Creatinine Ratio 18.5     Anion Gap 9.0 mmol/L     Narrative:       GFR Normal >60  Chronic Kidney Disease <60  Kidney Failure <15      Troponin [[id number]]  (Normal) Collected:  09/06/20 2221    Specimen:  Blood Updated:  09/06/20 2309     Troponin T <0.010 ng/mL     Narrative:       Troponin T Reference Range:  <= 0.03 ng/mL-   Negative for AMI  >0.03 ng/mL-     Abnormal for myocardial necrosis.  Clinicians would have to utilize clinical acumen, EKG, Troponin and serial changes to determine if it is an Acute Myocardial Infarction or myocardial injury due to an underlying chronic condition.       Results may be falsely decreased if patient taking Biotin.      Magnesium [[id number]]  (Normal) Collected:  09/06/20 2221    Specimen:  Blood Updated:  09/06/20 2309     Magnesium 1.8 mg/dL     CBC & Differential [[id number]] Collected:  09/06/20 2221     Specimen:  Blood Updated:  09/06/20 2303    Narrative:       The following orders were created for panel order CBC & Differential.  Procedure                               Abnormality         Status                     ---------                               -----------         ------                     CBC Auto Differential[[id number]]        Abnormal            Final result                 Please view results for these tests on the individual orders.    CBC Auto Differential [[id number]]  (Abnormal) Collected:  09/06/20 2221    Specimen:  Blood Updated:  09/06/20 2303     WBC 6.98 10*3/mm3      RBC 4.59 10*6/mm3      Hemoglobin 12.8 g/dL      Hematocrit 40.8 %      MCV 88.9 fL      MCH 27.9 pg      MCHC 31.4 g/dL      RDW 14.5 %      RDW-SD 47.0 fl      MPV 11.5 fL      Platelets 163 10*3/mm3      Neutrophil % 68.4 %      Lymphocyte % 20.2 %      Monocyte % 6.6 %      Eosinophil % 4.4 %      Basophil % 0.1 %      Immature Grans % 0.3 %      Neutrophils, Absolute 4.77 10*3/mm3      Lymphocytes, Absolute 1.41 10*3/mm3      Monocytes, Absolute 0.46 10*3/mm3      Eosinophils, Absolute 0.31 10*3/mm3      Basophils, Absolute 0.01 10*3/mm3      Immature Grans, Absolute 0.02 10*3/mm3      nRBC 0.0 /100 WBC         Imaging Results (Last 24 Hours)     Procedure Component Value Units Date/Time    MRI Brain Without Contrast [[id number]] Resulted:  09/06/20 2337     Updated:  09/07/20 0009    XR Chest 1 View [[id number]] Resulted:  09/06/20 2304     Updated:  09/07/20 0003    CT Angiogram Head [[id number]] Collected:  09/06/20 2316     Updated:  09/06/20 2318    Narrative:       CTA Head, CTA Neck    INDICATION:   Vertigo today.    TECHNIQUE:   CT angiogram of the head and neck with and without IV contrast. 3-D reconstructions were obtained and reviewed.  Evaluation for a significant carotid arterial stenosis is based on the NASCET criteria. Radiation dose reduction techniques included  automated exposure control or exposure  modulation based on body size. Count of known CT and cardiac nuc med studies performed in previous 12 months: 0.     COMPARISON:   Head CT same day    FINDINGS:   CTA neck:  Both vertebral arteries are widely patent and codominant. No plaque is identified at the carotid bifurcations. There is no carotid stenosis on either side of the neck. There is no carotid or vertebral arterial dissection.    CTA head:  Vertebrobasilar system is normal. There is some mild plaque in the carotid siphons, but no stenosis is identified. Anterior, middle, and posterior cerebral arteries appear widely patent. No flow-limiting stenosis or vessel cut off is seen. No  intracranial aneurysm is identified. Major dural venous sinuses are patent.      Impression:       Negative CT angiogram of the head and neck.    Signer Name: Ramy Wu MD   Signed: 9/6/2020 11:16 PM   Workstation Name: [email or web address]    [facility]    CT Angiogram Neck [[id number]] Collected:  09/06/20 2316     Updated:  09/06/20 2318    Narrative:       CTA Head, CTA Neck    INDICATION:   Vertigo today.    TECHNIQUE:   CT angiogram of the head and neck with and without IV contrast. 3-D reconstructions were obtained and reviewed.  Evaluation for a significant carotid arterial stenosis is based on the NASCET criteria. Radiation dose reduction techniques included  automated exposure control or exposure modulation based on body size. Count of known CT and cardiac nuc med studies performed in previous 12 months: 0.     COMPARISON:   Head CT same day    FINDINGS:   CTA neck:  Both vertebral arteries are widely patent and codominant. No plaque is identified at the carotid bifurcations. There is no carotid stenosis on either side of the neck. There is no carotid or vertebral arterial dissection.    CTA head:  Vertebrobasilar system is normal. There is some mild plaque in the carotid siphons, but no stenosis is identified. Anterior, middle, and posterior  cerebral arteries appear widely patent. No flow-limiting stenosis or vessel cut off is seen. No  intracranial aneurysm is identified. Major dural venous sinuses are patent.      Impression:       Negative CT angiogram of the head and neck.    Signer Name: Ramy Wu MD   Signed: 9/6/2020 11:16 PM   Workstation Name: [facility]    CT Cerebral Perfusion With & Without Contrast [[id number]] Collected:  09/06/20 2306     Updated:  09/06/20 2308    Narrative:       CT CEREBRAL PERFUSION W WO CONTRAST    HISTORY:   Dizziness today.    TECHNIQUE:   Axial CT images of the brain without and with intravenous contrast using cerebral perfusion protocol. Post-processing parametric maps were created using RAPID software and reviewed. Radiation dose reduction techniques included automated exposure control  or exposure modulation based on body size. CT and nuclear cardiology exams in the last 12 months: 0.    COMPARISON:   Head CT same day    FINDINGS:   Arterial input function is optimal.     Perfusion maps are symmetric without evidence of cerebral ischemia or core infarction.      Impression:       Normal brain perfusion CT.      Signer Name: Ramy Wu MD   Signed: 9/6/2020 11:06 PM   Workstation Name: [facility]    CT Head Without Contrast [[id number]] Collected:  09/06/20 2303     Updated:  09/06/20 2305    Narrative:       CT Head WO    HISTORY:   Dizziness and vertigo. Now mostly resolved.    TECHNIQUE:   Axial unenhanced head CT. Radiation dose reduction techniques included automated exposure control or exposure modulation based on body size. Count of known CT and cardiac nuc med studies performed in previous 12 months: 0.     Time of scan: 2243 hours    COMPARISON:   None.    FINDINGS:   No intracranial hemorrhage, mass, or infarct. No hydrocephalus or extra-axial fluid collection. Brain parenchymal density is normal. The skull base,  calvarium, and extracranial soft tissues are normal. Sphenoid sinus mucous retention cyst.      Impression:       Normal, negative unenhanced head CT.          Signer Name: JOSE MANUEL Beard MD   Signed: 9/6/2020 11:03 PM   Workstation Name: [facility]    [facility]        Results for orders placed during the hospital encounter of 08/08/17   Adult Transthoracic Echo Complete    Narrative · Left atrial cavity size is mildly dilated.  · Mild mitral valve regurgitation is present.  · Mild tricuspid valve regurgitation is present.  · Left ventricular systolic function is normal. Estimated EF = 70%.  · Left ventricular diastolic dysfunction (grade I) consistent with   impaired relaxation.  · There is no evidence of pericardial effusion.  · No evidence of pulmonary hypertension is present.  · Normal right ventricular cavity size, wall thickness, systolic function   and septal motion noted.  · The aortic valve exhibits sclerosis.          Assessment/Plan:     Mrs Elaine is a 69 year old white, left handed female with known diagnosis of HTN, HLD and essential tremors.  States at approximately 8:30pm she had sudden onset of feeling like the room was spinning with associated nausea and vomiting. Came to ER for further evaluation, symptoms have now resolved.         1. Vertigo, Ataxia:  CT head neg. CTP neg for LVO. CTA head/neck unremarkable.  Recommend check MRI. If positive for acute infarct, admit to hospitalist and initiate stroke order set. If negative, further workup and disposition per Dr Beard.           Nai Guillen, APRN  September 7, 2020  12:12 AM

## 2020-10-14 PROBLEM — R94.31 ABNORMAL EKG: Status: ACTIVE | Noted: 2020-10-14

## 2020-10-14 NOTE — PROGRESS NOTES
[facility] at [facility] - Office Note  Imelda Elaine         [address] [facility] [address]  1950   There are no phone numbers on file.     LOCATION:  [place] office.  Visit Type: Consult.    PCP:  Comfort Colunga MD    10/15/20   Imelda Elaine is a 69 y.o.  female  retired.      Chief Complaint: Abnormal EKG    PROBLEM LIST/PMHx:  1.  Abnormal EKG   A.  RBBB  2.  Essential Hypertension  3.  Dyslipidemia  4.  Palpitations   A.  Echocardiogram 8/8/17: EF 70%, grade 1 diastolic dysfunction, mild MR, mild TR, aortic valve sclerosis noted without aortic regurgitation or stenosis   B.  30 day event monitor 7/27/17.  Patient only wore for 10 days.  No manual detected events.  Sinus rhythm and sinus arrhythmia noted with one PVC.  5.  Obesity  6.  Osteoarthritis  7. Vertigo, 9/2020  8.  Dyspnea        Allergies   Allergen Reactions   • Pneumococcal Vaccines Swelling and Rash     Swelling on entire arm    • Other Swelling and Rash     Shingles vaccine  Swelling of injection site of arm,    • Tuberculin Tests Swelling and Rash     Pt has chest xrays to check          Current Outpatient Medications:   •  aspirin 81 MG EC tablet, Take 81 mg by mouth Every Other Day., Disp: , Rfl:   •  atorvastatin (LIPITOR) 10 MG tablet, Take 10 mg by mouth Every Night., Disp: , Rfl:   •  celecoxib (CeleBREX) 200 MG capsule, Take 1 capsule by mouth Daily. Must take an hour after aspirin if needed., Disp: , Rfl:   •  cetirizine (zyrTEC) 10 MG tablet, Take 10 mg by mouth Daily., Disp: , Rfl:   •  docusate sodium 100 MG capsule, Take 100 mg by mouth 2 (Two) Times a Day As Needed for Constipation., Disp: 60 capsule, Rfl: 0  •  estradiol (ESTRACE) 0.5 MG tablet, Take 1 tablet by mouth Daily., Disp: 90 tablet, Rfl: 3  •  ipratropium (ATROVENT) 0.06 % nasal spray, 1 spray into the nostril(s) as directed by provider As Needed., Disp: , Rfl:   •  lansoprazole (PREVACID) 30 MG capsule, Take 30 mg by  "mouth daily., Disp: , Rfl:   •  losartan (COZAAR) 100 MG tablet, Take 100 mg by mouth Daily., Disp: , Rfl:   •  metoprolol succinate XL (TOPROL-XL) 25 MG 24 hr tablet, Take 1 tablet by mouth Daily., Disp: 30 tablet, Rfl: 3  •  venlafaxine XR (EFFEXOR-XR) 75 MG 24 hr capsule, Take 75 mg by mouth Daily., Disp: , Rfl:     HPI  Imelda Elaine is a 70 yo  CF who presents to clinic today for consultation of dyspnea and abnormal EKG, referred by her PCP.  She has a PMHx of obesity, HTN, HLP, palpitations.  3 years ago, she was seen in the Heart/Valve center for tachy palpitations.  Monitor at that time showed occasional PVC but no arrhythmia or pauses.  Echo performed at that time was essentially normal as well.  Since then, she has continued to gain weight and now weighs 355#.  She denies chest pain or anginal type symptoms.  She does, however, complain of dyspnea both at rest and with activity that has progressed over the last year.  She has been tested for and does not have sleep apnea.  She denies orthopnea or PND but admits to sleeping in a recliner for comfort reasons. She also continues to have palpitations but they are different in nature.  Previously, she would feel irregular beats and fluttering in her chest area.  Now, the fluttering and \"hard beats\" are felt on the right side of her neck.  These episodes occur 2-3x/week and are not associated with any pre syncope or dizziness.  It's a sudden onset of heart racing and beating hard.          The following portions of the patient's history were reviewed in the chart and updated as appropriate: allergies, current medications, past family history, past medical history, past social history, past surgical history and problem list.    Review of Systems   Constitution: Positive for weight gain. Negative for fever.   Eyes: Negative for blurred vision.   Cardiovascular: Positive for dyspnea on exertion and palpitations. Negative for chest pain and leg swelling. " "  Respiratory: Positive for shortness of breath. Negative for cough, sleep disturbances due to breathing, snoring and wheezing.    Musculoskeletal: Positive for arthritis. Negative for falls.   Neurological: Negative for dizziness, headaches, light-headedness and weakness.   All other systems reviewed and are negative.            height is 157.5 cm (62\") and weight is 161 kg (355 lb) (abnormal). Her temperature is 97.8 °F (36.6 °C). Her blood pressure is 150/98 and her pulse is 81. Her oxygen saturation is 96%.   Constitutional:       Appearance: Normal appearance. Well-developed.   Eyes:      General: Lids are normal.      Conjunctiva/sclera: Conjunctivae normal.      Right eye: Right conjunctiva is not injected.      Left eye: Left conjunctiva is not injected.      Pupils: Pupils are equal, round, and reactive to light.   HENT:      Head: Normocephalic and atraumatic.      Right Ear: Hearing and external ear normal.      Left Ear: Hearing and external ear normal.      Nose: Nose normal. No septal deviation.   Neck:      Musculoskeletal: Normal range of motion. No edema or erythema.      Thyroid: No thyroid mass or thyromegaly.      Vascular: No carotid bruit or JVD.   Pulmonary:      Effort: Pulmonary effort is normal. No respiratory distress.      Breath sounds: Normal breath sounds. No decreased breath sounds. No wheezing. No rhonchi. No rales.   Cardiovascular:      Normal rate. Regular rhythm.   Abdominal:      General: Bowel sounds are normal. There is no abdominal bruit.      Palpations: Abdomen is soft. There is no abdominal mass.      Tenderness: There is no abdominal tenderness.   Musculoskeletal: Normal range of motion.      Extremities: No clubbing present.  Skin:     General: Skin is warm and dry. There is no cyanosis.   Neurological:      Mental Status: Alert.   Psychiatric:         Speech: Speech normal.             ECG 12 Lead    Date/Time: 10/15/2020 11:30 AM  Performed by: Florinda Nelson" PA  Authorized by: Florinda Nelson PA   Comparison: compared with previous ECG from 9/6/2020  Similar to previous ECG  Rhythm: sinus rhythm  Rate: normal  Conduction: right bundle branch block  QRS axis: normal    Clinical impression: abnormal EKG             Assessment/ Plan   1. Abnormal EKG (Primary):  RBBB noted on EKG without other abnormalities.  She has rest and exertional dyspnea which may very likely be related to her weight and inactivity.  With her dyspnea and EKG, will proceed with echo to assess EF/valvular structure.  Will have her RTC 6 weeks for further review.  She has been tested for and ruled out for VINNIE.  Will further discuss activity at time of follow up.     2. Essential hypertension:  Fairly controlled.  Have asked patient to keep BP log at home.  At this time, will increase Toprol XL to 50mg daily.  This will help with BP and palpitations.  Re evaluate at time of follow up.    3. Mixed hyperlipidemia:  Per PCP.  Continue Lipitor and appropriate diet.          Florinda Nelson PA-C functioning independently.  10/15/2020 10:48 EDT

## 2020-10-15 ENCOUNTER — CONSULT (OUTPATIENT)
Dept: CARDIOLOGY | Facility: CLINIC | Age: 70
End: 2020-10-15

## 2020-10-15 VITALS
WEIGHT: 293 LBS | HEART RATE: 81 BPM | SYSTOLIC BLOOD PRESSURE: 150 MMHG | DIASTOLIC BLOOD PRESSURE: 98 MMHG | TEMPERATURE: 97.8 F | OXYGEN SATURATION: 96 % | BODY MASS INDEX: 53.92 KG/M2 | HEIGHT: 62 IN

## 2020-10-15 DIAGNOSIS — R94.31 ABNORMAL EKG: Primary | ICD-10-CM

## 2020-10-15 DIAGNOSIS — R06.00 DYSPNEA, UNSPECIFIED TYPE: ICD-10-CM

## 2020-10-15 DIAGNOSIS — E78.2 MIXED HYPERLIPIDEMIA: ICD-10-CM

## 2020-10-15 DIAGNOSIS — I10 ESSENTIAL HYPERTENSION: ICD-10-CM

## 2020-10-15 PROCEDURE — 93000 ELECTROCARDIOGRAM COMPLETE: CPT | Performed by: PHYSICIAN ASSISTANT

## 2020-10-15 PROCEDURE — 99204 OFFICE O/P NEW MOD 45 MIN: CPT | Performed by: PHYSICIAN ASSISTANT

## 2020-10-15 RX ORDER — METOPROLOL SUCCINATE 50 MG/1
50 TABLET, EXTENDED RELEASE ORAL DAILY
Qty: 30 TABLET | Refills: 11 | Status: SHIPPED | OUTPATIENT
Start: 2020-10-15 | End: 2021-09-20 | Stop reason: SDUPTHER

## 2020-10-15 RX ORDER — CETIRIZINE HYDROCHLORIDE 10 MG/1
10 TABLET ORAL DAILY
COMMUNITY

## 2020-10-15 RX ORDER — LOSARTAN POTASSIUM 100 MG/1
100 TABLET ORAL DAILY
COMMUNITY

## 2020-11-12 ENCOUNTER — HOSPITAL ENCOUNTER (OUTPATIENT)
Dept: CARDIOLOGY | Facility: HOSPITAL | Age: 70
Discharge: HOME | End: 2020-11-12
Admitting: PHYSICIAN ASSISTANT

## 2020-11-12 VITALS — WEIGHT: 293 LBS | BODY MASS INDEX: 53.92 KG/M2 | HEIGHT: 62 IN

## 2020-11-12 LAB
BH CV ECHO MEAS - AO MAX PG (FULL): 2 MMHG
BH CV ECHO MEAS - AO MAX PG: 8.6 MMHG
BH CV ECHO MEAS - AO MEAN PG (FULL): 0 MMHG
BH CV ECHO MEAS - AO MEAN PG: 4 MMHG
BH CV ECHO MEAS - AO ROOT AREA (BSA CORRECTED): 1.3
BH CV ECHO MEAS - AO ROOT AREA: 8 CM^2
BH CV ECHO MEAS - AO ROOT DIAM: 3.2 CM
BH CV ECHO MEAS - AO V2 MAX: 147 CM/SEC
BH CV ECHO MEAS - AO V2 MEAN: 94 CM/SEC
BH CV ECHO MEAS - AO V2 VTI: 34.7 CM
BH CV ECHO MEAS - AVA(I,A): 2.5 CM^2
BH CV ECHO MEAS - AVA(I,D): 2.5 CM^2
BH CV ECHO MEAS - AVA(V,A): 2.5 CM^2
BH CV ECHO MEAS - AVA(V,D): 2.5 CM^2
BH CV ECHO MEAS - BSA(HAYCOCK): 2.8 M^2
BH CV ECHO MEAS - BSA: 2.4 M^2
BH CV ECHO MEAS - BZI_BMI: 64.9 KILOGRAMS/M^2
BH CV ECHO MEAS - BZI_METRIC_HEIGHT: 157.5 CM
BH CV ECHO MEAS - BZI_METRIC_WEIGHT: 161 KG
BH CV ECHO MEAS - EDV(CUBED): 154.9 ML
BH CV ECHO MEAS - EDV(MOD-SP2): 68 ML
BH CV ECHO MEAS - EDV(MOD-SP4): 82 ML
BH CV ECHO MEAS - EDV(TEICH): 139.5 ML
BH CV ECHO MEAS - EF(CUBED): 78.6 %
BH CV ECHO MEAS - EF(MOD-BP): 63 %
BH CV ECHO MEAS - EF(MOD-SP2): 60.3 %
BH CV ECHO MEAS - EF(MOD-SP4): 65.9 %
BH CV ECHO MEAS - EF(TEICH): 70.4 %
BH CV ECHO MEAS - ESV(CUBED): 33.1 ML
BH CV ECHO MEAS - ESV(MOD-SP2): 27 ML
BH CV ECHO MEAS - ESV(MOD-SP4): 28 ML
BH CV ECHO MEAS - ESV(TEICH): 41.3 ML
BH CV ECHO MEAS - FS: 40.2 %
BH CV ECHO MEAS - IVS/LVPW: 0.92
BH CV ECHO MEAS - IVSD: 0.85 CM
BH CV ECHO MEAS - LA DIMENSION: 4 CM
BH CV ECHO MEAS - LA/AO: 1.3
BH CV ECHO MEAS - LAD MAJOR: 6.2 CM
BH CV ECHO MEAS - LAT PEAK E' VEL: 10.5 CM/SEC
BH CV ECHO MEAS - LATERAL E/E' RATIO: 8.6
BH CV ECHO MEAS - LV DIASTOLIC VOL/BSA (35-75): 33.6 ML/M^2
BH CV ECHO MEAS - LV IVRT: 0.09 SEC
BH CV ECHO MEAS - LV MASS(C)D: 175.9 GRAMS
BH CV ECHO MEAS - LV MASS(C)DI: 72.1 GRAMS/M^2
BH CV ECHO MEAS - LV MAX PG: 6.7 MMHG
BH CV ECHO MEAS - LV MEAN PG: 4 MMHG
BH CV ECHO MEAS - LV SYSTOLIC VOL/BSA (12-30): 11.5 ML/M^2
BH CV ECHO MEAS - LV V1 MAX: 129 CM/SEC
BH CV ECHO MEAS - LV V1 MEAN: 87.2 CM/SEC
BH CV ECHO MEAS - LV V1 VTI: 30.9 CM
BH CV ECHO MEAS - LVIDD: 5.4 CM
BH CV ECHO MEAS - LVIDS: 3.2 CM
BH CV ECHO MEAS - LVLD AP2: 8.2 CM
BH CV ECHO MEAS - LVLD AP4: 8.5 CM
BH CV ECHO MEAS - LVLS AP2: 6.6 CM
BH CV ECHO MEAS - LVLS AP4: 7.1 CM
BH CV ECHO MEAS - LVOT AREA (M): 2.8 CM^2
BH CV ECHO MEAS - LVOT AREA: 2.8 CM^2
BH CV ECHO MEAS - LVOT DIAM: 1.9 CM
BH CV ECHO MEAS - LVPWD: 0.93 CM
BH CV ECHO MEAS - MED PEAK E' VEL: 8.5 CM/SEC
BH CV ECHO MEAS - MEDIAL E/E' RATIO: 10.6
BH CV ECHO MEAS - MV A MAX VEL: 62.7 CM/SEC
BH CV ECHO MEAS - MV DEC SLOPE: 303 CM/SEC^2
BH CV ECHO MEAS - MV DEC TIME: 0.3 SEC
BH CV ECHO MEAS - MV E MAX VEL: 90.5 CM/SEC
BH CV ECHO MEAS - MV E/A: 1.4
BH CV ECHO MEAS - MV MAX PG: 4.2 MMHG
BH CV ECHO MEAS - MV MEAN PG: 2 MMHG
BH CV ECHO MEAS - MV P1/2T MAX VEL: 102 CM/SEC
BH CV ECHO MEAS - MV P1/2T: 98.6 MSEC
BH CV ECHO MEAS - MV V2 MAX: 103 CM/SEC
BH CV ECHO MEAS - MV V2 MEAN: 62.9 CM/SEC
BH CV ECHO MEAS - MV V2 VTI: 32.7 CM
BH CV ECHO MEAS - MVA P1/2T LCG: 2.2 CM^2
BH CV ECHO MEAS - MVA(P1/2T): 2.2 CM^2
BH CV ECHO MEAS - MVA(VTI): 2.7 CM^2
BH CV ECHO MEAS - PA ACC SLOPE: 148 CM/SEC^2
BH CV ECHO MEAS - PA ACC TIME: 0.19 SEC
BH CV ECHO MEAS - PA MAX PG: 4.4 MMHG
BH CV ECHO MEAS - PA PR(ACCEL): -8.3 MMHG
BH CV ECHO MEAS - PA V2 MAX: 105 CM/SEC
BH CV ECHO MEAS - RAP SYSTOLE: 3 MMHG
BH CV ECHO MEAS - RVSP: 33 MMHG
BH CV ECHO MEAS - SI(AO): 114.4 ML/M^2
BH CV ECHO MEAS - SI(CUBED): 49.9 ML/M^2
BH CV ECHO MEAS - SI(LVOT): 35.9 ML/M^2
BH CV ECHO MEAS - SI(MOD-SP2): 16.8 ML/M^2
BH CV ECHO MEAS - SI(MOD-SP4): 22.1 ML/M^2
BH CV ECHO MEAS - SI(TEICH): 40.3 ML/M^2
BH CV ECHO MEAS - SV(AO): 279.1 ML
BH CV ECHO MEAS - SV(CUBED): 121.8 ML
BH CV ECHO MEAS - SV(LVOT): 87.6 ML
BH CV ECHO MEAS - SV(MOD-SP2): 41 ML
BH CV ECHO MEAS - SV(MOD-SP4): 54 ML
BH CV ECHO MEAS - SV(TEICH): 98.2 ML
BH CV ECHO MEAS - TAPSE (>1.6): 1.8 CM
BH CV ECHO MEAS - TR MAX PG: 30 MMHG
BH CV ECHO MEAS - TR MAX VEL: 274 CM/SEC
BH CV ECHO MEASUREMENTS AVERAGE E/E' RATIO: 9.53
BH CV VAS BP LEFT ARM: NORMAL MMHG
BH CV XLRA - RV BASE: 3.5 CM
BH CV XLRA - RV LENGTH: 6.8 CM
BH CV XLRA - RV MID: 3.6 CM
BH CV XLRA - TDI S': 7.37 CM/SEC
IVRT: 87 MSEC
LEFT ATRIUM VOLUME INDEX: 21.3 ML/M^2
LEFT ATRIUM VOLUME: 52 ML

## 2020-11-12 PROCEDURE — 93306 TTE W/DOPPLER COMPLETE: CPT

## 2020-11-12 PROCEDURE — 93306 TTE W/DOPPLER COMPLETE: CPT | Performed by: INTERNAL MEDICINE

## 2020-12-03 ENCOUNTER — OFFICE VISIT (OUTPATIENT)
Dept: CARDIOLOGY | Facility: CLINIC | Age: 70
End: 2020-12-03

## 2020-12-03 VITALS
BODY MASS INDEX: 53.92 KG/M2 | HEIGHT: 62 IN | TEMPERATURE: 96.9 F | DIASTOLIC BLOOD PRESSURE: 90 MMHG | WEIGHT: 293 LBS | OXYGEN SATURATION: 97 % | SYSTOLIC BLOOD PRESSURE: 144 MMHG | HEART RATE: 83 BPM

## 2020-12-03 DIAGNOSIS — R06.02 SHORTNESS OF BREATH: Primary | ICD-10-CM

## 2020-12-03 DIAGNOSIS — R00.2 PALPITATIONS: ICD-10-CM

## 2020-12-03 DIAGNOSIS — I10 ESSENTIAL HYPERTENSION: ICD-10-CM

## 2020-12-03 PROCEDURE — 99214 OFFICE O/P EST MOD 30 MIN: CPT | Performed by: PHYSICIAN ASSISTANT

## 2020-12-03 NOTE — PROGRESS NOTES
[facility] at [facility] - Office Note  Imelda Elaine         [address] [facility] [address]  1950   [phone] (home)      LOCATION:  [place] office.  Visit Type: Follow Up.    PCP:  Comfort Colunga MD    12/03/20   Imelda Elaine is a 69 y.o.  female  retired.      Chief Complaint: FU on Palpitations, abnormal EKG with cardiac risk factors, dyspnea    PROBLEM LIST/PMHx:  1.  Abnormal EKG              A.  RBBB   B.  Echo 11/12/2020 Dr. Rodriguez:  Left ventricular ejection fraction appears to be 61 - 65%. Left ventricular systolic function is normal.  Estimated right ventricular systolic pressure from tricuspid regurgitation is normal (<35 mmHg).  Left  ventricular diastolic function was normal.  No significant structural or functional valvular disease.  2.  Essential Hypertension  3.  Dyslipidemia  4.  Palpitations             A.  Echocardiogram 8/8/17: EF 70%, grade 1 diastolic dysfunction, mild MR, mild TR, aortic valve sclerosis noted without aortic regurgitation or stenosis              B.  30 day event monitor 7/27/17.  Patient only wore for 10 days.  No manual detected events.  Sinus rhythm and sinus arrhythmia noted with one PVC.  5.  Obesity  6.  Osteoarthritis  7. Vertigo, 9/2020  8.  Dyspnea   A.  Left ventricular ejection fraction appears to be 61 - 65%. Left ventricular systolic function is normal.  Estimated right ventricular systolic pressure from tricuspid regurgitation is normal (<35 mmHg).  Left ventricular diastolic function was normal.  No significant structural or functional valvular disease.         Allergies   Allergen Reactions   • Pneumococcal Vaccines Swelling and Rash     Swelling on entire arm    • Other Swelling and Rash     Shingles vaccine  Swelling of injection site of arm,    • Tuberculin Tests Swelling and Rash     Pt has chest xrays to check          Current Outpatient Medications:   •  atorvastatin (LIPITOR) 10 MG tablet, Take 10  "mg by mouth Every Night., Disp: , Rfl:   •  celecoxib (CeleBREX) 200 MG capsule, Take 1 capsule by mouth Daily. Must take an hour after aspirin if needed., Disp: , Rfl:   •  cetirizine (zyrTEC) 10 MG tablet, Take 10 mg by mouth Daily., Disp: , Rfl:   •  docusate sodium 100 MG capsule, Take 100 mg by mouth 2 (Two) Times a Day As Needed for Constipation., Disp: 60 capsule, Rfl: 0  •  estradiol (ESTRACE) 0.5 MG tablet, Take 1 tablet by mouth Daily., Disp: 90 tablet, Rfl: 3  •  ipratropium (ATROVENT) 0.06 % nasal spray, 1 spray into the nostril(s) as directed by provider As Needed., Disp: , Rfl:   •  lansoprazole (PREVACID) 30 MG capsule, Take 30 mg by mouth daily., Disp: , Rfl:   •  losartan (COZAAR) 100 MG tablet, Take 100 mg by mouth Daily., Disp: , Rfl:   •  metoprolol succinate XL (TOPROL-XL) 50 MG 24 hr tablet, Take 1 tablet by mouth Daily., Disp: 30 tablet, Rfl: 11  •  venlafaxine XR (EFFEXOR-XR) 75 MG 24 hr capsule, Take 75 mg by mouth Daily., Disp: , Rfl:     HPI  Imelda Elaine is a 68 yo CF who is here today in follow up of her palpitations, HTN, and dyspnea.  At the last visit, I ordered an echo and increase Toprol for better control of BP and palpitations.  She does state that her BP is somewhat better.  She hasn't had any episodes of palpitations but states they are not frequent.  She continues to be short winded both at rest and activity.  She denies chest pain, denies weakness.  Again, she often sleeps in a recliner but this is more for comfort than for respiratory control.  She does have LE edema and states any time her feet \"are down\" that they swell.      The following portions of the patient's history were reviewed in the chart and updated as appropriate: allergies, current medications, past family history, past medical history, past social history, past surgical history and problem list.    Review of Systems   Constitution: Positive for weight gain. Negative for malaise/fatigue.   Cardiovascular: " "Positive for leg swelling and palpitations. Negative for chest pain, orthopnea and paroxysmal nocturnal dyspnea.   Respiratory: Positive for shortness of breath and wheezing. Negative for sleep disturbances due to breathing and snoring.    Neurological: Negative for dizziness.             height is 157.5 cm (62\") and weight is 163 kg (359 lb) (abnormal). Her temperature is 96.9 °F (36.1 °C). Her blood pressure is 144/90 and her pulse is 83. Her oxygen saturation is 97%.   Vitals signs and nursing note reviewed.   Constitutional:       Appearance: Normal appearance. Well-developed.   Pulmonary:      Effort: Pulmonary effort is normal.      Breath sounds: Normal breath sounds. No wheezing. No rhonchi. No rales.   Cardiovascular:      Normal rate. Regular rhythm.   Pulses:     Intact distal pulses.   Abdominal:      General: Bowel sounds are normal.      Palpations: Abdomen is soft.   Neurological:      Mental Status: Alert.           Procedures     Assessment/ Plan   Diagnoses and all orders for this visit:    1. Shortness of breath (Primary):  Echo performed since last visit and reviewed results with the patient.  EF 61-65% with no valvular abnormalities and no diastolic dysfunction.  She has tested negative in the past for VINNIE.  We had a thorough discussion that her weight is definitely a major contributing factor.  Before placing blame of all symptoms on her weight, I would like her to be evaluated by Pulmonology to rule out any lung involvement.  If pulmonology evaluation is normal, she will need to seriously discuss weight loss measures/surgery with her PCP.    2. Palpitations:  Occur rarely and have had no symptoms with increased dose of Toprol.  Continue to monitor.    3. Essential hypertension:  Better but could still use more control.  Discussed that diuretics would likely not help with dyspnea.  They could possibly help with BP in conjunction with other medications - will consider this in follow up.  RTC 6 " months with EKG or sooner PRN.            Florinda Nelson PA-C functioning independently.  12/3/2020 10:28 EST

## 2020-12-08 ENCOUNTER — TRANSCRIBE ORDERS (OUTPATIENT)
Dept: ADMINISTRATIVE | Facility: HOSPITAL | Age: 70
End: 2020-12-08

## 2020-12-08 DIAGNOSIS — R06.02 SHORTNESS OF BREATH ON EXERTION: Primary | ICD-10-CM

## 2020-12-10 DIAGNOSIS — Z01.812 BLOOD TESTS PRIOR TO TREATMENT OR PROCEDURE: Primary | ICD-10-CM

## 2020-12-11 ENCOUNTER — LAB (OUTPATIENT)
Dept: PULMONOLOGY | Facility: CLINIC | Age: 70
End: 2020-12-11

## 2020-12-11 DIAGNOSIS — Z01.812 BLOOD TESTS PRIOR TO TREATMENT OR PROCEDURE: ICD-10-CM

## 2020-12-11 PROCEDURE — 99000 SPECIMEN HANDLING OFFICE-LAB: CPT | Performed by: INTERNAL MEDICINE

## 2020-12-11 PROCEDURE — U0004 COV-19 TEST NON-CDC HGH THRU: HCPCS | Performed by: INTERNAL MEDICINE

## 2020-12-12 LAB — SARS-COV-2 RNA RESP QL NAA+PROBE: NOT DETECTED

## 2020-12-14 ENCOUNTER — OFFICE VISIT (OUTPATIENT)
Dept: PULMONOLOGY | Facility: CLINIC | Age: 70
End: 2020-12-14

## 2020-12-14 VITALS
DIASTOLIC BLOOD PRESSURE: 90 MMHG | BODY MASS INDEX: 53.92 KG/M2 | SYSTOLIC BLOOD PRESSURE: 124 MMHG | OXYGEN SATURATION: 96 % | HEART RATE: 61 BPM | HEIGHT: 62 IN | RESPIRATION RATE: 16 BRPM | TEMPERATURE: 97.3 F | WEIGHT: 293 LBS

## 2020-12-14 DIAGNOSIS — E66.01 MORBID OBESITY (HCC): ICD-10-CM

## 2020-12-14 DIAGNOSIS — R06.02 SOB (SHORTNESS OF BREATH): Primary | ICD-10-CM

## 2020-12-14 PROBLEM — R06.83 SNORING: Status: RESOLVED | Noted: 2018-03-20 | Resolved: 2020-12-14

## 2020-12-14 PROCEDURE — 94729 DIFFUSING CAPACITY: CPT | Performed by: INTERNAL MEDICINE

## 2020-12-14 PROCEDURE — 94726 PLETHYSMOGRAPHY LUNG VOLUMES: CPT | Performed by: INTERNAL MEDICINE

## 2020-12-14 PROCEDURE — 94375 RESPIRATORY FLOW VOLUME LOOP: CPT | Performed by: INTERNAL MEDICINE

## 2020-12-14 PROCEDURE — 99204 OFFICE O/P NEW MOD 45 MIN: CPT | Performed by: INTERNAL MEDICINE

## 2020-12-14 NOTE — PROGRESS NOTES
New Patient Pulmonary Office Visit      Patient Name: Imelda Elaine    Referring Physician: Florinda Nelson PA    Chief Complaint:    Chief Complaint   Patient presents with   • Shortness of Breath       History of Present Illness: Imelda Elaine is a 69 y.o. female who is here today to establish care with Pulmonary. Patient has a past medical history significant for morbid obesity, GERD, hyperlipidemia, and hypertension. Who presents to [facility] pulmonary for evaluation of shortness of breath. She states that she has been short of breath now for roughly 5 months. She notes it is only with exertion. She denies any associated chest pain. She denies any nausea, vomiting, abdominal pain, cough, night sweats, or weight loss. She states that she used to smoke when she was younger, but quit over 30 years ago. She states that her smoking was a very remote time course. She states that she used to get bronchitis when she smoked but no longer does. Her last case of bronchitis was roughly a year ago at which point time she did get a cough that lasted for roughly 3 months but then eventually ceased. She was evaluated by cardiology prior to seeing us at which point time she had an echo which was entirely normal, cardiology did not find any other potential causes for the patient's shortness of breath.    Review of Systems:   Review of Systems   Constitutional: Negative for activity change, appetite change, chills and diaphoresis.   HENT: Negative for congestion, postnasal drip, sinus pressure and voice change.    Eyes: Negative for blurred vision.   Respiratory: Positive for shortness of breath. Negative for cough and wheezing.    Cardiovascular: Negative for chest pain.   Gastrointestinal: Negative for abdominal pain.   Musculoskeletal: Negative for myalgias.   Skin: Negative for color change and dry skin.   Allergic/Immunologic: Negative for environmental allergies.   Neurological: Negative for weakness  "and confusion.   Hematological: Negative for adenopathy.   Psychiatric/Behavioral: Negative for sleep disturbance and depressed mood.       Past Medical History:   Past Medical History:   Diagnosis Date   • Anesthesia     low bp only with foot surgery, has had surgeries since and no issue per pt report   • Arthritis    • Back pain    • Depression    • GERD (gastroesophageal reflux disease)    • High cholesterol    • Hypertension    • Knee pain     BILATERAL   • Menopausal state    • Seasonal allergies    • Wears eyeglasses    • Wears glasses    • Wears partial dentures     implants       Past Surgical History:   Past Surgical History:   Procedure Laterality Date   • ANTERIOR AND POSTERIOR VAGINAL REPAIR N/A 2017    Vaginal enterocele / anterior posterior colporrhaphy;  Jose Maria Chowdhury MD;  Location:  [facility];  Service:    • BLADDER AUGMENTATION      MESH PLACED   • COLONOSCOPY      Dr Chung - gail   • EXPLORATORY LAPAROTOMY      \"BOWEL INTO PELVIC BONE\" /[place]    • EXPLORATORY LAPAROTOMY  2004    AQUILES/ abd sacrocolpopexy/post repair   • FOOT SURGERY Left    • KNEE ARTHROSCOPY Left     meniscus repair   • LAPAROSCOPIC CHOLECYSTECTOMY     • MD TOTAL KNEE ARTHROPLASTY Left 2017    Procedure: TOTAL KNEE ARTHROPLASTY LEFT;  Surgeon: Sunny Reynoso MD;  Location:  [facility];  Service: Orthopedics   • TONSILLECTOMY     • TOTAL ABDOMINAL HYSTERECTOMY WITH SALPINGO OOPHORECTOMY Bilateral    • TUBAL ABDOMINAL LIGATION     • VENTRAL HERNIA REPAIR N/A 2016    Procedure: LAPAROSCOPIC REPAIR OF ABDOMINAL WALL HERNIA WITH MESH, CONVERTED TO OPEN ;  Surgeon: Bertha Delacruz MD;  Location:  [facility];  Service:        Family History:   Family History   Problem Relation Age of Onset   • Hypertension Mother    • Diabetes Mother    • Heart attack Mother 59         from MI   • Cancer Father    • Aneurysm Father 57   • Hepatitis Brother         Hep B   • Kidney disease Brother " 58   • Alzheimer's disease Maternal Grandmother    • Pneumonia Maternal Grandmother    • Leukemia Maternal Grandfather    • No Known Problems Paternal Grandfather    • No Known Problems Daughter    • No Known Problems Son    • Ovarian cancer Neg Hx    • Breast cancer Neg Hx        Social History:   Social History     Socioeconomic History   • Marital status:      Spouse name: Not on file   • Number of children: Not on file   • Years of education: Not on file   • Highest education level: Not on file   Tobacco Use   • Smoking status: Former Smoker     Packs/day: 1.50     Years: 15.00     Pack years: 22.50     Types: Cigarettes     Quit date: 1986     Years since quittin.5   • Smokeless tobacco: Never Used   Substance and Sexual Activity   • Alcohol use: No   • Drug use: No   • Sexual activity: Never   Social History Narrative    Patient consumes 1-2 cups high-caffeine coffee, 1 [employer] sweet tea daily.     Patient lives at home with her .        Medications:     Current Outpatient Medications:   •  atorvastatin (LIPITOR) 10 MG tablet, Take 10 mg by mouth Every Night., Disp: , Rfl:   •  celecoxib (CeleBREX) 200 MG capsule, Take 1 capsule by mouth Daily. Must take an hour after aspirin if needed., Disp: , Rfl:   •  cetirizine (zyrTEC) 10 MG tablet, Take 10 mg by mouth Daily., Disp: , Rfl:   •  docusate sodium 100 MG capsule, Take 100 mg by mouth 2 (Two) Times a Day As Needed for Constipation., Disp: 60 capsule, Rfl: 0  •  estradiol (ESTRACE) 0.5 MG tablet, Take 1 tablet by mouth Daily., Disp: 90 tablet, Rfl: 3  •  ipratropium (ATROVENT) 0.06 % nasal spray, 1 spray into the nostril(s) as directed by provider As Needed., Disp: , Rfl:   •  lansoprazole (PREVACID) 30 MG capsule, Take 30 mg by mouth daily., Disp: , Rfl:   •  losartan (COZAAR) 100 MG tablet, Take 100 mg by mouth Daily., Disp: , Rfl:   •  metoprolol succinate XL (TOPROL-XL) 50 MG 24 hr tablet, Take 1 tablet by mouth Daily., Disp: 30  "tablet, Rfl: 11  •  venlafaxine XR (EFFEXOR-XR) 75 MG 24 hr capsule, Take 75 mg by mouth Daily., Disp: , Rfl:     Allergies:   Allergies   Allergen Reactions   • Pneumococcal Vaccines Swelling and Rash     Swelling on entire arm    • Other Swelling and Rash     Shingles vaccine  Swelling of injection site of arm,    • Tuberculin Tests Swelling and Rash     Pt has chest xrays to check        Physical Exam:  Vital Signs:   Vitals:    12/14/20 0903   BP: 124/90   BP Location: Right arm   Patient Position: Sitting   Cuff Size: Adult   Pulse: 61   Resp: 16   Temp: 97.3 °F (36.3 °C)   SpO2: 96%  Comment: room air at rest   Weight: (!) 159 kg (350 lb)   Height: 157.5 cm (62\")       Physical Exam  Vitals signs and nursing note reviewed.   Constitutional:       General: She is not in acute distress.     Appearance: She is well-developed and normal weight. She is not ill-appearing or toxic-appearing.   HENT:      Head: Normocephalic and atraumatic.      Right Ear: External ear normal.      Left Ear: External ear normal.      Nose: Nose normal.      Mouth/Throat:      Mouth: Mucous membranes are moist.      Pharynx: Oropharynx is clear. No oropharyngeal exudate or posterior oropharyngeal erythema.   Eyes:      Conjunctiva/sclera: Conjunctivae normal.      Pupils: Pupils are equal, round, and reactive to light.   Neck:      Musculoskeletal: Normal range of motion and neck supple. No neck rigidity.   Cardiovascular:      Rate and Rhythm: Normal rate and regular rhythm.      Pulses: Normal pulses.      Heart sounds: Normal heart sounds. No murmur. No friction rub. No gallop.    Pulmonary:      Effort: Pulmonary effort is normal. No respiratory distress.      Breath sounds: Normal breath sounds. No wheezing, rhonchi or rales.   Abdominal:      General: Bowel sounds are normal. There is no distension.      Palpations: Abdomen is soft.      Tenderness: There is no abdominal tenderness. There is no rebound.   Musculoskeletal: Normal " range of motion.      Right lower leg: No edema.      Left lower leg: No edema.   Skin:     General: Skin is warm and dry.      Capillary Refill: Capillary refill takes less than 2 seconds.   Neurological:      General: No focal deficit present.      Mental Status: She is alert and oriented to person, place, and time.   Psychiatric:         Mood and Affect: Mood normal.         Behavior: Behavior normal.         Thought Content: Thought content normal.         Judgment: Judgment normal.         Results Review:   - I personally reviewed the pts imaging from chest x-ray 12/14/2020 shows no acute cardiopulmonary process.  - I personally reviewed the pts PFT from 2020-12-14 showed no obstruction or restriction with a normal DLCO.  - I personally reviewed the pts Echo from 11/12/2020 showed a EF of 61 to 65%, with a normal RVSP, no significant valvular disease.  No signs of diastolic dysfunction  - I personally reviewed the pts home sleep study report from 2018, which showed an MARIELY of 2.3, this is not consistent with obstructive sleep apnea.  -I reviewed the patient's records from her PCP    Assessment / Plan:   1. SOB (shortness of breath) (Primary)  2. Morbid obesity (CMS/HCC)  -It is very likely that her shortness of breath is secondary to her weight, but given the acute change over last 5 months I do think further evaluation is warranted. She has had a normal cardiac work-up at this point time. In addition to this her PFTs are within normal limits. This does not rule out asthma level, and is also did not rule out any possible underlying pulmonary disease. Even though chest x-ray does appear normal on today's evaluation. I do think further work-up should be done therefore have ordered a CT of the chest with contrast will look at the pulmonary vasculature, as well as the lymph nodes to see if we have any enlargement possible underlying sarcoidosis. In addition to this I am going to get a methacholine challenge to see  if there is any underlying asthma. If both of these are negative I did inform her that it is very likely that her shortness of breath is secondary to her weight and we would have to extensively discuss diet and exercise. Although if this is all negative as well. She might be a very viable candidate for weight loss reduction surgery.  -I did ask her that she not have the high-resolution CT scan done, as I do not think this will provide us the information that we need. Due to the fact that she already has a normal set of pulmonary function testing, and no concerns of interstitial lung disease.    Follow Up:   Return in about 6 weeks (around 1/25/2021) for CT of the chest with contrast and methacholine challenge prior to the next visit..     GURPREET Vu, DO  Pulmonary and Critical Care Medicine  Note Electronically Signed    Please note that portions of this note may have been completed with a voice recognition program. Efforts were made to edit the dictations, but occasionally words are mistranscribed.

## 2020-12-21 ENCOUNTER — HOSPITAL ENCOUNTER (OUTPATIENT)
Dept: CT IMAGING | Facility: HOSPITAL | Age: 70
Discharge: HOME | End: 2020-12-21
Admitting: INTERNAL MEDICINE

## 2020-12-21 DIAGNOSIS — R06.02 SOB (SHORTNESS OF BREATH): ICD-10-CM

## 2020-12-21 LAB — CREAT BLDA-MCNC: 0.9 MG/DL (ref 0.6–1.3)

## 2020-12-21 PROCEDURE — 82565 ASSAY OF CREATININE: CPT

## 2020-12-21 PROCEDURE — 71260 CT THORAX DX C+: CPT

## 2020-12-21 PROCEDURE — 25010000002 IOPAMIDOL 61 % SOLUTION: Performed by: INTERNAL MEDICINE

## 2020-12-21 RX ADMIN — IOPAMIDOL 75 ML: 612 INJECTION, SOLUTION INTRAVENOUS at 09:04

## 2020-12-22 DIAGNOSIS — K22.89 ESOPHAGEAL CYST: Primary | ICD-10-CM

## 2020-12-22 NOTE — PROGRESS NOTES
I called the patient to inform that I reviewed the CT scan of her chest, as far as the pulmonary vasculature as well as the parenchyma there were no significant abnormalities.  She was noted to have a cystic lesion coming off the esophagus, therefore I told her that we would get a barium swallow to further evaluate, but this is likely not affecting her underlying breathing.  She verbalized understanding of this.  She is to have a methacholine challenge prior to our next visit and hopefully we can have the barium swallow performed also before that visit.    Electronically signed by Juan F Vu DO, 12/22/20, 4:51 PM EST.

## 2021-01-11 ENCOUNTER — APPOINTMENT (OUTPATIENT)
Dept: PREADMISSION TESTING | Facility: HOSPITAL | Age: 71
End: 2021-01-11

## 2021-01-11 LAB — SARS-COV-2 RNA RESP QL NAA+PROBE: NOT DETECTED

## 2021-01-11 PROCEDURE — U0004 COV-19 TEST NON-CDC HGH THRU: HCPCS

## 2021-01-11 PROCEDURE — C9803 HOPD COVID-19 SPEC COLLECT: HCPCS

## 2021-01-13 ENCOUNTER — HOSPITAL ENCOUNTER (OUTPATIENT)
Dept: GENERAL RADIOLOGY | Facility: HOSPITAL | Age: 71
Discharge: HOME | End: 2021-01-13
Admitting: INTERNAL MEDICINE

## 2021-01-13 DIAGNOSIS — K22.89 ESOPHAGEAL CYST: ICD-10-CM

## 2021-01-13 PROCEDURE — 74220 X-RAY XM ESOPHAGUS 1CNTRST: CPT

## 2021-01-13 RX ADMIN — BARIUM SULFATE 240 ML: 960 POWDER, FOR SUSPENSION ORAL at 10:29

## 2021-01-22 ENCOUNTER — OFFICE VISIT (OUTPATIENT)
Dept: PULMONOLOGY | Facility: CLINIC | Age: 71
End: 2021-01-22

## 2021-01-22 VITALS
OXYGEN SATURATION: 94 % | SYSTOLIC BLOOD PRESSURE: 120 MMHG | TEMPERATURE: 97.9 F | DIASTOLIC BLOOD PRESSURE: 80 MMHG | BODY MASS INDEX: 53.92 KG/M2 | WEIGHT: 293 LBS | HEART RATE: 98 BPM | HEIGHT: 62 IN

## 2021-01-22 DIAGNOSIS — Z01.812 BLOOD TESTS PRIOR TO TREATMENT OR PROCEDURE: Primary | ICD-10-CM

## 2021-01-22 DIAGNOSIS — K21.9 GERD WITHOUT ESOPHAGITIS: ICD-10-CM

## 2021-01-22 DIAGNOSIS — R06.02 SOB (SHORTNESS OF BREATH): Primary | ICD-10-CM

## 2021-01-22 DIAGNOSIS — E66.01 MORBID OBESITY (HCC): ICD-10-CM

## 2021-01-22 PROCEDURE — 99214 OFFICE O/P EST MOD 30 MIN: CPT | Performed by: INTERNAL MEDICINE

## 2021-01-22 NOTE — PROGRESS NOTES
"Follow Up Office Note       Patient Name: Imelda Elaine    Referring Physician: No ref. provider found    Chief Complaint:    Chief Complaint   Patient presents with   • Shortness of Breath   • Follow-up       History of Present Illness: Imelda Elaine is a 70 y.o. female who is here today to follow-up care with Pulmonary.  Patient has a past medical history significant for morbid obesity, GERD, hyperlipidemia, and hypertension.  She presents back today for follow-up on her shortness of breath.  For further details please see my note my initial visit on 12/14/2020.  She presents back today continued having shortness of breath that is unchanged.  Mostly with exertion.  She denies any fever, chills, nausea, or vomiting.  She did have her barium swallow, which was notable for reflux and a slight amount of hernia, although she is noted by her reflux for many years and is well controlled with Prevacid.  CT scan since her last visit also was unremarkable.    Review of Systems:   Review of Systems   Constitutional: Negative for chills, fatigue and fever.   HENT: Negative for congestion and voice change.    Eyes: Negative for blurred vision.   Respiratory: Positive for shortness of breath. Negative for cough and wheezing.    Cardiovascular: Negative for chest pain.   Skin: Negative for dry skin.   Hematological: Negative for adenopathy.   Psychiatric/Behavioral: Negative for agitation and depressed mood.       The following portions of the patient's history were reviewed and updated as appropriate: allergies, current medications, past family history, past medical history, past social history, past surgical history and problem list.    Physical Exam:  Vital Signs:   Vitals:    01/22/21 1118   BP: 120/80   Pulse: 98   Temp: 97.9 °F (36.6 °C)   SpO2: 94%  Comment: resting, room air   Weight: (!) 157 kg (347 lb)   Height: 157.5 cm (62\")       Physical Exam  Vitals signs and nursing note reviewed.   Constitutional:       " General: She is not in acute distress.     Appearance: She is well-developed and normal weight. She is not ill-appearing or toxic-appearing.   HENT:      Head: Normocephalic and atraumatic.   Cardiovascular:      Rate and Rhythm: Normal rate and regular rhythm.      Pulses: Normal pulses.      Heart sounds: Normal heart sounds. No murmur. No friction rub. No gallop.    Pulmonary:      Effort: Pulmonary effort is normal. No respiratory distress.      Breath sounds: Normal breath sounds. No wheezing, rhonchi or rales.   Musculoskeletal:      Right lower leg: No edema.      Left lower leg: No edema.   Skin:     General: Skin is warm and dry.   Neurological:      Mental Status: She is alert and oriented to person, place, and time.         Results Review:   - chest x-ray 12/14/2020 shows no acute cardiopulmonary process.  - PFT from 2020-12-14 showed no obstruction or restriction with a normal DLCO.  - Echo from 11/12/2020 showed a EF of 61 to 65%, with a normal RVSP, no significant valvular disease.  No signs of diastolic dysfunction  - home sleep study report from 2018, which showed an MARIELY of 2.3, this is not consistent with obstructive sleep apnea.  -I personally viewed the patient's CT scan of the chest from 12/21/2020, which showed no acute cardiopulmonary findings, no infiltrates, masses, or nodules.  -I personally viewed the patient's report from the patient's complete esophagram, which showed moderate gastroesophageal reflux to the level of thoracic inlet, with mild esophageal dysmotility, and a small sliding hiatal hernia with a duodenal diverticulum.  -I reviewed the patient's records from her PCP    Assessment / Plan:   1. SOB (shortness of breath) (Primary)  2. Morbid obesity (CMS/HCC)  -No clear etiology with patient shortness of breath at this point time.  I do think is very likely that this is some level of deconditioning.  There is also the possibility this could be underlying asthma.  Work-up so far has  been negative as noted above in the results section.  I gone ahead and given her a sample of a Breo inhaler 100 mcg 1 puff once daily to trial for the next 4 weeks.  I have though instructed her that I want her stopping this 48 hours before methacholine challenge.  Because I am still not sure that this is underlying asthma.  I had to put her on inhaler that I do not think will ultimately provide any benefit.  She did verbalize understanding of this and agreed with the plan.  I have ordered a methacholine challenge to be performed in the next 4 weeks which I will follow up with the patient.    3.  GERD  -Symptoms are well controlled on Prevacid.  Recommend that she go ahead and continue this.  We discussed dietary and lifestyle modifications.    Level of service justified based on 30 minutes spent in patient care on this date of service including, but not limited to: preparing to see the patient, obtaining and/or reviewing history, performing medically appropriate examination, ordering tests/medicine/procedures, independently interpreting results, documenting clinical information in EHR, and counseling/education of patient/family/caregiver. (Level 4 30-39 minutes; Level 5 40-54 minutes)    Follow Up:   Return in about 4 weeks (around 2/19/2021) for After methacholine challenge has been performed.       GURPREET Vu, DO  Pulmonary and Critical Care Medicine  Note Electronically Signed    Please note that portions of this note may have been completed with a voice recognition program. Efforts were made to edit the dictations, but occasionally words are mistranscribed.

## 2021-01-25 ENCOUNTER — CLINICAL SUPPORT (OUTPATIENT)
Dept: PULMONOLOGY | Facility: CLINIC | Age: 71
End: 2021-01-25

## 2021-01-25 DIAGNOSIS — Z01.812 BLOOD TESTS PRIOR TO TREATMENT OR PROCEDURE: ICD-10-CM

## 2021-01-25 PROCEDURE — 99211 OFF/OP EST MAY X REQ PHY/QHP: CPT | Performed by: INTERNAL MEDICINE

## 2021-01-25 PROCEDURE — U0004 COV-19 TEST NON-CDC HGH THRU: HCPCS | Performed by: INTERNAL MEDICINE

## 2021-01-26 LAB — SARS-COV-2 RNA RESP QL NAA+PROBE: NOT DETECTED

## 2021-01-28 ENCOUNTER — OFFICE VISIT (OUTPATIENT)
Dept: PULMONOLOGY | Facility: CLINIC | Age: 71
End: 2021-01-28

## 2021-01-28 DIAGNOSIS — E66.01 MORBID OBESITY (HCC): Primary | ICD-10-CM

## 2021-01-28 PROCEDURE — 94070 EVALUATION OF WHEEZING: CPT | Performed by: INTERNAL MEDICINE

## 2021-01-28 PROCEDURE — 95070 INHLJ BRNCL CHALLENGE TSTG: CPT | Performed by: INTERNAL MEDICINE

## 2021-01-28 RX ORDER — ALBUTEROL SULFATE 2.5 MG/3ML
2.5 SOLUTION RESPIRATORY (INHALATION) EVERY 6 HOURS PRN
Status: DISCONTINUED | OUTPATIENT
Start: 2021-01-28 | End: 2021-06-17

## 2021-01-28 RX ADMIN — ALBUTEROL SULFATE 2.5 MG: 2.5 SOLUTION RESPIRATORY (INHALATION) at 16:35

## 2021-02-08 ENCOUNTER — OFFICE VISIT (OUTPATIENT)
Dept: OBSTETRICS AND GYNECOLOGY | Facility: CLINIC | Age: 71
End: 2021-02-08

## 2021-02-08 VITALS
BODY MASS INDEX: 51.91 KG/M2 | WEIGHT: 293 LBS | DIASTOLIC BLOOD PRESSURE: 90 MMHG | SYSTOLIC BLOOD PRESSURE: 160 MMHG | HEIGHT: 63 IN

## 2021-02-08 DIAGNOSIS — Z01.419 ENCOUNTER FOR GYNECOLOGICAL EXAMINATION WITHOUT ABNORMAL FINDING: ICD-10-CM

## 2021-02-08 DIAGNOSIS — Z12.31 ENCOUNTER FOR SCREENING MAMMOGRAM FOR MALIGNANT NEOPLASM OF BREAST: Primary | ICD-10-CM

## 2021-02-08 PROBLEM — N81.6 RECTOCELE: Status: RESOLVED | Noted: 2018-01-02 | Resolved: 2021-02-08

## 2021-02-08 PROBLEM — K46.9 ENTEROCELE: Status: RESOLVED | Noted: 2017-02-14 | Resolved: 2021-02-08

## 2021-02-08 PROCEDURE — G0101 CA SCREEN;PELVIC/BREAST EXAM: HCPCS | Performed by: OBSTETRICS & GYNECOLOGY

## 2021-02-08 RX ORDER — ESTRADIOL 0.5 MG/1
0.5 TABLET ORAL DAILY
Qty: 90 TABLET | Refills: 3 | Status: SHIPPED | OUTPATIENT
Start: 2021-02-08 | End: 2022-03-11 | Stop reason: SDUPTHER
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
## 2021-02-10 ENCOUNTER — OFFICE VISIT (OUTPATIENT)
Dept: PULMONOLOGY | Facility: CLINIC | Age: 71
End: 2021-02-10

## 2021-02-10 VITALS
TEMPERATURE: 98.4 F | OXYGEN SATURATION: 97 % | HEART RATE: 84 BPM | WEIGHT: 293 LBS | BODY MASS INDEX: 51.91 KG/M2 | DIASTOLIC BLOOD PRESSURE: 90 MMHG | HEIGHT: 63 IN | SYSTOLIC BLOOD PRESSURE: 132 MMHG

## 2021-02-10 DIAGNOSIS — K21.9 GERD WITHOUT ESOPHAGITIS: ICD-10-CM

## 2021-02-10 DIAGNOSIS — E66.01 MORBID OBESITY (HCC): ICD-10-CM

## 2021-02-10 DIAGNOSIS — R06.02 SOB (SHORTNESS OF BREATH): Primary | ICD-10-CM

## 2021-02-10 PROCEDURE — 99213 OFFICE O/P EST LOW 20 MIN: CPT | Performed by: INTERNAL MEDICINE

## 2021-02-10 NOTE — PROGRESS NOTES
"Follow Up Office Note       Patient Name: Imelda Elaine    Referring Physician: No ref. provider found    Chief Complaint:    Chief Complaint   Patient presents with   • Shortness of Breath   • Follow-up       History of Present Illness: Imelda Elaine is a 70 y.o. female who is here today to follow-up care with Pulmonary.  Patient has a past medical history significant for morbid obesity, GERD, hyperlipidemia, and hypertension.  She presents back today for follow-up on her shortness of breath.  For further details please see my note my initial visit on 12/14/2020.   She is back today.  No change in her shortness of breath.  She denies any other new complaints.    Review of Systems:   Review of Systems   Constitutional: Negative for chills, fatigue and fever.   HENT: Negative for congestion and voice change.    Eyes: Negative for blurred vision.   Respiratory: Positive for shortness of breath. Negative for cough and wheezing.    Cardiovascular: Negative for chest pain.   Skin: Negative for dry skin.   Hematological: Negative for adenopathy.   Psychiatric/Behavioral: Negative for agitation and depressed mood.       The following portions of the patient's history were reviewed and updated as appropriate: allergies, current medications, past family history, past medical history, past social history, past surgical history and problem list.    Physical Exam:  Vital Signs:   Vitals:    02/10/21 1317   BP: 132/90   Pulse: 84   Temp: 98.4 °F (36.9 °C)   SpO2: 97%  Comment: resting, room air   Weight: (!) 163 kg (359 lb 3.2 oz)   Height: 159.4 cm (62.75\")       Physical Exam  Vitals signs and nursing note reviewed.   Constitutional:       General: She is not in acute distress.     Appearance: She is well-developed. She is obese. She is not ill-appearing or toxic-appearing.   HENT:      Head: Normocephalic and atraumatic.   Cardiovascular:      Rate and Rhythm: Normal rate and regular rhythm.      Pulses: Normal pulses. "      Heart sounds: Normal heart sounds. No murmur. No friction rub. No gallop.    Pulmonary:      Effort: Pulmonary effort is normal. No respiratory distress.      Breath sounds: Normal breath sounds. No wheezing, rhonchi or rales.   Musculoskeletal:      Right lower leg: No edema.      Left lower leg: No edema.   Skin:     General: Skin is warm and dry.   Neurological:      Mental Status: She is alert and oriented to person, place, and time.         Results Review:   - chest x-ray 12/14/2020 shows no acute cardiopulmonary process.  - I personally viewed the patient's methacholine challenge from 2/8/2021, which showed a normal response to methacholine, with no signs of bronchial reactivity.  She ultimately did have a 20% drop on the last dose, but this is appropriate and expected and is not a sign of asthma.  - PFT from 2020-12-14 showed no obstruction or restriction with a normal DLCO.  - Echo from 11/12/2020 showed a EF of 61 to 65%, with a normal RVSP, no significant valvular disease.  No signs of diastolic dysfunction  - home sleep study report from 2018, which showed an MARIELY of 2.3, this is not consistent with obstructive sleep apnea.  - CT scan of the chest from 12/21/2020, which showed no acute cardiopulmonary findings, no infiltrates, masses, or nodules.  -  patient's report from the patient's complete esophagram, which showed moderate gastroesophageal reflux to the level of thoracic inlet, with mild esophageal dysmotility, and a small sliding hiatal hernia with a duodenal diverticulum.    Assessment / Plan:   1. SOB (shortness of breath) (Primary)  2. Morbid obesity (CMS/HCC)  -At this point we have had extensive testing.  None of which is showed any underlying disease.  From the CT scan, pulmonary function testing, chest x-ray, cardiac work-up, previous sleep apnea evaluation.  She has no signs of any underlying lung disease.  Her methacholine challenge was negative as well.  Given this I think the most  likely cause the patient shortness of breath is deconditioning.  I have counseled her fairly extensively on diet and exercise.  From a pulmonary standpoint she is safe to pursue an active lifestyle, I have explained to her that if she gets tired to please sit down to rest and then continue with her exercise.  She verbalized understanding of this and plans to continue to work on weight loss.  I did explain that I cannot comment on whether or not she has any cardiac issues but at least from my review of cardiology notes it does not appear that she has any significant underlying cardiac disease.  -She does not need further follow-up with pulmonary at this point time, if she has any new issues over the next couple years at the point of the please call us up would be happy to reevaluate.    3. GERD without esophagitis  -However barium swallow did show that she had some reflux, but this has been known for many years.  She continues on her Prevacid which has been the best to control her symptoms.      Follow Up:   Return if symptoms worsen or fail to improve.       GURPREET Vu, DO  Pulmonary and Critical Care Medicine  Note Electronically Signed    Please note that portions of this note may have been completed with a voice recognition program. Efforts were made to edit the dictations, but occasionally words are mistranscribed.

## 2021-02-12 ENCOUNTER — APPOINTMENT (OUTPATIENT)
Dept: MAMMOGRAPHY | Facility: HOSPITAL | Age: 71
End: 2021-02-12

## 2021-04-13 ENCOUNTER — DOCUMENTATION (OUTPATIENT)
Dept: CARDIAC REHAB | Facility: HOSPITAL | Age: 71
End: 2021-04-13

## 2021-04-13 NOTE — PROGRESS NOTES
Staff contacted pt via telephone at the request of her PCP to discuss Phase III CR as a means of conditioning due to shortness of breath. Staff discussed benefits of exercise and program protocol. Teach back verified.  Pt wishes to discuss with her  and call to schedule at her convenience.

## 2021-05-06 ENCOUNTER — APPOINTMENT (OUTPATIENT)
Dept: MAMMOGRAPHY | Facility: HOSPITAL | Age: 71
End: 2021-05-06

## 2021-06-14 ENCOUNTER — APPOINTMENT (OUTPATIENT)
Dept: MAMMOGRAPHY | Facility: HOSPITAL | Age: 71
End: 2021-06-14

## 2021-06-17 ENCOUNTER — OFFICE VISIT (OUTPATIENT)
Dept: CARDIOLOGY | Facility: CLINIC | Age: 71
End: 2021-06-17

## 2021-06-17 VITALS
DIASTOLIC BLOOD PRESSURE: 70 MMHG | HEIGHT: 63 IN | HEART RATE: 89 BPM | BODY MASS INDEX: 51.91 KG/M2 | OXYGEN SATURATION: 97 % | SYSTOLIC BLOOD PRESSURE: 118 MMHG | WEIGHT: 293 LBS

## 2021-06-17 DIAGNOSIS — E66.01 MORBID OBESITY (HCC): ICD-10-CM

## 2021-06-17 DIAGNOSIS — I10 ESSENTIAL HYPERTENSION: ICD-10-CM

## 2021-06-17 DIAGNOSIS — R00.2 PALPITATIONS: Primary | ICD-10-CM

## 2021-06-17 DIAGNOSIS — R06.02 SHORTNESS OF BREATH: ICD-10-CM

## 2021-06-17 PROBLEM — I51.7 CARDIOMEGALY: Status: ACTIVE | Noted: 2021-06-17

## 2021-06-17 PROBLEM — Z96.652 S/P TOTAL KNEE ARTHROPLASTY, LEFT: Status: ACTIVE | Noted: 2021-06-17

## 2021-06-17 PROBLEM — I73.9 PERIPHERAL VASCULAR DISEASE: Status: ACTIVE | Noted: 2021-06-17

## 2021-06-17 PROBLEM — R06.09 DOE (DYSPNEA ON EXERTION): Status: ACTIVE | Noted: 2021-06-17

## 2021-06-17 PROBLEM — I34.0 NONRHEUMATIC MITRAL (VALVE) INSUFFICIENCY: Status: ACTIVE | Noted: 2021-06-17

## 2021-06-17 PROBLEM — G25.0 TREMOR, ESSENTIAL: Status: ACTIVE | Noted: 2021-06-17

## 2021-06-17 PROBLEM — R73.09 ELEVATED GLUCOSE: Status: ACTIVE | Noted: 2021-06-17

## 2021-06-17 PROBLEM — E78.00 PURE HYPERCHOLESTEROLEMIA: Status: ACTIVE | Noted: 2021-06-17

## 2021-06-17 PROBLEM — I36.1 NON-RHEUMATIC TRICUSPID VALVE INSUFFICIENCY: Status: ACTIVE | Noted: 2021-06-17

## 2021-06-17 PROBLEM — M85.80 OSTEOPENIA: Status: ACTIVE | Noted: 2021-06-17

## 2021-06-17 PROBLEM — K21.9 GASTROESOPHAGEAL REFLUX DISEASE WITHOUT ESOPHAGITIS: Status: ACTIVE | Noted: 2021-06-17

## 2021-06-17 PROBLEM — F41.9 ANXIETY AND DEPRESSION: Status: ACTIVE | Noted: 2021-06-17

## 2021-06-17 PROBLEM — H81.10 BPV (BENIGN POSITIONAL VERTIGO): Status: ACTIVE | Noted: 2021-06-17

## 2021-06-17 PROBLEM — F32.A ANXIETY AND DEPRESSION: Status: ACTIVE | Noted: 2021-06-17

## 2021-06-17 PROBLEM — I35.8 OTHER NONRHEUMATIC AORTIC VALVE DISORDERS: Status: ACTIVE | Noted: 2021-06-17

## 2021-06-17 PROCEDURE — 99214 OFFICE O/P EST MOD 30 MIN: CPT | Performed by: PHYSICIAN ASSISTANT

## 2021-06-17 RX ORDER — HYDROCHLOROTHIAZIDE 12.5 MG/1
12.5 TABLET ORAL DAILY
COMMUNITY
Start: 2021-05-05 | End: 2022-07-06

## 2021-06-17 NOTE — PROGRESS NOTES
[facility] - Office Note  Imelda Elaine         [address] [facility] [address]  1950   [phone] (home)        Location:  [facility].  Visit Type: Follow Up.    06/17/21     PCP:  Comfort Colunga MD    Identification:  Imelda Elaine is a 70 y.o.  female  retired.      Chief Complaint:  FU on HTN, SOA, HLP    PROBLEM LIST/PMHx:  1.  Abnormal EKG              A.  RBBB          B.  Echo 11/12/2020 Dr. Rodriguez:  Left ventricular ejection fraction appears to be 61 - 65%. Left ventricular systolic function is normal.  Estimated right ventricular systolic pressure from tricuspid regurgitation is normal (<35 mmHg).  Left ventricular diastolic function was normal.  No significant structural or functional valvular disease.  2.  Essential Hypertension  3.  Dyslipidemia  4.  Palpitations             A.  Echocardiogram 8/8/17: EF 70%, grade 1 diastolic dysfunction, mild MR, mild TR, aortic valve sclerosis noted without aortic regurgitation or stenosis              B.  30 day event monitor 7/27/17.  Patient only wore for 10 days.  No manual detected events.  Sinus rhythm and sinus arrhythmia noted with one PVC.  5.  Obesity  6.  Osteoarthritis  7. Vertigo, 9/2020  8.  Dyspnea          A.  Left ventricular ejection fraction appears to be 61 - 65%. Left ventricular systolic function is normal.  Estimated right ventricular systolic pressure from tricuspid regurgitation is normal (<35 mmHg).  Left ventricular diastolic function was normal.  No significant structural or functional valvular disease.           No problems updated.    Allergies   Allergen Reactions   • Pneumococcal Vaccines Swelling and Rash     Swelling on entire arm    • Other Swelling and Rash     Shingles vaccine  Swelling of injection site of arm,    • Tuberculin Tests Swelling and Rash     Pt has chest xrays to check          Current Outpatient Medications:   •  atorvastatin (LIPITOR) 10 MG tablet,  "Take 10 mg by mouth Every Night., Disp: , Rfl:   •  celecoxib (CeleBREX) 200 MG capsule, Take 1 capsule by mouth Daily. Must take an hour after aspirin if needed., Disp: , Rfl:   •  cetirizine (zyrTEC) 10 MG tablet, Take 10 mg by mouth Daily., Disp: , Rfl:   •  docusate sodium 100 MG capsule, Take 100 mg by mouth 2 (Two) Times a Day As Needed for Constipation., Disp: 60 capsule, Rfl: 0  •  estradiol (ESTRACE) 0.5 MG tablet, Take 1 tablet by mouth Daily., Disp: 90 tablet, Rfl: 3  •  hydroCHLOROthiazide (HYDRODIURIL) 12.5 MG tablet, Take 12.5 mg by mouth Daily., Disp: , Rfl:   •  ipratropium (ATROVENT) 0.06 % nasal spray, 1 spray into the nostril(s) as directed by provider As Needed., Disp: , Rfl:   •  lansoprazole (PREVACID) 30 MG capsule, Take 30 mg by mouth daily., Disp: , Rfl:   •  losartan (COZAAR) 100 MG tablet, Take 100 mg by mouth Daily., Disp: , Rfl:   •  metoprolol succinate XL (TOPROL-XL) 50 MG 24 hr tablet, Take 1 tablet by mouth Daily., Disp: 30 tablet, Rfl: 11  •  venlafaxine XR (EFFEXOR-XR) 75 MG 24 hr capsule, Take 75 mg by mouth Daily., Disp: , Rfl:   No current facility-administered medications for this visit.    HPI  Imelda MEÑO Elaine is here today for a routine follow up on HTN, palpitations and SOA.  Since her last visit, she has undergone and extensive work up with Pulmonology which was essentially normal.  They discussed with Ms. Elaine that deconditioning is likely the cause of her dyspnea.  She has lost ~ 11# since 2/2021 and is using an incentive spirometer for \"lung exercise.\"  She states she is breathing better and has no complaints of dyspnea today.  She is compliant with medications and PCP follows with labs - she is due for a visit in the next few months.  She is now on 3 BP meds and seems to be better controlled.  At times, she has cramping in her legs at night.    Her biggest issue today is tremors of both hands.  She is starting to have difficulty using a writing utensil without " "dropping it.  She was given Rx by PCP which caused severe diarrhea - is no longer taking.      The following portions of the patient's history were reviewed in the chart and updated as appropriate: allergies, current medications, past family history, past medical history, past social history, past surgical history and problem list.    Review of Systems   Constitutional: Positive for weight loss.   Cardiovascular: Negative for chest pain, dyspnea on exertion, leg swelling and palpitations.   Respiratory: Negative for shortness of breath and wheezing.    Musculoskeletal: Positive for arthritis and stiffness. Negative for falls.   Neurological: Negative for dizziness.   All other systems reviewed and are negative.            height is 158.8 cm (62.5\") and weight is 158 kg (348 lb) (abnormal). Her blood pressure is 118/70 and her pulse is 89. Her oxygen saturation is 97%.   Vitals reviewed.   Constitutional:       Appearance: Normal appearance. Well-developed.   Pulmonary:      Effort: Pulmonary effort is normal.      Breath sounds: Normal breath sounds. No wheezing. No rhonchi. No rales.   Cardiovascular:      Normal rate. Regular rhythm.   Pulses:     Intact distal pulses.   Abdominal:      General: Bowel sounds are normal.      Palpations: Abdomen is soft.   Neurological:      Mental Status: Alert.           Procedures     Assessment/ Plan   Diagnoses and all orders for this visit:    1. Palpitations (Primary):  Currently resolved.  Continue to monitor and repeat EKG at time of follow up.    2. Essential hypertension:  Well controlled.  Continue current medical regimen.  Get labs with PCP.  I encouraged hydration and even mentioned consumption of bananas for leg cramps if labs are WNL.    3. Shortness of breath: Today, patient states she is doing well.  She is currently using an IS to help with lung exercises.  Continue this along with aerobic activity as tolerated.  Continue weight loss as well.  RTC 10 months or " sooner PRN.    4. Morbid obesity (CMS/HCC):  Body mass index is 62.64 kg/m².  She has lost 11# since her most recent visit with pulm.  Continue weight loss and aerobic activity as tolerated.          Florinda Nelson PA-C  6/17/2021 10:53 EDT

## 2021-07-19 ENCOUNTER — HOSPITAL ENCOUNTER (OUTPATIENT)
Dept: MAMMOGRAPHY | Facility: HOSPITAL | Age: 71
Discharge: HOME | End: 2021-07-19
Admitting: OBSTETRICS & GYNECOLOGY

## 2021-07-19 DIAGNOSIS — Z12.31 ENCOUNTER FOR SCREENING MAMMOGRAM FOR MALIGNANT NEOPLASM OF BREAST: ICD-10-CM

## 2021-07-19 PROCEDURE — 77067 SCR MAMMO BI INCL CAD: CPT

## 2021-07-19 PROCEDURE — 77063 BREAST TOMOSYNTHESIS BI: CPT

## 2021-07-19 PROCEDURE — 77063 BREAST TOMOSYNTHESIS BI: CPT | Performed by: RADIOLOGY

## 2021-07-19 PROCEDURE — 77067 SCR MAMMO BI INCL CAD: CPT | Performed by: RADIOLOGY

## 2021-09-20 RX ORDER — METOPROLOL SUCCINATE 50 MG/1
50 TABLET, EXTENDED RELEASE ORAL DAILY
Qty: 30 TABLET | Refills: 11 | Status: SHIPPED | OUTPATIENT
Start: 2021-09-20 | End: 2022-05-17 | Stop reason: SDUPTHER

## 2021-10-12 ENCOUNTER — LAB (OUTPATIENT)
Dept: LAB | Facility: HOSPITAL | Age: 71
End: 2021-10-12

## 2021-10-12 ENCOUNTER — OFFICE VISIT (OUTPATIENT)
Dept: NEUROLOGY | Facility: CLINIC | Age: 71
End: 2021-10-12

## 2021-10-12 VITALS
BODY MASS INDEX: 51.91 KG/M2 | HEART RATE: 89 BPM | DIASTOLIC BLOOD PRESSURE: 76 MMHG | SYSTOLIC BLOOD PRESSURE: 124 MMHG | TEMPERATURE: 97.7 F | OXYGEN SATURATION: 95 % | WEIGHT: 293 LBS | HEIGHT: 63 IN

## 2021-10-12 DIAGNOSIS — G25.0 ESSENTIAL TREMOR: Primary | ICD-10-CM

## 2021-10-12 DIAGNOSIS — G25.0 ESSENTIAL TREMOR: ICD-10-CM

## 2021-10-12 DIAGNOSIS — G25.0 TREMOR, ESSENTIAL: ICD-10-CM

## 2021-10-12 LAB — ERYTHROCYTE [SEDIMENTATION RATE] IN BLOOD: 70 MM/HR (ref 0–30)

## 2021-10-12 PROCEDURE — 85652 RBC SED RATE AUTOMATED: CPT

## 2021-10-12 PROCEDURE — 82746 ASSAY OF FOLIC ACID SERUM: CPT

## 2021-10-12 PROCEDURE — 82607 VITAMIN B-12: CPT

## 2021-10-12 PROCEDURE — 82525 ASSAY OF COPPER: CPT

## 2021-10-12 PROCEDURE — 82390 ASSAY OF CERULOPLASMIN: CPT

## 2021-10-12 PROCEDURE — 99214 OFFICE O/P EST MOD 30 MIN: CPT | Performed by: NURSE PRACTITIONER

## 2021-10-12 PROCEDURE — 84446 ASSAY OF VITAMIN E: CPT

## 2021-10-12 PROCEDURE — 36415 COLL VENOUS BLD VENIPUNCTURE: CPT

## 2021-10-12 PROCEDURE — 86063 ANTISTREPTOLYSIN O SCREEN: CPT

## 2021-10-12 RX ORDER — ZONISAMIDE 100 MG/1
100 CAPSULE ORAL NIGHTLY
Qty: 30 CAPSULE | Refills: 2 | Status: SHIPPED | OUTPATIENT
Start: 2021-10-12 | End: 2021-11-16 | Stop reason: SDUPTHER

## 2021-10-12 NOTE — PROGRESS NOTES
Subjective:     Patient ID: Imelda Elaine is a 70 y.o. female.    CC:   Chief Complaint   Patient presents with   • Tremors       HPI:   History of Present Illness   70 y.o. female presents as a new patient referred by Dr. Colunga for tremors.     Tremors present for 25-30 years located in bilateral hands. Worsening over the past 5 years.     Has difficulty with holding utensils and cups has tried weighted utensils.   Can no longer write.   Anxiety worsens tremors     +vivid dreaming, denies fighting in her sleep.     Denies hallucinations   Has had difficulty with ambulation, has had left knee replacement.     Trialed Primidone and had side effect of diarrhea    Topamax 25 mg nightly caused hair loss so she stopped    Her mother had a tremor, denies family history of parkinson's disease.     Denies weakness, numbness and tingling or bowel/bladder difficulty     Medical records reviewed:  Tremors for years, worsening over the last year.   Trial and failed TPM, Primidone, and on Metoprolol.     Labs 9/28/21: HA1C 6, CBC, CMP, TSH - NCS      MRI Brain 9/7/20: atrophy and chronic small vessel disease     The following portions of the patient's history were reviewed and updated as appropriate: allergies, current medications, past family history, past medical history, past social history, past surgical history and problem list.    Past Medical History:   Diagnosis Date   • Anesthesia 2002    low bp only with foot surgery, has had surgeries since and no issue per pt report   • Arthritis    • Back pain    • Depression    • Enterocele 2/14/2017   • GERD (gastroesophageal reflux disease)    • High cholesterol    • Hypertension    • Knee pain     BILATERAL   • Menopausal state    • Rectocele 1/2/2018   • Seasonal allergies    • Wears eyeglasses    • Wears glasses    • Wears partial dentures     implants       Past Surgical History:   Procedure Laterality Date   • ANTERIOR AND POSTERIOR VAGINAL REPAIR N/A 4/17/2017     "Vaginal enterocele / anterior posterior colporrhaphy;  Jose Maria Chowdhury MD;  Location:  [facility];  Service:    • BLADDER SUSPENSION      MESH TVT?    • COLONOSCOPY      Dr Sheldon reynolds   • EXPLORATORY LAPAROTOMY      \"BOWEL INTO PELVIC BONE\" /Mesh [place]    • EXPLORATORY LAPAROTOMY  2004    AQUILES/ abd sacrocolpopexy/post repair   • FOOT SURGERY Left    • KNEE ARTHROSCOPY Left     meniscus repair   • LAPAROSCOPIC CHOLECYSTECTOMY Right    • [place] TOTAL KNEE ARTHROPLASTY Left 2017    Procedure: TOTAL KNEE ARTHROPLASTY LEFT;  Surgeon: Sunny Reynoso MD;  Location:  [facility];  Service: Orthopedics   • TONSILLECTOMY     • TOTAL ABDOMINAL HYSTERECTOMY WITH SALPINGO OOPHORECTOMY Bilateral    • TUBAL ABDOMINAL LIGATION     • VENTRAL HERNIA REPAIR N/A 2016    Procedure: LAPAROSCOPIC REPAIR OF ABDOMINAL WALL HERNIA WITH MESH, CONVERTED TO OPEN ;  Surgeon: Bertha Delacruz MD;  Location:  [facility];  Service:        Social History     Socioeconomic History   • Marital status:    Tobacco Use   • Smoking status: Former Smoker     Packs/day: 1.50     Years: 15.00     Pack years: 22.50     Types: Cigarettes     Quit date: 1986     Years since quittin.3   • Smokeless tobacco: Never Used   Substance and Sexual Activity   • Alcohol use: No   • Drug use: No   • Sexual activity: Never       Family History   Problem Relation Age of Onset   • Hypertension Mother    • Diabetes Mother    • Heart attack Mother 59         from MI   • Cancer Father    • Aneurysm Father 57   • Hepatitis Brother         Hep B   • Kidney disease Brother 58   • Alzheimer's disease Maternal Grandmother    • Pneumonia Maternal Grandmother    • Leukemia Maternal Grandfather    • No Known Problems Paternal Grandfather    • No Known Problems Daughter    • No Known Problems Son    • Ovarian cancer Neg Hx    • Breast cancer Neg Hx         Objective:  /76   Pulse 89   Temp 97.7 °F (36.5 °C) " "(Temporal)   Ht 158.8 cm (62.52\")   Wt (!) 157 kg (346 lb 9.6 oz)   LMP  (LMP Unknown)   SpO2 95%   BMI 62.34 kg/m²     Neurologic Exam     Mental Status   Oriented to person, place, and time.   Follows 3 step commands.   Attention: normal. Concentration: normal.   Speech: speech is normal   Level of consciousness: alert  Knowledge: good and consistent with education.   Able to name object. Able to read. Able to repeat. Able to write. Normal comprehension.     Cranial Nerves     CN II   Visual fields full to confrontation.   Visual acuity: normal  Right visual field deficit: none  Left visual field deficit: none     CN III, IV, VI   Pupils are equal, round, and reactive to light.  Extraocular motions are normal.   Right pupil: Shape: regular. Reactivity: brisk. Consensual response: intact.   Left pupil: Shape: regular. Reactivity: brisk. Consensual response: intact.   Nystagmus: none   Diplopia: none  Ophthalmoparesis: none  Upgaze: normal  Downgaze: normal  Conjugate gaze: present  Vestibulo-ocular reflex: present    CN V   Facial sensation intact.   Right corneal reflex: normal  Left corneal reflex: normal    CN VII   Right facial weakness: none  Left facial weakness: none    CN VIII   Hearing: intact    CN IX, X   Palate: symmetric  Right gag reflex: normal  Left gag reflex: normal    CN XI   Right sternocleidomastoid strength: normal  Left sternocleidomastoid strength: normal    CN XII   Tongue: not atrophic  Fasciculations: absent  Tongue deviation: none    Motor Exam   Muscle bulk: normal  Overall muscle tone: normal  Right arm tone: normal  Left arm tone: normal  Right leg tone: normal  Left leg tone: normal    Strength   Strength 5/5 throughout.     Sensory Exam   Light touch normal.     Gait, Coordination, and Reflexes     Gait  Gait: (antalgic, using a cane )    Coordination   Finger to nose coordination: abnormal    Tremor   Resting tremor: present  Intention tremor: present  Action tremor: left arm " and right arm    Reflexes   Reflexes 2+ except as noted. Mild horizontal head tremor, flapping UE tremor with activity, mild UE tremor at rest.     No bradykinesia or cogwheel rigidity        Physical Exam  Vitals and nursing note reviewed.   Constitutional:       Appearance: Normal appearance.   HENT:      Head: Normocephalic and atraumatic.   Eyes:      Extraocular Movements: Extraocular movements intact and EOM normal.      Pupils: Pupils are equal, round, and reactive to light.   Cardiovascular:      Rate and Rhythm: Normal rate.   Pulmonary:      Effort: Pulmonary effort is normal.   Skin:     General: Skin is warm and dry.   Neurological:      Mental Status: She is alert and oriented to person, place, and time.      Coordination: Finger-Nose-Finger Test abnormal.      Deep Tendon Reflexes: Strength normal.   Psychiatric:         Mood and Affect: Mood normal.         Speech: Speech normal.         Assessment/Plan:       Diagnoses and all orders for this visit:    1. Essential tremor (Primary)  -     Sedimentation Rate; Future  -     Vitamin B12 & Folate; Future  -     Antistreptolysin O Screen; Future  -     Ceruloplasmin; Future  -     Vitamin E; Future  -     Copper, Serum; Future  -     zonisamide (ZONEGRAN) 100 MG capsule; Take 1 capsule by mouth Every Night.  Dispense: 30 capsule; Refill: 2    2. Tremor, essential  Assessment & Plan:  Will give trail of Zonegran 100 mg PO QHS for tremor.     Ordered labs     F/U in 4 weeks or sooner if needed              Reviewed medications, potential side effects and signs and symptoms to report. Discussed risk versus benefits of treatment plan with patient and/or family-including medications, labs and radiology that may be ordered. Addressed questions and concerns during visit. Patient and/or family verbalized understanding and agree with plan. Patient instructed to call the office with questions or concerns and report to ED with life-threatening symptoms.     AS THE  PROVIDER, I PERSONALLY WORE PPE DURING ENTIRE FACE TO FACE ENCOUNTER IN CLINIC WITH THE PATIENT. PATIENT ALSO WORE PPE DURING ENTIRE FACE TO FACE ENCOUNTER EXCEPT FOR A MAX OF 30 SECONDS DURING NEUROLOGICAL EVALUATION OF CRANIAL NERVES AND THEN MASK WAS PLACED BACK OVER PATIENT FACE FOR REMAINDER OF VISIT. I WASHED MY HANDS BEFORE AND AFTER VISIT.    During this visit the following were done:  Labs Reviewed []    Labs Ordered [x]    Radiology Reports Reviewed [x]    Radiology Ordered []    PCP Records Reviewed [x]    Referring Provider Records Reviewed []    ER Records Reviewed []    Hospital Records Reviewed []    History Obtained From Family []    Radiology Images Reviewed []    Other Reviewed []    Records Requested []      Return in about 4 weeks (around 11/9/2021).      Kelsi Rodgers, FLOR  10/12/2021

## 2021-10-12 NOTE — ASSESSMENT & PLAN NOTE
Will give trail of Zonegran 100 mg PO QHS for tremor.     Ordered labs     F/U in 4 weeks or sooner if needed

## 2021-10-13 LAB
ASO AB SERPL-ACNC: NEGATIVE [IU]/ML
CERULOPLASMIN SERPL-MCNC: 40 MG/DL (ref 19–39)
FOLATE SERPL-MCNC: 6.08 NG/ML (ref 4.78–24.2)
VIT B12 BLD-MCNC: 337 PG/ML (ref 211–946)

## 2021-10-19 LAB
A-TOCOPHEROL VIT E SERPL-MCNC: 13 MG/L (ref 9–29)
GAMMA TOCOPHEROL SERPL-MCNC: 2.5 MG/L (ref 0.5–4.9)

## 2021-10-20 LAB — COPPER SERPL-MCNC: 222 UG/DL (ref 80–158)

## 2021-10-21 DIAGNOSIS — G25.0 TREMOR, ESSENTIAL: Primary | ICD-10-CM

## 2021-10-21 DIAGNOSIS — R78.79 HIGH BLOOD COPPER LEVEL: ICD-10-CM

## 2021-10-22 ENCOUNTER — LAB (OUTPATIENT)
Dept: LAB | Facility: HOSPITAL | Age: 71
End: 2021-10-22

## 2021-10-25 ENCOUNTER — LAB (OUTPATIENT)
Dept: LAB | Facility: HOSPITAL | Age: 71
End: 2021-10-25

## 2021-10-25 DIAGNOSIS — R78.79 HIGH BLOOD COPPER LEVEL: ICD-10-CM

## 2021-10-25 DIAGNOSIS — G25.0 TREMOR, ESSENTIAL: ICD-10-CM

## 2021-10-25 PROCEDURE — 82525 ASSAY OF COPPER: CPT

## 2021-10-25 PROCEDURE — 81050 URINALYSIS VOLUME MEASURE: CPT

## 2021-10-25 PROCEDURE — 82570 ASSAY OF URINE CREATININE: CPT

## 2021-10-29 LAB
COPPER 24H UR-MRATE: 10 UG/24 HR (ref 3–35)
COPPER UR-MCNC: 9 UG/L
COPPER/CREAT UR: 14 UG/G CREAT (ref 0–49)
CREAT UR-MCNC: 0.66 G/L (ref 0.3–3)

## 2021-11-16 ENCOUNTER — OFFICE VISIT (OUTPATIENT)
Dept: NEUROLOGY | Facility: CLINIC | Age: 71
End: 2021-11-16

## 2021-11-16 VITALS
SYSTOLIC BLOOD PRESSURE: 140 MMHG | WEIGHT: 293 LBS | OXYGEN SATURATION: 99 % | TEMPERATURE: 97.3 F | HEIGHT: 63 IN | DIASTOLIC BLOOD PRESSURE: 88 MMHG | BODY MASS INDEX: 51.91 KG/M2 | HEART RATE: 88 BPM | RESPIRATION RATE: 12 BRPM

## 2021-11-16 DIAGNOSIS — K21.9 GASTROESOPHAGEAL REFLUX DISEASE WITHOUT ESOPHAGITIS: ICD-10-CM

## 2021-11-16 DIAGNOSIS — G25.0 ESSENTIAL TREMOR: Chronic | ICD-10-CM

## 2021-11-16 DIAGNOSIS — G25.0 TREMOR, ESSENTIAL: ICD-10-CM

## 2021-11-16 DIAGNOSIS — R79.0 ABNORMAL BLOOD LEVEL OF COPPER: Primary | Chronic | ICD-10-CM

## 2021-11-16 PROCEDURE — 99214 OFFICE O/P EST MOD 30 MIN: CPT | Performed by: NURSE PRACTITIONER

## 2021-11-16 RX ORDER — ZONISAMIDE 100 MG/1
300 CAPSULE ORAL NIGHTLY
Qty: 90 CAPSULE | Refills: 2 | Status: SHIPPED | OUTPATIENT
Start: 2021-11-16 | End: 2022-02-08

## 2021-11-16 NOTE — PROGRESS NOTES
Subjective:     Patient ID: Imelda Elaine is a 70 y.o. female.    CC:   Chief Complaint   Patient presents with   • Tremors     4 week follow up       HPI:   History of Present Illness   70 y.o. female returns in follow up for tremors. At last appointment on 10/12/21 started Zonegran and ordered labs.     Labs 10/12/21: B12/Folate, ASO, Vitamin E - NCS   Ceruloplasmin, and copper were elevated, copper urine was normal. Sed rate 70.      Tremors present for 25-30 years located in bilateral hands. Worsening over the past 5 years.      Has difficulty with holding utensils and cups has tried weighted utensils.   Can no longer write.   Anxiety worsens tremors      +vivid dreaming, denies fighting in her sleep.      Denies hallucinations   Has had difficulty with ambulation, has had left knee replacement.      Trialed Primidone and had side effect of diarrhea     Topamax 25 mg nightly caused hair loss so she stopped     Her mother had a tremor, denies family history of parkinson's disease.      Denies weakness, numbness and tingling or bowel/bladder difficulty      Medical records reviewed:  Tremors for years, worsening over the last year.   Trial and failed TPM, Primidone, and on Metoprolol.      Labs 9/28/21: HA1C 6, CBC, CMP, TSH - NCS       MRI Brain 9/7/20: atrophy and chronic small vessel disease      The following portions of the patient's history were reviewed and updated as appropriate: allergies, current medications, past family history, past medical history, past social history, past surgical history and problem list.    Past Medical History:   Diagnosis Date   • Anesthesia 2002    low bp only with foot surgery, has had surgeries since and no issue per pt report   • Arthritis    • Back pain    • Depression    • Enterocele 2/14/2017   • GERD (gastroesophageal reflux disease)    • High cholesterol    • Hypertension    • Knee pain     BILATERAL   • Menopausal state    • Rectocele 1/2/2018   • Seasonal allergies  "   • Wears eyeglasses    • Wears glasses    • Wears partial dentures     implants       Past Surgical History:   Procedure Laterality Date   • ANTERIOR AND POSTERIOR VAGINAL REPAIR N/A 2017    Vaginal enterocele / anterior posterior colporrhaphy;  Jose Maria Chowdhury MD;  Location:  [facility];  Service:    • BLADDER SUSPENSION      MESH TVT?    • COLONOSCOPY      Dr Sheldon reynolds   • EXPLORATORY LAPAROTOMY      \"BOWEL INTO PELVIC BONE\" /Mesh [place]    • EXPLORATORY LAPAROTOMY  2004    AQUILES/ abd sacrocolpopexy/post repair   • FOOT SURGERY Left    • KNEE ARTHROSCOPY Left     meniscus repair   • LAPAROSCOPIC CHOLECYSTECTOMY Right    • [place] TOTAL KNEE ARTHROPLASTY Left 2017    Procedure: TOTAL KNEE ARTHROPLASTY LEFT;  Surgeon: Sunny Reynoso MD;  Location:  [facility];  Service: Orthopedics   • TONSILLECTOMY     • TOTAL ABDOMINAL HYSTERECTOMY WITH SALPINGO OOPHORECTOMY Bilateral    • TUBAL ABDOMINAL LIGATION     • VENTRAL HERNIA REPAIR N/A 2016    Procedure: LAPAROSCOPIC REPAIR OF ABDOMINAL WALL HERNIA WITH MESH, CONVERTED TO OPEN ;  Surgeon: Bertha Delacruz MD;  Location:  [facility];  Service:        Social History     Socioeconomic History   • Marital status:    Tobacco Use   • Smoking status: Former Smoker     Packs/day: 1.50     Years: 30.00     Pack years: 45.00     Types: Cigarettes     Start date: 10/1/1967     Quit date: 1986     Years since quittin.4   • Smokeless tobacco: Never Used   Substance and Sexual Activity   • Alcohol use: No   • Drug use: No   • Sexual activity: Not Currently     Partners: Male     Birth control/protection: Abstinence, Condom, Surgical     Comment: Total hysterectomy       Family History   Problem Relation Age of Onset   • Hypertension Mother    • Diabetes Mother    • Heart attack Mother 59         from MI   • Cancer Father    • Aneurysm Father 57   • Hepatitis Brother         Hep B   • Kidney disease Brother 58   • " "Alzheimer's disease Maternal Grandmother    • Pneumonia Maternal Grandmother    • Leukemia Maternal Grandfather    • No Known Problems Paternal Grandfather    • No Known Problems Daughter    • No Known Problems Son    • Ovarian cancer Neg Hx    • Breast cancer Neg Hx         Objective:  /88   Pulse 88   Temp 97.3 °F (36.3 °C)   Resp 12   Ht 158.8 cm (62.52\")   Wt (!) 155 kg (342 lb 3.2 oz)   LMP  (LMP Unknown)   SpO2 99%   BMI 61.55 kg/m²     Neurologic Exam     Mental Status   Oriented to person, place, and time.   Attention: normal. Concentration: normal.   Speech: speech is normal   Level of consciousness: alert  Knowledge: good and consistent with education.   Able to name object. Able to read. Able to repeat. Able to write. Normal comprehension.     Cranial Nerves     CN II   Visual fields full to confrontation.   Visual acuity: normal  Right visual field deficit: none  Left visual field deficit: none     CN III, IV, VI   Pupils are equal, round, and reactive to light.  Extraocular motions are normal.   Right pupil: Shape: regular. Reactivity: brisk. Consensual response: intact.   Left pupil: Shape: regular. Reactivity: brisk. Consensual response: intact.   Nystagmus: none   Diplopia: none  Ophthalmoparesis: none  Upgaze: normal  Downgaze: normal  Conjugate gaze: present  Vestibulo-ocular reflex: present    CN V   Facial sensation intact.   Right corneal reflex: normal  Left corneal reflex: normal    CN VII   Right facial weakness: none  Left facial weakness: none    CN VIII   Hearing: intact    CN IX, X   Palate: symmetric  Right gag reflex: normal  Left gag reflex: normal    CN XI   Right sternocleidomastoid strength: normal  Left sternocleidomastoid strength: normal    CN XII   Tongue: not atrophic  Fasciculations: absent  Tongue deviation: none    Motor Exam   Muscle bulk: normal  Overall muscle tone: normal    Strength   Strength 5/5 throughout.     Sensory Exam   Light touch normal. "     Gait, Coordination, and Reflexes     Gait  Gait: normal    Coordination   Finger to nose coordination: abnormal    Tremor   Resting tremor: present  Intention tremor: present  Action tremor: absent    Reflexes   Reflexes 2+ except as noted.       Physical Exam  Vitals and nursing note reviewed.   Constitutional:       Appearance: Normal appearance.   HENT:      Head: Normocephalic and atraumatic.   Eyes:      Extraocular Movements: Extraocular movements intact and EOM normal.      Pupils: Pupils are equal, round, and reactive to light.   Skin:     General: Skin is warm and dry.   Neurological:      Mental Status: She is alert and oriented to person, place, and time.      Coordination: Finger-Nose-Finger Test abnormal.      Gait: Gait is intact.      Deep Tendon Reflexes: Strength normal.   Psychiatric:         Mood and Affect: Mood normal.         Speech: Speech normal.         Assessment/Plan:       Diagnoses and all orders for this visit:    1. Abnormal blood level of copper (Primary)  Assessment & Plan:  Referred to GI     Orders:  -     Cancel: Ambulatory Referral to Gastroenterology  -     Ambulatory Referral to Gastroenterology    2. Essential tremor  -     zonisamide (ZONEGRAN) 100 MG capsule; Take 3 capsules by mouth Every Night.  Dispense: 90 capsule; Refill: 2    3. Gastroesophageal reflux disease without esophagitis  -     Ambulatory Referral to Gastroenterology    4. Tremor, essential  Assessment & Plan:  Increase TPM to 200 mg PO QHS X 2 weeks then 300 mg PO QHS thereafter     If ineffective will trial Requip.              Reviewed medications, potential side effects and signs and symptoms to report. Discussed risk versus benefits of treatment plan with patient and/or family-including medications, labs and radiology that may be ordered. Addressed questions and concerns during visit. Patient and/or family verbalized understanding and agree with plan. Patient instructed to call the office with  questions or concerns and report to ED with life-threatening symptoms.     AS THE PROVIDER, I PERSONALLY WORE PPE DURING ENTIRE FACE TO FACE ENCOUNTER IN CLINIC WITH THE PATIENT. PATIENT ALSO WORE PPE DURING ENTIRE FACE TO FACE ENCOUNTER EXCEPT FOR A MAX OF 30 SECONDS DURING NEUROLOGICAL EVALUATION OF CRANIAL NERVES AND THEN MASK WAS PLACED BACK OVER PATIENT FACE FOR REMAINDER OF VISIT. I WASHED MY HANDS BEFORE AND AFTER VISIT.    During this visit the following were done:  Labs Reviewed [x]    Labs Ordered []    Radiology Reports Reviewed []    Radiology Ordered []    PCP Records Reviewed []    Referring Provider Records Reviewed []    ER Records Reviewed []    Hospital Records Reviewed []    History Obtained From Family []    Radiology Images Reviewed []    Other Reviewed []    Records Requested []      Return in about 6 weeks (around 12/28/2021).      Kelsi Rodgers, FLOR  11/16/2021

## 2021-11-16 NOTE — ASSESSMENT & PLAN NOTE
Increase TPM to 200 mg PO QHS X 2 weeks then 300 mg PO QHS thereafter     If ineffective will trial Requip.

## 2022-01-06 ENCOUNTER — OFFICE VISIT (OUTPATIENT)
Dept: NEUROLOGY | Facility: CLINIC | Age: 72
End: 2022-01-06

## 2022-01-06 VITALS
BODY MASS INDEX: 51.91 KG/M2 | TEMPERATURE: 97.1 F | SYSTOLIC BLOOD PRESSURE: 130 MMHG | DIASTOLIC BLOOD PRESSURE: 100 MMHG | WEIGHT: 293 LBS | OXYGEN SATURATION: 98 % | HEIGHT: 63 IN | HEART RATE: 78 BPM

## 2022-01-06 DIAGNOSIS — G25.0 TREMOR, ESSENTIAL: Primary | ICD-10-CM

## 2022-01-06 PROCEDURE — 99213 OFFICE O/P EST LOW 20 MIN: CPT | Performed by: NURSE PRACTITIONER

## 2022-01-06 NOTE — ASSESSMENT & PLAN NOTE
Continue Zonegran 300 mg PO QHS     If symptoms worsen may add on Requip.     F/U in 6 months or sooner if needed

## 2022-01-06 NOTE — PROGRESS NOTES
Subjective:     Patient ID: Imelda Elaine is a 71 y.o. female.    CC:   Chief Complaint   Patient presents with   • Tremors       HPI:   History of Present Illness   70 y.o. female returns in follow up for tremors. At last appointment on 11/16/21 increased ZNG to 200 mg PO QHS X 2 weeks then 300 mg PO QHS thereafter. Referred to GI.      Labs 10/12/21: B12/Folate, ASO, Vitamin E - NCS   Ceruloplasmin, and copper were elevated, copper urine was normal. Sed rate 70.     Tremors have improved by 70% with Zonegran 300 mg PO QSH, is able to use a pen again, improvement with using utensils and cups. Anxiety continues to exacerbate symptoms.     Denies side effects of Zonegran.     Has appointment with GI in one week.      PH:   Tremors present for 25-30 years located in bilateral hands. Worsening over the past 5 years.      Has difficulty with holding utensils and cups has tried weighted utensils.   Can no longer write.   Anxiety worsens tremors      +vivid dreaming, denies fighting in her sleep.      Denies hallucinations     Has had difficulty with ambulation, has had left knee replacement.      Trialed Primidone and had side effect of diarrhea     Topamax 25 mg nightly caused hair loss so she stopped     Her mother had a tremor, denies family history of parkinson's disease.      Denies weakness, numbness and tingling or bowel/bladder difficulty      Medical records reviewed:  Tremors for years, worsening over the last year.   Trial and failed TPM, Primidone, and on Metoprolol.      Labs 9/28/21: HA1C 6, CBC, CMP, TSH - NCS       MRI Brain 9/7/20: atrophy and chronic small vessel disease     The following portions of the patient's history were reviewed and updated as appropriate: allergies, current medications, past family history, past medical history, past social history, past surgical history and problem list.    Past Medical History:   Diagnosis Date   • Anesthesia 2002    low bp only with foot surgery, has had  "surgeries since and no issue per pt report   • Arthritis    • Back pain    • Depression    • Enterocele 2017   • GERD (gastroesophageal reflux disease)    • High cholesterol    • Hypertension    • Knee pain     BILATERAL   • Menopausal state    • Rectocele 2018   • Seasonal allergies    • Wears eyeglasses    • Wears glasses    • Wears partial dentures     implants       Past Surgical History:   Procedure Laterality Date   • ANTERIOR AND POSTERIOR VAGINAL REPAIR N/A 2017    Vaginal enterocele / anterior posterior colporrhaphy;  Jose Maria Chowdhury MD;  Location:  [facility];  Service:    • BLADDER SUSPENSION      MESH TVT?    • COLONOSCOPY      Dr Sheldon reynolds   • EXPLORATORY LAPAROTOMY      \"BOWEL INTO PELVIC BONE\" /Mesh [place]    • EXPLORATORY LAPAROTOMY  2004    AQUILES/ abd sacrocolpopexy/post repair   • FOOT SURGERY Left    • KNEE ARTHROSCOPY Left     meniscus repair   • LAPAROSCOPIC CHOLECYSTECTOMY Right    • FL TOTAL KNEE ARTHROPLASTY Left 2017    Procedure: TOTAL KNEE ARTHROPLASTY LEFT;  Surgeon: uSnny Reynoso MD;  Location:  [facility];  Service: Orthopedics   • TONSILLECTOMY     • TOTAL ABDOMINAL HYSTERECTOMY WITH SALPINGO OOPHORECTOMY Bilateral    • TUBAL ABDOMINAL LIGATION     • VENTRAL HERNIA REPAIR N/A 2016    Procedure: LAPAROSCOPIC REPAIR OF ABDOMINAL WALL HERNIA WITH MESH, CONVERTED TO OPEN ;  Surgeon: Bertha Delacruz MD;  Location:  [facility];  Service:        Social History     Socioeconomic History   • Marital status:    Tobacco Use   • Smoking status: Former Smoker     Packs/day: 1.50     Years: 30.00     Pack years: 45.00     Types: Cigarettes     Start date: 10/1/1967     Quit date: 1986     Years since quittin.5   • Smokeless tobacco: Never Used   Substance and Sexual Activity   • Alcohol use: No   • Drug use: No   • Sexual activity: Not Currently     Partners: Male     Birth control/protection: Abstinence, Condom, Surgical " "    Comment: Total hysterectomy       Family History   Problem Relation Age of Onset   • Hypertension Mother    • Diabetes Mother    • Heart attack Mother 59         from MI   • Cancer Father    • Aneurysm Father 57   • Hepatitis Brother         Hep B   • Kidney disease Brother 58   • Alzheimer's disease Maternal Grandmother    • Pneumonia Maternal Grandmother    • Leukemia Maternal Grandfather    • No Known Problems Paternal Grandfather    • No Known Problems Daughter    • No Known Problems Son    • Ovarian cancer Neg Hx    • Breast cancer Neg Hx         Objective:  /100   Pulse 78   Temp 97.1 °F (36.2 °C)   Ht 158.8 cm (62.5\")   Wt (!) 154 kg (339 lb)   LMP  (LMP Unknown)   SpO2 98%   BMI 61.02 kg/m²     Neurologic Exam     Mental Status   Oriented to person, place, and time.   Follows 3 step commands.   Attention: normal. Concentration: normal.   Speech: speech is normal   Level of consciousness: alert  Knowledge: good and consistent with education.   Able to name object. Able to read. Able to repeat. Able to write. Normal comprehension.     Cranial Nerves     CN II   Visual fields full to confrontation.   Visual acuity: normal  Right visual field deficit: none  Left visual field deficit: none     CN III, IV, VI   Pupils are equal, round, and reactive to light.  Extraocular motions are normal.   Right pupil: Shape: regular. Reactivity: brisk. Consensual response: intact.   Left pupil: Shape: regular. Reactivity: brisk. Consensual response: intact.   Nystagmus: none   Diplopia: none  Ophthalmoparesis: none  Upgaze: normal  Downgaze: normal  Conjugate gaze: present  Vestibulo-ocular reflex: present    CN V   Facial sensation intact.   Right corneal reflex: normal  Left corneal reflex: normal    CN VII   Right facial weakness: none  Left facial weakness: none    CN VIII   Hearing: intact    CN IX, X   Palate: symmetric  Right gag reflex: normal  Left gag reflex: normal    CN XI   Right " sternocleidomastoid strength: normal  Left sternocleidomastoid strength: normal    CN XII   Tongue: not atrophic  Fasciculations: absent  Tongue deviation: none    Motor Exam   Muscle bulk: normal  Overall muscle tone: normal    Strength   Strength 5/5 throughout.     Sensory Exam   Light touch normal.     Gait, Coordination, and Reflexes     Gait  Gait: normal    Tremor   Resting tremor: absent  Intention tremor: present  Action tremor: absent    Reflexes   Reflexes 2+ except as noted.       Physical Exam  Vitals and nursing note reviewed.   Constitutional:       Appearance: Normal appearance.   HENT:      Head: Normocephalic and atraumatic.   Eyes:      Extraocular Movements: Extraocular movements intact and EOM normal.      Pupils: Pupils are equal, round, and reactive to light.   Skin:     General: Skin is warm and dry.   Neurological:      Mental Status: She is alert and oriented to person, place, and time.      Gait: Gait is intact.      Deep Tendon Reflexes: Strength normal.   Psychiatric:         Mood and Affect: Mood normal.         Speech: Speech normal.         Assessment/Plan:       Diagnoses and all orders for this visit:    1. Tremor, essential (Primary)  Assessment & Plan:  Continue Zonegran 300 mg PO QHS     If symptoms worsen may add on Requip.     F/U in 6 months or sooner if needed              Reviewed medications, potential side effects and signs and symptoms to report. Discussed risk versus benefits of treatment plan with patient and/or family-including medications, labs and radiology that may be ordered. Addressed questions and concerns during visit. Patient and/or family verbalized understanding and agree with plan. Patient instructed to call the office with questions or concerns and report to ED with life-threatening symptoms.     AS THE PROVIDER, I PERSONALLY WORE PPE DURING ENTIRE FACE TO FACE ENCOUNTER IN CLINIC WITH THE PATIENT. PATIENT ALSO WORE PPE DURING ENTIRE FACE TO FACE ENCOUNTER  EXCEPT FOR A MAX OF 30 SECONDS DURING NEUROLOGICAL EVALUATION OF CRANIAL NERVES AND THEN MASK WAS PLACED BACK OVER PATIENT FACE FOR REMAINDER OF VISIT. I WASHED MY HANDS BEFORE AND AFTER VISIT.    During this visit the following were done:  Labs Reviewed []    Labs Ordered []    Radiology Reports Reviewed []    Radiology Ordered []    PCP Records Reviewed []    Referring Provider Records Reviewed []    ER Records Reviewed []    Hospital Records Reviewed []    History Obtained From Family []    Radiology Images Reviewed []    Other Reviewed []    Records Requested []      Return in about 6 months (around 7/6/2022).      Kelsi Rodgers, APRN  1/6/2022

## 2022-01-14 ENCOUNTER — OFFICE VISIT (OUTPATIENT)
Dept: GASTROENTEROLOGY | Facility: CLINIC | Age: 72
End: 2022-01-14

## 2022-01-14 ENCOUNTER — TELEPHONE (OUTPATIENT)
Dept: GASTROENTEROLOGY | Facility: CLINIC | Age: 72
End: 2022-01-14

## 2022-01-14 VITALS
SYSTOLIC BLOOD PRESSURE: 144 MMHG | HEART RATE: 83 BPM | TEMPERATURE: 97.8 F | DIASTOLIC BLOOD PRESSURE: 84 MMHG | WEIGHT: 293 LBS | BODY MASS INDEX: 60.8 KG/M2

## 2022-01-14 DIAGNOSIS — R79.0 ABNORMAL BLOOD LEVEL OF COPPER: Primary | ICD-10-CM

## 2022-01-14 PROCEDURE — 99213 OFFICE O/P EST LOW 20 MIN: CPT | Performed by: NURSE PRACTITIONER

## 2022-01-14 RX ORDER — HYDROCODONE BITARTRATE AND ACETAMINOPHEN 5; 325 MG/1; MG/1
0.5 TABLET ORAL EVERY 4 HOURS PRN
COMMUNITY
End: 2022-03-28

## 2022-01-14 NOTE — PROGRESS NOTES
"GASTROENTEROLOGY OFFICE NOTE  Imelda Elaine  [id number]  1950    CARE TEAM  Patient Care Team:  Comfort Colunga MD as PCP - General (Internal Medicine)    Referring Provider: Comfort Colunga MD    Chief Complaint   Patient presents with   • Heartburn        HISTORY OF PRESENT ILLNESS:  Ms. Elaine is a very pleasant 71-year-old female who presents today with an elevated serum copper.  She was being evaluated by neurology for tremors and found to have elevated serum copper.  Subsequent testing shows a slight increase in ceruloplasmin 40, 24-hour urine copper within normal limits.    PAST MEDICAL HISTORY  Past Medical History:   Diagnosis Date   • Anesthesia 2002    low bp only with foot surgery, has had surgeries since and no issue per pt report   • Arthritis    • Back pain    • Depression    • Enterocele 2/14/2017   • GERD (gastroesophageal reflux disease)    • High cholesterol    • Hypertension    • Knee pain     BILATERAL   • Menopausal state    • Rectocele 1/2/2018   • Seasonal allergies    • Tremor, essential    • Wears eyeglasses    • Wears glasses    • Wears partial dentures     implants        PAST SURGICAL HISTORY  Past Surgical History:   Procedure Laterality Date   • ANTERIOR AND POSTERIOR VAGINAL REPAIR N/A 4/17/2017    Vaginal enterocele / anterior posterior colporrhaphy;  Jose Maria Chowdhury MD;  Location:  [facility];  Service:    • BLADDER SUSPENSION  1999    MESH TVT?    • COLONOSCOPY  2018    Dr Chung - normal   • EXPLORATORY LAPAROTOMY  2010    \"BOWEL INTO PELVIC BONE\" /Mesh [place]    • EXPLORATORY LAPAROTOMY  04/2004    AQUILES/ abd sacrocolpopexy/post repair   • FOOT SURGERY Left    • KNEE ARTHROSCOPY Left     meniscus repair   • LAPAROSCOPIC CHOLECYSTECTOMY Right 2000   • ND TOTAL KNEE ARTHROPLASTY Left 11/16/2017    Procedure: TOTAL KNEE ARTHROPLASTY LEFT;  Surgeon: Sunny Reynoso MD;  Location:  [facility];  Service: Orthopedics   • TONSILLECTOMY     • TOTAL ABDOMINAL " HYSTERECTOMY WITH SALPINGO OOPHORECTOMY Bilateral 1989   • TUBAL ABDOMINAL LIGATION     • VENTRAL HERNIA REPAIR N/A 6/21/2016    Procedure: LAPAROSCOPIC REPAIR OF ABDOMINAL WALL HERNIA WITH MESH, CONVERTED TO OPEN ;  Surgeon: Bertha Delacruz MD;  Location: [facility];  Service:         MEDICATIONS:    Current Outpatient Medications:   •  HYDROcodone-acetaminophen (NORCO) 5-325 MG per tablet, Take 0.5 tablets by mouth Every 4 (Four) Hours As Needed., Disp: , Rfl:   •  atorvastatin (LIPITOR) 10 MG tablet, Take 10 mg by mouth Every Night., Disp: , Rfl:   •  celecoxib (CeleBREX) 200 MG capsule, Take 1 capsule by mouth Daily. Must take an hour after aspirin if needed., Disp: , Rfl:   •  cetirizine (zyrTEC) 10 MG tablet, Take 10 mg by mouth Daily., Disp: , Rfl:   •  docusate sodium 100 MG capsule, Take 100 mg by mouth 2 (Two) Times a Day As Needed for Constipation., Disp: 60 capsule, Rfl: 0  •  estradiol (ESTRACE) 0.5 MG tablet, Take 1 tablet by mouth Daily., Disp: 90 tablet, Rfl: 3  •  hydroCHLOROthiazide (HYDRODIURIL) 12.5 MG tablet, Take 12.5 mg by mouth Daily., Disp: , Rfl:   •  ipratropium (ATROVENT) 0.06 % nasal spray, 1 spray into the nostril(s) as directed by provider As Needed., Disp: , Rfl:   •  lansoprazole (PREVACID) 30 MG capsule, Take 30 mg by mouth daily., Disp: , Rfl:   •  losartan (COZAAR) 100 MG tablet, Take 100 mg by mouth Daily., Disp: , Rfl:   •  metoprolol succinate XL (TOPROL-XL) 50 MG 24 hr tablet, Take 1 tablet by mouth Daily., Disp: 30 tablet, Rfl: 11  •  venlafaxine XR (EFFEXOR-XR) 75 MG 24 hr capsule, Take 75 mg by mouth Daily., Disp: , Rfl:   •  zonisamide (ZONEGRAN) 100 MG capsule, Take 3 capsules by mouth Every Night., Disp: 90 capsule, Rfl: 2    ALLERGIES  Allergies   Allergen Reactions   • Pneumococcal Vaccines Swelling and Rash     Swelling on entire arm    • Primidone Other (See Comments)     Hair loss   • Topiramate Diarrhea   • Other Swelling and Rash     Shingles vaccine  Swelling  of injection site of arm,    • Tuberculin Tests Swelling and Rash     Pt has chest xrays to check        FAMILY HISTORY:  Family History   Problem Relation Age of Onset   • Hypertension Mother    • Diabetes Mother    • Heart attack Mother 59         from MI   • Cancer Father    • Aneurysm Father 57   • Hepatitis Brother         Hep B   • Kidney disease Brother 58   • Alzheimer's disease Maternal Grandmother    • Pneumonia Maternal Grandmother    • Leukemia Maternal Grandfather    • No Known Problems Paternal Grandfather    • No Known Problems Daughter    • No Known Problems Son    • Ovarian cancer Neg Hx    • Breast cancer Neg Hx        SOCIAL HISTORY  Social History     Socioeconomic History   • Marital status:    Tobacco Use   • Smoking status: Former Smoker     Packs/day: 1.50     Years: 30.00     Pack years: 45.00     Types: Cigarettes     Start date: 10/1/1967     Quit date: 1986     Years since quittin.6   • Smokeless tobacco: Never Used   Vaping Use   • Vaping Use: Never used   Substance and Sexual Activity   • Alcohol use: No   • Drug use: No   • Sexual activity: Not Currently     Partners: Male     Birth control/protection: Abstinence, Condom, Surgical     Comment: Total hysterectomy         PHYSICAL EXAM   /84 (BP Location: Left arm, Patient Position: Sitting, Cuff Size: Adult)   Pulse 83   Temp 97.8 °F (36.6 °C) (Temporal)   Wt (!) 153 kg (337 lb 12.8 oz)   LMP  (LMP Unknown)   BMI 60.80 kg/m²   Physical Exam  Constitutional:       Appearance: Normal appearance.   HENT:      Head: Normocephalic and atraumatic.   Pulmonary:      Effort: Pulmonary effort is normal.   Neurological:      Mental Status: She is alert and oriented to person, place, and time.   Psychiatric:         Mood and Affect: Mood normal.         Thought Content: Thought content normal.           Results Review:  Results for GAIL KHAN (MRN [id number]) as of 2022 11:03   Ref. Range 10/12/2021  15:07 10/25/2021 12:02   Folate Latest Ref Range: 4.78 - 24.20 ng/mL 6.08    Ceruloplasmin Latest Ref Range: 19 - 39 mg/dL 40 (H)    Copper Latest Ref Range: 80 - 158 ug/dL 222 (H)    Vitamin B-12 Latest Ref Range: 211 - 946 pg/mL 337    Sed Rate Latest Ref Range: 0 - 30 mm/hr 70 (H)    ASO Latest Ref Range: Negative  Negative    Copper, urine Latest Ref Range: Not Estab. ug/L  9   Creatinine, Urine Latest Ref Range: 0.30 - 3.00 g/L  0.66   Copper /G Creat Latest Ref Range: 0 - 49 ug/g creat  14   Copper, 24H Ur Latest Ref Range: 3 - 35 ug/24 hr  10   Vitamin E (Alpha Tocopherol) Latest Ref Range: 9.0 - 29.0 mg/L 13.0    Vitamin E (Gamma Tocopherol) Latest Ref Range: 0.5 - 4.9 mg/L 2.5      ASSESSMENT / PLAN  1.  Abnormal serum copper  - Referral to ophthalmology to assess for Valente- Fleischer rings  If rings are absent; her 24-hour copper has already shown to be less than 40 mcg and this Wilsons disease is therefore excluded.  If rings are present then liver biopsy versus genetic testing should be obtained.    Return in about 3 months (around 4/14/2022).    I discussed the patients findings and my recommendations with patient    FLOR Mustafa

## 2022-01-14 NOTE — TELEPHONE ENCOUNTER
I called [facility] and sent medical records over to Fax  [phone] Attention Danni per Mickey Ramírez APRN..  is going to call me back with her appointment time for the consultation pertaining to high copper. Danni's contact number is [phone].

## 2022-01-25 ENCOUNTER — TELEPHONE (OUTPATIENT)
Dept: GASTROENTEROLOGY | Facility: CLINIC | Age: 72
End: 2022-01-25

## 2022-01-25 NOTE — TELEPHONE ENCOUNTER
I called patient and gave her the date , time, and directions for her appointment with eye doctor (Dr Brigido Rod). Patient verbalized understanding and instructed to call back if she has any questions.

## 2022-01-25 NOTE — TELEPHONE ENCOUNTER
I called patient to let her know her appointment date is April 6th 2022 at 10:30 am at  Ophthalmology located at [address] [place] [address] [address] on 4th floor. I left voice message for patient to return my call.

## 2022-01-25 NOTE — TELEPHONE ENCOUNTER
----- Message from Clare Felix LPN sent at 1/21/2022  1:27 PM EST -----  Call Danni per patent note and verfy referral date and time.

## 2022-02-06 DIAGNOSIS — G25.0 ESSENTIAL TREMOR: Chronic | ICD-10-CM

## 2022-02-08 RX ORDER — ZONISAMIDE 100 MG/1
CAPSULE ORAL
Qty: 90 CAPSULE | Refills: 2 | Status: SHIPPED | OUTPATIENT
Start: 2022-02-08 | End: 2022-05-09

## 2022-02-22 ENCOUNTER — TRANSCRIBE ORDERS (OUTPATIENT)
Dept: ADMINISTRATIVE | Facility: HOSPITAL | Age: 72
End: 2022-02-22

## 2022-02-22 DIAGNOSIS — Z12.31 VISIT FOR SCREENING MAMMOGRAM: Primary | ICD-10-CM

## 2022-03-11 ENCOUNTER — TELEPHONE (OUTPATIENT)
Dept: OBSTETRICS AND GYNECOLOGY | Facility: CLINIC | Age: 72
End: 2022-03-11

## 2022-03-11 RX ORDER — ESTRADIOL 0.5 MG/1
0.5 TABLET ORAL DAILY
Qty: 90 TABLET | Refills: 0 | Status: SHIPPED | OUTPATIENT
Start: 2022-03-11 | End: 2022-03-28 | Stop reason: SDUPTHER

## 2022-03-28 ENCOUNTER — OFFICE VISIT (OUTPATIENT)
Dept: OBSTETRICS AND GYNECOLOGY | Facility: CLINIC | Age: 72
End: 2022-03-28

## 2022-03-28 VITALS
WEIGHT: 293 LBS | BODY MASS INDEX: 61.2 KG/M2 | DIASTOLIC BLOOD PRESSURE: 80 MMHG | SYSTOLIC BLOOD PRESSURE: 138 MMHG | RESPIRATION RATE: 16 BRPM

## 2022-03-28 DIAGNOSIS — N95.1 MENOPAUSAL SYMPTOMS: Primary | ICD-10-CM

## 2022-03-28 DIAGNOSIS — N39.3 SUI (STRESS URINARY INCONTINENCE, FEMALE): ICD-10-CM

## 2022-03-28 PROCEDURE — 99213 OFFICE O/P EST LOW 20 MIN: CPT | Performed by: NURSE PRACTITIONER

## 2022-03-28 RX ORDER — ESTRADIOL 0.5 MG/1
0.5 TABLET ORAL DAILY
Qty: 90 TABLET | Refills: 3 | Status: SHIPPED | OUTPATIENT
Start: 2022-03-28 | End: 2023-03-21 | Stop reason: SDUPTHER

## 2022-03-28 NOTE — PROGRESS NOTES
Annual Visit     Patient Name: Imelda Elaine  : 1950   MRN: [id number]   Care Team: Patient Care Team:  Comfort Colunga MD as PCP - General (Internal Medicine)    Chief Complaint:    Menopausal sx - HRT   INGE     HPI: Imelda Elaine is a 71 y.o. year old  presenting to be seen for one year f/u.   S/p total hyst with BSO   In 2017 anterior and posterior repair done   INGE improved after surgery but still present     Doing well with estrace 0.5mg qd - states she tried stopping a few yrs ago and had terrible hot flashes so she restarted   Has never had blood clots     Mammogram 2021 birads 1     DEXA has done with PCP - she is due for one this year     Colonoscopy 2018 rpt due next yr per pt     Hx HTN and hyperlipidemia - well controlled with medications     Has been a patient of Dr. Chowdhury for many yrs     Subjective      /80   Resp 16   Wt (!) 154 kg (340 lb)   LMP  (LMP Unknown)   Breastfeeding No   BMI 61.20 kg/m²     BMI reviewed: Body mass index is 61.2 kg/m².      Objective     Physical Exam    Neuro: alert and oriented to person, place and time   General:  alert; cooperative; well developed; well nourished   Skin:  No suspicious lesions seen   Thyroid: normal to inspection and palpation   Lungs:  breathing is unlabored  clear to auscultation bilaterally   Heart:  regular rate and rhythm, S1, S2 normal, no murmur, click, rub or gallop  normal apical impulse   Breasts:  Examined in supine position  Symmetric without masses or skin dimpling  Nipples normal without inversion, lesions or discharge  There are no palpable axillary nodes   Abdomen: soft, non-tender; no masses  no umbilical or inguinal hernias are present  no hepato-splenomegaly   Pelvis: Clinical staff was present for exam  External genitalia:  normal appearance of the external genitalia including Bartholin's and Estelline's glands.  :  urethral meatus normal;  Vaginal:  normal pink mucosa without prolapse or  lesions.  Cervix:  absent.  Uterus:  absent.  Adnexa:  absent, bilateral.  Rectal:  digital rectal exam not performed; anus visually normal appearing.         Assessment / Plan      Assessment  Problems Addressed This Visit    ICD-10-CM ICD-9-CM   1. Menopausal symptoms  N95.1 627.2   2. INGE (stress urinary incontinence, female)  N39.3 625.6       Plan    Discussed indications for ERT and risks of cont including blood clot, stroke, MI, and PE   Pt v/u to these risks and desires to cont with estrace 0.5mg qd   Discussed reducing dose to qod and she is agreeable to trial   Discussed increased incidence rate of breast cancer with HRT   Stressed importance of annual mammogram and monthly SBEs     Discussed Calcium, 600 mg/ Vit. D, 400 IU daily    INGE improved after A/P repair but still present     AV 1 yr             Follow Up  Return in about 1 year (around 3/28/2023) for Annual physical.  Patient was given instructions and counseling regarding her condition or for health maintenance advice. Please see specific information pulled into the AVS if appropriate.     Lisa Grimm, APRN  March 28, 2022  10:30 EDT

## 2022-04-14 ENCOUNTER — OFFICE VISIT (OUTPATIENT)
Dept: GASTROENTEROLOGY | Facility: CLINIC | Age: 72
End: 2022-04-14

## 2022-04-14 VITALS
WEIGHT: 293 LBS | HEART RATE: 83 BPM | TEMPERATURE: 97.1 F | HEIGHT: 62 IN | DIASTOLIC BLOOD PRESSURE: 75 MMHG | SYSTOLIC BLOOD PRESSURE: 131 MMHG | BODY MASS INDEX: 53.92 KG/M2

## 2022-04-14 DIAGNOSIS — R79.0 ABNORMAL BLOOD LEVEL OF COPPER: Primary | ICD-10-CM

## 2022-04-14 DIAGNOSIS — R74.8 ELEVATED ALKALINE PHOSPHATASE LEVEL: ICD-10-CM

## 2022-04-14 PROCEDURE — 99214 OFFICE O/P EST MOD 30 MIN: CPT | Performed by: NURSE PRACTITIONER

## 2022-04-14 NOTE — PROGRESS NOTES
"GASTROENTEROLOGY OFFICE NOTE  Imelda Elaine  [id number]  1950    CARE TEAM  Patient Care Team:  Comfort Colunga MD as PCP - General (Internal Medicine)    Referring Provider: Comfort Colunga MD    Chief Complaint   Patient presents with   • Constipation     Managed with miralax        HISTORY OF PRESENT ILLNESS:  Ms. Elaine is seen in follow up with an elevated serum copper.  She was being evaluated by neurology for tremors and found to have elevated serum copper.  Subsequent testing shows a slight increase in ceruloplasmin 40, 24-hour urine copper within normal limits.  Ophthalmology consult found no Valente Fleischer rings.      PAST MEDICAL HISTORY  Past Medical History:   Diagnosis Date   • Anesthesia 2002    low bp only with foot surgery, has had surgeries since and no issue per pt report   • Arthritis    • Back pain    • Depression    • Enterocele 2/14/2017   • GERD (gastroesophageal reflux disease)    • High cholesterol    • Hypertension    • Knee pain     BILATERAL   • Menopausal state    • Rectocele 1/2/2018   • Seasonal allergies    • Tremor, essential    • Wears eyeglasses    • Wears glasses    • Wears partial dentures     implants        PAST SURGICAL HISTORY  Past Surgical History:   Procedure Laterality Date   • ANTERIOR AND POSTERIOR VAGINAL REPAIR N/A 4/17/2017    Vaginal enterocele / anterior posterior colporrhaphy;  Jose Maria Chowdhury MD;  Location:  [facility];  Service:    • BLADDER SUSPENSION  1999    MESH TVT?    • COLONOSCOPY  2018    Dr Chung - normal   • EXPLORATORY LAPAROTOMY  2010    \"BOWEL INTO PELVIC BONE\" /Mesh [place]    • EXPLORATORY LAPAROTOMY  04/2004    AQUILES/ abd sacrocolpopexy/post repair   • FOOT SURGERY Left    • KNEE ARTHROSCOPY Left     meniscus repair   • LAPAROSCOPIC CHOLECYSTECTOMY Right 2000   • LA TOTAL KNEE ARTHROPLASTY Left 11/16/2017    Procedure: TOTAL KNEE ARTHROPLASTY LEFT;  Surgeon: Sunny Reynoso MD;  Location:  [facility];  Service: Orthopedics "   • TONSILLECTOMY     • TOTAL ABDOMINAL HYSTERECTOMY WITH SALPINGO OOPHORECTOMY Bilateral 1989   • TUBAL ABDOMINAL LIGATION     • VENTRAL HERNIA REPAIR N/A 6/21/2016    Procedure: LAPAROSCOPIC REPAIR OF ABDOMINAL WALL HERNIA WITH MESH, CONVERTED TO OPEN ;  Surgeon: Bertha Delacruz MD;  Location: [facility];  Service:         MEDICATIONS:    Current Outpatient Medications:   •  atorvastatin (LIPITOR) 10 MG tablet, Take 10 mg by mouth Every Night., Disp: , Rfl:   •  celecoxib (CeleBREX) 200 MG capsule, Take 1 capsule by mouth Daily. Must take an hour after aspirin if needed., Disp: , Rfl:   •  cetirizine (zyrTEC) 10 MG tablet, Take 10 mg by mouth Daily., Disp: , Rfl:   •  estradiol (ESTRACE) 0.5 MG tablet, Take 1 tablet by mouth Daily., Disp: 90 tablet, Rfl: 3  •  hydroCHLOROthiazide (HYDRODIURIL) 12.5 MG tablet, Take 12.5 mg by mouth Daily., Disp: , Rfl:   •  ipratropium (ATROVENT) 0.06 % nasal spray, 1 spray into the nostril(s) as directed by provider As Needed., Disp: , Rfl:   •  lansoprazole (PREVACID) 30 MG capsule, Take 30 mg by mouth daily., Disp: , Rfl:   •  losartan (COZAAR) 100 MG tablet, Take 100 mg by mouth Daily., Disp: , Rfl:   •  metoprolol succinate XL (TOPROL-XL) 50 MG 24 hr tablet, Take 1 tablet by mouth Daily., Disp: 30 tablet, Rfl: 11  •  venlafaxine XR (EFFEXOR-XR) 75 MG 24 hr capsule, Take 75 mg by mouth Daily., Disp: , Rfl:   •  zonisamide (ZONEGRAN) 100 MG capsule, TAKE THREE CAPSULES BY MOUTH EVERY EVENING, Disp: 90 capsule, Rfl: 2  •  docusate sodium 100 MG capsule, Take 100 mg by mouth 2 (Two) Times a Day As Needed for Constipation., Disp: 60 capsule, Rfl: 0    ALLERGIES  Allergies   Allergen Reactions   • Pneumococcal Vaccines Swelling and Rash     Swelling on entire arm    • Primidone Other (See Comments)     Hair loss   • Topiramate Diarrhea   • Other Swelling and Rash     Shingles vaccine  Swelling of injection site of arm,    • Tuberculin Tests Swelling and Rash     Pt has chest  "xrays to check        FAMILY HISTORY:  Family History   Problem Relation Age of Onset   • Hypertension Mother    • Diabetes Mother    • Heart attack Mother 59         from MI   • Cancer Father    • Aneurysm Father 57   • Hepatitis Brother         Hep B   • Kidney disease Brother 58   • Alzheimer's disease Maternal Grandmother    • Pneumonia Maternal Grandmother    • Leukemia Maternal Grandfather    • No Known Problems Paternal Grandfather    • No Known Problems Daughter    • No Known Problems Son    • Ovarian cancer Neg Hx    • Breast cancer Neg Hx        SOCIAL HISTORY  Social History     Socioeconomic History   • Marital status:    Tobacco Use   • Smoking status: Former Smoker     Packs/day: 1.50     Years: 30.00     Pack years: 45.00     Types: Cigarettes     Start date: 10/1/1967     Quit date: 1986     Years since quittin.8   • Smokeless tobacco: Never Used   Vaping Use   • Vaping Use: Never used   Substance and Sexual Activity   • Alcohol use: No   • Drug use: No   • Sexual activity: Not Currently     Partners: Male     Birth control/protection: Abstinence, Condom, Surgical     Comment: Total hysterectomy         PHYSICAL EXAM   /75 (BP Location: Left arm, Patient Position: Sitting, Cuff Size: Adult)   Pulse 83   Temp 97.1 °F (36.2 °C) (Temporal)   Ht 157.5 cm (62\")   Wt (!) 155 kg (342 lb)   LMP  (LMP Unknown)   BMI 62.55 kg/m²   Physical Exam  Constitutional:       General: She is not in acute distress.     Appearance: She is well-developed.   HENT:      Head: Normocephalic and atraumatic.      Nose: Nose normal.   Eyes:      Conjunctiva/sclera: Conjunctivae normal.      Pupils: Pupils are equal, round, and reactive to light.   Pulmonary:      Effort: Pulmonary effort is normal.   Neurological:      Mental Status: She is alert and oriented to person, place, and time.   Psychiatric:         Behavior: Behavior normal.         Judgment: Judgment normal.           Results " Review:  Ophthalmology Dr. Viola cesra (4/6/2022)  Slit Lamp Exam   Right Left   Lids/Lashes Normal for age Normal for age   Conjunctiva/Sclera Normal Normal   Cornea Clear and compact scattered punctate epithelial erosions (PEE) no K-F ring/deposits Clear and compact scattered punctate epithelial erosions (PEE) no K-F ring/deposits   Anterior Chamber Deep and quiet Deep and quiet   Iris Normal pupil size and shape Normal pupil size and shape   Lens 1-2+ NS with some marlin scatterd flecks 1-2+ NS with some marlin scatterd flecks   Vitreous Clear Clear       Approach to the diagnosis of Andriy disease          Serum ceruloplasmin 40 mg/dL >> Kayser-Fleischer rings absent >>24-hour urine copper 10 mcg >> Andriy disease excluded    ASSESSMENT / PLAN  1. Elevated copper  Andriy's disease excluded  2.  Isolated elevation of alkaline phosphatase    Consider other etiologies for elevated copper including:   -Copper toxicity from dietary sources  -Anemia  -Primary Biliary Cholangitis  -Hemochromatosis  -Hyperthyroidism  -Hypothyroidism  -Leukemia  -Lymphoma  -Rheumatoid arthritis    Plan:  -Mitochondrial antibody  -Hemochromatosis mutation  -Ferritin  -CBC  -IgG, IgM, IgA      I discussed the patients findings and my recommendations with patient    Mickey Ramírez, APRN

## 2022-04-26 ENCOUNTER — LAB (OUTPATIENT)
Dept: LAB | Facility: HOSPITAL | Age: 72
End: 2022-04-26

## 2022-04-26 DIAGNOSIS — R79.0 ABNORMAL BLOOD LEVEL OF COPPER: ICD-10-CM

## 2022-04-26 DIAGNOSIS — R74.8 ELEVATED ALKALINE PHOSPHATASE LEVEL: ICD-10-CM

## 2022-04-26 LAB
BASOPHILS # BLD AUTO: 0.04 10*3/MM3 (ref 0–0.2)
BASOPHILS NFR BLD AUTO: 0.5 % (ref 0–1.5)
DEPRECATED RDW RBC AUTO: 43.7 FL (ref 37–54)
EOSINOPHIL # BLD AUTO: 0.22 10*3/MM3 (ref 0–0.4)
EOSINOPHIL NFR BLD AUTO: 2.6 % (ref 0.3–6.2)
ERYTHROCYTE [DISTWIDTH] IN BLOOD BY AUTOMATED COUNT: 13.5 % (ref 12.3–15.4)
FERRITIN SERPL-MCNC: 23.1 NG/ML (ref 13–150)
HCT VFR BLD AUTO: 44.3 % (ref 34–46.6)
HGB BLD-MCNC: 14.4 G/DL (ref 12–15.9)
IGA1 MFR SER: 273 MG/DL (ref 70–400)
IGG1 SER-MCNC: 969 MG/DL (ref 700–1600)
IGM SERPL-MCNC: 118 MG/DL (ref 40–230)
IMM GRANULOCYTES # BLD AUTO: 0.02 10*3/MM3 (ref 0–0.05)
IMM GRANULOCYTES NFR BLD AUTO: 0.2 % (ref 0–0.5)
LYMPHOCYTES # BLD AUTO: 1.8 10*3/MM3 (ref 0.7–3.1)
LYMPHOCYTES NFR BLD AUTO: 21.4 % (ref 19.6–45.3)
MCH RBC QN AUTO: 29 PG (ref 26.6–33)
MCHC RBC AUTO-ENTMCNC: 32.5 G/DL (ref 31.5–35.7)
MCV RBC AUTO: 89.1 FL (ref 79–97)
MONOCYTES # BLD AUTO: 0.57 10*3/MM3 (ref 0.1–0.9)
MONOCYTES NFR BLD AUTO: 6.8 % (ref 5–12)
NEUTROPHILS NFR BLD AUTO: 5.78 10*3/MM3 (ref 1.7–7)
NEUTROPHILS NFR BLD AUTO: 68.5 % (ref 42.7–76)
NRBC BLD AUTO-RTO: 0 /100 WBC (ref 0–0.2)
PLATELET # BLD AUTO: 215 10*3/MM3 (ref 140–450)
PMV BLD AUTO: 12.3 FL (ref 6–12)
RBC # BLD AUTO: 4.97 10*6/MM3 (ref 3.77–5.28)
WBC NRBC COR # BLD: 8.43 10*3/MM3 (ref 3.4–10.8)

## 2022-04-26 PROCEDURE — 82728 ASSAY OF FERRITIN: CPT

## 2022-04-26 PROCEDURE — 82784 ASSAY IGA/IGD/IGG/IGM EACH: CPT

## 2022-04-26 PROCEDURE — 85025 COMPLETE CBC W/AUTO DIFF WBC: CPT

## 2022-04-26 PROCEDURE — 36415 COLL VENOUS BLD VENIPUNCTURE: CPT

## 2022-04-26 PROCEDURE — 86381 MITOCHONDRIAL ANTIBODY EACH: CPT

## 2022-04-27 LAB — MITOCHONDRIA M2 IGG SER-ACNC: <20 UNITS (ref 0–20)

## 2022-05-02 LAB — HFE GENE MUT ANL BLD/T: NORMAL

## 2022-05-07 DIAGNOSIS — G25.0 ESSENTIAL TREMOR: Chronic | ICD-10-CM

## 2022-05-09 RX ORDER — ZONISAMIDE 100 MG/1
CAPSULE ORAL
Qty: 90 CAPSULE | Refills: 2 | Status: SHIPPED | OUTPATIENT
Start: 2022-05-09 | End: 2022-07-06 | Stop reason: SDUPTHER

## 2022-05-09 NOTE — TELEPHONE ENCOUNTER
Rx Refill Note  Requested Prescriptions     Pending Prescriptions Disp Refills   • zonisamide (ZONEGRAN) 100 MG capsule [Pharmacy Med Name: ZONISAMIDE 100 MG CAPSULE] 90 capsule 2     Sig: TAKE THREE CAPSULES BY MOUTH EVERY EVENING      Last filled: 02/08/2022 90 with 2 refills.    Last office visit with prescribing clinician: 1/6/2022      Next office visit with prescribing clinician: 07/06/2022    90 with 2 refills.      Za Castillo MA  05/09/22, 08:15 EDT

## 2022-05-11 PROBLEM — I34.0 NONRHEUMATIC MITRAL (VALVE) INSUFFICIENCY: Status: RESOLVED | Noted: 2021-06-17 | Resolved: 2022-05-11

## 2022-05-11 PROBLEM — I36.1 NON-RHEUMATIC TRICUSPID VALVE INSUFFICIENCY: Status: RESOLVED | Noted: 2021-06-17 | Resolved: 2022-05-11

## 2022-05-11 PROBLEM — I35.8 OTHER NONRHEUMATIC AORTIC VALVE DISORDERS: Status: RESOLVED | Noted: 2021-06-17 | Resolved: 2022-05-11

## 2022-05-11 NOTE — PROGRESS NOTES
"[facility]      Identification: Imelda Elaine is a 71 y.o. female who resides in [place], [place]    Reason for visit:  Palpitations (Follow up )      Subjective      Imelda Elaine presents to [facility] for followup.    History of Present Illness  Patient is a 71-year-old female who returns today for follow-up of her valvular heart disease, palpitations and shortness of breath.  Patient carries a diagnosis of mild valvular heart disease however recent echo in 2020 did not suggest any significant valvular heart disease.  Past echocardiogram showed mild MR.  She had normal LVEF.  She does have an abnormal ekg with right bundle branch block at baseline which has not changed over the years.  Her palpitations have been controlled on metoprolol therapy.  She reports her blood pressures have also been well controlled.  She denies chest pain, dyspnea, orthopnea, palpitations or syncope.      Review of Systems   Constitutional: Negative for malaise/fatigue.   Eyes: Negative for vision loss in left eye and vision loss in right eye.   Cardiovascular: Negative for chest pain, dyspnea on exertion, near-syncope, orthopnea, palpitations, paroxysmal nocturnal dyspnea and syncope.   Musculoskeletal: Negative for myalgias.   Neurological: Negative for brief paralysis, excessive daytime sleepiness, focal weakness, numbness, paresthesias and weakness.   All other systems reviewed and are negative.      Objective     /78   Pulse 98   Temp 97.3 °F (36.3 °C)   Ht 157.5 cm (62\")   Wt (!) 153 kg (337 lb 12.8 oz)   SpO2 94%   BMI 61.78 kg/m²       Constitutional:       Appearance: Healthy appearance. Well-developed.   Eyes:      General: Lids are normal. No scleral icterus.     Conjunctiva/sclera: Conjunctivae normal.   HENT:      Head: Normocephalic and atraumatic.   Neck:      Thyroid: No thyromegaly.      Vascular: No carotid bruit or JVD.   Pulmonary:      Effort: Pulmonary effort is " normal.      Breath sounds: Normal breath sounds. No wheezing. No rhonchi. No rales.   Cardiovascular:      Normal rate. Regular rhythm.      Murmurs: There is no murmur.      No gallop. No rub.   Pulses:     Intact distal pulses.   Edema:     Peripheral edema absent.   Abdominal:      General: There is no distension.      Palpations: Abdomen is soft. There is no abdominal mass.   Musculoskeletal:      Cervical back: Normal range of motion. Skin:     General: Skin is warm and dry.      Findings: No rash.   Neurological:      General: No focal deficit present.      Mental Status: Alert and oriented to person, place, and time.      Gait: Gait is intact.   Psychiatric:         Attention and Perception: Attention normal.         Mood and Affect: Mood normal.         Behavior: Behavior normal.         Result Review :    Lab Results   Component Value Date    GLUCOSE 137 (H) 09/06/2020    BUN 17 09/06/2020    CREATININE 0.90 12/21/2020    EGFRIFNONA 61 09/06/2020    BCR 18.5 09/06/2020    K 4.1 09/06/2020    CO2 27.0 09/06/2020    CALCIUM 9.0 09/06/2020    ALBUMIN 3.70 09/06/2020    AST 15 09/06/2020    ALT 11 09/06/2020     Lab Results   Component Value Date    WBC 8.43 04/26/2022    HGB 14.4 04/26/2022    HCT 44.3 04/26/2022    MCV 89.1 04/26/2022     04/26/2022     No results found for: CHOL, CHLPL, TRIG, HDL, LDL, LDLDIRECT  Lab Results   Component Value Date    HGBA1C 5.80 (H) 11/07/2017     Labs obtained 4/21/2022: Sodium 139, potassium 4.3, glucose 97, BUN 20, creatinine 1.01, ALT 9, AST 13, WBC 7.9, hemoglobin 14.2, hematocrit 43.8, platelets 220 TSH 0.9, hemoglobin A1c 6.1, total cholesterol 201, triglycerides 147,     ECG 12 Lead    Date/Time: 5/17/2022 11:49 AM  Performed by: Florinda Lopez APRN  Authorized by: Florinda Lopez APRN   Comparison: compared with previous ECG from 10/15/2020  Similar to previous ECG  Comparison to previous ECG: No change from prior EKG  Rhythm: sinus  rhythm  BPM: 96  Conduction: right bundle branch block    Clinical impression: abnormal EKG  Comments: Normal sinus rhythm with right bundle branch block  QT/QTc 360/454 MS             Assessment     Problem List Items Addressed This Visit        Cardiac and Vasculature    NDIAYE (dyspnea on exertion)    Overview     · Echo (11/12/2020):LVEF=61 - 65%.  No significant valvular abnormality           Current Assessment & Plan     · Continue metoprolol succinate 50 mg daily  · Continue losartan 100 mg daily  · Continue HCTZ 12.5 mg daily           Cardiomegaly - Primary    Overview     · Echo (11/12/2020):LVEF=61 - 65%.  No significant valvular abnormality           Current Assessment & Plan     · Stable NYHA class II symptoms  · Continue metoprolol succinate 50 mg daily  · Continue losartan 100 mg daily           Relevant Medications    metoprolol succinate XL (TOPROL-XL) 50 MG 24 hr tablet    Palpitations    Overview     · Echo (8/8/2017): Normal LVEF.  Grade 1 diastolic dysfunction.  Mild MR and TR.  · 10 day event monitor (7/27/2017): Normal.  · Echo (11/12/2020):LVEF=61 - 65%.RVSP less than 35 mmHg. No significant structural or functional valvular disease.           Current Assessment & Plan     · Palpitations have resolved  · Continue metoprolol succinate 50 mg daily           Mixed hyperlipidemia    Current Assessment & Plan     · Continue Lipitor 10 mg daily           Essential hypertension    Current Assessment & Plan     · Hypertension is controlled  · Continue hydrochlorothiazide 12.5 mg daily  · Continue losartan 100 mg daily  · Continue metoprolol succinate 50 mg daily           Relevant Medications    metoprolol succinate XL (TOPROL-XL) 50 MG 24 hr tablet    Abnormal EKG    Overview     · EKG with RBBB  · Echo (11/12/2020): LVEF 61-65%.  RVSP less than 35 mmHg.  No structural or functional valvular disease.             Current Assessment & Plan     · Baseline right bundle branch block.  EKG with no changes  from prior EKGs.  No acute ischemic changes               Patient has no signs or symptoms of angina or heart failure.  Palpitations are controlled on beta-blocker therapy.  We will continue her current medications she will follow-up 12 months or sooner if needed.  Plan   • Continue current medications      Follow-up   Return in about 1 year (around 5/17/2023), or if symptoms worsen or fail to improve, for Follow-up with Dr. Kay next visit.        Florinda Lopez, APRN  5/17/2022

## 2022-05-12 PROBLEM — E78.2 MIXED HYPERLIPIDEMIA: Status: ACTIVE | Noted: 2017-11-16

## 2022-05-17 ENCOUNTER — OFFICE VISIT (OUTPATIENT)
Dept: CARDIOLOGY | Facility: CLINIC | Age: 72
End: 2022-05-17

## 2022-05-17 VITALS
HEIGHT: 62 IN | DIASTOLIC BLOOD PRESSURE: 78 MMHG | SYSTOLIC BLOOD PRESSURE: 128 MMHG | WEIGHT: 293 LBS | TEMPERATURE: 97.3 F | HEART RATE: 98 BPM | OXYGEN SATURATION: 94 % | BODY MASS INDEX: 53.92 KG/M2

## 2022-05-17 DIAGNOSIS — R00.2 PALPITATIONS: ICD-10-CM

## 2022-05-17 DIAGNOSIS — E78.2 MIXED HYPERLIPIDEMIA: ICD-10-CM

## 2022-05-17 DIAGNOSIS — I45.10 RBBB: ICD-10-CM

## 2022-05-17 DIAGNOSIS — R06.09 DOE (DYSPNEA ON EXERTION): ICD-10-CM

## 2022-05-17 DIAGNOSIS — I10 ESSENTIAL HYPERTENSION: ICD-10-CM

## 2022-05-17 DIAGNOSIS — I51.7 CARDIOMEGALY: Primary | ICD-10-CM

## 2022-05-17 PROCEDURE — 93000 ELECTROCARDIOGRAM COMPLETE: CPT | Performed by: NURSE PRACTITIONER

## 2022-05-17 PROCEDURE — 99214 OFFICE O/P EST MOD 30 MIN: CPT | Performed by: NURSE PRACTITIONER

## 2022-05-17 RX ORDER — METOPROLOL SUCCINATE 50 MG/1
50 TABLET, EXTENDED RELEASE ORAL DAILY
Qty: 90 TABLET | Refills: 3 | Status: SHIPPED | OUTPATIENT
Start: 2022-05-17

## 2022-05-17 NOTE — ASSESSMENT & PLAN NOTE
· Stable NYHA class II symptoms  · Continue metoprolol succinate 50 mg daily  · Continue losartan 100 mg daily

## 2022-05-17 NOTE — ASSESSMENT & PLAN NOTE
· Hypertension is controlled  · Continue hydrochlorothiazide 12.5 mg daily  · Continue losartan 100 mg daily  · Continue metoprolol succinate 50 mg daily

## 2022-05-17 NOTE — ASSESSMENT & PLAN NOTE
· Baseline right bundle branch block.  EKG with no changes from prior EKGs.  No acute ischemic changes

## 2022-05-17 NOTE — ASSESSMENT & PLAN NOTE
· Continue metoprolol succinate 50 mg daily  · Continue losartan 100 mg daily  · Continue HCTZ 12.5 mg daily

## 2022-07-06 ENCOUNTER — OFFICE VISIT (OUTPATIENT)
Dept: NEUROLOGY | Facility: CLINIC | Age: 72
End: 2022-07-06

## 2022-07-06 VITALS
HEART RATE: 91 BPM | DIASTOLIC BLOOD PRESSURE: 98 MMHG | OXYGEN SATURATION: 97 % | BODY MASS INDEX: 53.92 KG/M2 | SYSTOLIC BLOOD PRESSURE: 124 MMHG | HEIGHT: 62 IN | WEIGHT: 293 LBS | TEMPERATURE: 97.1 F

## 2022-07-06 DIAGNOSIS — G25.0 ESSENTIAL TREMOR: Primary | Chronic | ICD-10-CM

## 2022-07-06 DIAGNOSIS — I10 PRIMARY HYPERTENSION: ICD-10-CM

## 2022-07-06 PROCEDURE — 99213 OFFICE O/P EST LOW 20 MIN: CPT | Performed by: NURSE PRACTITIONER

## 2022-07-06 RX ORDER — ZONISAMIDE 100 MG/1
CAPSULE ORAL
Qty: 180 CAPSULE | Refills: 3 | Status: SHIPPED | OUTPATIENT
Start: 2022-07-06 | End: 2023-01-05 | Stop reason: SDUPTHER

## 2022-07-06 NOTE — PROGRESS NOTES
Neuro Office Visit      Encounter Date: 2022   Patient Name: Imelda Elaine  : 1950   MRN: [id number]   PCP: Dr Colunga  Chief Complaint:    Chief Complaint   Patient presents with   • Tremors       History of Present Illness: Imelda Elaine is a 71 y.o. female who is here today in Neurology for tremor.    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    Tremor  Bilat hand improved on Zonegran 200mg q hs.  300mg caused bad dreams. Able to write and use utensils Anxiety exacerbates symptoms. No side effects. Left hand dominate. Slight lip and head tremor that is not noticeable to pt.    PH  Onset 30 years ago, bilat hands. Interfering with ADL's.+vivid dreams but denies acting them out. Denies hallucinations. Left knee pain causes problems with ambulation.  Failed on Primidone w SE of diarrhea.  TPM caused hair loss    MRI Brain 20: atrophy and chronic small vessel disease     HTN  Elevated bp today. At home DBP has been in 70-80. Recently stopped HCTZ. Has follow up with PCP.  Denies chest pain and heart palpitations.    SH: limited activity due to covid.  FH: mother w tremor. Neg FH of PD    Subjective      Past Medical History:   Past Medical History:   Diagnosis Date   • Anesthesia     low bp only with foot surgery, has had surgeries since and no issue per pt report   • Arthritis    • Back pain    • Depression    • Enterocele 2017   • GERD (gastroesophageal reflux disease)    • High cholesterol    • Hypertension    • Knee pain     BILATERAL   • Menopausal state    • Rectocele 2018   • Seasonal allergies    • Tremor, essential    • Wears eyeglasses    • Wears glasses    • Wears partial dentures     implants       Past Surgical History:   Past Surgical History:   Procedure Laterality Date   • ANTERIOR AND POSTERIOR VAGINAL REPAIR N/A 2017    Vaginal enterocele / anterior posterior  "colporrhaphy;  Jose Maria Chowdhury MD;  Location:  [facility];  Service:    • BLADDER SUSPENSION      MESH TVT?    • COLONOSCOPY      Dr Sheldon reynolds   • EXPLORATORY LAPAROTOMY      \"BOWEL INTO PELVIC BONE\" /Mesh [place]    • EXPLORATORY LAPAROTOMY  2004    AQUILES/ abd sacrocolpopexy/post repair   • FOOT SURGERY Left    • KNEE ARTHROSCOPY Left     meniscus repair   • LAPAROSCOPIC CHOLECYSTECTOMY Right    • CA TOTAL KNEE ARTHROPLASTY Left 2017    Procedure: TOTAL KNEE ARTHROPLASTY LEFT;  Surgeon: Sunny Reynoso MD;  Location:  [facility];  Service: Orthopedics   • TONSILLECTOMY     • TOTAL ABDOMINAL HYSTERECTOMY WITH SALPINGO OOPHORECTOMY Bilateral    • TUBAL ABDOMINAL LIGATION     • VENTRAL HERNIA REPAIR N/A 2016    Procedure: LAPAROSCOPIC REPAIR OF ABDOMINAL WALL HERNIA WITH MESH, CONVERTED TO OPEN ;  Surgeon: Bertha Delacruz MD;  Location:  [facility];  Service:        Family History:   Family History   Problem Relation Age of Onset   • Hypertension Mother    • Diabetes Mother    • Heart attack Mother 59         from MI   • Cancer Father    • Aneurysm Father 57   • Hepatitis Brother         Hep B   • Kidney disease Brother 58   • Alzheimer's disease Maternal Grandmother    • Pneumonia Maternal Grandmother    • Leukemia Maternal Grandfather    • No Known Problems Paternal Grandfather    • No Known Problems Daughter    • No Known Problems Son    • Ovarian cancer Neg Hx    • Breast cancer Neg Hx        Social History:   Social History     Socioeconomic History   • Marital status:    Tobacco Use   • Smoking status: Former Smoker     Packs/day: 1.50     Years: 30.00     Pack years: 45.00     Types: Cigarettes     Start date: 10/1/1967     Quit date: 1986     Years since quittin.0   • Smokeless tobacco: Never Used   Vaping Use   • Vaping Use: Never used   Substance and Sexual Activity   • Alcohol use: No   • Drug use: No   • Sexual activity: Not Currently     " Partners: Male     Birth control/protection: Abstinence, Condom, Surgical     Comment: Total hysterectomy       Medications:     Current Outpatient Medications:   •  atorvastatin (LIPITOR) 10 MG tablet, Take 10 mg by mouth Every Night., Disp: , Rfl:   •  cetirizine (zyrTEC) 10 MG tablet, Take 10 mg by mouth Daily., Disp: , Rfl:   •  docusate sodium 100 MG capsule, Take 100 mg by mouth 2 (Two) Times a Day As Needed for Constipation., Disp: 60 capsule, Rfl: 0  •  estradiol (ESTRACE) 0.5 MG tablet, Take 1 tablet by mouth Daily., Disp: 90 tablet, Rfl: 3  •  ipratropium (ATROVENT) 0.06 % nasal spray, 1 spray into the nostril(s) as directed by provider As Needed., Disp: , Rfl:   •  lansoprazole (PREVACID) 30 MG capsule, Take 30 mg by mouth daily., Disp: , Rfl:   •  losartan (COZAAR) 100 MG tablet, Take 100 mg by mouth Daily., Disp: , Rfl:   •  metoprolol succinate XL (TOPROL-XL) 50 MG 24 hr tablet, Take 1 tablet by mouth Daily., Disp: 90 tablet, Rfl: 3  •  venlafaxine XR (EFFEXOR-XR) 75 MG 24 hr capsule, Take 75 mg by mouth Daily., Disp: , Rfl:   •  zonisamide (ZONEGRAN) 100 MG capsule, 2 capsules per day, Disp: 180 capsule, Rfl: 3    Allergies:   Allergies   Allergen Reactions   • Pneumococcal Vaccines Swelling and Rash     Swelling on entire arm    • Primidone Other (See Comments)     Hair loss   • Topiramate Diarrhea   • Other Swelling and Rash     Shingles vaccine  Swelling of injection site of arm,    • Tuberculin Tests Swelling and Rash     Pt has chest xrays to check        PHQ-9 Total Score:     STEADI Fall Risk Assessment was completed, and patient is at LOW risk for falls.Assessment completed on:7/6/2022    Objective     Physical Exam:   Physical Exam  Eyes:      Pupils: Pupils are equal, round, and reactive to light.   Neurological:      Mental Status: She is oriented to person, place, and time.      Coordination: Finger-Nose-Finger Test abnormal. Heel to Shin Test and Romberg Test normal.      Gait: Gait is  intact.      Deep Tendon Reflexes:      Reflex Scores:       Tricep reflexes are 2+ on the right side and 2+ on the left side.       Bicep reflexes are 2+ on the right side and 2+ on the left side.       Brachioradialis reflexes are 2+ on the right side and 2+ on the left side.       Patellar reflexes are 2+ on the right side and 2+ on the left side.       Achilles reflexes are 2+ on the right side and 2+ on the left side.  Psychiatric:         Speech: Speech normal.         Neurologic Exam     Mental Status   Oriented to person, place, and time.   Follows 3 step commands.   Attention: normal. Concentration: normal.   Speech: speech is normal   Level of consciousness: alert  Knowledge: consistent with education.   Normal comprehension.     Cranial Nerves     CN III, IV, VI   Pupils are equal, round, and reactive to light.  Right pupil: Accommodation: intact.   Left pupil: Accommodation: intact.   CN III: no CN III palsy  CN VI: no CN VI palsy  Nystagmus: none   Diplopia: none  Upgaze: normal  Downgaze: normal  Conjugate gaze: present    CN VII   Facial expression full, symmetric.     CN VIII   Hearing: intact    CN XII   CN XII normal.     Motor Exam   Muscle bulk: normal  Overall muscle tone: normal    Strength   Right biceps: 5/5  Left biceps: 5/5  Right triceps: 5/5  Left triceps: 5/5  Right interossei: 5/5  Left interossei: 5/5  Right quadriceps: 5/5  Left quadriceps: 5/5  Right anterior tibial: 5/5  Left anterior tibial: 5/5  Right posterior tibial: 5/5  Left posterior tibial: 5/5    Sensory Exam   Light touch normal.     Gait, Coordination, and Reflexes     Gait  Gait: normal    Coordination   Romberg: negative  Finger to nose coordination: abnormal  Heel to shin coordination: normal    Tremor   Resting tremor: present  Intention tremor: present  Action tremor: left arm and right arm    Reflexes   Right brachioradialis: 2+  Left brachioradialis: 2+  Right biceps: 2+  Left biceps: 2+  Right triceps: 2+  Left  "triceps: 2+  Right patellar: 2+  Left patellar: 2+  Right achilles: 2+  Left achilles: 2+  Right : 2+  Left : 2+Slight vertical head tremor. Bilat dysarthria        Vital Signs:   Vitals:    07/06/22 1035   BP: 124/98   Pulse: 91   Temp: 97.1 °F (36.2 °C)   SpO2: 97%   Weight: (!) 158 kg (348 lb 9.6 oz)   Height: 157.5 cm (62.01\")     Body mass index is 63.74 kg/m².       Assessment / Plan      Assessment/Plan:   Diagnoses and all orders for this visit:    1. Essential tremor (Primary)  -     zonisamide (ZONEGRAN) 100 MG capsule; 2 capsules per day  Dispense: 180 capsule; Refill: 3    2. Primary hypertension  Comments:  Cont losartan and metoprolol. Follow up with PCP. May need increased dose of metoprolol       Patient Education:     Reviewed medications, potential side effects and signs and symptoms to report. Discussed risk versus benefits of treatment plan with patient and/or family-including medications, labs and radiology that may be ordered. Addressed questions and concerns during visit. Patient and/or family verbalized understanding and agree with plan. Instructed to call the office with any questions and report to ER with any life-threatening symptoms.     Follow Up:   Return in about 6 months (around 1/6/2023).    During this visit the following were done:  Labs Reviewed []    Labs Ordered []    Radiology Reports Reviewed []    Radiology Ordered []    PCP Records Reviewed []    Referring Provider Records Reviewed []    ER Records Reviewed []    Hospital Records Reviewed []    History Obtained From Family []    Radiology Images Reviewed []    Other Reviewed [x]    Records Requested []      Don Castelan, DNP, APRN  "

## 2022-07-20 ENCOUNTER — HOSPITAL ENCOUNTER (OUTPATIENT)
Dept: MAMMOGRAPHY | Facility: HOSPITAL | Age: 72
Discharge: HOME | End: 2022-07-20
Admitting: INTERNAL MEDICINE

## 2022-07-20 DIAGNOSIS — Z12.31 VISIT FOR SCREENING MAMMOGRAM: ICD-10-CM

## 2022-07-20 PROCEDURE — 77067 SCR MAMMO BI INCL CAD: CPT | Performed by: RADIOLOGY

## 2022-07-20 PROCEDURE — 77067 SCR MAMMO BI INCL CAD: CPT

## 2022-07-20 PROCEDURE — 77063 BREAST TOMOSYNTHESIS BI: CPT | Performed by: RADIOLOGY

## 2022-07-20 PROCEDURE — 77063 BREAST TOMOSYNTHESIS BI: CPT

## 2022-09-06 ENCOUNTER — HOSPITAL ENCOUNTER (OUTPATIENT)
Dept: MAMMOGRAPHY | Facility: HOSPITAL | Age: 72
Discharge: HOME | End: 2022-09-06

## 2022-09-06 ENCOUNTER — APPOINTMENT (OUTPATIENT)
Dept: MAMMOGRAPHY | Facility: HOSPITAL | Age: 72
End: 2022-09-06

## 2022-09-06 DIAGNOSIS — Z12.31 VISIT FOR SCREENING MAMMOGRAM: ICD-10-CM

## 2022-09-13 ENCOUNTER — TELEPHONE (OUTPATIENT)
Dept: CARDIOLOGY | Facility: CLINIC | Age: 72
End: 2022-09-13

## 2022-09-13 NOTE — TELEPHONE ENCOUNTER
Patient called reporting she had no refills for metoprolol. I called [facility] to verify they had received them that we had sent previously. They confirmed they did and they would get them ready for the patient to . Message left for the patient on her VM.

## 2023-01-05 ENCOUNTER — OFFICE VISIT (OUTPATIENT)
Dept: NEUROLOGY | Facility: CLINIC | Age: 73
End: 2023-01-05
Payer: MEDICARE

## 2023-01-05 VITALS
OXYGEN SATURATION: 97 % | DIASTOLIC BLOOD PRESSURE: 86 MMHG | TEMPERATURE: 96.9 F | HEIGHT: 62 IN | SYSTOLIC BLOOD PRESSURE: 128 MMHG | WEIGHT: 293 LBS | BODY MASS INDEX: 53.92 KG/M2 | HEART RATE: 73 BPM

## 2023-01-05 DIAGNOSIS — I10 PRIMARY HYPERTENSION: ICD-10-CM

## 2023-01-05 DIAGNOSIS — G25.0 ESSENTIAL TREMOR: Primary | ICD-10-CM

## 2023-01-05 PROCEDURE — 99213 OFFICE O/P EST LOW 20 MIN: CPT | Performed by: NURSE PRACTITIONER

## 2023-01-05 RX ORDER — ZONISAMIDE 100 MG/1
CAPSULE ORAL
Qty: 180 CAPSULE | Refills: 3 | Status: SHIPPED | OUTPATIENT
Start: 2023-01-05

## 2023-01-05 RX ORDER — KETOCONAZOLE 20 MG/ML
SHAMPOO TOPICAL
COMMUNITY
Start: 2022-12-07

## 2023-01-05 NOTE — PROGRESS NOTES
Neuro Office Visit      Encounter Date: 2023   Patient Name: Imelda Elaine  : 1950   MRN: [id number]     Chief Complaint:    Chief Complaint   Patient presents with   • Tremors       History of Present Illness: Imelda Elaine is a 72 y.o. female who is here today in Neurology for essential tremor and htn.    Last visit 2022 w me- cont zonegran 100mg 2 caps dailly. Cont losartan and metoprolol. FU w PCP.      Essential Tremor  Bilat hand tremor controlled on Zonegran 200mg q hs. Left hand dominate. Slight head and lip tremor is mild. Anxiety makes it worse. Writing is legible, able to eat and get dressed.    Having nice dreams. Denies slurred speech, dysphagia, shuffling gait. Has side to side gait for right knee OA. Planning to have knee replacement surgery this summer.    PH  Onset 30 years ago, bilat hands. Interfering with ADL's.+vivid dreams but denies acting them out. Denies hallucinations. Left knee pain causes problems with ambulation.  Failed on Primidone w SE of diarrhea.  TPM caused hair loss  300mg zonegran caused bad dreams.    MRI Brain 20: atrophy and chronic small vessel disease     HTN  Monitor bp at home. Current meds losartan and metoprolol  Followed by PCP. Denies chest pain and sob.      SH: limited activity due to covid.  FH: mother w tremor. Neg FH of PD  Subjective      Past Medical History:   Past Medical History:   Diagnosis Date   • Anesthesia     low bp only with foot surgery, has had surgeries since and no issue per pt report   • Arthritis    • Back pain    • Depression    • Difficulty walking     Left knee replacement  need right knee replacement   • Enterocele 2017   • GERD (gastroesophageal reflux disease)    • High cholesterol    • Hypertension    • Knee pain     BILATERAL   • Menopausal state    • Rectocele 2018   • Seasonal allergies    • Tremor, essential    • Vision loss     Far sighted   • Wears eyeglasses    • Wears glasses    •  Wears partial dentures     implants       Past Surgical History:   Past Surgical History:   Procedure Laterality Date   • ANTERIOR AND POSTERIOR VAGINAL REPAIR N/A 2017    Vaginal enterocele / anterior posterior colporrhaphy;  Jose Maria Chowdhury MD;  Location:  [facility];  Service:    • BLADDER SUSPENSION      MESH TVT?    • COLONOSCOPY      Dr Sheldon reynolds   • EXPLORATORY LAPAROTOMY      \"BOWEL INTO PELVIC BONE\" /Mesh [place]    • EXPLORATORY LAPAROTOMY  2004    AQUILES/ abd sacrocolpopexy/post repair   • FOOT SURGERY Left    • KNEE ARTHROSCOPY Left     meniscus repair   • LAPAROSCOPIC CHOLECYSTECTOMY Right    • MD ARTHRP KNE CONDYLE&PLATU MEDIAL&LAT COMPARTMENTS Left 2017    Procedure: TOTAL KNEE ARTHROPLASTY LEFT;  Surgeon: Sunny Reynoso MD;  Location:  [facility];  Service: Orthopedics   • TONSILLECTOMY     • TOTAL ABDOMINAL HYSTERECTOMY WITH SALPINGO OOPHORECTOMY Bilateral    • TUBAL ABDOMINAL LIGATION     • VENTRAL HERNIA REPAIR N/A 2016    Procedure: LAPAROSCOPIC REPAIR OF ABDOMINAL WALL HERNIA WITH MESH, CONVERTED TO OPEN ;  Surgeon: Bertha Delacruz MD;  Location:  [facility];  Service:        Family History:   Family History   Problem Relation Age of Onset   • Hypertension Mother    • Diabetes Mother    • Heart attack Mother 59         from MI   • Cancer Father    • Aneurysm Father 57   • Hepatitis Brother         Hep B   • Kidney disease Brother 58   • Alzheimer's disease Maternal Grandmother    • Pneumonia Maternal Grandmother    • Parkinsonism Maternal Grandmother         Along w/Alzheimer’s   • Leukemia Maternal Grandfather    • No Known Problems Paternal Grandfather    • No Known Problems Daughter    • No Known Problems Son    • Ovarian cancer Neg Hx    • Breast cancer Neg Hx        Social History:   Social History     Socioeconomic History   • Marital status:    Tobacco Use   • Smoking status: Former     Packs/day: 1.50     Years: 35.00     Pack  years: 52.50     Types: Cigarettes     Start date: 10/1/1967     Quit date: 1986     Years since quittin.5   • Smokeless tobacco: Never   Vaping Use   • Vaping Use: Never used   Substance and Sexual Activity   • Alcohol use: No   • Drug use: Never   • Sexual activity: Not Currently     Partners: Male     Birth control/protection: Surgical, Abstinence, Post-menopausal, Tubal ligation, Hysterectomy     Comment: Total hysterectomy       Medications:     Current Outpatient Medications:   •  atorvastatin (LIPITOR) 10 MG tablet, Take 10 mg by mouth Every Night., Disp: , Rfl:   •  cetirizine (zyrTEC) 10 MG tablet, Take 10 mg by mouth Daily., Disp: , Rfl:   •  docusate sodium 100 MG capsule, Take 100 mg by mouth 2 (Two) Times a Day As Needed for Constipation., Disp: 60 capsule, Rfl: 0  •  estradiol (ESTRACE) 0.5 MG tablet, Take 1 tablet by mouth Daily., Disp: 90 tablet, Rfl: 3  •  ipratropium (ATROVENT) 0.06 % nasal spray, 1 spray into the nostril(s) as directed by provider As Needed., Disp: , Rfl:   •  lansoprazole (PREVACID) 30 MG capsule, Take 30 mg by mouth daily., Disp: , Rfl:   •  losartan (COZAAR) 100 MG tablet, Take 100 mg by mouth Daily., Disp: , Rfl:   •  metoprolol succinate XL (TOPROL-XL) 50 MG 24 hr tablet, Take 1 tablet by mouth Daily., Disp: 90 tablet, Rfl: 3  •  venlafaxine XR (EFFEXOR-XR) 75 MG 24 hr capsule, Take 75 mg by mouth Daily., Disp: , Rfl:   •  zonisamide (ZONEGRAN) 100 MG capsule, 2 capsules per day, Disp: 180 capsule, Rfl: 3  •  ketoconazole (NIZORAL) 2 % shampoo, , Disp: , Rfl:     Allergies:   Allergies   Allergen Reactions   • Pneumococcal Vaccines Swelling and Rash     Swelling on entire arm    • Primidone Other (See Comments)     Hair loss   • Topiramate Diarrhea   • Other Swelling and Rash     Shingles vaccine  Swelling of injection site of arm,    • Tuberculin Tests Swelling and Rash     Pt has chest xrays to check        PHQ-9 Total Score:     SABAS Fall Risk Assessment was  completed, and patient is at MODERATE risk for falls. Assessment completed on:1/5/2023    Objective     Physical Exam:   Physical Exam  Eyes:      Pupils: Pupils are equal, round, and reactive to light.   Neurological:      Mental Status: She is oriented to person, place, and time.      Coordination: Finger-Nose-Finger Test abnormal.      Gait: Gait is intact.   Psychiatric:         Speech: Speech normal.         Neurologic Exam     Mental Status   Oriented to person, place, and time.   Follows 3 step commands.   Attention: normal. Concentration: normal.   Speech: speech is normal   Level of consciousness: alert  Knowledge: consistent with education.   Normal comprehension.     Cranial Nerves     CN III, IV, VI   Pupils are equal, round, and reactive to light.  Right pupil: Accommodation: intact.   Left pupil: Accommodation: intact.   CN III: no CN III palsy  CN VI: no CN VI palsy  Nystagmus: none   Diplopia: none  Upgaze: normal  Downgaze: normal  Conjugate gaze: present    CN VII   Facial expression full, symmetric.     CN VIII   Hearing: intact    CN XII   CN XII normal.     Motor Exam   Muscle bulk: normal  Overall muscle tone: normal    Strength   Right biceps: 5/5  Left biceps: 5/5  Right triceps: 5/5  Left triceps: 5/5  Right interossei: 5/5  Left interossei: 5/5  Right quadriceps: 5/5  Left quadriceps: 5/5  Right anterior tibial: 5/5  Left anterior tibial: 5/5  Right posterior tibial: 5/5  Left posterior tibial: 5/5    Sensory Exam   Light touch normal.     Gait, Coordination, and Reflexes     Gait  Gait: normal    Coordination   Finger to nose coordination: abnormal    Tremor   Resting tremor: absent  Action tremor: absentMild to mod bilat hand tremor that int with activity. Slight head tremor. No bradykinesia.        Vital Signs:   Vitals:    01/05/23 1029   BP: 128/86   Pulse: 73   Temp: 96.9 °F (36.1 °C)   SpO2: 97%   Weight: (!) 155 kg (341 lb 3.2 oz)   Height: 157.5 cm (62.01\")     Body mass index is  62.39 kg/m².         Assessment / Plan      Assessment/Plan:   Diagnoses and all orders for this visit:    1. Essential tremor (Primary)  -     zonisamide (ZONEGRAN) 100 MG capsule; 2 capsules per day  Dispense: 180 capsule; Refill: 3    2. Primary hypertension  Comments:  Con metoprolol and  and losartan       Patient Education:       Reviewed medications, potential side effects and signs and symptoms to report. Discussed risk versus benefits of treatment plan with patient and/or family-including medications, labs and radiology that may be ordered. Addressed questions and concerns during visit. Patient and/or family verbalized understanding and agree with plan. Instructed to call the office with any questions and report to ER with any life-threatening symptoms.     Follow Up:   Return in about 1 year (around 1/5/2024) for Recheck.    During this visit the following were done:  Labs Reviewed []    Labs Ordered []    Radiology Reports Reviewed []    Radiology Ordered []    PCP Records Reviewed []    Referring Provider Records Reviewed []    ER Records Reviewed []    Hospital Records Reviewed []    History Obtained From Family []    Radiology Images Reviewed []    Other Reviewed []    Records Requested []      Don Castelan, DNP, APRN

## 2023-03-21 ENCOUNTER — TELEPHONE (OUTPATIENT)
Dept: OBSTETRICS AND GYNECOLOGY | Facility: CLINIC | Age: 73
End: 2023-03-21

## 2023-03-21 RX ORDER — ESTRADIOL 0.5 MG/1
0.5 TABLET ORAL DAILY
Qty: 90 TABLET | Refills: 0 | Status: SHIPPED | OUTPATIENT
Start: 2023-03-21 | End: 2023-05-08 | Stop reason: SDUPTHER

## 2023-03-21 NOTE — TELEPHONE ENCOUNTER
Caller: Imelda Elaine    Relationship: Self    Best call back number: [phone]    Requested Prescriptions: estradiol (ESTRACE) 0.5 MG tablet       Requested Prescriptions      No prescriptions requested or ordered in this encounter        Pharmacy where request should be sent:    [facility]   Last office visit with prescribing clinician: 3/28/2022   Last telemedicine visit with prescribing clinician: 5/8/2023   Next office visit with prescribing clinician: 5/8/2023     Additional details provided by patient: PT WOULD LIKE REFILL UNTIL HER ANNUAL APPT IN MAY     Does the patient have less than a 3 day supply:  [] Yes  [x] No    Would you like a call back once the refill request has been completed: [] Yes [x] No    If the office needs to give you a call back, can they leave a voicemail: [] Yes [x] No    Ortiz Nuñez Rep   03/21/23 14:07 EDT

## 2023-05-08 ENCOUNTER — OFFICE VISIT (OUTPATIENT)
Dept: OBSTETRICS AND GYNECOLOGY | Facility: CLINIC | Age: 73
End: 2023-05-08
Payer: MEDICARE

## 2023-05-08 VITALS — WEIGHT: 293 LBS | SYSTOLIC BLOOD PRESSURE: 118 MMHG | DIASTOLIC BLOOD PRESSURE: 78 MMHG | BODY MASS INDEX: 60.38 KG/M2

## 2023-05-08 DIAGNOSIS — Z01.419 ENCOUNTER FOR GYNECOLOGICAL EXAMINATION WITHOUT ABNORMAL FINDING: Primary | ICD-10-CM

## 2023-05-08 DIAGNOSIS — N95.1 MENOPAUSAL SYMPTOMS: ICD-10-CM

## 2023-05-08 DIAGNOSIS — N39.3 SUI (STRESS URINARY INCONTINENCE, FEMALE): ICD-10-CM

## 2023-05-08 RX ORDER — SENNOSIDES 8.6 MG
650 CAPSULE ORAL EVERY 8 HOURS PRN
COMMUNITY

## 2023-05-08 RX ORDER — ESTRADIOL 0.5 MG/1
0.5 TABLET ORAL DAILY
Qty: 90 TABLET | Refills: 3 | Status: SHIPPED | OUTPATIENT
Start: 2023-05-08

## 2023-05-08 NOTE — PROGRESS NOTES
Annual Visit     Patient Name: Imelda Elaine  : 1950   MRN: [id number]   Care Team: Patient Care Team:  Comfort Colunga MD as PCP - General (Internal Medicine)  Florinda Lopez APRN as Nurse Practitioner (Interventional Cardiology)  Lisa Grimm APRN as Nurse Practitioner (Nurse Practitioner)    Chief Complaint:    Chief Complaint   Patient presents with   • Annual Exam       HPI: Imelda Elaine is a 72 y.o. year old  presenting to be seen for her gynecologic exam.   S/p total hyst with BSO   In 2017 anterior and posterior repair done   INGE improved after surgery but still present - stable from last yr      Doing well with estrace 0.5mg qd   Tried reducing to qod dose after visit last yr but states she had terrible hot flashes and nt sweats again, so went back to qd dose  Has never had blood clots      Mammogram 2022 birads 1      DEXA has done with PCP - PCP is to be scheduling one for her this yr      Colonoscopy 2018 - rpt due at end of this yr      Hx HTN and hyperlipidemia - well controlled with medications      Her  just had CABG done - he is doing better now       Subjective      I have reviewed the patients family history, social history, past medical history, past surgical history and have updated it as appropriate.    /78   Wt (!) 150 kg (330 lb 3.2 oz)   LMP  (LMP Unknown)   BMI 60.38 kg/m²     BMI reviewed: Body mass index is 60.38 kg/m².      Objective     Physical Exam    Neuro: alert and oriented to person, place and time   General:  alert; cooperative; well developed; well nourished   Skin:  No suspicious lesions seen   Thyroid: normal to inspection and palpation   Lungs:  breathing is unlabored  clear to auscultation bilaterally   Heart:  regular rate and rhythm, S1, S2 normal, no murmur, click, rub or gallop  normal apical impulse   Breasts:  Examined in supine position  Symmetric without masses or skin dimpling  Nipples normal without  inversion, lesions or discharge  There are no palpable axillary nodes   Abdomen: soft, non-tender; no masses  no umbilical or inguinal hernias are present  no hepato-splenomegaly   Pelvis: Clinical staff was present for exam  External genitalia:  normal appearance of the external genitalia including Bartholin's and [place]'s glands.  :  urethral meatus normal;  Vaginal:  normal pink mucosa without prolapse or lesions.  Cervix:  absent.  Uterus:  absent.  Adnexa:  absent, bilateral.  Rectal:  digital rectal exam not performed; anus visually normal appearing.         Assessment / Plan      Assessment  Problems Addressed This Visit    ICD-10-CM ICD-9-CM   1. Encounter for gynecological examination without abnormal finding  Z01.419 V72.31   2. Menopausal symptoms  N95.1 627.2   3. INGE (stress urinary incontinence, female)  N39.3 625.6       Plan    Discussed indications for ERT and risks of cont including blood clot, stroke, MI, and PE   Pt v/u to these risks and desires to cont with estrace 0.5mg qd   Discussed increased incidence rate of breast cancer with HRT   Stressed importance of annual mammogram and monthly SBEs      Discussed Calcium, 600 mg/ Vit. D, 400 IU daily for bone health   DEXA to be scheduled with PCP      INGE improved after A/P repair but still present   Discussed Kegels, avoidance of heavy lifting, and dietary irritants      AV 1 yr            Follow Up  Return in about 1 year (around 5/8/2024) for Annual physical.  Patient was given instructions and counseling regarding her condition or for health maintenance advice. Please see specific information pulled into the AVS if appropriate.     Lisa Grimm, APRN  May 8, 2023  10:32 EDT

## 2023-05-26 PROBLEM — I51.7 CARDIOMEGALY: Status: RESOLVED | Noted: 2021-06-17 | Resolved: 2023-05-26

## 2023-05-26 NOTE — PROGRESS NOTES
Cardiology Outpatient Visit      Identification: Imelda Elaine is a 72 y.o. female who resides in [place], [place]    Reason for visit:  Hypertension, Hyperlipidemia, Palpitations, and PVD      Subjective      Patient is a 72-year-old female who returns today for follow up of her dyspnea on exertion, palpitations and cardiac risk factors.  The patient is morbidly obese and has had progressive dyspnea for several years.  She has been having orthopedic issues and is very inactive.  She gets short of breath just walking across the floor.  She has had ultrasounds with the last being 2020 that shows normal LVEF and no valvular abnormality.  She did have stress testing performed but has been greater than 10 years ago.  She has already had a left knee replacement and is scheduled for a right knee replacement with Dr. Reynoso on the 29th of this month.  She is requesting cardiac clearance.  She has a history of palpitations that have been controlled on metoprolol therapy.  Her blood pressures have been well controlled.  Her only complaint is of fatigue and shortness of breath.  She had recent blood work that was within normal limits.  She is on statin therapy and tolerating without myalgias.    Review of Systems   Constitutional: Positive for malaise/fatigue.   Eyes:  Negative for vision loss in left eye and vision loss in right eye.   Cardiovascular:  Positive for dyspnea on exertion. Negative for chest pain, near-syncope, orthopnea, palpitations, paroxysmal nocturnal dyspnea and syncope.   Musculoskeletal:  Negative for myalgias.   Neurological:  Negative for brief paralysis, excessive daytime sleepiness, focal weakness, numbness, paresthesias and weakness.   All other systems reviewed and are negative.    Allergies   Allergen Reactions    Pneumococcal Vaccines Swelling and Rash     Swelling on entire arm     Primidone Other (See Comments)     Hair loss    Topiramate Diarrhea    Other Swelling and Rash     Shingles  "vaccine  Swelling of injection site of arm,     Tuberculin Tests Swelling and Rash     Pt has chest xrays to check          Current Outpatient Medications   Medication Instructions    acetaminophen (TYLENOL) 650 mg, Oral, Every 8 Hours PRN    atorvastatin (LIPITOR) 10 mg, Oral, Nightly    cetirizine (ZYRTEC) 10 mg, Oral, Daily    docusate sodium 100 mg, Oral, 2 Times Daily PRN    estradiol (ESTRACE) 0.5 mg, Oral, Daily    ipratropium (ATROVENT) 0.06 % nasal spray 1 spray, Nasal, As Needed    ketoconazole (NIZORAL) 2 % shampoo No dose, route, or frequency recorded.    lansoprazole (PREVACID) 30 mg, Oral, Daily    losartan (COZAAR) 100 mg, Oral, Daily    metoprolol succinate XL (TOPROL-XL) 50 mg, Oral, Daily    multivitamin with minerals (MULTIVITAMIN ADULTS 50+ PO) 1 tablet, Oral, Daily    venlafaxine XR (EFFEXOR-XR) 75 mg, Oral, Daily    zonisamide (ZONEGRAN) 100 MG capsule 2 capsules per day         Objective     /72 (BP Location: Right arm, Patient Position: Sitting)   Pulse 64   Ht 157.5 cm (62.01\")   Wt (!) 149 kg (328 lb)   SpO2 95%   BMI 59.98 kg/m²       Constitutional:       Appearance: Healthy appearance. Well-developed.   Eyes:      General: Lids are normal. No scleral icterus.     Conjunctiva/sclera: Conjunctivae normal.   HENT:      Head: Normocephalic and atraumatic.   Neck:      Thyroid: No thyromegaly.      Vascular: No carotid bruit or JVD.   Pulmonary:      Effort: Pulmonary effort is normal.      Breath sounds: Normal breath sounds. No wheezing. No rhonchi. No rales.   Cardiovascular:      Normal rate. Regular rhythm.      Murmurs: There is no murmur.      No gallop.  No rub.   Pulses:     Intact distal pulses.   Edema:     Peripheral edema absent.   Abdominal:      General: There is no distension.      Palpations: Abdomen is soft. There is no abdominal mass.   Musculoskeletal:      Cervical back: Normal range of motion. Skin:     General: Skin is warm and dry.      Findings: No rash. "   Neurological:      General: No focal deficit present.      Mental Status: Alert and oriented to person, place, and time.      Gait: Gait is intact.   Psychiatric:         Attention and Perception: Attention normal.         Mood and Affect: Mood normal.         Behavior: Behavior normal.       Result Review  (reviewed with patient):        ECG 12 Lead    Date/Time: 6/6/2023 10:35 AM  Performed by: Florinda Lopez APRN  Authorized by: Florinda Lopez APRN   Comparison: compared with previous ECG from 5/17/2022  Similar to previous ECG  Rhythm: sinus rhythm  Conduction: right bundle branch block    Clinical impression: abnormal EKG  Comments: QT/QTc 390/435 MS         Lab Results   Component Value Date    GLUCOSE 137 (H) 09/06/2020    BUN 17 09/06/2020    CREATININE 0.90 12/21/2020    BCR 18.5 09/06/2020    K 4.1 09/06/2020    CO2 27.0 09/06/2020    CALCIUM 9.0 09/06/2020    ALBUMIN 3.70 09/06/2020    BILITOT 0.3 09/06/2020    AST 15 09/06/2020    ALT 11 09/06/2020     Lab Results   Component Value Date    WBC 8.43 04/26/2022    HGB 14.4 04/26/2022    HCT 44.3 04/26/2022    MCV 89.1 04/26/2022     04/26/2022     No results found for: CHOL, CHLPL, TRIG, HDL, LDL, LDLDIRECT  Lab Results   Component Value Date    HGBA1C 5.80 (H) 11/07/2017           Assessment     Diagnoses and all orders for this visit:    1. NDIAYE (dyspnea on exertion) (Primary)  Overview:  Echo (11/12/2020):LVEF=61 - 65%.  No significant valvular abnormality    Assessment & Plan:  Obtain myocardial perfusion study for ongoing shortness of breath  Continue metoprolol succinate 50 mg daily    Orders:  -     Stress Test With Myocardial Perfusion (1 Day); Future    2. Encounter for pre-operative cardiovascular clearance  Assessment & Plan:  Obtain myocardial perfusion study for risk stratification and to provide cardiac clearance to undergo right knee replacement with Dr. Reynoso    Orders:  -     Stress Test With Myocardial Perfusion (1 Day);  Future    3. Palpitations  Overview:  Echo (8/8/2017): Normal LVEF.  Grade 1 diastolic dysfunction.  Mild MR and TR.  10 day event monitor (7/27/2017): Normal.  Echo (11/12/2020):LVEF=61 - 65%.RVSP less than 35 mmHg. No significant structural or functional valvular disease.    Assessment & Plan:  Palpitations are controlled on metoprolol succinate      4. RBBB    5. Essential hypertension  Assessment & Plan:  Hypertension is improving with treatment.  Continue current treatment regimen.  Blood pressure will be reassessed at the next regular appointment.      6. Mixed hyperlipidemia  Assessment & Plan:  Continue Lipitor 10 mg daily      Other orders  -     atorvastatin (LIPITOR) 10 MG tablet; Take 1 tablet by mouth Every Night.  Dispense: 90 tablet; Refill: 1  -     metoprolol succinate XL (TOPROL-XL) 50 MG 24 hr tablet; Take 1 tablet by mouth Daily.  Dispense: 90 tablet; Refill: 3  -     losartan (COZAAR) 100 MG tablet; Take 1 tablet by mouth Daily.  Dispense: 90 tablet; Refill: 1  -     ECG 12 Lead          Plan   Obtain myocardial perfusion study for risk stratification/cardiovascular clearance to undergo right knee surgery with Dr. Reynoso  Continue current medications  I will contact patient once results of stress testing are available.  If within normal limits will clear for surgery      Follow-up   Return in about 1 year (around 6/6/2024), or if symptoms worsen or fail to improve.        Florinda Lopez, FLOR  6/6/2023

## 2023-06-06 ENCOUNTER — OFFICE VISIT (OUTPATIENT)
Dept: CARDIOLOGY | Facility: CLINIC | Age: 73
End: 2023-06-06
Payer: MEDICARE

## 2023-06-06 VITALS
HEIGHT: 62 IN | SYSTOLIC BLOOD PRESSURE: 102 MMHG | OXYGEN SATURATION: 95 % | DIASTOLIC BLOOD PRESSURE: 72 MMHG | BODY MASS INDEX: 53.92 KG/M2 | HEART RATE: 64 BPM | WEIGHT: 293 LBS

## 2023-06-06 DIAGNOSIS — R00.2 PALPITATIONS: ICD-10-CM

## 2023-06-06 DIAGNOSIS — I10 ESSENTIAL HYPERTENSION: ICD-10-CM

## 2023-06-06 DIAGNOSIS — R06.09 DOE (DYSPNEA ON EXERTION): Primary | ICD-10-CM

## 2023-06-06 DIAGNOSIS — E78.2 MIXED HYPERLIPIDEMIA: ICD-10-CM

## 2023-06-06 DIAGNOSIS — I45.10 RBBB: ICD-10-CM

## 2023-06-06 DIAGNOSIS — Z01.810 ENCOUNTER FOR PRE-OPERATIVE CARDIOVASCULAR CLEARANCE: ICD-10-CM

## 2023-06-06 PROBLEM — Z01.818 PRE-OPERATIVE CLEARANCE: Status: ACTIVE | Noted: 2023-06-06

## 2023-06-06 PROBLEM — R06.02 SHORTNESS OF BREATH: Status: RESOLVED | Noted: 2020-12-03 | Resolved: 2023-06-06

## 2023-06-06 RX ORDER — ATORVASTATIN CALCIUM 10 MG/1
10 TABLET, FILM COATED ORAL NIGHTLY
Qty: 90 TABLET | Refills: 1 | Status: SHIPPED | OUTPATIENT
Start: 2023-06-06

## 2023-06-06 RX ORDER — METOPROLOL SUCCINATE 50 MG/1
50 TABLET, EXTENDED RELEASE ORAL DAILY
Qty: 90 TABLET | Refills: 3 | Status: SHIPPED | OUTPATIENT
Start: 2023-06-06 | End: 2023-06-07

## 2023-06-06 RX ORDER — MULTIPLE VITAMINS W/ MINERALS TAB 9MG-400MCG
1 TAB ORAL DAILY
COMMUNITY

## 2023-06-06 RX ORDER — LOSARTAN POTASSIUM 100 MG/1
100 TABLET ORAL DAILY
Qty: 90 TABLET | Refills: 1 | Status: SHIPPED | OUTPATIENT
Start: 2023-06-06

## 2023-06-06 NOTE — ASSESSMENT & PLAN NOTE
Obtain myocardial perfusion study for ongoing shortness of breath  Continue metoprolol succinate 50 mg daily

## 2023-06-06 NOTE — ASSESSMENT & PLAN NOTE
Obtain myocardial perfusion study for risk stratification and to provide cardiac clearance to undergo right knee replacement with Dr. Reynoso

## 2023-06-07 RX ORDER — METOPROLOL SUCCINATE 50 MG/1
TABLET, EXTENDED RELEASE ORAL
Qty: 90 TABLET | Refills: 3 | Status: SHIPPED | OUTPATIENT
Start: 2023-06-07

## 2023-06-15 ENCOUNTER — PRE-ADMISSION TESTING (OUTPATIENT)
Dept: PREADMISSION TESTING | Facility: HOSPITAL | Age: 73
End: 2023-06-15
Payer: MEDICARE

## 2023-06-15 VITALS — WEIGHT: 293 LBS | HEIGHT: 62 IN | BODY MASS INDEX: 53.92 KG/M2

## 2023-06-15 LAB
ANION GAP SERPL CALCULATED.3IONS-SCNC: 11 MMOL/L (ref 5–15)
BUN SERPL-MCNC: 22 MG/DL (ref 8–23)
BUN/CREAT SERPL: 23.4 (ref 7–25)
CALCIUM SPEC-SCNC: 9.4 MG/DL (ref 8.6–10.5)
CHLORIDE SERPL-SCNC: 107 MMOL/L (ref 98–107)
CO2 SERPL-SCNC: 23 MMOL/L (ref 22–29)
CREAT SERPL-MCNC: 0.94 MG/DL (ref 0.57–1)
DEPRECATED RDW RBC AUTO: 48.6 FL (ref 37–54)
EGFRCR SERPLBLD CKD-EPI 2021: 65 ML/MIN/1.73
ERYTHROCYTE [DISTWIDTH] IN BLOOD BY AUTOMATED COUNT: 14.3 % (ref 12.3–15.4)
GLUCOSE SERPL-MCNC: 105 MG/DL (ref 65–99)
HBA1C MFR BLD: 5.9 % (ref 4.8–5.6)
HCT VFR BLD AUTO: 43.2 % (ref 34–46.6)
HGB BLD-MCNC: 13.5 G/DL (ref 12–15.9)
MCH RBC QN AUTO: 28.8 PG (ref 26.6–33)
MCHC RBC AUTO-ENTMCNC: 31.3 G/DL (ref 31.5–35.7)
MCV RBC AUTO: 92.3 FL (ref 79–97)
PLATELET # BLD AUTO: 194 10*3/MM3 (ref 140–450)
PMV BLD AUTO: 11.3 FL (ref 6–12)
POTASSIUM SERPL-SCNC: 4.8 MMOL/L (ref 3.5–5.2)
RBC # BLD AUTO: 4.68 10*6/MM3 (ref 3.77–5.28)
SODIUM SERPL-SCNC: 141 MMOL/L (ref 136–145)
WBC NRBC COR # BLD: 6.08 10*3/MM3 (ref 3.4–10.8)

## 2023-06-15 PROCEDURE — 83036 HEMOGLOBIN GLYCOSYLATED A1C: CPT

## 2023-06-15 PROCEDURE — 85027 COMPLETE CBC AUTOMATED: CPT

## 2023-06-15 PROCEDURE — 36415 COLL VENOUS BLD VENIPUNCTURE: CPT

## 2023-06-15 PROCEDURE — 80048 BASIC METABOLIC PNL TOTAL CA: CPT

## 2023-06-15 NOTE — PAT
Discussed with patient options for receiving total joint replacement education and assessed patient's ability and preference. Joint Replacement Guide given to patient during PAT visit since not received a copy within the last year. Encouraged patient/family to read guide thoroughly and notify PAT staff with any questions or concerns. Handout provided directing patient to links to watch online videos related to joint replacement surgery on the [facility] website. The handout gives detailed instructions for joining an online joint replacement class through Zoom or phone conference offered on . Patient agreed to participate by watching videos online. Patient verbalized understanding of instructions and to complete the online learning tool survey. Encouraged to share information with family and/or . An overview of the joint replacement education was provided during the visit including general perioperative instructions that are routine for all surgical patients (PAT PASS, wipes, directions to pre-op, etc.).    Per Anesthesia Request, patient instructed not to take their ACE/ARB medications on the AM of surgery.    Patient to apply Chlorhexadine wipes  to surgical area (as instructed) the night before procedure and the AM of procedure. Wipes provided.    Patient instructed to drink 20 ounces of Gatorade and it needs to be completed 1 hour (for Main OR patients) or 2 hours (scheduled  section & [facility] patients) before given arrival time for procedure (NO RED Gatorade)    Patient verbalized understanding.    Pt has appointment for a stress test tomorrow for cardiac clearance.  Will follow up.

## 2023-06-16 ENCOUNTER — HOSPITAL ENCOUNTER (OUTPATIENT)
Dept: CARDIOLOGY | Facility: HOSPITAL | Age: 73
Discharge: HOME | End: 2023-06-16
Payer: MEDICARE

## 2023-06-16 DIAGNOSIS — R06.09 DOE (DYSPNEA ON EXERTION): ICD-10-CM

## 2023-06-16 DIAGNOSIS — Z01.810 ENCOUNTER FOR PRE-OPERATIVE CARDIOVASCULAR CLEARANCE: ICD-10-CM

## 2023-06-16 PROCEDURE — 93017 CV STRESS TEST TRACING ONLY: CPT

## 2023-06-16 PROCEDURE — 25010000002 REGADENOSON 0.4 MG/5ML SOLUTION: Performed by: NURSE PRACTITIONER

## 2023-06-16 PROCEDURE — 78452 HT MUSCLE IMAGE SPECT MULT: CPT

## 2023-06-16 PROCEDURE — 0 TECHNETIUM SESTAMIBI: Performed by: NURSE PRACTITIONER

## 2023-06-16 PROCEDURE — A9500 TC99M SESTAMIBI: HCPCS | Performed by: NURSE PRACTITIONER

## 2023-06-16 RX ORDER — REGADENOSON 0.08 MG/ML
0.4 INJECTION, SOLUTION INTRAVENOUS ONCE
Status: COMPLETED | OUTPATIENT
Start: 2023-06-16 | End: 2023-06-16

## 2023-06-16 RX ADMIN — TECHNETIUM TC 99M SESTAMIBI 1 DOSE: 1 INJECTION INTRAVENOUS at 08:45

## 2023-06-16 RX ADMIN — REGADENOSON 0.4 MG: 0.08 INJECTION, SOLUTION INTRAVENOUS at 10:16

## 2023-06-16 RX ADMIN — TECHNETIUM TC 99M SESTAMIBI 1 DOSE: 1 INJECTION INTRAVENOUS at 10:20

## 2023-06-19 LAB
BH CV REST NUCLEAR ISOTOPE DOSE: 9.9 MCI
BH CV STRESS BP STAGE 1: NORMAL
BH CV STRESS BP STAGE 3: NORMAL
BH CV STRESS COMMENTS STAGE 1: NORMAL
BH CV STRESS DOSE REGADENOSON STAGE 1: 0.4
BH CV STRESS DURATION MIN STAGE 1: 1
BH CV STRESS DURATION MIN STAGE 2: 1
BH CV STRESS DURATION MIN STAGE 3: 1
BH CV STRESS DURATION MIN STAGE 4: 1
BH CV STRESS DURATION SEC STAGE 1: 0
BH CV STRESS DURATION SEC STAGE 2: 0
BH CV STRESS DURATION SEC STAGE 3: 0
BH CV STRESS DURATION SEC STAGE 4: 0
BH CV STRESS HR STAGE 1: 83
BH CV STRESS HR STAGE 2: 100
BH CV STRESS HR STAGE 3: 96
BH CV STRESS HR STAGE 4: 95
BH CV STRESS NUCLEAR ISOTOPE DOSE: 33 MCI
BH CV STRESS O2 STAGE 1: 97
BH CV STRESS O2 STAGE 2: 98
BH CV STRESS O2 STAGE 3: 100
BH CV STRESS O2 STAGE 4: 98
BH CV STRESS PROTOCOL 1: NORMAL
BH CV STRESS RECOVERY BP: NORMAL MMHG
BH CV STRESS RECOVERY HR: 94 BPM
BH CV STRESS RECOVERY O2: 98 %
BH CV STRESS STAGE 1: 1
BH CV STRESS STAGE 2: 2
BH CV STRESS STAGE 3: 3
BH CV STRESS STAGE 4: 4
LV EF NUC BP: 79 %
MAXIMAL PREDICTED HEART RATE: 149 BPM
PERCENT MAX PREDICTED HR: 70.47 %
STRESS BASELINE BP: NORMAL MMHG
STRESS BASELINE HR: 76 BPM
STRESS O2 SAT REST: 98 %
STRESS PERCENT HR: 83 %
STRESS POST ESTIMATED WORKLOAD: 1 METS
STRESS POST EXERCISE DUR MIN: 4 MIN
STRESS POST EXERCISE DUR SEC: 0 SEC
STRESS POST O2 SAT PEAK: 98 %
STRESS POST PEAK BP: NORMAL MMHG
STRESS POST PEAK HR: 105 BPM
STRESS TARGET HR: 127 BPM

## 2023-06-29 ENCOUNTER — APPOINTMENT (OUTPATIENT)
Dept: GENERAL RADIOLOGY | Facility: HOSPITAL | Age: 73
End: 2023-06-29
Payer: MEDICARE

## 2023-06-29 ENCOUNTER — HOSPITAL ENCOUNTER (OUTPATIENT)
Facility: HOSPITAL | Age: 73
Discharge: HOME | End: 2023-06-30
Attending: ORTHOPAEDIC SURGERY | Admitting: ORTHOPAEDIC SURGERY
Payer: MEDICARE

## 2023-06-29 ENCOUNTER — ANESTHESIA EVENT CONVERTED (OUTPATIENT)
Dept: ANESTHESIOLOGY | Facility: HOSPITAL | Age: 73
End: 2023-06-29
Payer: MEDICARE

## 2023-06-29 DIAGNOSIS — Z96.651 STATUS POST RIGHT KNEE REPLACEMENT: Primary | ICD-10-CM

## 2023-06-29 DIAGNOSIS — E66.01 MORBID OBESITY: ICD-10-CM

## 2023-06-29 PROCEDURE — 25010000002 ROPIVACAINE PER 1 MG: Performed by: ORTHOPAEDIC SURGERY

## 2023-06-29 PROCEDURE — 0 MORPHINE PER 10 MG: Performed by: ORTHOPAEDIC SURGERY

## 2023-06-29 PROCEDURE — 73560 X-RAY EXAM OF KNEE 1 OR 2: CPT

## 2023-06-29 PROCEDURE — 25010000002 DROPERIDOL PER 5 MG

## 2023-06-29 PROCEDURE — 99214 OFFICE O/P EST MOD 30 MIN: CPT | Performed by: INTERNAL MEDICINE

## 2023-06-29 PROCEDURE — 25010000002 CEFAZOLIN PER 500 MG: Performed by: ORTHOPAEDIC SURGERY

## 2023-06-29 PROCEDURE — 25010000002 FENTANYL CITRATE (PF) 50 MCG/ML SOLUTION

## 2023-06-29 PROCEDURE — C1755 CATHETER, INTRASPINAL: HCPCS | Performed by: ORTHOPAEDIC SURGERY

## 2023-06-29 PROCEDURE — 97110 THERAPEUTIC EXERCISES: CPT

## 2023-06-29 PROCEDURE — C1713 ANCHOR/SCREW BN/BN,TIS/BN: HCPCS | Performed by: ORTHOPAEDIC SURGERY

## 2023-06-29 PROCEDURE — C1776 JOINT DEVICE (IMPLANTABLE): HCPCS | Performed by: ORTHOPAEDIC SURGERY

## 2023-06-29 PROCEDURE — 25010000002 ROPIVACAINE PER 1 MG: Performed by: NURSE ANESTHETIST, CERTIFIED REGISTERED

## 2023-06-29 PROCEDURE — 97161 PT EVAL LOW COMPLEX 20 MIN: CPT

## 2023-06-29 PROCEDURE — C1889 IMPLANT/INSERT DEVICE, NOC: HCPCS | Performed by: ORTHOPAEDIC SURGERY

## 2023-06-29 DEVICE — GENESIS II RESURFACING PATELLAR                                    PROSTHESIS  29MM
Type: IMPLANTABLE DEVICE | Site: KNEE | Status: FUNCTIONAL
Brand: GENESIS II

## 2023-06-29 DEVICE — LEGION POSTERIOR STABILIZED                                    OXINIUM FEMORAL SIZE 5 RIGHT
Type: IMPLANTABLE DEVICE | Site: KNEE | Status: FUNCTIONAL
Brand: LEGION

## 2023-06-29 DEVICE — DEV CONTRL TISS STRATAFIX SYMM PDS PLUS VIL CT-1 45CM: Type: IMPLANTABLE DEVICE | Site: KNEE | Status: FUNCTIONAL

## 2023-06-29 DEVICE — GENESIS II NON-POROUS TIBIAL                                    BASEPLATE SIZE 3 RIGHT
Type: IMPLANTABLE DEVICE | Site: KNEE | Status: FUNCTIONAL
Brand: GENESIS II

## 2023-06-29 DEVICE — DEV CONTRL TISS STRATAFIX SPIRAL PGPCL 2/0FS 30X30CM: Type: IMPLANTABLE DEVICE | Site: KNEE | Status: FUNCTIONAL

## 2023-06-29 DEVICE — LEGION PS HIGH FLEX XLPE SZ 3-4 10MM
Type: IMPLANTABLE DEVICE | Site: KNEE | Status: FUNCTIONAL
Brand: LEGION

## 2023-06-29 DEVICE — IMPLANTABLE DEVICE: Type: IMPLANTABLE DEVICE | Site: KNEE | Status: FUNCTIONAL

## 2023-06-29 DEVICE — CMT BONE PALACOS R HI/VISC 1X40: Type: IMPLANTABLE DEVICE | Site: KNEE | Status: FUNCTIONAL

## 2023-06-29 RX ORDER — MELOXICAM 15 MG/1
15 TABLET ORAL ONCE
Status: COMPLETED | OUTPATIENT
Start: 2023-06-29 | End: 2023-06-29

## 2023-06-29 RX ORDER — ONDANSETRON 4 MG/1
4 TABLET, FILM COATED ORAL EVERY 6 HOURS PRN
Status: DISCONTINUED | OUTPATIENT
Start: 2023-06-29 | End: 2023-06-30 | Stop reason: HOSPADM

## 2023-06-29 RX ORDER — TRANEXAMIC ACID 10 MG/ML
1000 INJECTION, SOLUTION INTRAVENOUS ONCE
Status: DISCONTINUED | OUTPATIENT
Start: 2023-06-29 | End: 2023-06-29 | Stop reason: HOSPADM

## 2023-06-29 RX ORDER — LOSARTAN POTASSIUM 50 MG/1
100 TABLET ORAL DAILY
Status: DISCONTINUED | OUTPATIENT
Start: 2023-06-29 | End: 2023-06-30 | Stop reason: HOSPADM

## 2023-06-29 RX ORDER — CEFAZOLIN SODIUM 2 G/100ML
2 INJECTION, SOLUTION INTRAVENOUS ONCE
Status: COMPLETED | OUTPATIENT
Start: 2023-06-29 | End: 2023-06-29

## 2023-06-29 RX ORDER — MIDAZOLAM HYDROCHLORIDE 1 MG/ML
0.5 INJECTION INTRAMUSCULAR; INTRAVENOUS
Status: DISCONTINUED | OUTPATIENT
Start: 2023-06-29 | End: 2023-06-29 | Stop reason: HOSPADM

## 2023-06-29 RX ORDER — TRANEXAMIC ACID 10 MG/ML
1000 INJECTION, SOLUTION INTRAVENOUS ONCE
Status: COMPLETED | OUTPATIENT
Start: 2023-06-29 | End: 2023-06-29

## 2023-06-29 RX ORDER — SODIUM CHLORIDE 9 MG/ML
100 INJECTION, SOLUTION INTRAVENOUS CONTINUOUS
Status: DISCONTINUED | OUTPATIENT
Start: 2023-06-29 | End: 2023-06-30 | Stop reason: HOSPADM

## 2023-06-29 RX ORDER — ONDANSETRON 2 MG/ML
4 INJECTION INTRAMUSCULAR; INTRAVENOUS EVERY 6 HOURS PRN
Status: DISCONTINUED | OUTPATIENT
Start: 2023-06-29 | End: 2023-06-30 | Stop reason: HOSPADM

## 2023-06-29 RX ORDER — AMOXICILLIN 250 MG
2 CAPSULE ORAL 2 TIMES DAILY PRN
Status: DISCONTINUED | OUTPATIENT
Start: 2023-06-29 | End: 2023-06-30 | Stop reason: HOSPADM

## 2023-06-29 RX ORDER — ATORVASTATIN CALCIUM 10 MG/1
10 TABLET, FILM COATED ORAL NIGHTLY
Status: DISCONTINUED | OUTPATIENT
Start: 2023-06-29 | End: 2023-06-30 | Stop reason: HOSPADM

## 2023-06-29 RX ORDER — PANTOPRAZOLE SODIUM 40 MG/1
40 TABLET, DELAYED RELEASE ORAL
Status: DISCONTINUED | OUTPATIENT
Start: 2023-06-30 | End: 2023-06-30 | Stop reason: HOSPADM

## 2023-06-29 RX ORDER — ONDANSETRON 2 MG/ML
4 INJECTION INTRAMUSCULAR; INTRAVENOUS EVERY 6 HOURS PRN
Status: DISCONTINUED | OUTPATIENT
Start: 2023-06-29 | End: 2023-06-29 | Stop reason: SDUPTHER

## 2023-06-29 RX ORDER — ZONISAMIDE 100 MG/1
200 CAPSULE ORAL DAILY
Status: DISCONTINUED | OUTPATIENT
Start: 2023-06-29 | End: 2023-06-30 | Stop reason: HOSPADM

## 2023-06-29 RX ORDER — MELOXICAM 15 MG/1
15 TABLET ORAL DAILY
Status: DISCONTINUED | OUTPATIENT
Start: 2023-06-29 | End: 2023-06-30 | Stop reason: HOSPADM

## 2023-06-29 RX ORDER — DOCUSATE SODIUM 100 MG/1
100 CAPSULE, LIQUID FILLED ORAL 2 TIMES DAILY PRN
Status: DISCONTINUED | OUTPATIENT
Start: 2023-06-29 | End: 2023-06-30 | Stop reason: HOSPADM

## 2023-06-29 RX ORDER — NEBIVOLOL 5 MG/1
5 TABLET ORAL
Status: DISCONTINUED | OUTPATIENT
Start: 2023-06-29 | End: 2023-06-30 | Stop reason: HOSPADM

## 2023-06-29 RX ORDER — SODIUM CHLORIDE 0.9 % (FLUSH) 0.9 %
10 SYRINGE (ML) INJECTION EVERY 12 HOURS SCHEDULED
Status: CANCELLED | OUTPATIENT
Start: 2023-06-29

## 2023-06-29 RX ORDER — BISACODYL 5 MG/1
10 TABLET, DELAYED RELEASE ORAL DAILY PRN
Status: DISCONTINUED | OUTPATIENT
Start: 2023-06-29 | End: 2023-06-30 | Stop reason: HOSPADM

## 2023-06-29 RX ORDER — PREGABALIN 75 MG/1
75 CAPSULE ORAL ONCE
Status: COMPLETED | OUTPATIENT
Start: 2023-06-29 | End: 2023-06-29

## 2023-06-29 RX ORDER — DIPHENHYDRAMINE HCL 25 MG
25 CAPSULE ORAL EVERY 6 HOURS PRN
Status: DISCONTINUED | OUTPATIENT
Start: 2023-06-29 | End: 2023-06-30 | Stop reason: HOSPADM

## 2023-06-29 RX ORDER — FENTANYL CITRATE 50 UG/ML
INJECTION, SOLUTION INTRAMUSCULAR; INTRAVENOUS
Status: COMPLETED
Start: 2023-06-29 | End: 2023-06-29

## 2023-06-29 RX ORDER — BISACODYL 10 MG
10 SUPPOSITORY, RECTAL RECTAL DAILY PRN
Status: DISCONTINUED | OUTPATIENT
Start: 2023-06-29 | End: 2023-06-30 | Stop reason: HOSPADM

## 2023-06-29 RX ORDER — FENTANYL CITRATE 50 UG/ML
50 INJECTION, SOLUTION INTRAMUSCULAR; INTRAVENOUS
Status: DISCONTINUED | OUTPATIENT
Start: 2023-06-29 | End: 2023-06-29

## 2023-06-29 RX ORDER — OXYCODONE HYDROCHLORIDE 5 MG/1
5 TABLET ORAL EVERY 4 HOURS PRN
Status: DISCONTINUED | OUTPATIENT
Start: 2023-06-29 | End: 2023-06-30 | Stop reason: HOSPADM

## 2023-06-29 RX ORDER — DIPHENHYDRAMINE HYDROCHLORIDE 50 MG/ML
25 INJECTION INTRAMUSCULAR; INTRAVENOUS EVERY 6 HOURS PRN
Status: DISCONTINUED | OUTPATIENT
Start: 2023-06-29 | End: 2023-06-30 | Stop reason: HOSPADM

## 2023-06-29 RX ORDER — KETOROLAC TROMETHAMINE 15 MG/ML
15 INJECTION, SOLUTION INTRAMUSCULAR; INTRAVENOUS EVERY 6 HOURS PRN
Status: DISCONTINUED | OUTPATIENT
Start: 2023-06-29 | End: 2023-06-30 | Stop reason: HOSPADM

## 2023-06-29 RX ORDER — OXYCODONE HYDROCHLORIDE 10 MG/1
10 TABLET ORAL EVERY 4 HOURS PRN
Status: DISCONTINUED | OUTPATIENT
Start: 2023-06-29 | End: 2023-06-30 | Stop reason: HOSPADM

## 2023-06-29 RX ORDER — VENLAFAXINE HYDROCHLORIDE 75 MG/1
75 CAPSULE, EXTENDED RELEASE ORAL DAILY
Status: DISCONTINUED | OUTPATIENT
Start: 2023-06-30 | End: 2023-06-30 | Stop reason: HOSPADM

## 2023-06-29 RX ORDER — NALOXONE HCL 0.4 MG/ML
0.1 VIAL (ML) INJECTION
Status: DISCONTINUED | OUTPATIENT
Start: 2023-06-29 | End: 2023-06-30 | Stop reason: HOSPADM

## 2023-06-29 RX ORDER — NEBIVOLOL 5 MG/1
5 TABLET ORAL
Status: DISCONTINUED | OUTPATIENT
Start: 2023-06-29 | End: 2023-06-29

## 2023-06-29 RX ORDER — ROPIVACAINE HYDROCHLORIDE 2 MG/ML
INJECTION, SOLUTION EPIDURAL; INFILTRATION; PERINEURAL CONTINUOUS
Status: DISCONTINUED | OUTPATIENT
Start: 2023-06-29 | End: 2023-06-30 | Stop reason: HOSPADM

## 2023-06-29 RX ORDER — ALBUTEROL SULFATE 2.5 MG/3ML
2.5 SOLUTION RESPIRATORY (INHALATION) ONCE AS NEEDED
Status: DISCONTINUED | OUTPATIENT
Start: 2023-06-29 | End: 2023-06-29

## 2023-06-29 RX ORDER — CETIRIZINE HYDROCHLORIDE 10 MG/1
10 TABLET ORAL DAILY
Status: DISCONTINUED | OUTPATIENT
Start: 2023-06-29 | End: 2023-06-30 | Stop reason: HOSPADM

## 2023-06-29 RX ORDER — SODIUM CHLORIDE 0.9 % (FLUSH) 0.9 %
10 SYRINGE (ML) INJECTION AS NEEDED
Status: CANCELLED | OUTPATIENT
Start: 2023-06-29

## 2023-06-29 RX ORDER — ONDANSETRON 2 MG/ML
4 INJECTION INTRAMUSCULAR; INTRAVENOUS ONCE AS NEEDED
Status: DISCONTINUED | OUTPATIENT
Start: 2023-06-29 | End: 2023-06-29

## 2023-06-29 RX ORDER — DROPERIDOL 2.5 MG/ML
INJECTION, SOLUTION INTRAMUSCULAR; INTRAVENOUS
Status: COMPLETED
Start: 2023-06-29 | End: 2023-06-29

## 2023-06-29 RX ORDER — SODIUM CHLORIDE 9 MG/ML
40 INJECTION, SOLUTION INTRAVENOUS AS NEEDED
Status: CANCELLED | OUTPATIENT
Start: 2023-06-29

## 2023-06-29 RX ORDER — LABETALOL HYDROCHLORIDE 5 MG/ML
10 INJECTION, SOLUTION INTRAVENOUS EVERY 4 HOURS PRN
Status: DISCONTINUED | OUTPATIENT
Start: 2023-06-29 | End: 2023-06-30 | Stop reason: HOSPADM

## 2023-06-29 RX ORDER — LABETALOL HYDROCHLORIDE 5 MG/ML
5 INJECTION, SOLUTION INTRAVENOUS
Status: DISCONTINUED | OUTPATIENT
Start: 2023-06-29 | End: 2023-06-29

## 2023-06-29 RX ORDER — LIDOCAINE HYDROCHLORIDE 10 MG/ML
0.5 INJECTION, SOLUTION EPIDURAL; INFILTRATION; INTRACAUDAL; PERINEURAL ONCE AS NEEDED
Status: COMPLETED | OUTPATIENT
Start: 2023-06-29 | End: 2023-06-29

## 2023-06-29 RX ORDER — CEFAZOLIN SODIUM IN 0.9 % NACL 3 G/100 ML
3000 INTRAVENOUS SOLUTION, PIGGYBACK (ML) INTRAVENOUS EVERY 8 HOURS
Status: COMPLETED | OUTPATIENT
Start: 2023-06-29 | End: 2023-06-30

## 2023-06-29 RX ORDER — SODIUM CHLORIDE, SODIUM LACTATE, POTASSIUM CHLORIDE, CALCIUM CHLORIDE 600; 310; 30; 20 MG/100ML; MG/100ML; MG/100ML; MG/100ML
9 INJECTION, SOLUTION INTRAVENOUS CONTINUOUS PRN
Status: DISCONTINUED | OUTPATIENT
Start: 2023-06-29 | End: 2023-06-30 | Stop reason: HOSPADM

## 2023-06-29 RX ORDER — ACETAMINOPHEN 500 MG
1000 TABLET ORAL ONCE
Status: COMPLETED | OUTPATIENT
Start: 2023-06-29 | End: 2023-06-29

## 2023-06-29 RX ORDER — DROPERIDOL 2.5 MG/ML
0.62 INJECTION, SOLUTION INTRAMUSCULAR; INTRAVENOUS ONCE AS NEEDED
Status: COMPLETED | OUTPATIENT
Start: 2023-06-29 | End: 2023-06-29

## 2023-06-29 RX ORDER — FAMOTIDINE 20 MG/1
20 TABLET, FILM COATED ORAL
Status: COMPLETED | OUTPATIENT
Start: 2023-06-29 | End: 2023-06-29

## 2023-06-29 RX ORDER — ESMOLOL HYDROCHLORIDE 10 MG/ML
INJECTION INTRAVENOUS
Status: COMPLETED
Start: 2023-06-29 | End: 2023-06-29

## 2023-06-29 RX ORDER — ACETAMINOPHEN 500 MG
1000 TABLET ORAL EVERY 6 HOURS
Status: DISCONTINUED | OUTPATIENT
Start: 2023-06-29 | End: 2023-06-30 | Stop reason: HOSPADM

## 2023-06-29 RX ADMIN — SODIUM CHLORIDE 100 ML/HR: 9 INJECTION, SOLUTION INTRAVENOUS at 15:29

## 2023-06-29 RX ADMIN — PREGABALIN 75 MG: 75 CAPSULE ORAL at 07:23

## 2023-06-29 RX ADMIN — CETIRIZINE HYDROCHLORIDE 10 MG: 10 TABLET, FILM COATED ORAL at 15:29

## 2023-06-29 RX ADMIN — FENTANYL CITRATE 50 MCG: 50 INJECTION, SOLUTION INTRAMUSCULAR; INTRAVENOUS at 10:53

## 2023-06-29 RX ADMIN — ACETAMINOPHEN 1000 MG: 500 TABLET ORAL at 15:29

## 2023-06-29 RX ADMIN — ATORVASTATIN CALCIUM 10 MG: 10 TABLET, FILM COATED ORAL at 20:20

## 2023-06-29 RX ADMIN — Medication 1000 MG: at 10:18

## 2023-06-29 RX ADMIN — DROPERIDOL 0.62 MG: 2.5 INJECTION, SOLUTION INTRAMUSCULAR; INTRAVENOUS at 10:41

## 2023-06-29 RX ADMIN — ESMOLOL HYDROCHLORIDE 50 MG: 10 INJECTION, SOLUTION INTRAVENOUS at 11:17

## 2023-06-29 RX ADMIN — CEFAZOLIN 3000 MG: 10 INJECTION, POWDER, FOR SOLUTION INTRAVENOUS at 15:29

## 2023-06-29 RX ADMIN — LIDOCAINE HYDROCHLORIDE 0.2 ML: 10 INJECTION, SOLUTION EPIDURAL; INFILTRATION; INTRACAUDAL; PERINEURAL at 07:10

## 2023-06-29 RX ADMIN — MELOXICAM 15 MG: 15 TABLET ORAL at 15:27

## 2023-06-29 RX ADMIN — MELOXICAM 15 MG: 15 TABLET ORAL at 07:23

## 2023-06-29 RX ADMIN — NEBIVOLOL 5 MG: 5 TABLET ORAL at 20:20

## 2023-06-29 RX ADMIN — SODIUM CHLORIDE, POTASSIUM CHLORIDE, SODIUM LACTATE AND CALCIUM CHLORIDE 9 ML/HR: 600; 310; 30; 20 INJECTION, SOLUTION INTRAVENOUS at 07:10

## 2023-06-29 RX ADMIN — ACETAMINOPHEN 1000 MG: 500 TABLET ORAL at 20:20

## 2023-06-29 RX ADMIN — ZONISAMIDE 200 MG: 100 CAPSULE ORAL at 15:34

## 2023-06-29 RX ADMIN — FAMOTIDINE 20 MG: 20 TABLET ORAL at 07:24

## 2023-06-29 RX ADMIN — ACETAMINOPHEN 1000 MG: 500 TABLET ORAL at 07:22

## 2023-06-29 NOTE — OP NOTE
Preoperative diagnosis:Right knee end stage osteoarthritis    Postoperative diagnosis: Right knee end stage osteoarthritis    Operative procedure: Right total knee arthroplasty with Cori robot computer guidance    Surgeon: Dr. Samuel Reynoso    Assistant: ISAI Dixon.  Necessary during the case for assistance with positioning of the patient, exposure, implantation of components and closure.  Necessary for the success and completion of the case.    Anesthesia: Spinal with local and adductor canal catheter    Estimated blood loss: Minimal    Surgical Approach: Knee Medial Parapatellar     Implants: [employer] posterior stabilized total knee arthroplasty size 5 femur, 3 tibia, 29 patella, 10 polyethylene.    Tourniquet time: 104 minutes at 300 mmHg    A 22 modifier will be added to this case as the patient's BMI is 60.  This added complexity to the case as the patient's size required about 30 minutes of additional time for soft tissue dissection to expose to the knee and for closure.     Indications for procedure: Imelda is a very pleasant 72-year-old female who has struggled with end-stage activity limiting right knee pain secondary to osteoarthritis.  X-rays in November revealed severe tricompartmental degenerative changes in a valgus knee with complete loss of lateral joint space and marginal osteophyte formation.  They have failed exhaustive conservative treatment measures including cortisone injections and physical therapy.  History of a left knee replacement in 2017 that is doing well.  She ambulates with cane assist and uses Tylenol for her knee pain.  After undergoing a shared decision-making process they agreed to proceed with right total knee arthroplasty.  Surgical consent form was signed.    Description of procedure: The patient was seen in the preoperative holding area and the right knee was signed to confirm the correct operative site.  They were seen by anesthesia.  They received Ancef 3 g IV  prophylactic antibiotics within 1 hour of incision time.  They were brought back to the operating room.  Spinal was performed without difficulty and they were given sedation throughout the case.  Patient placed in the supine position and all bony prominences were well-padded. Nonsterile tourniquet was applied to the thigh.  The right lower extremity was prepped and draped in the usual sterile fashion and timeout was performed to confirm right total knee arthroplasty for patient Imelda Elaine.    The right lower extremity was exsanguinated with an Esmarch.  Tourniquet was inflated to 300 mmHg.  With the knee flexed a midline incision was made with a 10 blade scalpel.  Adequate hemostasis maintained with Bovie electrocautery.  Full-thickness medial and lateral flaps were elevated.  Using a fresh 10 blade scalpel I made a medial parapatellar arthrotomy.  The patella was everted.  Patella fat pad and anterior femoral fat pads were excised.  Marginal osteophytes were removed.  Minimal medial release was performed for this valgus knee using Bovie electrocautery.  Z retractors were placed medially and laterally.    Guide pins for the CORI were placed in the medial tibia shaft and distal medial femur. Sensors were placed and data capture was performed per standard CORI computer guidance technique.  Femoral and tibial sizes were determined.    The distal femoral cut was then made with a catia at the previously selected depth and amount of valgus (3-5 degrees). The 4-in-1 cutting guide for the previously selected femur size of 5 was pinned in place at the appropriate amount of external rotation.  Anterior and posterior cuts were made as were the chamfer cuts.  Cut bone was removed.  We then turned our attention to the tibia.    The tibia was subluxed anteriorly. PCL retractor was placed as were medial and lateral Hohmann retractors.  The tibial cutting guide was then pinned in place using CORI guidance for the proximal tibial  cut. We then used the oscillating saw to make the proximal tibial cut and this cut was then checked with the CORI. Medial and lateral menisci were then excised as were posterior osteophytes.    Flexion and extension gaps were then checked and with a 10 mm block we achieved full extension and flexion with excellent alignment. The tibia was sized to a 3 tibial tray centered off the medial third of the tibial tubercle.  The tray was pinned in place.  We then impacted the tibial fins.  Size 5  trial femur was then placed and box cut was made with the reamer and punch. We trialed with a size 10 polyethylene, 5 femur and 3 tibia.  With these trial components in place we achieved full extension and flexion with excellent alignment and stable throughout.     We then turned our attention to the patella.  Patella was sized to 24 mm in thickness and we made a 9 mm patellar cut with the reciprocal saw.  Patella drill holes were made with the appropriate size guide.  A 29 trial button was placed and there was excellent tracking with the no thumbs technique.    Trial components were then removed.  Final components were opened on the back table.  Posterior capsule was injected with 20 mL of half percent ropivacaine and 5 mg of morphine.  Cement was mixed on the back table.  The knee was copiously irrigated with Pulsavac lavage.  The knee was thoroughly dried.  Cement was then applied to the tibia and final size 3 tibial tray was impacted in place and excess cement was removed.  Cement was applied to the femur and final size 5 femur was impacted in place and excess cement removed.  We then placed a 10 mm polyethylene-this was seen to be fully seated in the tibial tray.  Knee was then placed in extension and we applied cement to the cut patellar surface and 29 patella was held in place with patellar clamp.  All excess cement was removed.  The knee was left in extension while cement cured.  While the cement cured we removed the CORI  tibial and femoral pins and sensors.  Tibial pin incisions were closed with 3-0 monocryl.    Once the cement was fully cured we irrigated the knee with diluted Betadine solution and Pulsavac lavage.  The knee was taken through full range of motion and seen to achieve full extension and flexion with excellent patellar tracking.    We then proceeded with closure.  The deep fascia was closed with a #1 Stratafix barbed suture.  Dermis was closed with 2-0 Vicryl.  Skin was closed with a running 3-0 Stratafix barbed subcuticular suture.    A sterile dressing was then applied with mesh dressing and glue.  We then applied 4x4's, ABDs, soft roll and a six-inch Ace bandage.    Tourniquet was deflated at 104 minutes.  Patient was transferred to recovery in stable condition.  All sponge and needle counts were correct ×2.  Patient received first dose of tranexamic acid just prior to incision and the second dose 5-10 minutes prior to letting down the tourniquet to minimize bleeding.    Postoperative plan:  Patient will be admitted to Dr. AYALA for medical management  Mobilize starting today with PT/OT as tolerated  Start Eliquis for DVT prophylaxis tomorrow   consult for home physical therapy and home equipment needs  Follow-up with me in 2-3 weeks.    Sunny Reynoso MD  06/29/23  10:07 EDT    CC: Dr. Comfort Bartholomew

## 2023-06-29 NOTE — PLAN OF CARE
Goal Outcome Evaluation:  Plan of Care Reviewed With: patient, spouse        Progress: no change  Outcome Evaluation: PT eval completed. Pt ambulated 80 feet CGA x2 with FWx with cues for upright posture, increased stride length, and sequencing with walker. Mobility limited d/t fatigue and pain. Knee flex/ext AROM 15-65 degrees at this date. d/c rec home with assist and HHPT.      Anticipated Discharge Disposition (PT): home with assist, home with home health

## 2023-06-29 NOTE — CONSULTS
[facility] at [facility]  Cardiovascular Consultation Note    Reason for consultation: Postop arrhythmia    History of Present Illness:  72-year-old female with morbid obesity, depression, chronic back pain, severe DJD of the knees who has a history of chronic dyspnea on exertion hypertension hyperlipidemia.  She was seen in preop clearance June 6, 2023.  At that time she was scheduled for stress PET which showed no calcification no ischemia.  She has known normal previous EF.  She is a history of palpitations but a negative monitor in the past.  She has chronic dyspnea on exertion.  The patient came in today for right knee replacement.  Postop she developed a tachycardic rhythm.  The nurse reports postop the patient was doing fine but then began to complain of some postop pain.  Then she developed a sudden tachyarrhythmia rates in the 120s to 130s.  The arrhythmia also terminated spontaneously.  The patient is still a little groggy after surgery.  She did not feel her heart race.  She denies any recent episodes of feeling her heart race at home.  She has dyspnea on exertion even with minimal activity.  No history of tightness heaviness squeezing pressure chest jaw throat arms.  The patient states she has a history of wheezing and on a daily basis her family members noticed her wheezing.  Consequently she is also been on metoprolol for many years.    Cardiac risk factors: #1 obesity #2 age greater than 65 #3 hypertension #4 hyperlipidemia    Past medical and surgical history, social and family history reviewed in EMR.    REVIEW OF SYSTEMS:     Past Medical History:   Diagnosis Date   • Anesthesia 2002    low bp only with foot surgery, has had surgeries since and no issue per pt report   • Arthritis    • Back pain    • Depression    • Difficulty walking     Left knee replacement  need right knee replacement   • GERD (gastroesophageal reflux disease)    • High cholesterol    • Hypertension    •  "Knee pain     BILATERAL   • Seasonal allergies    • Tremor, essential      Past Surgical History:   Procedure Laterality Date   • ANTERIOR AND POSTERIOR VAGINAL REPAIR N/A 2017    Vaginal enterocele / anterior posterior colporrhaphy;  Jose Maria Chowdhury MD;  Location:  [facility] OR;  Service:    • BLADDER SUSPENSION      MESH TVT?    • COLONOSCOPY      Dr Sheldon reynolds   • EXPLORATORY LAPAROTOMY      \"BOWEL INTO PELVIC BONE\" /Mesh [place]    • EXPLORATORY LAPAROTOMY  2004    AQUILES/ abd sacrocolpopexy/post repair   • FOOT SURGERY Left    • KNEE ARTHROSCOPY Left     meniscus repair   • LAPAROSCOPIC CHOLECYSTECTOMY Right    • RI ARTHRP KNE CONDYLE&PLATU MEDIAL&LAT COMPARTMENTS Left 2017    Procedure: TOTAL KNEE ARTHROPLASTY LEFT;  Surgeon: Sunny Reynoso MD;  Location:  [facility] OR;  Service: Orthopedics   • TONSILLECTOMY     • TOTAL ABDOMINAL HYSTERECTOMY WITH SALPINGO OOPHORECTOMY Bilateral    • TUBAL ABDOMINAL LIGATION     • VENTRAL HERNIA REPAIR N/A 2016    Procedure: LAPAROSCOPIC REPAIR OF ABDOMINAL WALL HERNIA WITH MESH, CONVERTED TO OPEN ;  Surgeon: Bertha Delacruz MD;  Location:  [facility] OR;  Service:      Social History     Socioeconomic History   • Marital status:    Tobacco Use   • Smoking status: Former     Packs/day: 1.50     Years: 35.00     Pack years: 52.50     Types: Cigarettes     Start date: 10/1/1967     Quit date: 1986     Years since quittin.0   • Smokeless tobacco: Never   Vaping Use   • Vaping Use: Never used   Substance and Sexual Activity   • Alcohol use: No   • Drug use: Never   • Sexual activity: Defer     Partners: Male     Birth control/protection: Surgical, Abstinence, Post-menopausal, Tubal ligation, Hysterectomy     Comment: Total hysterectomy     Family History   Problem Relation Age of Onset   • Hypertension Mother    • Diabetes Mother    • Heart attack Mother 59         from MI   • Cancer Father    • Aneurysm Father 57   • " Hepatitis Brother         Hep B   • Kidney disease Brother 58   • Alzheimer's disease Maternal Grandmother    • Pneumonia Maternal Grandmother    • Parkinsonism Maternal Grandmother         Along w/Alzheimer’s   • Leukemia Maternal Grandfather    • No Known Problems Paternal Grandfather    • No Known Problems Daughter    • No Known Problems Son    • Ovarian cancer Neg Hx    • Breast cancer Neg Hx        H&P ROS reviewed and pertinent CV ROS as noted in HPI.    Cardiac: Patient did not have palpitations with her tachyarrhythmia today.  She denies any recent palpitations/no anginal pain  Respiratory: Patient has a history of chronic dyspnea even with activities of daily living.  She is also noted to wheeze every day  Lower Extremities: Complains of significant pain in her knees and she status post knee replacement  GI: No nausea vomiting diarrhea bright blood per rectum or melena  Neuro: No stroke TIA or neurologic event  Hematology: No bleeding diathesis ecchymosis or petechia  Renal: No renal disease or hematuria  Musculoskeletal: History of severe degenerative joint disease of the knees with previous left TKR and today underwent right TKR  Endocrine: No diabetes or hypothyroidism  Constitutional: No fever or chills  Psych: No suicidal ideation      Physical Exam: General very pleasant morbidly obese female currently heart rate 78.  Nontachypneic not dyspneic       HEENT: No JVP or bruits.  No icterus.  Tongue midline       Respiratory: Equal bilateral symmetrical expansion with auscultation bilaterally       Cardiovascular: Regular rate and rhythm without any obvious murmur and no edema to palpation       GI: Obese and soft       Lower Extremities: No lesions       Neuro: Facial expressions symmetrical moves all 4 extremities       Skin: Warm and dry no edema palpation       Psych: Pleasant affect and at times    Results Review: Previous nuclear fusion scan June 23 was negative for ischemia and there was no  coronary calcification.  Previous Holter monitor negative for arrhythmia previous echocardiogram normal wall motion EF.  Preop EKG sinus rhythm right bundle branch block           Vital Sign Min/Max for last 24 hours  Temp  Min: 97.2 °F (36.2 °C)  Max: 98.4 °F (36.9 °C)   BP  Min: 109/78  Max: 148/78   Pulse  Min: 77  Max: 123   Resp  Min: 13  Max: 18   SpO2  Min: 94 %  Max: 97 %   No data recorded      Intake/Output Summary (Last 24 hours) at 6/29/2023 1235  Last data filed at 6/29/2023 0940  Gross per 24 hour   Intake 1000 ml   Output 30 ml   Net 970 ml             Current Facility-Administered Medications:   •  albuterol (PROVENTIL) nebulizer solution 0.083% 2.5 mg/3mL, 2.5 mg, Nebulization, Once PRN, Lopez Engel CRNA  •  esmolol (BREVIBLOC) bolus from bag 10 mg/mL 50 mg, 50 mg, Intravenous, Once, Jose Maria Seay MD  •  fentaNYL citrate (PF) (SUBLIMAZE) injection 50 mcg, 50 mcg, Intravenous, Q5 Min PRN, Lopez Engel CRNA, 50 mcg at 06/29/23 1053  •  HYDROmorphone (DILAUDID) injection 0.5 mg, 0.5 mg, Intravenous, Q5 Min PRN, Lopez Engel CRNA  •  labetalol (NORMODYNE,TRANDATE) injection 5 mg, 5 mg, Intravenous, Q5 Min PRN, Lopez Engel CRNA  •  lactated ringers bolus 500 mL, 500 mL, Intravenous, Once PRN, Lopez Engel CRNA  •  lactated ringers infusion, 9 mL/hr, Intravenous, Continuous PRN, Jose Maria Seay MD, Last Rate: 9 mL/hr at 06/29/23 0730, New Bag at 06/29/23 0924  •  ondansetron (ZOFRAN) injection 4 mg, 4 mg, Intravenous, Once PRN, Lopez Engel CRNA  •  ropivacaine (NAROPIN) 0.2 % infusion (INFUSYSTEM), , Peripheral Nerve, Continuous, Lopez Engel CRNA, 1,000 mg at 06/29/23 1018    Lab Review:             Invalid input(s): CHLORIDE  Estimated Creatinine Clearance: 78.1 mL/min (by C-G formula based on SCr of 0.94 mg/dL).        .lipd  Lab Results   Component Value Date    AST 15 09/06/2020    ALT 11 09/06/2020       Radiology Reports:  Imaging Results (Last 72 Hours)     Procedure  Component Value Units Date/Time    XR Knee 1 or 2 View Right [[id number]] Collected: 06/29/23 1042     Updated: 06/29/23 1046    Narrative:      XR KNEE 1 OR 2 VW RIGHT    Date of Exam: 6/29/2023 10:23 AM EDT    Indication: Post-Op Knee Arthoplasty    Comparison: None available.    Findings:  Expected postoperative changes of total knee arthroplasty. Normal appearing alignment. No evidence of immediate hardware complication or periprosthetic fracture. Tibial surgical tracks are seen. Expected soft tissue gas.      Impression:      Impression:  Postoperative changes of total knee arthroplasty without evidence of immediate hardware complication.      Electronically Signed: Tate Wangorty    6/29/2023 10:43 AM EDT    Workstation ID: [id number]          Assessment/Plan: 1 postop SVT.  The nurse reports it started and terminated abruptly.  Rates got up to the high 120s 130s.  Patient was asymptomatic with this.  Of note however the patient has complained in the past of palpitations.  There is never been a documented arrhythmia.  The patient is being admitted postop.  Of note this patient has extreme dyspnea on exertion even with activities of daily living as well as she wheezes daily.  Her symptoms of dyspnea have been ongoing for years and she is also been on metoprolol for years.  At this time I am going to DC her metoprolol and start her on Bystolic 5 mg.  We will give her first dose tonight it will be worthwhile just to see if her breathing improves on a more beta-1 selective agent  If she continues to have recurrent SVT would give her IV metoprolol.      Ramy Larkin MD  06/29/23  12:35 EDT

## 2023-06-29 NOTE — H&P
"Patient Name: Imelda Elaine  MRN: [id number]  : 1950  DOS: 2023    Attending: Sunny Reynoso,*    Primary Care Provider: Comfort Colunga MD      Chief complaint: Right knee Pain.    Subjective   Patient is a pleasant 72 y.o. female presented for scheduled surgery by Dr. Reynoso.  She underwent right total knee arthroplasty under spinal anesthesia.  She tolerated surgery well and was admitted for further medical management.  Shortly after surgery patient suddenly developed tachyarrhythmia with rates in the 120-130 beats per minute.  Cardiology consulted in PACU and she was given esmolol bolus with resolution of tachyarrhythmia.  Patient noted that she has been having shortness of breath and wheezing on a daily basis for quite some time and has been on metoprolol for many years.  Cardiology stopped metoprolol and starting patient on Bystolic to see if her breathing improves.  Patient reports she has felt heart racing and palpitations in the past but tachyarrhythmia has never been documented.  She denies further cardiac history or history of DVT/PE.    When seen postop, patient is resting comfortably.  Heart rate is well controlled at 70 bpm.  She denies pain, nausea, shortness of breath or chest pain.    Allergies:  Allergies   Allergen Reactions    Primidone Other (See Comments)     Hair loss    Topiramate Diarrhea    Tuberculin Tests Swelling and Rash     Pt has chest xrays to check     Zostavax [Zoster Vaccine Live] Swelling and Rash     \"Swelling of injection site of arm\"       Meds:  Medications Prior to Admission   Medication Sig Dispense Refill Last Dose    atorvastatin (LIPITOR) 10 MG tablet Take 1 tablet by mouth Every Night. 90 tablet 1 2023 at 0400    cetirizine (zyrTEC) 10 MG tablet Take 1 tablet by mouth Daily.   2023    estradiol (ESTRACE) 0.5 MG tablet Take 1 tablet by mouth Daily. 90 tablet 3 2023    ipratropium (ATROVENT) 0.06 % nasal spray 1 spray into the " "nostril(s) as directed by provider As Needed.   6/28/2023    ketoconazole (NIZORAL) 2 % shampoo    6/29/2023    lansoprazole (PREVACID) 30 MG capsule Take 1 capsule by mouth Daily.   6/29/2023    losartan (COZAAR) 100 MG tablet Take 1 tablet by mouth Daily. 90 tablet 1 6/28/2023    metoprolol succinate XL (TOPROL-XL) 50 MG 24 hr tablet TAKE ONE TABLET BY MOUTH DAILY (Patient taking differently: Take 1 tablet by mouth Daily.) 90 tablet 3 6/29/2023 at 0400    multivitamin with minerals tablet tablet Take 1 tablet by mouth Daily.   6/28/2023    venlafaxine XR (EFFEXOR-XR) 75 MG 24 hr capsule Take 1 capsule by mouth Daily.   6/29/2023 at 0400    zonisamide (ZONEGRAN) 100 MG capsule 2 capsules per day (Patient taking differently: Take 2 capsules by mouth Daily. 2 capsules per day) 180 capsule 3 6/28/2023    acetaminophen (TYLENOL) 650 MG 8 hr tablet Take 1 tablet by mouth Every 8 (Eight) Hours As Needed for Mild Pain.   6/22/2023    docusate sodium 100 MG capsule Take 100 mg by mouth 2 (Two) Times a Day As Needed for Constipation. 60 capsule 0 6/27/2023         History:   Past Medical History:   Diagnosis Date    Anesthesia 2002    low bp only with foot surgery, has had surgeries since and no issue per pt report    Arthritis     Back pain     Depression     Difficulty walking     Left knee replacement  need right knee replacement    GERD (gastroesophageal reflux disease)     High cholesterol     Hypertension     Knee pain     BILATERAL    Seasonal allergies     Tremor, essential      Past Surgical History:   Procedure Laterality Date    ANTERIOR AND POSTERIOR VAGINAL REPAIR N/A 4/17/2017    Vaginal enterocele / anterior posterior colporrhaphy;  Jose Maria Chowdhury MD;  Location: [facility] OR;  Service:     BLADDER SUSPENSION  1999    MESH TVT?     COLONOSCOPY  2018    Dr Chung - normal    EXPLORATORY LAPAROTOMY  2010    \"BOWEL INTO PELVIC BONE\" /Mesh [place]     EXPLORATORY LAPAROTOMY  04/2004    AQUILES/ abd sacrocolpopexy/post " "repair    FOOT SURGERY Left     KNEE ARTHROSCOPY Left     meniscus repair    LAPAROSCOPIC CHOLECYSTECTOMY Right     MA ARTHRP KNE CONDYLE&PLATU MEDIAL&LAT COMPARTMENTS Left 2017    Procedure: TOTAL KNEE ARTHROPLASTY LEFT;  Surgeon: Sunny Reynoso MD;  Location:  [facility] OR;  Service: Orthopedics    TONSILLECTOMY      TOTAL ABDOMINAL HYSTERECTOMY WITH SALPINGO OOPHORECTOMY Bilateral     TUBAL ABDOMINAL LIGATION      VENTRAL HERNIA REPAIR N/A 2016    Procedure: LAPAROSCOPIC REPAIR OF ABDOMINAL WALL HERNIA WITH MESH, CONVERTED TO OPEN ;  Surgeon: Bertha Delacruz MD;  Location:  [facility] OR;  Service:      Family History   Problem Relation Age of Onset    Hypertension Mother     Diabetes Mother     Heart attack Mother 59         from MI    Cancer Father     Aneurysm Father 57    Hepatitis Brother         Hep B    Kidney disease Brother 58    Alzheimer's disease Maternal Grandmother     Pneumonia Maternal Grandmother     Parkinsonism Maternal Grandmother         Along w/Alzheimer’s    Leukemia Maternal Grandfather     No Known Problems Paternal Grandfather     No Known Problems Daughter     No Known Problems Son     Ovarian cancer Neg Hx     Breast cancer Neg Hx      Social History     Tobacco Use    Smoking status: Former     Packs/day: 1.50     Years: 35.00     Pack years: 52.50     Types: Cigarettes     Start date: 10/1/1967     Quit date: 1986     Years since quittin.0    Smokeless tobacco: Never   Vaping Use    Vaping Use: Never used   Substance Use Topics    Alcohol use: No    Drug use: Never       Review of Systems  Pertinent items are noted in HPI, all other systems reviewed and negative    Vital Signs  /94 (BP Location: Right arm, Patient Position: Lying)   Pulse (!) 123   Temp 98.4 °F (36.9 °C)   Resp 13   Ht 157.5 cm (62\")   Wt (!) 150 kg (330 lb)   LMP  (LMP Unknown)   SpO2 96%   BMI 60.36 kg/m²     Physical Exam:    General Appearance:    Alert, " cooperative, in no acute distress   Head:    Normocephalic, without obvious abnormality, atraumatic   Eyes:            Lids and lashes normal, conjunctivae and sclerae normal, no   icterus, no pallor, corneas clear    Ears:    Ears appear intact with no abnormalities noted   Throat:   No oral lesions, no thrush, oral mucosa moist   Neck:   No adenopathy, supple, trachea midline, no thyromegaly         Lungs:     Clear to auscultation,respirations regular, even and                   unlabored    Heart:    Regular rhythm and normal rate, normal S1 and S2, no       murmur, no gallop   Abdomen:     Normal bowel sounds, no masses, no organomegaly, soft        non-tender, non-distended, no guarding, no rebound                 tenderness   Genitalia:    Deferred   Extremities: RLE, CDI dressing on knee, PNB cath present.    Pulses:   Pulses palpable and equal bilaterally   Skin:   No bleeding, bruising or rash   Neurologic:   Cranial nerves 2 - 12 grossly intact, flexion dorsiflexion intact bilateral feet      I reviewed the patient's new clinical results.             Invalid input(s): NEUTOPHILPCT,  EOSPCT        Invalid input(s): LABALBU, PROT  Lab Results   Component Value Date    HGBA1C 5.90 (H) 06/15/2023      Latest Reference Range & Units 06/15/23 10:30   Sodium 136 - 145 mmol/L 141   Potassium 3.5 - 5.2 mmol/L 4.8   Chloride 98 - 107 mmol/L 107   CO2 22.0 - 29.0 mmol/L 23.0   Anion Gap 5.0 - 15.0 mmol/L 11.0   BUN 8 - 23 mg/dL 22   Creatinine 0.57 - 1.00 mg/dL 0.94   BUN/Creatinine Ratio 7.0 - 25.0  23.4   eGFR >60.0 mL/min/1.73 65.0   Glucose 65 - 99 mg/dL 105 (H)   Calcium 8.6 - 10.5 mg/dL 9.4   Hemoglobin A1C 4.80 - 5.60 % 5.90 (H)   WBC 3.40 - 10.80 10*3/mm3 6.08   RBC 3.77 - 5.28 10*6/mm3 4.68   Hemoglobin 12.0 - 15.9 g/dL 13.5   Hematocrit 34.0 - 46.6 % 43.2   Platelets 140 - 450 10*3/mm3 194   RDW 12.3 - 15.4 % 14.3   MCV 79.0 - 97.0 fL 92.3   MCH 26.6 - 33.0 pg 28.8   MCHC 31.5 - 35.7 g/dL 31.3 (L)   MPV  6.0 - 12.0 fL 11.3   RDW-SD 37.0 - 54.0 fl 48.6   (H): Data is abnormally high  (L): Data is abnormally low      Assessment and Plan:       Status post right knee replacement    Essential hypertension    Mixed hyperlipidemia    Elevated hemoglobin A1c    Gastroesophageal reflux disease without esophagitis      Plan  1. PT/OT, Weight bearing as tolerated RLE  2. Pain control-prns, ACB cath with ropivacaine infusion.  3. IS-encourage  4. DVT proph- Mechanicals and Eliquis starting postop day #1  5. Bowel regimen  6. Resume home medications as appropriate  7. Monitor post-op labs  8. DC planning for home    Post op SVT  -Cardiology consulted, stopped home Metoprolol, Esmolol bolus given and started Bystolic  -Continuous cardiac monitoring    HTN, Hyperlipidemia  - Continue home losartan, statin  - Monitor BP   - Holding parameters for BP meds  - Labetalol PRN for SBP>170    GERD  -Protonix    Elevated hemoglobin A1c: In prediabetic range  - Explained to patient implication of A1C elevation, need to modify diet and increase activity, and f/u with PCP.      Dragon disclaimer:  Part of this encounter note is an electronic transcription/translation of spoken language to printed text. The electronic translation of spoken language may permit erroneous, or at times, nonsensical words or phrases to be inadvertently transcribed; Although I have reviewed the note for such errors, some may still exist.    Electronically signed by FLOR Deras, 06/29/23, 2:28 PM EDT.

## 2023-06-29 NOTE — H&P
"Pre-Op H&P  Imelda Elaine  [id number]  1950    Chief complaint: R knee pain    HPI:  Patient is a 72 y.o.female who presents with chronic right knee pain that has continued to worsen for the past two years despite conservative treatment. She is scheduled for a right total knee arthroplasty. She does not take any blood thinners routinely.      Review of Systems:  General ROS: negative for chills or fevers  Cardiovascular ROS: no chest pain or dyspnea on exertion  Respiratory ROS: no cough, shortness of breath, or wheezing    Allergies:   Allergies   Allergen Reactions   • Primidone Other (See Comments)     Hair loss   • Topiramate Diarrhea   • Tuberculin Tests Swelling and Rash     Pt has chest xrays to check    • Zostavax [Zoster Vaccine Live] Swelling and Rash     \"Swelling of injection site of arm\"       Home Meds:    No current facility-administered medications on file prior to encounter.     Current Outpatient Medications on File Prior to Encounter   Medication Sig Dispense Refill   • acetaminophen (TYLENOL) 650 MG 8 hr tablet Take 1 tablet by mouth Every 8 (Eight) Hours As Needed for Mild Pain.     • cetirizine (zyrTEC) 10 MG tablet Take 1 tablet by mouth Daily.     • docusate sodium 100 MG capsule Take 100 mg by mouth 2 (Two) Times a Day As Needed for Constipation. 60 capsule 0   • estradiol (ESTRACE) 0.5 MG tablet Take 1 tablet by mouth Daily. 90 tablet 3   • ipratropium (ATROVENT) 0.06 % nasal spray 1 spray into the nostril(s) as directed by provider As Needed.     • ketoconazole (NIZORAL) 2 % shampoo      • lansoprazole (PREVACID) 30 MG capsule Take 1 capsule by mouth Daily.     • venlafaxine XR (EFFEXOR-XR) 75 MG 24 hr capsule Take 1 capsule by mouth Daily.     • zonisamide (ZONEGRAN) 100 MG capsule 2 capsules per day (Patient taking differently: Take 2 capsules by mouth Daily. 2 capsules per day) 180 capsule 3       PMH:   Past Medical History:   Diagnosis Date   • Anesthesia 2002    low bp only " "with foot surgery, has had surgeries since and no issue per pt report   • Arthritis    • Back pain    • Depression    • Difficulty walking     Left knee replacement  need right knee replacement   • Enterocele 2017   • GERD (gastroesophageal reflux disease)    • High cholesterol    • Hypertension    • Knee pain     BILATERAL   • Seasonal allergies    • Tremor, essential      PSH:    Past Surgical History:   Procedure Laterality Date   • ANTERIOR AND POSTERIOR VAGINAL REPAIR N/A 2017    Vaginal enterocele / anterior posterior colporrhaphy;  Jose Maria Chowdhury MD;  Location:  [facility];  Service:    • BLADDER SUSPENSION      MESH TVT?    • COLONOSCOPY      Dr Sheldon reynolds   • EXPLORATORY LAPAROTOMY      \"BOWEL INTO PELVIC BONE\" /[place]    • EXPLORATORY LAPAROTOMY  2004    AQUILES/ abd sacrocolpopexy/post repair   • FOOT SURGERY Left    • KNEE ARTHROSCOPY Left     meniscus repair   • LAPAROSCOPIC CHOLECYSTECTOMY Right    • [place] ARTHRP KNE CONDYLE&PLATU MEDIAL&LAT COMPARTMENTS Left 2017    Procedure: TOTAL KNEE ARTHROPLASTY LEFT;  Surgeon: Sunny Reynoso MD;  Location:  [facility];  Service: Orthopedics   • TONSILLECTOMY     • TOTAL ABDOMINAL HYSTERECTOMY WITH SALPINGO OOPHORECTOMY Bilateral    • TUBAL ABDOMINAL LIGATION     • VENTRAL HERNIA REPAIR N/A 2016    Procedure: LAPAROSCOPIC REPAIR OF ABDOMINAL WALL HERNIA WITH MESH, CONVERTED TO OPEN ;  Surgeon: Bertha Delacruz MD;  Location:  [facility];  Service:        Immunization History:  Influenza: UTD  Pneumococcal: UTD  Tetanus: unsure    Social History:   Tobacco:   Social History     Tobacco Use   Smoking Status Former   • Packs/day: 1.50   • Years: 35.00   • Pack years: 52.50   • Types: Cigarettes   • Start date: 10/1/1967   • Quit date: 1986   • Years since quittin.0   Smokeless Tobacco Never      Alcohol:     Social History     Substance and Sexual Activity   Alcohol Use No       Vitals:           BP " "139/86 (BP Location: Right arm, Patient Position: Sitting)   Pulse 81   Temp 97.2 °F (36.2 °C) (Temporal)   Resp 18   Ht 157.5 cm (62\")   Wt (!) 150 kg (330 lb)   LMP  (LMP Unknown)   SpO2 95%   BMI 60.36 kg/m²     Physical Exam:  General Appearance:    Alert, cooperative, no distress, appears stated age   Head:    Normocephalic, without obvious abnormality, atraumatic   Lungs:     Clear to auscultation bilaterally, respirations unlabored    Heart:   Regular rate and rhythm, no murmur, rub or gallop    Abdomen:    Soft, nontender. No rigidity or guarding.    Breast Exam:    deferred   Genitalia:    deferred   Extremities:   No cyanosis or edema   Skin:   Skin color, texture, turgor normal, no rashes or lesions   Neurologic:   Grossly intact   Results Review  LABS:  Lab Results   Component Value Date    WBC 6.08 06/15/2023    HGB 13.5 06/15/2023    HCT 43.2 06/15/2023    MCV 92.3 06/15/2023     06/15/2023    NEUTROABS 5.78 04/26/2022    GLUCOSE 105 (H) 06/15/2023    BUN 22 06/15/2023    CREATININE 0.94 06/15/2023    EGFRIFNONA 61 09/06/2020     06/15/2023    K 4.8 06/15/2023     06/15/2023    CO2 23.0 06/15/2023    MG 1.8 09/06/2020    CALCIUM 9.4 06/15/2023    ALBUMIN 3.70 09/06/2020    AST 15 09/06/2020    ALT 11 09/06/2020    BILITOT 0.3 09/06/2020       RADIOLOGY:  No radiology results for the last 3 days     I reviewed the patient's new imaging results and agree with the interpretation.    Impression: R knee primary osteoarthritis     Plan: Right total knee arthroplasty     Willie Damon PA-C   6/29/2023   06:56 EDT   "

## 2023-06-29 NOTE — THERAPY EVALUATION
"Patient Name: Imelda Elaine  : 1950    MRN: [id number]                              Today's Date: 2023       Admit Date: 2023    Visit Dx: No diagnosis found.  Patient Active Problem List   Diagnosis   • Morbid obesity   • Palpitations   • Essential hypertension   • Mixed hyperlipidemia   • Prediabetes   • Osteoarthritis of left knee   • Leukocytosis, mild, likely reactive   • Abnormal EKG   • Anxiety and depression   • BPV (benign positional vertigo)   • Elevated hemoglobin A1c   • Gastroesophageal reflux disease without esophagitis   • Osteopenia   • Peripheral vascular disease   • S/P total knee arthroplasty, left   • Tremor, essential   • NDIAYE (dyspnea on exertion)   • Body mass index 40.0-44.9, adult   • Abnormal blood level of copper   • RBBB   • Pre-operative clearance   • Encounter for pre-operative cardiovascular clearance   • Status post right knee replacement     Past Medical History:   Diagnosis Date   • Anesthesia     low bp only with foot surgery, has had surgeries since and no issue per pt report   • Arthritis    • Back pain    • Depression    • Difficulty walking     Left knee replacement  need right knee replacement   • GERD (gastroesophageal reflux disease)    • High cholesterol    • Hypertension    • Knee pain     BILATERAL   • Seasonal allergies    • Tremor, essential      Past Surgical History:   Procedure Laterality Date   • ANTERIOR AND POSTERIOR VAGINAL REPAIR N/A 2017    Vaginal enterocele / anterior posterior colporrhaphy;  Jose Maria Chowdhury MD;  Location: [facility];  Service:    • BLADDER SUSPENSION      MESH TVT?    • COLONOSCOPY      Dr Chung - normal   • EXPLORATORY LAPAROTOMY      \"BOWEL INTO PELVIC BONE\" /[facility] [place]    • EXPLORATORY LAPAROTOMY  2004    AQUILES/ abd sacrocolpopexy/post repair   • FOOT SURGERY Left    • KNEE ARTHROSCOPY Left     meniscus repair   • LAPAROSCOPIC CHOLECYSTECTOMY Right    • [place] ARTHRP KNE CONDYLE&PLATU " MEDIAL&LAT COMPARTMENTS Left 11/16/2017    Procedure: TOTAL KNEE ARTHROPLASTY LEFT;  Surgeon: Sunny Reynoso MD;  Location: [facility];  Service: Orthopedics   • TONSILLECTOMY     • TOTAL ABDOMINAL HYSTERECTOMY WITH SALPINGO OOPHORECTOMY Bilateral 1989   • TUBAL ABDOMINAL LIGATION     • VENTRAL HERNIA REPAIR N/A 6/21/2016    Procedure: LAPAROSCOPIC REPAIR OF ABDOMINAL WALL HERNIA WITH MESH, CONVERTED TO OPEN ;  Surgeon: Bertha Delacruz MD;  Location:  [facility];  Service:       General Information     Row Name 06/29/23 1506          Physical Therapy Time and Intention    Document Type evaluation  -     Mode of Treatment physical therapy  -     Row Name 06/29/23 1506          General Information    Patient Profile Reviewed yes  -     Prior Level of Function min assist:;all household mobility;gait;transfer;bed mobility  Mobility limited d/t pain, ambulated with cane at baseline and has FWx.  -     Existing Precautions/Restrictions fall;cardiac;other (see comments)  R TKA, WBAT RLE, adductor nerve cath  -     Barriers to Rehab previous functional deficit  -     Row Name 06/29/23 1506          Living Environment    People in Home spouse  -     Row Name 06/29/23 1506          Home Main Entrance    Number of Stairs, Main Entrance none  -     Row Name 06/29/23 1506          Stairs Within Home, Primary    Stairs, Within Home, Primary has elevator to condo, has transfer bench at tub  -     Number of Stairs, Within Home, Primary none  -HM     Row Name 06/29/23 1506          Cognition    Orientation Status (Cognition) oriented x 4  -     Row Name 06/29/23 1506          Safety Issues, Functional Mobility    Safety Issues Affecting Function (Mobility) awareness of need for assistance;insight into deficits/self-awareness;safety precautions follow-through/compliance;safety precaution awareness;sequencing abilities  -     Impairments Affecting Function (Mobility) balance;endurance/activity  tolerance;strength;pain;sensation/sensory awareness;range of motion (ROM)  -           User Key  (r) = Recorded By, (t) = Taken By, (c) = Cosigned By    Initials Name Provider Type     Bre Vaughan PT Physical Therapist               Mobility     Row Name 06/29/23 1509          Bed Mobility    Bed Mobility supine-sit  -     Supine-Sit [place] (Bed Mobility) minimum assist (75% patient effort);1 person assist  -     Assistive Device (Bed Mobility) head of bed elevated  -     Comment, (Bed Mobility) Min A x1 for RLE d/t pain, denied dizziness sitting EOB, increased time required for mobility  -     Row Name 06/29/23 1509          Sit-Stand Transfer    Sit-Stand [place] (Transfers) contact guard;2 person assist  -     Assistive Device (Sit-Stand Transfers) walker, front-wheeled  -     Comment, (Sit-Stand Transfer) cues for sequencing and hand placement  -     Row Name 06/29/23 1509          Gait/Stairs (Locomotion)    [place] Level (Gait) contact guard;2 person assist  -     Assistive Device (Gait) walker, front-wheeled  -HM     Distance in Feet (Gait) 80  -HM     Deviations/Abnormal Patterns (Gait) bilateral deviations;gait speed decreased;mable decreased;stride length decreased  -     Bilateral Gait Deviations forward flexed posture;heel strike decreased  -     Comment, (Gait/Stairs) Pt ambulated with a step to pattern with FWx progressing to slight step through pattern with cueing. Pt cued for increased stride length, sequencing with walker, and upright posture. No knee buckling or instability noted. Slight SOA with mobility and further mobility limited d/t fatigue.  -           User Key  (r) = Recorded By, (t) = Taken By, (c) = Cosigned By    Initials Name Provider Type     Bre Vaughan PT Physical Therapist               Obj/Interventions     Row Name 06/29/23 1514          Range of Motion Comprehensive    General Range of Motion other (see comments)  -      Comment, General Range of Motion RLE knee flex/ext 15-65  -     Row Name 06/29/23 1514          Strength Comprehensive (MMT)    General Manual Muscle Testing (MMT) Assessment lower extremity strength deficits identified  -     Comment, General Manual Muscle Testing (MMT) Assessment Pt able to complete RLE active dorsiflexion, Min A for SLR  -     Row Name 06/29/23 1514          Motor Skills    Therapeutic Exercise hip;knee;ankle  -     Row Name 06/29/23 1514          Knee (Therapeutic Exercise)    Knee (Therapeutic Exercise) AAROM (active assistive range of motion);AROM (active range of motion);isometric exercises  -     Knee AROM (Therapeutic Exercise) LAQ (long arc quad);SLR (straight leg raise);SAQ (short arc quad);right;3 repetitions;heel slides  -     Knee AAROM (Therapeutic Exercise) right;flexion;extension;3 repetitions  -     Knee Isometrics (Therapeutic Exercise) right;quad sets;3 repetitions  -     Row Name 06/29/23 1514          Ankle (Therapeutic Exercise)    Ankle (Therapeutic Exercise) AROM (active range of motion)  -     Ankle AROM (Therapeutic Exercise) bilateral;dorsiflexion;plantarflexion;10 repetitions  -     Row Name 06/29/23 1514          Balance    Balance Assessment sitting static balance;sitting dynamic balance;sit to stand dynamic balance;standing static balance;standing dynamic balance  -     Static Sitting Balance standby assist  -     Dynamic Sitting Balance standby assist  -     Position, Sitting Balance unsupported;sitting edge of bed  -     Sit to Stand Dynamic Balance contact guard;2-person assist  -     Static Standing Balance contact guard  -     Dynamic Standing Balance contact guard  -     Position/Device Used, Standing Balance supported;walker, front-wheeled  -     Balance Interventions standing;dynamic;occupation based/functional task  -     Comment, Balance no overt LOB  -     Row Name 06/29/23 1514          Sensory Assessment  (Somatosensory)    Sensory Assessment (Somatosensory) LE sensation intact  -           User Key  (r) = Recorded By, (t) = Taken By, (c) = Cosigned By    Initials Name Provider Type     Bre Vaughan, PT Physical Therapist               Goals/Plan     Row Name 06/29/23 1522          Bed Mobility Goal 1 (PT)    Activity/Assistive Device (Bed Mobility Goal 1, PT) bed mobility activities, all  -HM     [place] Level/Cues Needed (Bed Mobility Goal 1, PT) independent  -HM     Time Frame (Bed Mobility Goal 1, PT) long term goal (LTG);10 days  -HM     Progress/Outcomes (Bed Mobility Goal 1, PT) new goal  -     Row Name 06/29/23 1522          Transfer Goal 1 (PT)    Activity/Assistive Device (Transfer Goal 1, PT) sit-to-stand/stand-to-sit;bed-to-chair/chair-to-bed  -HM     [place] Level/Cues Needed (Transfer Goal 1, PT) standby assist  -HM     Time Frame (Transfer Goal 1, PT) long term goal (LTG);10 days  -HM     Progress/Outcome (Transfer Goal 1, PT) new goal  -     Row Name 06/29/23 1522          Gait Training Goal 1 (PT)    Activity/Assistive Device (Gait Training Goal 1, PT) gait (walking locomotion);assistive device use  -HM     [place] Level (Gait Training Goal 1, PT) standby assist  -HM     Distance (Gait Training Goal 1, PT) 150  -HM     Time Frame (Gait Training Goal 1, PT) long term goal (LTG);10 days  -HM     Progress/Outcome (Gait Training Goal 1, PT) new goal  -     Row Name 06/29/23 1522          Therapy Assessment/Plan (PT)    Planned Therapy Interventions (PT) balance training;bed mobility training;gait training;home exercise program;postural re-education;patient/family education;neuromuscular re-education;ROM (range of motion);strengthening;transfer training  -           User Key  (r) = Recorded By, (t) = Taken By, (c) = Cosigned By    Initials Name Provider Type     Bre Vaughan, PT Physical Therapist               Clinical Impression     Row Name 06/29/23 1514           Pain    Pretreatment Pain Rating 2/10  -     Posttreatment Pain Rating 5/10  -     Pain Location - Side/Orientation Right  -     Pain Location generalized  -     Pain Location - knee  -     Pre/Posttreatment Pain Comment tolerated  -     Pain Intervention(s) Ambulation/increased activity;Repositioned;Cold pack;Elevated  -     Row Name 06/29/23 1518          Plan of Care Review    Plan of Care Reviewed With patient;spouse  -     Progress no change  -     Outcome Evaluation PT eval completed. Pt ambulated 80 feet CGA x2 with FWx with cues for upright posture, increased stride length, and sequencing with walker. Mobility limited d/t fatigue and pain. Knee flex/ext AROM 15-65 degrees at this date. d/c rec home with assist and HHPT.  -     Row Name 06/29/23 1518          Therapy Assessment/Plan (PT)    Rehab Potential (PT) good, to achieve stated therapy goals  -     Criteria for Skilled Interventions Met (PT) yes;skilled treatment is necessary;meets criteria  -     Therapy Frequency (PT) 2 times/day  -     Row Name 06/29/23 1518          Vital Signs    Pre Systolic BP Rehab 118  -HM     Pre Treatment Diastolic BP 74  -HM     Pretreatment Heart Rate (beats/min) 91  -HM     Posttreatment Heart Rate (beats/min) 69  -HM     Pre SpO2 (%) 94  -HM     O2 Delivery Pre Treatment room air  -HM     O2 Delivery Intra Treatment room air  -HM     Post SpO2 (%) 94  -HM     O2 Delivery Post Treatment room air  -HM     Pre Patient Position Supine  -     Intra Patient Position Standing  -     Post Patient Position Sitting  -     Row Name 06/29/23 1518          Positioning and Restraints    Pre-Treatment Position in bed  -     Post Treatment Position chair  -HM     In Chair notified nsg;reclined;sitting;call light within reach;encouraged to call for assist;exit alarm on;with family/caregiver;waffle cushion;compression device;legs elevated  -           User Key  (r) = Recorded By, (t) = Taken By, (c) =  Cosigned By    Initials Name Provider Type     Bre Vaughan, YANICK Physical Therapist               Outcome Measures     Row Name 06/29/23 1524          How much help from another person do you currently need...    Turning from your back to your side while in flat bed without using bedrails? 3  -HM     Moving from lying on back to sitting on the side of a flat bed without bedrails? 3  -HM     Moving to and from a bed to a chair (including a wheelchair)? 3  -HM     Standing up from a chair using your arms (e.g., wheelchair, bedside chair)? 3  -HM     Climbing 3-5 steps with a railing? 3  -HM     To walk in hospital room? 3  -HM     AM-PAC 6 Clicks Score (PT) 18  -HM     Highest level of mobility 6 --> Walked 10 steps or more  -     Row Name 06/29/23 1524          Functional Assessment    Outcome Measure Options AM-PAC 6 Clicks Basic Mobility (PT)  -           User Key  (r) = Recorded By, (t) = Taken By, (c) = Cosigned By    Initials Name Provider Type     Bre Vaughan PT Physical Therapist                             Physical Therapy Education     Title: PT OT SLP Therapies (In Progress)     Topic: Physical Therapy (Done)     Point: Mobility training (Done)     Learning Progress Summary           Patient Acceptance, E,TB, VU,NR by  at 6/29/2023 1524                   Point: Home exercise program (Done)     Learning Progress Summary           Patient Acceptance, E,TB, VU,NR by  at 6/29/2023 1524                   Point: Body mechanics (Done)     Learning Progress Summary           Patient Acceptance, E,TB, VU,NR by  at 6/29/2023 1524                   Point: Precautions (Done)     Learning Progress Summary           Patient Acceptance, E,TB, VU,NR by  at 6/29/2023 1524                               User Key     Initials Effective Dates Name Provider Type [facility] 09/22/22 -  Bre Vaughan PT Physical Therapist PT              PT Recommendation and Plan  Planned Therapy Interventions  (PT): balance training, bed mobility training, gait training, home exercise program, postural re-education, patient/family education, neuromuscular re-education, ROM (range of motion), strengthening, transfer training  Plan of Care Reviewed With: patient, spouse  Progress: no change  Outcome Evaluation: PT eval completed. Pt ambulated 80 feet CGA x2 with FWx with cues for upright posture, increased stride length, and sequencing with walker. Mobility limited d/t fatigue and pain. Knee flex/ext AROM 15-65 degrees at this date. d/c rec home with assist and HHPT.     Time Calculation:    PT Charges     Row Name 06/29/23 1526             Time Calculation    Start Time 1400  -HM      PT Received On 06/29/23  -HM      PT Goal Re-Cert Due Date 07/09/23  -HM         Timed Charges    82489 - PT Therapeutic Exercise Minutes 8  -HM         Untimed Charges    PT Eval/Re-eval Minutes 46  -HM         Total Minutes    Timed Charges Total Minutes 8  -HM      Untimed Charges Total Minutes 46  -HM       Total Minutes 54  -HM            User Key  (r) = Recorded By, (t) = Taken By, (c) = Cosigned By    Initials Name Provider Type     Bre Vaughan, PT Physical Therapist              Therapy Charges for Today     Code Description Service Date Service Provider Modifiers Qty    17898747647 HC PT THER PROC EA 15 MIN 6/29/2023 Bre Vaughan, PT GP 1    80013102373 HC PT EVAL LOW COMPLEXITY 4 6/29/2023 Bre Vaughan, PT GP 1          PT G-Codes  Outcome Measure Options: AM-PAC 6 Clicks Basic Mobility (PT)  AM-PAC 6 Clicks Score (PT): 18  PT Discharge Summary  Anticipated Discharge Disposition (PT): home with assist, home with home health    Bre Vaughan PT  6/29/2023

## 2023-06-29 NOTE — BRIEF OP NOTE
TOTAL KNEE ARTHROPLASTY WITH CORI ROBOT  Progress Note    Imelda Elaine  6/29/2023    Pre-op Diagnosis:   right knee osteoarthritis       Post-Op Diagnosis Codes:     * Primary localized osteoarthritis of right knee [M17.11]    Procedure/CPT® Codes:  OR ARTHRP KNE CONDYLE&PLATU MEDIAL&LAT COMPARTMENTS [39027]  OR CPTR-ASST SURGICAL NAVIGATION IMAGE-LESS [34217]      Procedure(s):  TOTAL KNEE ARTHROPLASTY WITH CORI ROBOT - RIGHT    Surgical Approach: Knee Medial Parapatellar            Surgeon(s):  Sunny Reynoso MD    Assistant: ISAI Dixon    Anesthesia: Spinal with local and adductor canal catheter    Staff:   Circulator: Jaida Desai RN; Mira Wells, LEVY; Lorri Hess RN  Scrub Person: Bre Alas; Ammon Martinez  Vendor Representative: Alban Ospina ([employer])  Nursing Assistant: Kirstin Mars PCT  Assistant: Huber Phoenix RNFA  Assistant: Huber Phoenix RNFA      Estimated Blood Loss: minimal    Urine Voided: * No values recorded between 6/29/2023  7:30 AM and 6/29/2023  9:54 AM *    Specimens:                None          Drains: * No LDAs found *    Findings: Right knee severe tricompartmental degenerative changes with valgus deformity        Complications: None    Implants: [employer] posterior stabilized total knee arthroplasty with a size 5 femur, 3 tibia, 10 polyethylene and 29 patella    Tourniquet time: 104 minutes at 300 mmHg    Assistant: Huber Phoenix RNFA  was responsible for performing the following activities: Retraction, assistance with implantation components and closure and their skilled assistance was necessary for the success of this case.    Sunny Reynoso MD     Date: 6/29/2023  Time: 10:06 EDT

## 2023-06-30 VITALS
DIASTOLIC BLOOD PRESSURE: 56 MMHG | BODY MASS INDEX: 53.92 KG/M2 | TEMPERATURE: 97.6 F | HEART RATE: 67 BPM | WEIGHT: 293 LBS | OXYGEN SATURATION: 93 % | RESPIRATION RATE: 18 BRPM | HEIGHT: 62 IN | SYSTOLIC BLOOD PRESSURE: 105 MMHG

## 2023-06-30 PROBLEM — I47.1 PSVT (PAROXYSMAL SUPRAVENTRICULAR TACHYCARDIA): Status: ACTIVE | Noted: 2023-06-30

## 2023-06-30 PROBLEM — I47.10 PSVT (PAROXYSMAL SUPRAVENTRICULAR TACHYCARDIA): Status: ACTIVE | Noted: 2023-06-30

## 2023-06-30 LAB
ANION GAP SERPL CALCULATED.3IONS-SCNC: 9 MMOL/L (ref 5–15)
BASOPHILS # BLD AUTO: 0.01 10*3/MM3 (ref 0–0.2)
BASOPHILS NFR BLD AUTO: 0.1 % (ref 0–1.5)
BUN SERPL-MCNC: 28 MG/DL (ref 8–23)
BUN/CREAT SERPL: 24.3 (ref 7–25)
CALCIUM SPEC-SCNC: 8.4 MG/DL (ref 8.6–10.5)
CHLORIDE SERPL-SCNC: 109 MMOL/L (ref 98–107)
CO2 SERPL-SCNC: 22 MMOL/L (ref 22–29)
CREAT SERPL-MCNC: 1.15 MG/DL (ref 0.57–1)
DEPRECATED RDW RBC AUTO: 49.1 FL (ref 37–54)
EGFRCR SERPLBLD CKD-EPI 2021: 51 ML/MIN/1.73
EOSINOPHIL # BLD AUTO: 0 10*3/MM3 (ref 0–0.4)
EOSINOPHIL NFR BLD AUTO: 0 % (ref 0.3–6.2)
ERYTHROCYTE [DISTWIDTH] IN BLOOD BY AUTOMATED COUNT: 14.3 % (ref 12.3–15.4)
GLUCOSE SERPL-MCNC: 131 MG/DL (ref 65–99)
HCT VFR BLD AUTO: 34 % (ref 34–46.6)
HGB BLD-MCNC: 10.5 G/DL (ref 12–15.9)
IMM GRANULOCYTES # BLD AUTO: 0.02 10*3/MM3 (ref 0–0.05)
IMM GRANULOCYTES NFR BLD AUTO: 0.2 % (ref 0–0.5)
LYMPHOCYTES # BLD AUTO: 1.11 10*3/MM3 (ref 0.7–3.1)
LYMPHOCYTES NFR BLD AUTO: 11.9 % (ref 19.6–45.3)
MCH RBC QN AUTO: 28.8 PG (ref 26.6–33)
MCHC RBC AUTO-ENTMCNC: 30.9 G/DL (ref 31.5–35.7)
MCV RBC AUTO: 93.2 FL (ref 79–97)
MONOCYTES # BLD AUTO: 1.04 10*3/MM3 (ref 0.1–0.9)
MONOCYTES NFR BLD AUTO: 11.1 % (ref 5–12)
NEUTROPHILS NFR BLD AUTO: 7.16 10*3/MM3 (ref 1.7–7)
NEUTROPHILS NFR BLD AUTO: 76.7 % (ref 42.7–76)
NRBC BLD AUTO-RTO: 0 /100 WBC (ref 0–0.2)
PLATELET # BLD AUTO: 169 10*3/MM3 (ref 140–450)
PMV BLD AUTO: 11.5 FL (ref 6–12)
POTASSIUM SERPL-SCNC: 5.2 MMOL/L (ref 3.5–5.2)
RBC # BLD AUTO: 3.65 10*6/MM3 (ref 3.77–5.28)
SODIUM SERPL-SCNC: 140 MMOL/L (ref 136–145)
WBC NRBC COR # BLD: 9.34 10*3/MM3 (ref 3.4–10.8)

## 2023-06-30 PROCEDURE — 80048 BASIC METABOLIC PNL TOTAL CA: CPT | Performed by: ORTHOPAEDIC SURGERY

## 2023-06-30 PROCEDURE — 97530 THERAPEUTIC ACTIVITIES: CPT

## 2023-06-30 PROCEDURE — 97165 OT EVAL LOW COMPLEX 30 MIN: CPT

## 2023-06-30 PROCEDURE — 85025 COMPLETE CBC W/AUTO DIFF WBC: CPT | Performed by: ORTHOPAEDIC SURGERY

## 2023-06-30 PROCEDURE — 97110 THERAPEUTIC EXERCISES: CPT

## 2023-06-30 PROCEDURE — 97535 SELF CARE MNGMENT TRAINING: CPT

## 2023-06-30 PROCEDURE — 99214 OFFICE O/P EST MOD 30 MIN: CPT | Performed by: INTERNAL MEDICINE

## 2023-06-30 PROCEDURE — 25010000002 CEFAZOLIN PER 500 MG: Performed by: ORTHOPAEDIC SURGERY

## 2023-06-30 RX ORDER — MELOXICAM 15 MG/1
15 TABLET ORAL DAILY
Qty: 15 TABLET | Refills: 0 | Status: SHIPPED | OUTPATIENT
Start: 2023-06-30 | End: 2023-07-15

## 2023-06-30 RX ORDER — OXYCODONE HYDROCHLORIDE 5 MG/1
5 TABLET ORAL EVERY 4 HOURS PRN
Qty: 40 TABLET | Refills: 0 | Status: SHIPPED | OUTPATIENT
Start: 2023-06-30 | End: 2024-02-21

## 2023-06-30 RX ORDER — DOCUSATE SODIUM 100 MG/1
100 CAPSULE, LIQUID FILLED ORAL 2 TIMES DAILY
Qty: 30 CAPSULE | Refills: 0 | Status: SHIPPED | OUTPATIENT
Start: 2023-06-30 | End: 2023-07-15

## 2023-06-30 RX ORDER — ROPIVACAINE HYDROCHLORIDE 2 MG/ML
1 INJECTION, SOLUTION EPIDURAL; INFILTRATION; PERINEURAL CONTINUOUS
Start: 2023-06-30 | End: 2023-07-28 | Stop reason: HOSPADM

## 2023-06-30 RX ORDER — ASPIRIN 325 MG
325 TABLET, DELAYED RELEASE (ENTERIC COATED) ORAL DAILY
Qty: 30 TABLET | Refills: 0 | Status: SHIPPED | OUTPATIENT
Start: 2023-07-01 | End: 2023-07-28 | Stop reason: HOSPADM

## 2023-06-30 RX ORDER — NEBIVOLOL 5 MG/1
5 TABLET ORAL
Qty: 90 TABLET | Refills: 3 | Status: SHIPPED | OUTPATIENT
Start: 2023-06-30 | End: 2023-08-29 | Stop reason: SDUPTHER

## 2023-06-30 RX ORDER — SENNOSIDES 8.6 MG
650 CAPSULE ORAL EVERY 8 HOURS
Status: ON HOLD
Start: 2023-06-30 | End: 2023-08-25 | Stop reason: SDUPTHER

## 2023-06-30 RX ADMIN — APIXABAN 2.5 MG: 2.5 TABLET, FILM COATED ORAL at 08:48

## 2023-06-30 RX ADMIN — VENLAFAXINE HYDROCHLORIDE 75 MG: 75 CAPSULE, EXTENDED RELEASE ORAL at 08:49

## 2023-06-30 RX ADMIN — PANTOPRAZOLE SODIUM 40 MG: 40 TABLET, DELAYED RELEASE ORAL at 05:39

## 2023-06-30 RX ADMIN — ZONISAMIDE 200 MG: 100 CAPSULE ORAL at 08:48

## 2023-06-30 RX ADMIN — CEFAZOLIN 3000 MG: 10 INJECTION, POWDER, FOR SOLUTION INTRAVENOUS at 00:39

## 2023-06-30 RX ADMIN — ACETAMINOPHEN 1000 MG: 500 TABLET ORAL at 08:49

## 2023-06-30 RX ADMIN — MELOXICAM 15 MG: 15 TABLET ORAL at 08:49

## 2023-06-30 RX ADMIN — ACETAMINOPHEN 1000 MG: 500 TABLET ORAL at 05:38

## 2023-06-30 RX ADMIN — CETIRIZINE HYDROCHLORIDE 10 MG: 10 TABLET, FILM COATED ORAL at 08:49

## 2023-06-30 NOTE — DISCHARGE SUMMARY
Patient Name: Imelda Elaine  MRN: [id number]  : 1950  DOS: 2023    Attending: Sunny Reynoso,*    Primary Care Provider: Comfort Colunga MD    Date of Admission:.2023  6:01 AM    Date of Discharge:  2023    Discharge Diagnosis:     Status post right knee replacement    Essential hypertension    Mixed hyperlipidemia    Elevated hemoglobin A1c    Gastroesophageal reflux disease without esophagitis    PSVT (paroxysmal supraventricular tachycardia)      Hospital Course    At admit:  Patient is a pleasant 72 y.o. female presented for scheduled surgery by Dr. Reynoso.  She underwent right total knee arthroplasty under spinal anesthesia.  She tolerated surgery well and was admitted for further medical management.  Shortly after surgery patient suddenly developed tachyarrhythmia with rates in the 120-130 beats per minute.  Cardiology consulted in PACU and she was given esmolol bolus with resolution of tachyarrhythmia.  Patient noted that she has been having shortness of breath and wheezing on a daily basis for quite some time and has been on metoprolol for many years.  Cardiology stopped metoprolol and starting patient on Bystolic to see if her breathing improves.  Patient reports she has felt heart racing and palpitations in the past but tachyarrhythmia has never been documented.  She denies further cardiac history or history of DVT/PE.     When seen postop, patient is resting comfortably.  Heart rate is well controlled at 70 bpm.  She denies pain, nausea, shortness of breath or chest pain.     After admit:    Patient was provided pain medications as needed for pain control, along with adductor canal nerve block infusion of Ropivacaine.    Adjustments were made to pain medications to optimize postop pain management. Risks and benefits of opiate medications discussed with patient. DARIUS report was reviewed.    Patient was seen by PT and OT and has progressed well over her stay.    She  used an IS for atelectasis prophylaxis and aspirin along with mechanicals for DVT prophylaxis.    Home medications were resumed as appropriate, and labs were monitored and remained fairly stable.     As noted above, patient was seen postop day 1 by cardiology as well, further plan ( #1 PSVT-patient started on Bystolic last evening no significant arrhythmia overnight.  The patient can be discharged home.  Recommend follow-up with Sridevi Lopez in 4 to 6 weeks  2 history of dyspnea on exertion with wheezing-hopefully this will improve off of metoprolol).  Prescription of Bystolic was sent at time of discharge.      With the progress she has made, Ms. Elaine is ready for DC home today.      She will have an Infupump ( instructed on it during this admit).    Discussed with patient regarding plan and she shows understanding and agreement.      Patient will have  PT following discharge.        Procedures Performed  Procedure(s):  TOTAL KNEE ARTHROPLASTY WITH CORI ROBOT - RIGHT       Pertinent Test Results:    I reviewed the patient's new clinical results.   Results from last 7 days   Lab Units 06/30/23  0520   WBC 10*3/mm3 9.34   HEMOGLOBIN g/dL 10.5*   HEMATOCRIT % 34.0   PLATELETS 10*3/mm3 169     Results from last 7 days   Lab Units 06/30/23  0520   SODIUM mmol/L 140   POTASSIUM mmol/L 5.2   CHLORIDE mmol/L 109*   CO2 mmol/L 22.0   BUN mg/dL 28*   CREATININE mg/dL 1.15*   CALCIUM mg/dL 8.4*   GLUCOSE mg/dL 131*     I reviewed the patient's new imaging including images and reports.      Physical therapy  Progress: improving  Outcome Evaluation: Pt increased distance ambulated at this date with improved gait mechanics at this date with CGA and FWx. Reviewed HEP and encouraged frequent ambulation and participation in exercises throughout the day. Mobility limited d/t fatigue. RLE AROM knee flexion/ext 10-70 degrees at this date. Continue to recommend home with assist and HHPT at d/c. Pt cleared to d/c home from PT  "standpoint.        Anticipated Discharge Disposition (PT): home with assist, home with home health  Discharge Assessment:       Visit Vitals  /61   Pulse 69   Temp 97.6 °F (36.4 °C) (Oral)   Resp 18   Ht 157.5 cm (62\")   Wt (!) 150 kg (330 lb)   LMP  (LMP Unknown)   SpO2 95%   BMI 60.36 kg/m²     Temp (24hrs), Av.8 °F (36.6 °C), Min:97.4 °F (36.3 °C), Max:98.4 °F (36.9 °C)      General Appearance:    Alert, cooperative, in no acute distress   Lungs:     Clear to auscultation,respirations regular, even and                   unlabored    Heart:    Regular rhythm and normal rate, normal S1 and S2   Abdomen:     Normal bowel sounds, no masses, no organomegaly, soft        non-tender, non-distended, no guarding, no rebound                 tenderness   Extremities:   CDI dressing surgical knee . ACB cath present, infupump.   Pulses:   Pulses palpable and equal bilaterally   Skin:   No bleeding, bruising or rash   Neurologic:   Cranial nerves 2 - 12 grossly intact, sensation intact, Flexion and dorsiflexion intact bilateral feet.         Discharge Disposition: Home          Discharge Medications        New Medications        Instructions Start Date   aspirin 325 MG EC tablet   325 mg, Oral, Daily   Start Date: 2023     docusate sodium 100 MG capsule  Commonly known as: COLACE   100 mg, Oral, 2 Times Daily      meloxicam 15 MG tablet  Commonly known as: MOBIC   15 mg, Oral, Daily      nebivolol 5 MG tablet  Commonly known as: BYSTOLIC   5 mg, Oral, Every 24 Hours Scheduled      oxyCODONE 5 MG immediate release tablet  Commonly known as: Roxicodone   5 mg, Oral, Every 4 Hours PRN      ropivacaine 0.2 % infusion (INFUSYSTEM)  Commonly known as: NAROPIN   1 mL/hr (2 mg/hr), Peripheral Nerve, Continuous             Changes to Medications        Instructions Start Date   acetaminophen 650 MG 8 hr tablet  Commonly known as: TYLENOL  What changed:   when to take this  reasons to take this  additional " instructions   650 mg, Oral, Every 8 Hours, Take scheduled for a week then prn      zonisamide 100 MG capsule  Commonly known as: ZONEGRAN  What changed:   how much to take  how to take this  when to take this   2 capsules per day             Continue These Medications        Instructions Start Date   atorvastatin 10 MG tablet  Commonly known as: LIPITOR   10 mg, Oral, Nightly      cetirizine 10 MG tablet  Commonly known as: zyrTEC   10 mg, Oral, Daily      estradiol 0.5 MG tablet  Commonly known as: ESTRACE   0.5 mg, Oral, Daily      ipratropium 0.06 % nasal spray  Commonly known as: ATROVENT   1 spray, Nasal, As Needed      ketoconazole 2 % shampoo  Commonly known as: NIZORAL   No dose, route, or frequency recorded.      lansoprazole 30 MG capsule  Commonly known as: PREVACID   30 mg, Oral, Daily      losartan 100 MG tablet  Commonly known as: COZAAR   100 mg, Oral, Daily      multivitamin with minerals tablet tablet   1 tablet, Oral, Daily      venlafaxine XR 75 MG 24 hr capsule  Commonly known as: EFFEXOR-XR   75 mg, Oral, Daily             Stop These Medications      metoprolol succinate XL 50 MG 24 hr tablet  Commonly known as: TOPROL-XL              Discharge Diet: Resume prior    Activity at Discharge: Weightbearing as tolerated       Future Appointments   Date Time Provider Department Center   1/5/2024 10:30 AM Don Castelan DNP, APRN [facility]   5/14/2024 10:30 AM Lisa Grimm APRN [facility]   6/11/2024  9:20 AM Florinda Lopez APRN [facility]   Sunny Reynoso MD per his orders      Dragon disclaimer:  Part of this encounter note is an electronic transcription/translation of spoken language to printed text. The electronic translation of spoken language may permit erroneous, or at times, nonsensical words or phrases to be inadvertently transcribed; Although I have reviewed the note for such errors, some may still exist.       Valerie Concepcion MD  06/30/23  11:55  EDT

## 2023-06-30 NOTE — PROGRESS NOTES
[facility] at [facility]  IP Progress Note      Chief Complaint/Reason for service: PSVT    Subjective   Subjective: The patient feels well this morning.  She wants to go home.  At home she was complaining of significant dyspnea on exertion and wheezing.  She was evaluated for asthma which she did not have.  She was placed on Bystolic last evening in place of metoprolol    Past medical, surgical, social and family history reviewed in the patient's electronic medical record.    Objective     Vital Sign Min/Max for last 24 hours  Temp  Min: 97.2 °F (36.2 °C)  Max: 98.4 °F (36.9 °C)   BP  Min: 98/62  Max: 148/78   Pulse  Min: 62  Max: 129   Resp  Min: 13  Max: 18   SpO2  Min: 92 %  Max: 97 %   Flow (L/min)  Min: 2  Max: 2      Intake/Output Summary (Last 24 hours) at 6/30/2023 0800  Last data filed at 6/29/2023 1344  Gross per 24 hour   Intake 1250 ml   Output 330 ml   Net 920 ml             Current Facility-Administered Medications:   •  acetaminophen (TYLENOL) tablet 1,000 mg, 1,000 mg, Oral, Q6H, Sunny Reynoso MD, 1,000 mg at 06/30/23 0538  •  apixaban (ELIQUIS) tablet 2.5 mg, 2.5 mg, Oral, Q12H, Sunny Reynoso MD  •  atorvastatin (LIPITOR) tablet 10 mg, 10 mg, Oral, Nightly, Simona aVrela APRN, 10 mg at 06/29/23 2020  •  bisacodyl (DULCOLAX) EC tablet 10 mg, 10 mg, Oral, Daily PRN, Sunny Reynoso MD  •  bisacodyl (DULCOLAX) suppository 10 mg, 10 mg, Rectal, Daily PRN, Sunny Reynoso MD  •  cetirizine (zyrTEC) tablet 10 mg, 10 mg, Oral, Daily, Simona Varela APRN, 10 mg at 06/29/23 1529  •  diphenhydrAMINE (BENADRYL) capsule 25 mg, 25 mg, Oral, Q6H PRN **OR** diphenhydrAMINE (BENADRYL) injection 25 mg, 25 mg, Intravenous, Q6H PRN, Sunny Reynoso MD  •  docusate sodium (COLACE) capsule 100 mg, 100 mg, Oral, BID PRN, Sunny Reynoso MD  •  HYDROmorphone (DILAUDID) injection 0.5 mg, 0.5 mg, Intravenous, Q2H PRN  **AND** naloxone (NARCAN) injection 0.1 mg, 0.1 mg, Intravenous, Q5 Min PRN, Sunny Reynoso MD  •  ketorolac (TORADOL) injection 15 mg, 15 mg, Intravenous, Q6H PRN, Sunny Reynoso MD  •  labetalol (NORMODYNE,TRANDATE) injection 10 mg, 10 mg, Intravenous, Q4H PRN, Simona Varela APRN  •  lactated ringers infusion, 9 mL/hr, Intravenous, Continuous PRN, Sunny Reynoso MD, Stopped at 06/29/23 1358  •  losartan (COZAAR) tablet 100 mg, 100 mg, Oral, Daily, Simona Varela APRN  •  magnesium hydroxide (MILK OF MAGNESIA) suspension 10 mL, 10 mL, Oral, Daily PRN, Sunny Reynoso MD  •  meloxicam (MOBIC) tablet 15 mg, 15 mg, Oral, Daily, Sunny Reynoso MD, 15 mg at 06/29/23 1527  •  nebivolol (BYSTOLIC) tablet 5 mg, 5 mg, Oral, Q24H, Ramy Larkin MD, 5 mg at 06/29/23 2020  •  ondansetron (ZOFRAN) tablet 4 mg, 4 mg, Oral, Q6H PRN **OR** ondansetron (ZOFRAN) injection 4 mg, 4 mg, Intravenous, Q6H PRN, Sunny Reynoso MD  •  oxyCODONE (ROXICODONE) immediate release tablet 10 mg, 10 mg, Oral, Q4H PRN, Sunny Reynoso MD  •  oxyCODONE (ROXICODONE) immediate release tablet 5 mg, 5 mg, Oral, Q4H PRN, Sunny Reynoso MD  •  pantoprazole (PROTONIX) EC tablet 40 mg, 40 mg, Oral, Q AM, Simona Varela APRN, 40 mg at 06/30/23 0539  •  ropivacaine (NAROPIN) 0.2 % infusion (INFUSYSTEM), , Peripheral Nerve, Continuous, Sunny Reynoso MD, Currently Infusing at 06/29/23 1358  •  sennosides-docusate (PERICOLACE) 8.6-50 MG per tablet 2 tablet, 2 tablet, Oral, BID PRN, Sunny Reynoso MD  •  sodium chloride 0.9 % bolus 500 mL, 500 mL, Intravenous, TID PRN, Simona Varela, FLOR  •  sodium chloride 0.9 % infusion, 100 mL/hr, Intravenous, Continuous, Sunny Reynoso MD, Last Rate: 100 mL/hr at 06/29/23 1529, 100 mL/hr at 06/29/23 1529  •  venlafaxine XR (EFFEXOR-XR) 24 hr capsule 75 mg, 75 mg, Oral,  Daily, Simona Varela APRN  •  zonisamide (ZONEGRAN) capsule 200 mg, 200 mg, Oral, Daily, Simona Varela APRN, 200 mg at 06/29/23 1534    Physical Exam: General very pleasant overweight female in bed not dyspneic tachypneic systolic blood pressure 90        HEENT: No JVP       Respiratory: Equal bilateral symmetrical expansion reduced breath sounds no wheezing       Cardiovascular: Regular rate and rhythm without murmur and no edema palpation       GI: Obese          Neuro: Facial expressions are symmetrical moves all 4 extremities       Skin: Warm and dry       Psych: Pleasant affect    Results Review: Heart rate 62-84.  Blood pressure stable.  GFR is 51 and was 65 yesterday    Radiology Results:  Imaging Results (Last 72 Hours)     Procedure Component Value Units Date/Time    XR Knee 1 or 2 View Right [[id number]] Collected: 06/29/23 1042     Updated: 06/29/23 1046    Narrative:      XR KNEE 1 OR 2 VW RIGHT    Date of Exam: 6/29/2023 10:23 AM EDT    Indication: Post-Op Knee Arthoplasty    Comparison: None available.    Findings:  Expected postoperative changes of total knee arthroplasty. Normal appearing alignment. No evidence of immediate hardware complication or periprosthetic fracture. Tibial surgical tracks are seen. Expected soft tissue gas.      Impression:      Impression:  Postoperative changes of total knee arthroplasty without evidence of immediate hardware complication.      Electronically Signed: Tate Bellamy    6/29/2023 10:43 AM EDT    Workstation ID: [id number]          EKG: Sinus rhythm no ischemia    ECHO: Previously known normal EF    Tele: Occasional short run of PSVT 5-6 beats.  No recurrent significant SVT    Assessment   Assessment/Plan: #1 PSVT-patient started on Bystolic last evening no significant arrhythmia overnight.  The patient can be discharged home.  Recommend follow-up with Sridevi Lopez in 4 to 6 weeks  2 history of dyspnea on exertion with wheezing-hopefully this  will improve off of metoprolol    Ramy Larkin MD  06/30/23  08:00 EDT

## 2023-06-30 NOTE — DISCHARGE INSTRUCTIONS
[facility] COLD THERAPY - PATIENT INSTRUCTION SHEET    Cold Compression Therapy for your comfort and rehabilitation  Your caregivers want you to be productive in your rehab and comfortable during your stay. In keeping with those goals, you will be receiving an SMI Cold Therapy Wrap to help ease post-operative pain and swelling that might keep you from getting back on track! Your SMI Cold Therapy Wrap is effective and simple-to-use, and you will be encouraged to apply it throughout your hospital stay and at home through the duration of your recovery.    When you are ready to go home  Be sure to take your SMI Cold Therapy Wrap and both sets of Gel Bags with you for continued comfort and use throughout your rehabilitation. If you don't already have them, ask your nurse or aide to retrieve your SMI Gel Bags from the patient freezer.    Home use precautions  Always follow your medical professional's application instructions upon discharge. Your SMI Cold Therapy Wrap and Gel Bags are designed to last for months following your surgery. Never heat the Gel Bags unless specified by your healthcare provider. Supervision is advised when using this product on children or geriatric patients. To avoid danger of suffocation, please keep the outer plastic packaging away from children & pets.    Cold Therapy Instructions  Place Gel Bags in a freezer set ¾ of the way to max temperature for at least (4) hours. For best results, lay the Gel Bags flat and [id number] in the freezer. Once frozen, slide Gel Bags into the gel pouch and secure your wrap to the affected area with the straps.  Gel wraps that have been stored in a freezer for an extended period of time may require a (10) minute period of softening up in a room temperature environment before application.  The gel pouch acts as a protective barrier. NEVER place frozen bags directly onto skin, as this may cause frostbite injury.  The SMI Cold Therapy Wrap is designed to be able to be  worm while ambulating. The compression straps can be secured well enough so that the Wrap won't fall off while moving.  Wrap Application Videos can be viewed at [employer].  An additional protective barrier such as clothing, a washcloth, hand-towel or pillowcase may be used during prolonged treatment applications.  The Gel-Pouch and Wrap are both Latex-Free and the Gel Bag ingredients are non toxic.    SMI Wrap care instructions  The [facility] Cold Therapy Wrap may be hand washed and hung to dry when needed.    [facility] re-order information  Additional [facility] body specific wraps and/or Gel Bags can be re-ordered from [employer] or call [id number] ([phone])  “I received and reviewed a copy of the BHLEX Joint Replacement Guide, understand the individualized plan of care and self-management training program developed and do not have any questions.”EXOFIN CARING FOR YOUR WOUND    AFTER exofin Fusion SKIN CLOSURE SYSTEM HAS BEEN APPLIED    YOUR HEALTHCARE PROFESSIONAL HAS CHOSEN TO USE exofin Fusion SKIN CLOSURE SYSTEM TO CLOSE YOUR WOUND.    exocin Fusion is the combination of a mesh and liquid adhesive that allows the incision or wound to be held together during the healing process.    exocin Fusion should remain in place until your healthcare professional; has determined that adequate healing has occurred, which is usually anywhere between 7 to 14 days. In most cases, exofin Fusion is easily removed with little or no discomfort.    In the event that you notice that exofin Fusion is beginning to loosen or may be coming off, contact your healthcare professional.    The following information is provided to help you understand how to care for your incision and is based on the FDA-cleared product labeling.    You should always follow the instructions of your healthcare provider.    THINGS TO KNOW    BATHING OR SHOWERING    If directed by your healthcare professional, you may occasionally and briefly wet  your incision or wound that was treated with exofin Fusion in the shower or bath. Do not soak or scrub your incision or wound. Do not swim or soak your incision or wound in water. After showering or bathing, gently blot your incision or wound dry with a soft towel. If a dry protective dressing is being used over exofin Fusion, it should be replaced with a fresh, dry protective dressing after showering or bathing as directed by your healthcare practitioner. Care should also be taken so that any tape that may be part of the dry protective dressing does not come into contact with exofin Fusion because when the tape is removed, it may also remove exofin Fusion.    WOUND HEALING  If you experience any redness, swelling, discomfort, warmth or pus, contact your healthcare professional and he or she will determine how your incision or wound is healing and take the necessary steps to address any issues.    EXERCISE  Do not engage in strenuous exercise that may cause additional stress on your incision or wound. Follow your healthcare professional's guidance about when you can return to your normal activities.    OINTMENTS OR LIQUIDS  Topical ointments, liquids or any other products (other than dry bandages) should not be applied to the incision while exofin Fusion is in place. These may loosen exofin Fusion from the skin before it has completely healed.    REMOVING exofin Fusion  Your healthcare professional will determine when the healing process of your incision or wound has been completed and exofin Fusion is ready to be removed, which is usually between 7 to 14 days. When healing is complete, your healthcare professional will carefully peel off the exofin Fusion.    Prior to removal, do not scratch, rub or pick at the mesh. This may loosen the adhesive and mesh before the skin is healed.  In the event that you notice the exofin Fusion is beginning to loosen and may be coming off or comes off with the skin/wound, contract  your healthcare professional.    For complete directions on the use of exofin Fusion, please refer to instructions for Use (IFU) included in the product packaging.  IF YOU HAVE ANY QUESTIONS OR CONCERNS ABOUT exofin Fusion PLEASE CONTACT YOUR HEALTHCARE PROFESSIONAL.Nerve Catheter Removal Instructions  When your device is empty:    Remove your catheter by pulling the dressing off slowly (like you would remove a regular bandage). The catheter should pull right out of the skin.  Check that the BLUE tip is intact.                                                                                     If the catheter is stuck, reposition your   extremity and pull slowly until removed.  *If catheter is HURTING and WON'T come out, stop and call [phone] for further assistance.    Remove medication bag from the black carrying case.  Cut the tubing on right and left side of pump, and discard the medication bag and tubing into garbage.  Place the pump and black carrying case into the plastic bag and then place this into the return box.  Seal box with blue stickers and return to [employer].    THIS IS PRE-PAID POSTAGE.Nerve Catheter Removal Instructions  When your device is empty:    Remove your catheter by pulling the dressing off slowly (like you would remove a regular bandage). The catheter should pull right out of the skin.  Check that the BLUE tip is intact.                                                                                     If the catheter is stuck, reposition your   extremity and pull slowly until removed.  *If catheter is HURTING and WON'T come out, stop and call [phone] for further assistance.    Remove medication bag from the black carrying case.  Cut the tubing on right and left side of pump, and discard the medication bag and tubing into garbage.  Place the pump and black carrying case into the plastic bag and then place this into the return box.  Seal box with blue stickers and  return to [employer] postal service.    THIS IS PRE-PAID POSTAGE.

## 2023-06-30 NOTE — CASE MANAGEMENT/SOCIAL WORK
Case Management Discharge Note      Final Note: chart reviewed. I met with Mrs Elaine to disucss d/c plan. her plan is to return home. She lives with  in a 3rd level condo, they have elevator access to condo. She was independent with most of her ADLs,driving prior to surgery. She has a cane,rolling walker and transfer tub bench. We discussed options for PT. She has requested a referral to [facility]. I spoke with Krystal at [facility] who accepted referral, [facility] PT will make some home visits until ready to transition to outpt visits at [facility]. No other d/c needs identified         Selected Continued Care - Admitted Since 6/29/2023       Destination    No services have been selected for the patient.                Durable Medical Equipment    No services have been selected for the patient.                Dialysis/Infusion    No services have been selected for the patient.                Home Medical Care    No services have been selected for the patient.                Therapy Coordination complete.      Service Provider Selected Services Address Phone Fax Patient Preferred    [facility] [address] [place] [id number] [phone] --              Community Resources    No services have been selected for the patient.                Community & DME    No services have been selected for the patient.                         Final Discharge Disposition Code: 01 - home or self-care

## 2023-06-30 NOTE — THERAPY DISCHARGE NOTE
"Acute Care - Occupational Therapy Discharge  [facility]    Patient Name: Imelda Elaine  : 1950    MRN: [id number]                              Today's Date: 2023       Admit Date: 2023    Visit Dx: No diagnosis found.  Patient Active Problem List   Diagnosis    Morbid obesity    Palpitations    Essential hypertension    Mixed hyperlipidemia    Prediabetes    Osteoarthritis of left knee    Leukocytosis, mild, likely reactive    Abnormal EKG    Anxiety and depression    BPV (benign positional vertigo)    Elevated hemoglobin A1c    Gastroesophageal reflux disease without esophagitis    Osteopenia    Peripheral vascular disease    S/P total knee arthroplasty, left    Tremor, essential    NDIAYE (dyspnea on exertion)    Body mass index 40.0-44.9, adult    Abnormal blood level of copper    RBBB    Pre-operative clearance    Encounter for pre-operative cardiovascular clearance    Status post right knee replacement     Past Medical History:   Diagnosis Date    Anesthesia     low bp only with foot surgery, has had surgeries since and no issue per pt report    Arthritis     Back pain     Depression     Difficulty walking     Left knee replacement  need right knee replacement    GERD (gastroesophageal reflux disease)     High cholesterol     Hypertension     Knee pain     BILATERAL    Seasonal allergies     Tremor, essential      Past Surgical History:   Procedure Laterality Date    ANTERIOR AND POSTERIOR VAGINAL REPAIR N/A 2017    Vaginal enterocele / anterior posterior colporrhaphy;  Jose Maria Chowdhury MD;  Location: [facility];  Service:     BLADDER SUSPENSION      MESH TVT?     COLONOSCOPY      Dr Chung - normal    EXPLORATORY LAPAROTOMY      \"BOWEL INTO PELVIC BONE\" /Mesh [place]     EXPLORATORY LAPAROTOMY  2004    AQUILES/ abd sacrocolpopexy/post repair    FOOT SURGERY Left     KNEE ARTHROSCOPY Left     meniscus repair    LAPAROSCOPIC CHOLECYSTECTOMY Right     KS ARTHRP KNE " CONDYLE&PLATU MEDIAL&LAT COMPARTMENTS Left 11/16/2017    Procedure: TOTAL KNEE ARTHROPLASTY LEFT;  Surgeon: Sunny Reynoso MD;  Location: [facility] OR;  Service: Orthopedics    TONSILLECTOMY      TOTAL ABDOMINAL HYSTERECTOMY WITH SALPINGO OOPHORECTOMY Bilateral 1989    TUBAL ABDOMINAL LIGATION      VENTRAL HERNIA REPAIR N/A 6/21/2016    Procedure: LAPAROSCOPIC REPAIR OF ABDOMINAL WALL HERNIA WITH MESH, CONVERTED TO OPEN ;  Surgeon: Bertha Delacruz MD;  Location:  [facility];  Service:       General Information       Row Name 06/30/23 0839          OT Time and Intention    Document Type discharge evaluation/summary;discharge treatment  -TB     Mode of Treatment occupational therapy;individual therapy  -TB       Row Name 06/30/23 0839          General Information    Patient Profile Reviewed yes  -TB     Prior Level of Function independent:;all household mobility;min assist:;transfer;ADL's  -TB     Existing Precautions/Restrictions fall;cardiac;other (see comments)  R TKA, WBAT RLE, adductor nerve cath  -TB     Barriers to Rehab previous functional deficit  -TB       Row Name 06/30/23 0839          Occupational Profile    Reason for Services/Referral (Occupational Profile) Occupational decline  -TB     Environmental Supports and Barriers (Occupational Profile) Pt lives with her supportive spouse in a condo with elevator access. Tub/shower with TTB, ETS, RW and all necessary AE at home.  -TB       Row Name 06/30/23 0839          Living Environment    People in Home spouse  -TB       Row Name 06/30/23 0839          Home Main Entrance    Number of Stairs, Main Entrance none  -TB       Row Name 06/30/23 0839          Stairs Within Home, Primary    Number of Stairs, Within Home, Primary none  -TB       Row Name 06/30/23 0839          Cognition    Orientation Status (Cognition) oriented x 4  -TB       Row Name 06/30/23 0839          Safety Issues, Functional Mobility    Safety Issues Affecting Function (Mobility)  awareness of need for assistance;insight into deficits/self-awareness;safety precaution awareness;safety precautions follow-through/compliance  -TB     Impairments Affecting Function (Mobility) balance;endurance/activity tolerance;strength;range of motion (ROM)  -TB     Comment, Safety Issues/Impairments (Mobility) Pt up in room with RW support and CGAx1  -TB               User Key  (r) = Recorded By, (t) = Taken By, (c) = Cosigned By      Initials Name Provider Type    TB Marcia Berger, OT Occupational Therapist                   Mobility/ADL's       Row Name 06/30/23 0841          Bed Mobility    Bed Mobility supine-sit;scooting/bridging  -TB     Scooting/Bridging [place] (Bed Mobility) modified independence  -TB     Supine-Sit [place] (Bed Mobility) modified independence  -TB     Comment, (Bed Mobility) Pt sleeps in a recliner at baseline x2 years  -TB       Row Name 06/30/23 0841          Transfers    Transfers sit-stand transfer;bed-chair transfer  -TB     Comment, (Transfers) Education and cues for hand placement and sequencing  -TB       Row Name 06/30/23 0841          Bed-Chair Transfer    Bed-Chair [place] (Transfers) contact guard;1 person assist;verbal cues  -TB     Assistive Device (Bed-Chair Transfers) walker, front-wheeled  -TB       Row Name 06/30/23 0841          Sit-Stand Transfer    Sit-Stand [place] (Transfers) contact guard;1 person assist;verbal cues  -TB     Assistive Device (Sit-Stand Transfers) walker, front-wheeled  -TB       Row Name 06/30/23 0841          Functional Mobility    Functional Mobility- Ind. Level contact guard assist;1 person;verbal cues required  -TB     Functional Mobility- Device walker, front-wheeled  -TB     Functional Mobility-Distance (Feet) 30  -TB     Functional Mobility- Safety Issues step length decreased;weight-shifting ability decreased;sequencing ability decreased  -TB     Functional Mobility- Comment Up in room/bathroom with assist  x1 demonstrating good safety RW level  -TB       Row Name 06/30/23 0841          Activities of Daily Living    BADL Assessment/Intervention grooming;lower body dressing  -TB       Row Name 06/30/23 0841          Mobility    Extremity Weight-bearing Status right lower extremity  -TB     Right Lower Extremity (Weight-bearing Status) weight-bearing as tolerated (WBAT)  -TB       Row Name 06/30/23 0841          Grooming Assessment/Training    [place] Level (Grooming) set up;wash face, hands;hair care, combing/brushing;supervision  -TB     Position (Grooming) sink side  -TB       Row Name 06/30/23 0841          Self-Feeding Assessment/Training    [place] (Feeding) independent;liquids to mouth  -TB     Position (Self-Feeding) supported sitting  -TB       Row Name 06/30/23 0841          Lower Body Dressing Assessment/Training    Comment, (Lower Body Dressing) Pt reports having all necessary AE at home from remote L TKA. Denies need for additional teaching. Spouse able to assist as needed at d/c.  -TB               User Key  (r) = Recorded By, (t) = Taken By, (c) = Cosigned By      Initials Name Provider Type     Marcia Berger, OT Occupational Therapist                   Obj/Interventions       Row Name 06/30/23 0844          Sensory Assessment (Somatosensory)    Sensory Assessment (Somatosensory) UE sensation intact  -TB       Row Name 06/30/23 0844          Vision Assessment/Intervention    Visual Impairment/Limitations corrective lenses full-time  -TB       Row Name 06/30/23 0844          Range of Motion Comprehensive    General Range of Motion bilateral upper extremity ROM WFL  -TB     Comment, General Range of Motion BUE AROM WFL for self-care  -TB       Row Name 06/30/23 0844          Strength Comprehensive (MMT)    Comment, General Manual Muscle Testing (MMT) Assessment Generalized weakness/deconditioned  -TB       Row Name 06/30/23 0844          Balance    Balance Assessment sitting  dynamic balance;sit to stand dynamic balance;standing dynamic balance  -TB     Dynamic Sitting Balance independent  -TB     Position, Sitting Balance unsupported;sitting in chair;sitting edge of bed  -TB     Sit to Stand Dynamic Balance contact guard;1-person assist;verbal cues  -TB     Dynamic Standing Balance contact guard;1-person assist;verbal cues  -TB     Position/Device Used, Standing Balance supported;walker, front-wheeled  -TB     Balance Interventions sitting;standing;sit to stand;supported;dynamic;dynamic reaching;occupation based/functional task;UE activity with balance activity  -TB               User Key  (r) = Recorded By, (t) = Taken By, (c) = Cosigned By      Initials Name Provider Type    TB Marcia Berger OT Occupational Therapist                   Goals/Plan    No documentation.                  Clinical Impression       Row Name 06/30/23 0888          Pain Assessment    Pretreatment Pain Rating 0/10 - no pain  -TB     Posttreatment Pain Rating 0/10 - no pain  -TB     Pain Location - Side/Orientation Right  -TB     Pain Location generalized  -TB     Pain Location - knee  -TB     Pre/Posttreatment Pain Comment Pt denies pain with dynamic EOB/OOB activity  -TB     Pain Intervention(s) Ambulation/increased activity;Repositioned;Elevated;Other (Comment);Cold applied  AC block infusing  -TB       Row Name 06/30/23 7246          Plan of Care Review    Plan of Care Reviewed With patient  -TB     Progress --  IE  -TB     Outcome Evaluation OT IE completed. Pt is A/Ox4 and motivated to regain her occupational independence. Up in room/bathroom with RW support and CGAx1. Able to stand sink side >5 min to complete morning grooming routine. Education completed for precautions and ADL retraining. Pt reports having all necessary AE at home from remote L TKA and denies need for additional teaching. Pt has DME in place for home safety (TTB and ETS). OT will d/c at this time. Recommend home with spouse  support when cleared by PT.  -TB       Row Name 06/30/23 0845          Therapy Assessment/Plan (OT)    Therapy Frequency (OT) evaluation only  -TB       Row Name 06/30/23 0845          Therapy Plan Review/Discharge Plan (OT)    Anticipated Discharge Disposition (OT) home with assist  -TB       Row Name 06/30/23 0845          Vital Signs    Pre Systolic BP Rehab --  RN cleared OT  -TB     O2 Delivery Pre Treatment room air  -TB     Pre Patient Position Supine  -TB     Intra Patient Position Standing  -TB     Post Patient Position Sitting  -TB       Row Name 06/30/23 0845          Positioning and Restraints    Pre-Treatment Position in bed  -TB     Post Treatment Position chair  -TB     In Chair notified nsg;reclined;call light within reach;encouraged to call for assist;exit alarm on;legs elevated  -TB               User Key  (r) = Recorded By, (t) = Taken By, (c) = Cosigned By      Initials Name Provider Type    Marcia Avila OT Occupational Therapist                   Outcome Measures       Row Name 06/30/23 0848          How much help from another is currently needed...    Putting on and taking off regular lower body clothing? 2  -TB     Bathing (including washing, rinsing, and drying) 3  -TB     Toileting (which includes using toilet bed pan or urinal) 3  -TB     Putting on and taking off regular upper body clothing 3  -TB     Taking care of personal grooming (such as brushing teeth) 3  -TB     Eating meals 4  -TB     AM-PAC 6 Clicks Score (OT) 18  -TB       Row Name 06/30/23 0848          Functional Assessment    Outcome Measure Options AM-PAC 6 Clicks Daily Activity (OT)  -TB               User Key  (r) = Recorded By, (t) = Taken By, (c) = Cosigned By      Initials Name Provider Type    Marcia Avila OT Occupational Therapist                  Occupational Therapy Education       Title: PT OT SLP Therapies (In Progress)       Topic: Occupational Therapy (In Progress)       Point: ADL  training (Done)       Description:   Instruct learner(s) on proper safety adaptation and remediation techniques during self care or transfers.   Instruct in proper use of assistive devices.                  Learning Progress Summary             Patient Acceptance, E,D, VU,NR by TB at 6/30/2023 0849                         Point: Precautions (Done)       Description:   Instruct learner(s) on prescribed precautions during self-care and functional transfers.                  Learning Progress Summary             Patient Acceptance, E,D, VU,NR by TB at 6/30/2023 0849                         Point: Body mechanics (Not Started)       Description:   Instruct learner(s) on proper positioning and spine alignment during self-care, functional mobility activities and/or exercises.                  Learner Progress:  Not documented in this visit.                              User Key       Initials Effective Dates Name Provider Type Discipline     05/31/23 -  Marcia Berger OT Occupational Therapist OT                  OT Recommendation and Plan  Therapy Frequency (OT): evaluation only  Plan of Care Review  Plan of Care Reviewed With: patient  Progress:  (IE)  Outcome Evaluation: OT IE completed. Pt is A/Ox4 and motivated to regain her occupational independence. Up in room/bathroom with RW support and CGAx1. Able to stand sink side >5 min to complete morning grooming routine. Education completed for precautions and ADL retraining. Pt reports having all necessary AE at home from remote L TKA and denies need for additional teaching. Pt has DME in place for home safety (TTB and ETS). OT will d/c at this time. Recommend home with spouse support when cleared by PT.  Plan of Care Reviewed With: patient  Outcome Evaluation: OT IE completed. Pt is A/Ox4 and motivated to regain her occupational independence. Up in room/bathroom with RW support and CGAx1. Able to stand sink side >5 min to complete morning grooming routine.  Education completed for precautions and ADL retraining. Pt reports having all necessary AE at home from remote L TKA and denies need for additional teaching. Pt has DME in place for home safety (TTB and ETS). OT will d/c at this time. Recommend home with spouse support when cleared by PT.     Time Calculation:    Time Calculation- OT       Row Name 06/30/23 0748             Time Calculation- OT    OT Start Time 0748  -TB      OT Received On 06/30/23  -TB         Timed Charges    40639 - OT Self Care/Mgmt Minutes 20  -TB         Untimed Charges    OT Eval/Re-eval Minutes 40  -TB         Total Minutes    Timed Charges Total Minutes 20  -TB      Untimed Charges Total Minutes 40  -TB       Total Minutes 60  -TB                User Key  (r) = Recorded By, (t) = Taken By, (c) = Cosigned By      Initials Name Provider Type    TB Marcia Berger OT Occupational Therapist                  Therapy Charges for Today       Code Description Service Date Service Provider Modifiers Qty    23193822017 HC OT SELF CARE/MGMT/TRAIN EA 15 MIN 6/30/2023 Marcia Berger OT GO 1    [id number]  OT EVAL LOW COMPLEXITY 3 6/30/2023 Marica Berger OT GO 1               OT Discharge Summary  Anticipated Discharge Disposition (OT): home with assist    Marcia Berger OT  6/30/2023

## 2023-06-30 NOTE — PLAN OF CARE
Problem: Adult Inpatient Plan of Care  Goal: Plan of Care Review  Recent Flowsheet Documentation  Taken 6/30/2023 0845 by Marcia Berger OT  Progress: (IE) --  Plan of Care Reviewed With: patient  Outcome Evaluation: OT IE completed. Pt is A/Ox4 and motivated to regain her occupational independence. Up in room/bathroom with RW support and CGAx1. Able to stand sink side >5 min to complete morning grooming routine. Education completed for precautions and ADL retraining. Pt reports having all necessary AE at home from remote L TKA and denies need for additional teaching. Pt has DME in place for home safety (TTB and ETS). OT will d/c at this time. Recommend home with spouse support when cleared by PT.

## 2023-06-30 NOTE — PROGRESS NOTES
[facility]    Acute pain service Inpatient Progress Note    Patient Name: Imelda Elaine  :  1950  MRN:  [id number]        Acute Pain  Service Inpatient Progress Note:    Analgesia:Good  Pain Score:10  LOC: alert and awake  Resp Status: room air  Cardiac: VS stable  Side Effects:None  Catheter Site:clean, dressing intact and dry  Cath type: peripheral nerve cath with ON Q  Volume: 1mL,8ml, 8ml InfuSystem Pump.  Catheter Plan:Catheter to remain Insitu and Continue catheter infusion rate unchanged  Comments:

## 2023-06-30 NOTE — THERAPY TREATMENT NOTE
"Patient Name: Imelda Elaine  : 1950    MRN: [id number]                              Today's Date: 2023       Admit Date: 2023    Visit Dx:     ICD-10-CM ICD-9-CM   1. Status post right knee replacement  Z96.651 V43.65   2. Body mass index 40.0-44.9, adult  Z68.41 V85.41   3. Morbid obesity  E66.01 278.01     Patient Active Problem List   Diagnosis   • Morbid obesity   • Palpitations   • Essential hypertension   • Mixed hyperlipidemia   • Prediabetes   • Osteoarthritis of left knee   • Leukocytosis, mild, likely reactive   • Abnormal EKG   • Anxiety and depression   • BPV (benign positional vertigo)   • Elevated hemoglobin A1c   • Gastroesophageal reflux disease without esophagitis   • Osteopenia   • Peripheral vascular disease   • S/P total knee arthroplasty, left   • Tremor, essential   • NDIAYE (dyspnea on exertion)   • Body mass index 40.0-44.9, adult   • Abnormal blood level of copper   • RBBB   • Pre-operative clearance   • Encounter for pre-operative cardiovascular clearance   • Status post right knee replacement     Past Medical History:   Diagnosis Date   • Anesthesia     low bp only with foot surgery, has had surgeries since and no issue per pt report   • Arthritis    • Back pain    • Depression    • Difficulty walking     Left knee replacement  need right knee replacement   • GERD (gastroesophageal reflux disease)    • High cholesterol    • Hypertension    • Knee pain     BILATERAL   • Seasonal allergies    • Tremor, essential      Past Surgical History:   Procedure Laterality Date   • ANTERIOR AND POSTERIOR VAGINAL REPAIR N/A 2017    Vaginal enterocele / anterior posterior colporrhaphy;  Jose Maria Chowdhury MD;  Location: [facility] OR;  Service:    • BLADDER SUSPENSION      MESH TVT?    • COLONOSCOPY      Dr Chung - normal   • EXPLORATORY LAPAROTOMY      \"BOWEL INTO PELVIC BONE\" /Mesh [place]    • EXPLORATORY LAPAROTOMY  2004    AQUILES/ abd sacrocolpopexy/post repair   • " FOOT SURGERY Left    • KNEE ARTHROSCOPY Left     meniscus repair   • LAPAROSCOPIC CHOLECYSTECTOMY Right 2000   • [place] ARTHRP KNE CONDYLE&PLATU MEDIAL&LAT COMPARTMENTS Left 11/16/2017    Procedure: TOTAL KNEE ARTHROPLASTY LEFT;  Surgeon: Sunny Reynoso MD;  Location: [facility] OR;  Service: Orthopedics   • TONSILLECTOMY     • TOTAL ABDOMINAL HYSTERECTOMY WITH SALPINGO OOPHORECTOMY Bilateral 1989   • TUBAL ABDOMINAL LIGATION     • VENTRAL HERNIA REPAIR N/A 6/21/2016    Procedure: LAPAROSCOPIC REPAIR OF ABDOMINAL WALL HERNIA WITH MESH, CONVERTED TO OPEN ;  Surgeon: Bertha Delacruz MD;  Location: [facility] OR;  Service:       General Information     Row Name 06/30/23 0958          Physical Therapy Time and Intention    Document Type therapy note (daily note)  -     Mode of Treatment physical therapy  -     Row Name 06/30/23 0958          General Information    Patient Profile Reviewed yes  -     Existing Precautions/Restrictions fall;cardiac;other (see comments)  R TKA, WBAT RLE, adductor nerve cath  -     Row Name 06/30/23 0958          Cognition    Orientation Status (Cognition) oriented x 4  -     Row Name 06/30/23 0958          Safety Issues, Functional Mobility    Safety Issues Affecting Function (Mobility) awareness of need for assistance;insight into deficits/self-awareness;safety precaution awareness;safety precautions follow-through/compliance  -     Impairments Affecting Function (Mobility) balance;endurance/activity tolerance;strength;range of motion (ROM)  -           User Key  (r) = Recorded By, (t) = Taken By, (c) = Cosigned By    Initials Name Provider Type     Bre Vaughan, YANICK Physical Therapist               Mobility     Row Name 06/30/23 0959          Bed Mobility    Comment, (Bed Mobility) Pt UIC upon arrival  -     Row Name 06/30/23 0959          Sit-Stand Transfer    Sit-Stand [place] (Transfers) contact guard;1 person assist;verbal cues  -     Assistive Device  (Sit-Stand Transfers) walker, front-wheeled  -     Comment, (Sit-Stand Transfer) appropriate sequencing intact  -     Row Name 06/30/23 0959          Gait/Stairs (Locomotion)    Cloud Level (Gait) contact guard;1 person assist  -     Assistive Device (Gait) walker, front-wheeled  -     Distance in Feet (Gait) 200  -HM     Deviations/Abnormal Patterns (Gait) bilateral deviations;gait speed decreased;mable decreased;stride length decreased  -     Bilateral Gait Deviations forward flexed posture;heel strike decreased  -     Comment, (Gait/Stairs) Pt ambulated with a step through pattern at this date with intermittent cues for upright posture. No LOB or knee buckling noted. Pt with improvement of heel strike on RLE at this date. Further mobility limited d/t fatigue  -     Row Name 06/30/23 0959          Mobility    Extremity Weight-bearing Status right lower extremity  -     Right Lower Extremity (Weight-bearing Status) weight-bearing as tolerated (WBAT)  -           User Key  (r) = Recorded By, (t) = Taken By, (c) = Cosigned By    Initials Name Provider Type     Bre Vaughan, PT Physical Therapist               Obj/Interventions     Row Name 06/30/23 1007          Range of Motion Comprehensive    General Range of Motion other (see comments)  -     Comment, General Range of Motion RLE 10-70 AROM, limited d/t pain  -     Row Name 06/30/23 1007          Motor Skills    Therapeutic Exercise other (see comments)  BLE APs, quad sets, RLE heel slides, SAQ, LAQ with Min A x1 d/t pain, SLR CGA with limited ROM  -     Row Name 06/30/23 1007          Balance    Balance Assessment sitting dynamic balance;sit to stand dynamic balance;standing static balance;standing dynamic balance;sitting static balance  -     Static Sitting Balance independent  -     Dynamic Sitting Balance independent  -     Position, Sitting Balance unsupported;sitting in chair  -     Sit to Stand Dynamic Balance  contact guard;1-person assist  -     Static Standing Balance contact guard  -     Dynamic Standing Balance contact guard  -     Position/Device Used, Standing Balance supported;walker, front-wheeled  -     Balance Interventions standing;dynamic;occupation based/functional task  -     Comment, Balance no LOB  -           User Key  (r) = Recorded By, (t) = Taken By, (c) = Cosigned By    Initials Name Provider Type     Bre Vaughan, PT Physical Therapist               Goals/Plan    No documentation.                Clinical Impression     Row Name 06/30/23 1010          Pain    Pretreatment Pain Rating 0/10 - no pain  -     Posttreatment Pain Rating 0/10 - no pain  -     Pain Location - Side/Orientation Right  -     Pain Location generalized  -     Pain Location - knee  -     Pre/Posttreatment Pain Comment pain with knee flexion, tolerated  -     Pain Intervention(s) Ambulation/increased activity;Repositioned;Elevated;Cold applied  -     Row Name 06/30/23 1010          Plan of Care Review    Plan of Care Reviewed With patient  -     Progress improving  -     Outcome Evaluation Pt increased distance ambulated at this date with improved gait mechanics at this date with CGA and FWx. Reviewed HEP and encouraged frequent ambulation and participation in exercises throughout the day. Mobility limited d/t fatigue. RLE AROM knee flexion/ext 10-70 degrees at this date. Continue to recommend home with assist and HHPT at d/c. Pt cleared to d/c home from PT standpoint.  -     Row Name 06/30/23 1010          Therapy Assessment/Plan (PT)    Rehab Potential (PT) good, to achieve stated therapy goals  -     Criteria for Skilled Interventions Met (PT) yes;skilled treatment is necessary;meets criteria  -     Therapy Frequency (PT) 2 times/day  -     Row Name 06/30/23 1010          Vital Signs    Pre Systolic BP Rehab 108  -HM     Pre Treatment Diastolic BP 61  -HM     Pretreatment Heart Rate  (beats/min) 70  -HM     Posttreatment Heart Rate (beats/min) 61  -HM     Pre SpO2 (%) 95  -HM     O2 Delivery Pre Treatment room air  -HM     O2 Delivery Intra Treatment room air  -HM     Post SpO2 (%) 94  -HM     O2 Delivery Post Treatment room air  -HM     Pre Patient Position Sitting  -HM     Intra Patient Position Standing  -HM     Post Patient Position Sitting  -HM     Row Name 06/30/23 1010          Positioning and Restraints    Pre-Treatment Position sitting in chair/recliner  -HM     Post Treatment Position chair  -HM     In Chair notified nsg;reclined;sitting;call light within reach;encouraged to call for assist;exit alarm on;waffle cushion;legs elevated  -           User Key  (r) = Recorded By, (t) = Taken By, (c) = Cosigned By    Initials Name Provider Type    Bre Jade PT Physical Therapist               Outcome Measures     Row Name 06/30/23 1017          How much help from another person do you currently need...    Turning from your back to your side while in flat bed without using bedrails? 4  -HM     Moving from lying on back to sitting on the side of a flat bed without bedrails? 3  -HM     Moving to and from a bed to a chair (including a wheelchair)? 3  -HM     Standing up from a chair using your arms (e.g., wheelchair, bedside chair)? 3  -HM     Climbing 3-5 steps with a railing? 3  -HM     To walk in hospital room? 3  -HM     AM-PAC 6 Clicks Score (PT) 19  -HM     Highest level of mobility 6 --> Walked 10 steps or more  -     Row Name 06/30/23 1017 06/30/23 0848       Functional Assessment    Outcome Measure Options AM-PAC 6 Clicks Basic Mobility (PT)  - AM-PAC 6 Clicks Daily Activity (OT)  -TB          User Key  (r) = Recorded By, (t) = Taken By, (c) = Cosigned By    Initials Name Provider Type    Marcia Avila OT Occupational Therapist    Bre Jade PT Physical Therapist                             Physical Therapy Education     Title: PT OT SLP Therapies  (In Progress)     Topic: Physical Therapy (Done)     Point: Mobility training (Done)     Learning Progress Summary           Patient Acceptance, E,TB, VU,NR by  at 6/30/2023 1018    Acceptance, E,TB, VU,NR by  at 6/29/2023 1524                   Point: Home exercise program (Done)     Learning Progress Summary           Patient Acceptance, E,TB, VU,NR by  at 6/30/2023 1018    Acceptance, E,TB, VU,NR by  at 6/29/2023 1524                   Point: Body mechanics (Done)     Learning Progress Summary           Patient Acceptance, E,TB, VU,NR by  at 6/30/2023 1018    Acceptance, E,TB, VU,NR by  at 6/29/2023 1524                   Point: Precautions (Done)     Learning Progress Summary           Patient Acceptance, E,TB, VU,NR by  at 6/30/2023 1018    Acceptance, E,TB, VU,NR by  at 6/29/2023 1524                               User Key     Initials Effective Dates Name Provider Type Discipline     09/22/22 -  Bre Vaughan, PT Physical Therapist PT              PT Recommendation and Plan  Planned Therapy Interventions (PT): balance training, bed mobility training, gait training, home exercise program, postural re-education, patient/family education, neuromuscular re-education, ROM (range of motion), strengthening, transfer training  Plan of Care Reviewed With: patient  Progress: improving  Outcome Evaluation: Pt increased distance ambulated at this date with improved gait mechanics at this date with CGA and FWx. Reviewed HEP and encouraged frequent ambulation and participation in exercises throughout the day. Mobility limited d/t fatigue. RLE AROM knee flexion/ext 10-70 degrees at this date. Continue to recommend home with assist and HHPT at d/c. Pt cleared to d/c home from PT standpoint.     Time Calculation:    PT Charges     Row Name 06/30/23 1019             Time Calculation    Start Time 0915  -      PT Received On 06/30/23  -         Timed Charges    30809 - PT Therapeutic Exercise Minutes 15   -      39017 - PT Therapeutic Activity Minutes 23  -HM         Total Minutes    Timed Charges Total Minutes 38  -HM       Total Minutes 38  -HM            User Key  (r) = Recorded By, (t) = Taken By, (c) = Cosigned By    Initials Name Provider Type     Bre Vaughan, PT Physical Therapist              Therapy Charges for Today     Code Description Service Date Service Provider Modifiers Qty    89967513158 HC PT THER PROC EA 15 MIN 6/29/2023 Bre Vaughan, PT GP 1    84102741233 HC PT EVAL LOW COMPLEXITY 4 6/29/2023 Bre Vaughan, PT GP 1    83316490121 HC PT THER PROC EA 15 MIN 6/30/2023 Bre Vaughan, PT GP 1    21252998298 HC PT THERAPEUTIC ACT EA 15 MIN 6/30/2023 Bre Vaughan, PT GP 2          PT G-Codes  Outcome Measure Options: AM-PAC 6 Clicks Basic Mobility (PT)  AM-PAC 6 Clicks Score (PT): 19  AM-PAC 6 Clicks Score (OT): 18  PT Discharge Summary  Anticipated Discharge Disposition (PT): home with assist, home with home health    Bre Vaughan PT  6/30/2023

## 2023-06-30 NOTE — PROGRESS NOTES
"BP 98/62 (BP Location: Left arm, Patient Position: Lying)   Pulse 62   Temp 97.5 °F (36.4 °C) (Oral)   Resp 18   Ht 157.5 cm (62\")   Wt (!) 150 kg (330 lb)   LMP  (LMP Unknown)   SpO2 93%   BMI 60.36 kg/m²     Lab Results (last 24 hours)       Procedure Component Value Units Date/Time    Basic Metabolic Panel [[id number]]  (Abnormal) Collected: 06/30/23 0520    Specimen: Blood Updated: 06/30/23 0627     Glucose 131 mg/dL      BUN 28 mg/dL      Creatinine 1.15 mg/dL      Sodium 140 mmol/L      Potassium 5.2 mmol/L      Comment: Slight hemolysis detected by analyzer. Results may be affected.        Chloride 109 mmol/L      CO2 22.0 mmol/L      Calcium 8.4 mg/dL      BUN/Creatinine Ratio 24.3     Anion Gap 9.0 mmol/L      eGFR 51.0 mL/min/1.73     Narrative:      GFR Normal >60  Chronic Kidney Disease <60  Kidney Failure <15    The GFR formula is only valid for adults with stable renal function between ages 18 and 70.    CBC & Differential [[id number]]  (Abnormal) Collected: 06/30/23 0520    Specimen: Blood Updated: 06/30/23 0605    Narrative:      The following orders were created for panel order CBC & Differential.  Procedure                               Abnormality         Status                     ---------                               -----------         ------                     CBC Auto Differential[[id number]]        Abnormal            Final result                 Please view results for these tests on the individual orders.    CBC Auto Differential [[id number]]  (Abnormal) Collected: 06/30/23 0520    Specimen: Blood Updated: 06/30/23 0605     WBC 9.34 10*3/mm3      RBC 3.65 10*6/mm3      Hemoglobin 10.5 g/dL      Hematocrit 34.0 %      MCV 93.2 fL      MCH 28.8 pg      MCHC 30.9 g/dL      RDW 14.3 %      RDW-SD 49.1 fl      MPV 11.5 fL      Platelets 169 10*3/mm3      Neutrophil % 76.7 %      Lymphocyte % 11.9 %      Monocyte % 11.1 %      Eosinophil % 0.0 %      Basophil % 0.1 %      Immature Grans % " 0.2 %      Neutrophils, Absolute 7.16 10*3/mm3      Lymphocytes, Absolute 1.11 10*3/mm3      Monocytes, Absolute 1.04 10*3/mm3      Eosinophils, Absolute 0.00 10*3/mm3      Basophils, Absolute 0.01 10*3/mm3      Immature Grans, Absolute 0.02 10*3/mm3      nRBC 0.0 /100 WBC             Imaging Results (Last 24 Hours)       Procedure Component Value Units Date/Time    XR Knee 1 or 2 View Right [[id number]] Collected: 06/29/23 1042     Updated: 06/29/23 1046    Narrative:      XR KNEE 1 OR 2 VW RIGHT    Date of Exam: 6/29/2023 10:23 AM EDT    Indication: Post-Op Knee Arthoplasty    Comparison: None available.    Findings:  Expected postoperative changes of total knee arthroplasty. Normal appearing alignment. No evidence of immediate hardware complication or periprosthetic fracture. Tibial surgical tracks are seen. Expected soft tissue gas.      Impression:      Impression:  Postoperative changes of total knee arthroplasty without evidence of immediate hardware complication.      Electronically Signed: Tate Bellamy    6/29/2023 10:43 AM EDT    Workstation ID: [id number]            Patient Care Team:  Comfort Colnuga MD as PCP - General (Internal Medicine)  Florinda Lopez APRN as Nurse Practitioner (Interventional Cardiology)  Lisa Grimm APRN as Nurse Practitioner (Nurse Practitioner)    SUBJECTIVE: She reports she is doing well.  She has minimal to no pain.  She walked 80 feet with therapy yesterday.  Anticipates going home today.    PHYSICAL EXAM  Right knee incision is clean, dry and intact with mesh dressing in place  Thigh and calf soft and nontender  Neurovascular intact distally       Status post right knee replacement    Essential hypertension    Mixed hyperlipidemia    Elevated hemoglobin A1c    Gastroesophageal reflux disease without esophagitis      PLAN / DISPOSITION: 72-year-old female postop day #1 status post right total knee arthroplasty  -Continue to mobilize with therapy as  tolerated  -Eliquis for DVT prophylaxis  -Okay to shower with mesh dressing in place once nerve catheter removed  -Plan discharge home today.  Home physical therapy with [facility]  -Follow-up in 2 to 3 weeks    Sunny Reynoso MD  06/30/23  07:27 EDT

## 2023-06-30 NOTE — PLAN OF CARE
Problem: Adult Inpatient Plan of Care  Goal: Plan of Care Review  Outcome: Met  Flowsheets  Taken 6/30/2023 1156 by Hope Cantu RN  Progress: improving  Taken 6/30/2023 1010 by Bre Vaughan PT  Plan of Care Reviewed With: patient  Goal: Patient-Specific Goal (Individualized)  Outcome: Met  Goal: Absence of Hospital-Acquired Illness or Injury  Outcome: Met  Intervention: Identify and Manage Fall Risk  Recent Flowsheet Documentation  Taken 6/30/2023 1000 by Hope Cantu RN  Safety Promotion/Fall Prevention:   activity supervised   assistive device/personal items within reach   clutter free environment maintained   room organization consistent   safety round/check completed   toileting scheduled  Taken 6/30/2023 0849 by Hope Cantu RN  Safety Promotion/Fall Prevention:   activity supervised   assistive device/personal items within reach   clutter free environment maintained   safety round/check completed   toileting scheduled   room organization consistent  Intervention: Prevent Skin Injury  Recent Flowsheet Documentation  Taken 6/30/2023 1000 by Hope Cantu RN  Body Position: legs elevated  Taken 6/30/2023 0849 by Hope Cantu RN  Body Position: legs elevated  Intervention: Prevent and Manage VTE (Venous Thromboembolism) Risk  Recent Flowsheet Documentation  Taken 6/30/2023 1000 by oHpe Cantu RN  Activity Management: up in chair  VTE Prevention/Management:   bilateral   foot pump device on  Taken 6/30/2023 0849 by Hope Cantu RN  Activity Management: up in chair  VTE Prevention/Management:   bilateral   foot pump device on  Intervention: Prevent Infection  Recent Flowsheet Documentation  Taken 6/30/2023 1000 by oHpe Cantu RN  Infection Prevention:   environmental surveillance performed   rest/sleep promoted  Taken 6/30/2023 0849 by Hope Cantu RN  Infection Prevention:   environmental surveillance performed   rest/sleep promoted  Goal: Optimal Comfort and  Wellbeing  Outcome: Met  Intervention: Provide Person-Centered Care  Recent Flowsheet Documentation  Taken 6/30/2023 0849 by Hope Cantu RN  Trust Relationship/Rapport: care explained  Goal: Readiness for Transition of Care  Outcome: Met     Problem: Fall Injury Risk  Goal: Absence of Fall and Fall-Related Injury  Outcome: Met  Intervention: Identify and Manage Contributors  Recent Flowsheet Documentation  Taken 6/30/2023 1000 by Hope Cantu RN  Medication Review/Management: medications reviewed  Taken 6/30/2023 0849 by Hope Cantu RN  Medication Review/Management: medications reviewed  Intervention: Promote Injury-Free Environment  Recent Flowsheet Documentation  Taken 6/30/2023 1000 by Hope Cantu RN  Safety Promotion/Fall Prevention:   activity supervised   assistive device/personal items within reach   clutter free environment maintained   room organization consistent   safety round/check completed   toileting scheduled  Taken 6/30/2023 0849 by Hope Cantu RN  Safety Promotion/Fall Prevention:   activity supervised   assistive device/personal items within reach   clutter free environment maintained   safety round/check completed   toileting scheduled   room organization consistent     Problem: Pain Acute  Goal: Acceptable Pain Control and Functional Ability  Outcome: Met  Intervention: Prevent or Manage Pain  Recent Flowsheet Documentation  Taken 6/30/2023 1000 by Hope Cantu RN  Medication Review/Management: medications reviewed  Taken 6/30/2023 0849 by Hope Cantu RN  Medication Review/Management: medications reviewed     Problem: Adjustment to Surgery (Knee Arthroplasty)  Goal: Optimal Coping  Outcome: Met     Problem: Bleeding (Knee Arthroplasty)  Goal: Absence of Bleeding  Outcome: Met     Problem: Bowel Motility Impaired (Knee Arthroplasty)  Goal: Effective Bowel Elimination  Outcome: Met     Problem: Fluid and Electrolyte Imbalance (Knee Arthroplasty)  Goal: Fluid  and Electrolyte Balance  Outcome: Met     Problem: Functional Ability Impaired (Knee Arthroplasty)  Goal: Optimal Functional Ability  Outcome: Met  Intervention: Promote Optimal Functional Status  Recent Flowsheet Documentation  Taken 6/30/2023 1000 by Hope Cantu RN  Activity Management: up in chair  Taken 6/30/2023 0849 by Hope Cantu RN  Activity Management: up in chair  Assistive Device Utilized:   gait belt   walker     Problem: Infection (Knee Arthroplasty)  Goal: Absence of Infection Signs and Symptoms  Outcome: Met  Intervention: Prevent or Manage Infection  Recent Flowsheet Documentation  Taken 6/30/2023 1000 by Hope Cantu RN  Infection Prevention:   environmental surveillance performed   rest/sleep promoted  Taken 6/30/2023 0849 by Hope Cantu RN  Infection Prevention:   environmental surveillance performed   rest/sleep promoted     Problem: Neurovascular Compromise (Knee Arthroplasty)  Goal: Intact Neurovascular Status  Outcome: Met     Problem: Ongoing Anesthesia Effects (Knee Arthroplasty)  Goal: Anesthesia/Sedation Recovery  Outcome: Met  Intervention: Optimize Anesthesia Recovery  Recent Flowsheet Documentation  Taken 6/30/2023 1000 by Hope Cantu RN  Safety Promotion/Fall Prevention:   activity supervised   assistive device/personal items within reach   clutter free environment maintained   room organization consistent   safety round/check completed   toileting scheduled  Administration (IS): self-administered  Taken 6/30/2023 0849 by Hope Cantu RN  Safety Promotion/Fall Prevention:   activity supervised   assistive device/personal items within reach   clutter free environment maintained   safety round/check completed   toileting scheduled   room organization consistent  Administration (IS): self-administered     Problem: Pain (Knee Arthroplasty)  Goal: Acceptable Pain Control  Outcome: Met     Problem: Postoperative Nausea and Vomiting (Knee Arthroplasty)  Goal:  Nausea and Vomiting Relief  Outcome: Met     Problem: Postoperative Urinary Retention (Knee Arthroplasty)  Goal: Effective Urinary Elimination  Outcome: Met     Problem: Respiratory Compromise (Knee Arthroplasty)  Goal: Effective Oxygenation and Ventilation  Outcome: Met  Intervention: Optimize Oxygenation and Ventilation  Recent Flowsheet Documentation  Taken 6/30/2023 1000 by Hope Cantu RN  Administration (IS): self-administered  Taken 6/30/2023 0849 by Hope Cantu RN  Administration (IS): self-administered   Goal Outcome Evaluation:           Progress: improving

## 2023-06-30 NOTE — PLAN OF CARE
Goal Outcome Evaluation:  Plan of Care Reviewed With: patient        Progress: no change          Pt is alert and oriented X 4. VSS. RA. Denied pain. Inf[place] in place. Ambulated with one assist, walker and gait belt. Voiding spontaneously.

## 2023-06-30 NOTE — PLAN OF CARE
Goal Outcome Evaluation:  Plan of Care Reviewed With: patient        Progress: improving  Outcome Evaluation: Pt increased distance ambulated at this date with improved gait mechanics at this date with CGA and FWx. Reviewed HEP and encouraged frequent ambulation and participation in exercises throughout the day. Mobility limited d/t fatigue. RLE AROM knee flexion/ext 10-70 degrees at this date. Continue to recommend home with assist and HHPT at d/c. Pt cleared to d/c home from PT standpoint.      Anticipated Discharge Disposition (PT): home with assist, home with home health

## 2023-07-03 ENCOUNTER — TELEPHONE (OUTPATIENT)
Dept: CARDIOLOGY | Facility: CLINIC | Age: 73
End: 2023-07-03

## 2023-07-03 NOTE — TELEPHONE ENCOUNTER
Caller: Imelda Elaine    Relationship to patient: Self    Best call back number: [phone]    Chief complaint: PATIENT HAD RIGHT KNEE REPLACEMENT SURGERY. DURING RECOVERY PATIENT HAD AN ISSUE AND WAS ADVISED TO FOLLOW UP WITH CARDIOLOGIST IN 4 WEEKS.     Type of visit: FOLLOW UP     Requested date: ANY

## 2023-07-24 ENCOUNTER — HOSPITAL ENCOUNTER (INPATIENT)
Facility: HOSPITAL | Age: 73
LOS: 4 days | Discharge: NURSING, REHAB OR LONG-TERM CARE | DRG: 486 | End: 2023-07-28
Attending: INTERNAL MEDICINE | Admitting: INTERNAL MEDICINE
Payer: MEDICARE

## 2023-07-24 DIAGNOSIS — M00.9 INFECTION OF RIGHT KNEE: ICD-10-CM

## 2023-07-24 PROBLEM — T81.49XA POSTOPERATIVE WOUND INFECTION: Status: ACTIVE | Noted: 2023-07-24

## 2023-07-24 LAB
ALBUMIN SERPL-MCNC: 3.1 G/DL (ref 3.5–5.2)
ALBUMIN/GLOB SERPL: 0.9 G/DL
ALP SERPL-CCNC: 196 U/L (ref 39–117)
ALT SERPL W P-5'-P-CCNC: 47 U/L (ref 1–33)
ANION GAP SERPL CALCULATED.3IONS-SCNC: 12 MMOL/L (ref 5–15)
AST SERPL-CCNC: 43 U/L (ref 1–32)
BILIRUB SERPL-MCNC: 0.5 MG/DL (ref 0–1.2)
BUN SERPL-MCNC: 15 MG/DL (ref 8–23)
BUN/CREAT SERPL: 19.2 (ref 7–25)
CALCIUM SPEC-SCNC: 9.2 MG/DL (ref 8.6–10.5)
CHLORIDE SERPL-SCNC: 103 MMOL/L (ref 98–107)
CK SERPL-CCNC: 64 U/L (ref 20–180)
CO2 SERPL-SCNC: 25 MMOL/L (ref 22–29)
CREAT SERPL-MCNC: 0.78 MG/DL (ref 0.57–1)
CRP SERPL-MCNC: 16.82 MG/DL (ref 0–0.5)
D-LACTATE SERPL-SCNC: 1 MMOL/L (ref 0.5–2)
EGFRCR SERPLBLD CKD-EPI 2021: 81.3 ML/MIN/1.73
ERYTHROCYTE [SEDIMENTATION RATE] IN BLOOD: 95 MM/HR (ref 0–30)
GLOBULIN UR ELPH-MCNC: 3.6 GM/DL
GLUCOSE SERPL-MCNC: 117 MG/DL (ref 65–99)
POTASSIUM SERPL-SCNC: 4.2 MMOL/L (ref 3.5–5.2)
PROT SERPL-MCNC: 6.7 G/DL (ref 6–8.5)
SODIUM SERPL-SCNC: 140 MMOL/L (ref 136–145)

## 2023-07-24 PROCEDURE — 87077 CULTURE AEROBIC IDENTIFY: CPT | Performed by: INTERNAL MEDICINE

## 2023-07-24 PROCEDURE — 25010000002 DAPTOMYCIN PER 1 MG: Performed by: INTERNAL MEDICINE

## 2023-07-24 PROCEDURE — 80053 COMPREHEN METABOLIC PANEL: CPT | Performed by: INTERNAL MEDICINE

## 2023-07-24 PROCEDURE — 87040 BLOOD CULTURE FOR BACTERIA: CPT | Performed by: INTERNAL MEDICINE

## 2023-07-24 PROCEDURE — 86140 C-REACTIVE PROTEIN: CPT | Performed by: INTERNAL MEDICINE

## 2023-07-24 PROCEDURE — 87147 CULTURE TYPE IMMUNOLOGIC: CPT | Performed by: INTERNAL MEDICINE

## 2023-07-24 PROCEDURE — 25010000002 CEFTRIAXONE PER 250 MG: Performed by: INTERNAL MEDICINE

## 2023-07-24 PROCEDURE — 87186 SC STD MICRODIL/AGAR DIL: CPT | Performed by: INTERNAL MEDICINE

## 2023-07-24 PROCEDURE — 87205 SMEAR GRAM STAIN: CPT | Performed by: INTERNAL MEDICINE

## 2023-07-24 PROCEDURE — 85652 RBC SED RATE AUTOMATED: CPT | Performed by: INTERNAL MEDICINE

## 2023-07-24 PROCEDURE — 82550 ASSAY OF CK (CPK): CPT | Performed by: INTERNAL MEDICINE

## 2023-07-24 PROCEDURE — 83605 ASSAY OF LACTIC ACID: CPT | Performed by: INTERNAL MEDICINE

## 2023-07-24 PROCEDURE — 87070 CULTURE OTHR SPECIMN AEROBIC: CPT | Performed by: INTERNAL MEDICINE

## 2023-07-24 RX ORDER — BISACODYL 10 MG
10 SUPPOSITORY, RECTAL RECTAL DAILY PRN
Status: DISCONTINUED | OUTPATIENT
Start: 2023-07-24 | End: 2023-07-28 | Stop reason: HOSPADM

## 2023-07-24 RX ORDER — OXYCODONE HYDROCHLORIDE 10 MG/1
10 TABLET ORAL EVERY 4 HOURS PRN
Status: DISCONTINUED | OUTPATIENT
Start: 2023-07-24 | End: 2023-07-25 | Stop reason: SDUPTHER

## 2023-07-24 RX ORDER — NEBIVOLOL 5 MG/1
5 TABLET ORAL
Status: DISCONTINUED | OUTPATIENT
Start: 2023-07-24 | End: 2023-07-28 | Stop reason: HOSPADM

## 2023-07-24 RX ORDER — POLYETHYLENE GLYCOL 3350 17 G/17G
17 POWDER, FOR SOLUTION ORAL DAILY PRN
Status: DISCONTINUED | OUTPATIENT
Start: 2023-07-24 | End: 2023-07-28 | Stop reason: HOSPADM

## 2023-07-24 RX ORDER — SODIUM CHLORIDE 0.9 % (FLUSH) 0.9 %
10 SYRINGE (ML) INJECTION AS NEEDED
Status: DISCONTINUED | OUTPATIENT
Start: 2023-07-24 | End: 2023-07-28 | Stop reason: HOSPADM

## 2023-07-24 RX ORDER — ACETAMINOPHEN 500 MG
1000 TABLET ORAL EVERY 6 HOURS
Status: DISCONTINUED | OUTPATIENT
Start: 2023-07-24 | End: 2023-07-25 | Stop reason: SDUPTHER

## 2023-07-24 RX ORDER — OXYCODONE HYDROCHLORIDE 5 MG/1
5 TABLET ORAL EVERY 4 HOURS PRN
Status: DISCONTINUED | OUTPATIENT
Start: 2023-07-24 | End: 2023-07-24

## 2023-07-24 RX ORDER — VENLAFAXINE HYDROCHLORIDE 75 MG/1
75 CAPSULE, EXTENDED RELEASE ORAL DAILY
Status: DISCONTINUED | OUTPATIENT
Start: 2023-07-24 | End: 2023-07-28 | Stop reason: HOSPADM

## 2023-07-24 RX ORDER — LOSARTAN POTASSIUM 50 MG/1
100 TABLET ORAL DAILY
Status: DISCONTINUED | OUTPATIENT
Start: 2023-07-24 | End: 2023-07-28 | Stop reason: HOSPADM

## 2023-07-24 RX ORDER — AMOXICILLIN 250 MG
2 CAPSULE ORAL 2 TIMES DAILY
Status: DISCONTINUED | OUTPATIENT
Start: 2023-07-24 | End: 2023-07-28 | Stop reason: HOSPADM

## 2023-07-24 RX ORDER — CETIRIZINE HYDROCHLORIDE 10 MG/1
10 TABLET ORAL DAILY
Status: DISCONTINUED | OUTPATIENT
Start: 2023-07-24 | End: 2023-07-28 | Stop reason: HOSPADM

## 2023-07-24 RX ORDER — SODIUM CHLORIDE 9 MG/ML
40 INJECTION, SOLUTION INTRAVENOUS AS NEEDED
Status: DISCONTINUED | OUTPATIENT
Start: 2023-07-24 | End: 2023-07-28 | Stop reason: HOSPADM

## 2023-07-24 RX ORDER — LABETALOL HYDROCHLORIDE 5 MG/ML
10 INJECTION, SOLUTION INTRAVENOUS EVERY 4 HOURS PRN
Status: DISCONTINUED | OUTPATIENT
Start: 2023-07-24 | End: 2023-07-28 | Stop reason: HOSPADM

## 2023-07-24 RX ORDER — BISACODYL 5 MG/1
5 TABLET, DELAYED RELEASE ORAL DAILY PRN
Status: DISCONTINUED | OUTPATIENT
Start: 2023-07-24 | End: 2023-07-28 | Stop reason: HOSPADM

## 2023-07-24 RX ORDER — SODIUM CHLORIDE 0.9 % (FLUSH) 0.9 %
10 SYRINGE (ML) INJECTION EVERY 12 HOURS SCHEDULED
Status: DISCONTINUED | OUTPATIENT
Start: 2023-07-24 | End: 2023-07-28 | Stop reason: HOSPADM

## 2023-07-24 RX ORDER — ESTRADIOL 0.5 MG/1
0.5 TABLET ORAL DAILY
Status: DISCONTINUED | OUTPATIENT
Start: 2023-07-24 | End: 2023-07-28 | Stop reason: HOSPADM

## 2023-07-24 RX ORDER — ATORVASTATIN CALCIUM 10 MG/1
10 TABLET, FILM COATED ORAL NIGHTLY
Status: DISCONTINUED | OUTPATIENT
Start: 2023-07-24 | End: 2023-07-28 | Stop reason: HOSPADM

## 2023-07-24 RX ADMIN — SODIUM CHLORIDE 2000 MG: 900 INJECTION INTRAVENOUS at 16:13

## 2023-07-24 RX ADMIN — OXYCODONE HYDROCHLORIDE 5 MG: 5 TABLET ORAL at 15:48

## 2023-07-24 RX ADMIN — Medication 10 ML: at 20:33

## 2023-07-24 RX ADMIN — SENNOSIDES AND DOCUSATE SODIUM 2 TABLET: 50; 8.6 TABLET ORAL at 20:46

## 2023-07-24 RX ADMIN — DAPTOMYCIN 600 MG: 500 INJECTION, POWDER, LYOPHILIZED, FOR SOLUTION INTRAVENOUS at 15:19

## 2023-07-24 RX ADMIN — ATORVASTATIN CALCIUM 10 MG: 10 TABLET, FILM COATED ORAL at 20:33

## 2023-07-24 RX ADMIN — ACETAMINOPHEN 1000 MG: 500 TABLET ORAL at 17:40

## 2023-07-24 NOTE — LETTER
EMS Transport Request  For use at [facility], [place], [place], and [place] only   Patient Name: Imelda Elaine : 1950   Weight:(!) 150 kg (330 lb) Pick-up Location: [facility] BLS/ALS: BLS/ALS: BLS   Insurance: [employer] MEDICARE REPLACEMENT Auth End Date:    Pre-Cert #: D/C Summary complete:    Destination: [facility]   Contact Precautions: None   Equipment (O2, Fluids, etc.): None   Arrive By Date/Time: Friday, 23 Stretcher/WC: Stretcher   CM Requesting: Ashely Bray RN Ext: [phone]   Notes/Medical Necessity: 2 person max assist, acute pain, weakness, obesity, fall risk.     ______________________________________________________________________    *Only 2 patient bags OR 1 carry-on size bag are permitted.  Wheelchairs and walkers CANNOT transported with the patient. Acknowledge: Yes

## 2023-07-24 NOTE — H&P
"Patient Name: Imelda Elaine  MRN: [id number]  : 1950  DOS: 2023    Attending: Valerie Concepcion MD    Primary Care Provider: Comfort Colunga MD      Chief complaint:  Right Knee Pain, redness, drainage.    Subjective   Patient is a pleasant 72 y.o. female presented as a direct admit from Dr. Reynoso's office after presenting there with worsening pain, erythema and drainage from her right knee.    Patient is known to us from prior hospitalizations including left total knee arthroplasty admission in 2017 and more recently and admitted on 2023 for right total knee arthroplasty.  She did well postoperatively and was discharged home on postop day 1.  She did have a PSVT episode for which she was seen by cardiology and was placed on a beta-blocker.    Her postop course was going quite well up until 3 days postop when she felt tear as she was bending her knee.  She continued to have pain on a daily basis was followed by erythema especially the lower aspect of the incision.  She was seen by Dr. Granados in his office a week ago and was started on doxycycline.  Unfortunately her symptoms worsened and pain persisted.  She had intermittent chills, no documented fever.  Had decreased appetite, no nausea or vomiting.  No chest pain shortness of breath, no diarrhea or constipation.  She has avoided taking pain medication but more recently she had to reach for oxycodone to control her pain.    After being seen by  today it was felt that she will need aggressive management of her cellulitis/wound infection.  She is directly admitted.  Plan is for I&D, washout, likely poly exchange tomorrow.       Allergies   Allergen Reactions    Primidone Other (See Comments)     Hair loss    Topiramate Diarrhea    Tuberculin Tests Swelling and Rash     Pt has chest xrays to check     Zostavax [Zoster Vaccine Live] Swelling and Rash     \"Swelling of injection site of arm\"        Medications Prior to Admission "   Medication Sig Dispense Refill Last Dose    acetaminophen (TYLENOL) 650 MG 8 hr tablet Take 1 tablet by mouth Every 8 (Eight) Hours. Take scheduled for a week then prn       aspirin 325 MG EC tablet Take 1 tablet by mouth Daily for 30 days. 30 tablet 0     atorvastatin (LIPITOR) 10 MG tablet Take 1 tablet by mouth Every Night. 90 tablet 1     cetirizine (zyrTEC) 10 MG tablet Take 1 tablet by mouth Daily.       estradiol (ESTRACE) 0.5 MG tablet Take 1 tablet by mouth Daily. 90 tablet 3     ipratropium (ATROVENT) 0.06 % nasal spray 1 spray into the nostril(s) as directed by provider As Needed.       ketoconazole (NIZORAL) 2 % shampoo        lansoprazole (PREVACID) 30 MG capsule Take 1 capsule by mouth Daily.       losartan (COZAAR) 100 MG tablet Take 1 tablet by mouth Daily. 90 tablet 1     multivitamin with minerals tablet tablet Take 1 tablet by mouth Daily.       nebivolol (BYSTOLIC) 5 MG tablet Take 1 tablet by mouth Daily. 90 tablet 3     oxyCODONE (Roxicodone) 5 MG immediate release tablet Take 1 tablet by mouth Every 4 (Four) Hours As Needed for Moderate Pain. 40 tablet 0     ropivacaine (NAROPIN) 0.2 % infusion (INFUSYSTEM) 2 mg/hr by Peripheral Nerve route Continuous.       venlafaxine XR (EFFEXOR-XR) 75 MG 24 hr capsule Take 1 capsule by mouth Daily.       zonisamide (ZONEGRAN) 100 MG capsule 2 capsules per day (Patient taking differently: Take 2 capsules by mouth Daily. 2 capsules per day) 180 capsule 3       Past Medical History:   Diagnosis Date    Anesthesia 2002    low bp only with foot surgery, has had surgeries since and no issue per pt report    Arthritis     Back pain     Depression     Difficulty walking     Left knee replacement  need right knee replacement    GERD (gastroesophageal reflux disease)     High cholesterol     Hypertension     Knee pain     BILATERAL    Seasonal allergies     Tremor, essential      Past Surgical History:   Procedure Laterality Date    ANTERIOR AND POSTERIOR VAGINAL  "REPAIR N/A 2017    Vaginal enterocele / anterior posterior colporrhaphy;  Jose Maria Chowdhury MD;  Location:  [facility];  Service:     BLADDER SUSPENSION      MESH TVT?     COLONOSCOPY      Dr Chung - normal    EXPLORATORY LAPAROTOMY      \"BOWEL INTO PELVIC BONE\" /Mesh [place]     EXPLORATORY LAPAROTOMY  2004    AQUILES/ abd sacrocolpopexy/post repair    FOOT SURGERY Left     KNEE ARTHROSCOPY Left     meniscus repair    LAPAROSCOPIC CHOLECYSTECTOMY Right     LA ARTHRP KNE CONDYLE&PLATU MEDIAL&LAT COMPARTMENTS Left 2017    Procedure: TOTAL KNEE ARTHROPLASTY LEFT;  Surgeon: Sunny Reynoso MD;  Location:  [facility];  Service: Orthopedics    TONSILLECTOMY      TOTAL ABDOMINAL HYSTERECTOMY WITH SALPINGO OOPHORECTOMY Bilateral     TOTAL KNEE ARTHROPLASTY Right 2023    Procedure: TOTAL KNEE ARTHROPLASTY WITH CORI ROBOT - RIGHT;  Surgeon: Sunny Reynoso MD;  Location:  [facility];  Service: Robotics - Ortho;  Laterality: Right;    TUBAL ABDOMINAL LIGATION      VENTRAL HERNIA REPAIR N/A 2016    Procedure: LAPAROSCOPIC REPAIR OF ABDOMINAL WALL HERNIA WITH MESH, CONVERTED TO OPEN ;  Surgeon: Bertha Delacruz MD;  Location:  [facility];  Service:      Family History   Problem Relation Age of Onset    Hypertension Mother     Diabetes Mother     Heart attack Mother 59         from MI    Cancer Father     Aneurysm Father 57    Hepatitis Brother         Hep B    Kidney disease Brother 58    Alzheimer's disease Maternal Grandmother     Pneumonia Maternal Grandmother     Parkinsonism Maternal Grandmother         Along w/Alzheimer’s    Leukemia Maternal Grandfather     No Known Problems Paternal Grandfather     No Known Problems Daughter     No Known Problems Son     Ovarian cancer Neg Hx     Breast cancer Neg Hx      Social History     Tobacco Use    Smoking status: Former     Packs/day: 1.50     Years: 35.00     Pack years: 52.50     Types: Cigarettes     Start date: " 10/1/1967     Quit date: 1986     Years since quittin.1    Smokeless tobacco: Never   Vaping Use    Vaping Use: Never used   Substance Use Topics    Alcohol use: No    Drug use: Never       Review of Systems  Pertinent items are noted in HPI, all other systems reviewed and negative    Vital Signs  LMP  (LMP Unknown)     Physical Exam:    General Appearance:    Alert, cooperative, in no acute distress   Head:    Normocephalic, without obvious abnormality, atraumatic   Eyes:            Lids and lashes normal, conjunctivae and sclerae normal, no   icterus, no pallor, corneas clear    Ears:    Ears appear intact with no abnormalities noted   Throat:   No oral lesions, no thrush, oral mucosa moist   Neck:   No adenopathy, supple, trachea midline, no thyromegaly         Lungs:     Clear to auscultation,respirations regular, even and                   unlabored    Heart:    Regular rhythm and normal rate, normal S1 and S2, no       murmur, no gallop   Abdomen:     Normal bowel sounds, no masses, no organomegaly, soft        non-tender, non-distended, no guarding, morbidly obese   Genitalia:    Deferred   Extremities: Right LE, CDI dressing on knee, erythema with mild swelling.  There is drainage from incision mostly the lower half.   Pulses:   Pulses palpable and equal bilaterally   Skin:   No bleeding, bruising or rash   Neurologic:   Cranial nerves 2 - 12 grossly intact, intact flexion and dorsiflexion bilateral feet.      I reviewed the patient's new clinical results.             Invalid input(s): NEUTOPHILPCT,  EOSPCT        Invalid input(s): LABALBU, PROT  Lab Results   Component Value Date    HGBA1C 5.90 (H) 06/15/2023      Latest Reference Range & Units 23 05:20   Sodium 136 - 145 mmol/L 140   Potassium 3.5 - 5.2 mmol/L 5.2   Chloride 98 - 107 mmol/L 109 (H)   CO2 22.0 - 29.0 mmol/L 22.0   Anion Gap 5.0 - 15.0 mmol/L 9.0   BUN 8 - 23 mg/dL 28 (H)   Creatinine 0.57 - 1.00 mg/dL 1.15 (H)    BUN/Creatinine Ratio 7.0 - 25.0  24.3   eGFR >60.0 mL/min/1.73 51.0 (L)   Glucose 65 - 99 mg/dL 131 (H)   Calcium 8.6 - 10.5 mg/dL 8.4 (L)   (H): Data is abnormally high  (L): Data is abnormally low   Latest Reference Range & Units 06/30/23 05:20   WBC 3.40 - 10.80 10*3/mm3 9.34   RBC 3.77 - 5.28 10*6/mm3 3.65 (L)   Hemoglobin 12.0 - 15.9 g/dL 10.5 (L)   Hematocrit 34.0 - 46.6 % 34.0   Platelets 140 - 450 10*3/mm3 169   RDW 12.3 - 15.4 % 14.3   MCV 79.0 - 97.0 fL 93.2   MCH 26.6 - 33.0 pg 28.8   MCHC 31.5 - 35.7 g/dL 30.9 (L)   MPV 6.0 - 12.0 fL 11.5   RDW-SD 37.0 - 54.0 fl 49.1   (L): Data is abnormally low    Assessment and Plan:       Postoperative wound infection, right knee    Morbid obesity    Essential hypertension    Anxiety and depression    Elevated hemoglobin A1c    Peripheral vascular disease    PSVT (paroxysmal supraventricular tachycardia)      Plan  Admit for further management.    Wound culture, blood cultures, initial labs including CBC, , CMP, CRP, sed rate, and CPK.    Broad-spectrum IV antibiotics to include daptomycin and ceftriaxone, ID consult to follow.    Anxiety and depression: Resume home med regimen.        Hypertension:  Resume home medications as appropriate, formulary substitution when indicated.  Holding parameters.  Prn medications for elevated blood pressure.    Morbid obesity: Complicates all aspects of care.    D/w pt, , and with Dr.Coy. Betts disclaimer:  Part of this encounter note is an electronic transcription/translation of spoken language to printed text. The electronic translation of spoken language may permit erroneous, or at times, nonsensical words or phrases to be inadvertently transcribed; Although I have reviewed the note for such errors, some may still exist.    Valerie Concepcion MD  07/24/23  11:54 EDT

## 2023-07-24 NOTE — CONSULTS
INFECTIOUS DISEASE CONSULT/INITIAL HOSPITAL VISIT    Imelda Elaine  1950  [id number]    Date of consult: 7/24/2023    Admit date: 7/24/2023    Requesting Provider: Sunny Reynoso,*  Evaluating physician: Nam Adams MD  Reason for Consultation: Postop wound infection   Chief Complaint: right knee infection      Subjective   History of present illness:  Imelda Elaine is a  72 y.o.  Yr old female with a past medical history of hypertension, HLD,a dn osteoarthritis s/p left knee replacement in 2017 who underwent a right total knee replacement 6/29/2023. The patient initially did well post-operatively but subsequently started having swelling of the knee and increased pain about 1 week after surgery with mild erythema around incision site. This occurred right after she bent down when she was cleaning up after going to the bathroom and felt a pop behind her right knee. She was seen in clinic by Dr. Reynoso and was started on oral Bactrim about 1 week ago. Presented back to Dr. Reynoso's clinic today and had increased erythema, pain, and swelling along with drainage from incision site. She was also experiencing some fevers and chills at home. She was directly admitted to Dr. Concepcion's service at [facility]. Plan by Dr. Reynoso is to take the patient to the OR tomorrow for right knee I&D with poly exchange. The patient has remained afebrile since arrival. ESR was elevated to 95 and CRP was  16.8. Lactic acid was 1.0. Wound cx from right knee is in progress and blood cultures have been sent and are in progress. Cr was 0.78. LFTs were mildly elevated with ALT 47, AST 43, Alk Phos 196. T gemini was normal at 0.5. No new radiographic data. The patient has been started on IV Daptomycin and Ceftriaxone by the primary team. ID has been consulted for abx recs.     Past Medical History:   Diagnosis Date    Anesthesia 2002    low bp only with foot surgery, has had surgeries since and no issue per pt report    Arthritis      "Back pain     Depression     Difficulty walking     Left knee replacement  need right knee replacement    GERD (gastroesophageal reflux disease)     High cholesterol     Hypertension     Knee pain     BILATERAL    Seasonal allergies     Tremor, essential        Past Surgical History:   Procedure Laterality Date    ANTERIOR AND POSTERIOR VAGINAL REPAIR N/A 4/17/2017    Vaginal enterocele / anterior posterior colporrhaphy;  Jose Maria Chowdhury MD;  Location:  [facility];  Service:     BLADDER SUSPENSION  1999    MESH TVT?     COLONOSCOPY  2018    Dr Chung - gail    EXPLORATORY LAPAROTOMY  2010    \"BOWEL INTO PELVIC BONE\" /Mesh [place]     EXPLORATORY LAPAROTOMY  04/2004    AQUILES/ abd sacrocolpopexy/post repair    FOOT SURGERY Left     KNEE ARTHROSCOPY Left     meniscus repair    LAPAROSCOPIC CHOLECYSTECTOMY Right 2000    ME ARTHRP KNE CONDYLE&PLATU MEDIAL&LAT COMPARTMENTS Left 11/16/2017    Procedure: TOTAL KNEE ARTHROPLASTY LEFT;  Surgeon: Sunny Reynoso MD;  Location:  [facility];  Service: Orthopedics    TONSILLECTOMY      TOTAL ABDOMINAL HYSTERECTOMY WITH SALPINGO OOPHORECTOMY Bilateral 1989    TOTAL KNEE ARTHROPLASTY Right 6/29/2023    Procedure: TOTAL KNEE ARTHROPLASTY WITH CORI ROBOT - RIGHT;  Surgeon: Sunny Reynoso MD;  Location:  [facility];  Service: Robotics - Ortho;  Laterality: Right;    TUBAL ABDOMINAL LIGATION      VENTRAL HERNIA REPAIR N/A 6/21/2016    Procedure: LAPAROSCOPIC REPAIR OF ABDOMINAL WALL HERNIA WITH MESH, CONVERTED TO OPEN ;  Surgeon: Bertha Delacruz MD;  Location:  [facility];  Service:        Pediatric History   Patient Parents    Not on file     Other Topics Concern    Not on file   Social History Narrative    Patient consumes 1-2 cups high-caffeine coffee, 1 [employer] sweet tea daily.     Patient lives at home with her .        family history includes Alzheimer's disease in her maternal grandmother; Aneurysm (age of onset: 57) in her father; Cancer in her father; " "Diabetes in her mother; Heart attack (age of onset: 59) in her mother; Hepatitis in her brother; Hypertension in her mother; Kidney disease (age of onset: 58) in her brother; Leukemia in her maternal grandfather; No Known Problems in her daughter, paternal grandfather, and son; Parkinsonism in her maternal grandmother; Pneumonia in her maternal grandmother.    Allergies   Allergen Reactions    Primidone Other (See Comments)     Hair loss    Topiramate Diarrhea    Tuberculin Tests Swelling and Rash     Pt has chest xrays to check     Zostavax [Zoster Vaccine Live] Swelling and Rash     \"Swelling of injection site of arm\"       Immunization History   Administered Date(s) Administered    COVID-19 (MODERNA) BIVALENT 12+YRS 09/04/2022    COVID-19 (MODERNA) Monovalent Original Booster 04/19/2022    COVID-19 (PFIZER) Purple Cap Monovalent 02/06/2021, 02/27/2021, 09/26/2021    Fluad Quad 65+ 09/14/2020    Fluzone High Dose =>65 Years ([facility] ONLY) 10/02/2017, 09/22/2018, 10/04/2019    Influenza, Unspecified 09/14/2020    Pneumococcal Conjugate 13-Valent (PCV13) 10/02/2017    Pneumococcal Polysaccharide (PPSV23) 10/04/2019       Medication:    Current Facility-Administered Medications:     atorvastatin (LIPITOR) tablet 10 mg, 10 mg, Oral, Nightly, Valerie Concepcion MD    sennosides-docusate (PERICOLACE) 8.6-50 MG per tablet 2 tablet, 2 tablet, Oral, BID **AND** polyethylene glycol (MIRALAX) packet 17 g, 17 g, Oral, Daily PRN **AND** bisacodyl (DULCOLAX) EC tablet 5 mg, 5 mg, Oral, Daily PRN **AND** bisacodyl (DULCOLAX) suppository 10 mg, 10 mg, Rectal, Daily PRN, Valerie Concepcion MD    cefTRIAXone (ROCEPHIN) 2000 mg/100 mL 0.9% NS IVPB (MBP), 2,000 mg, Intravenous, Q24H, Valerie Concepcion MD, 2,000 mg at 07/24/23 1613    cetirizine (zyrTEC) tablet 10 mg, 10 mg, Oral, Daily, Valerie Concepcion MD    DAPTOmycin (CUBICIN) 600 mg in sodium chloride 0.9 % 50 mL IVPB, 600 mg, Intravenous, Q24H, Valerie Concepcion, " MD, Last Rate: 100 mL/hr at 07/24/23 1519, 600 mg at 07/24/23 1519    estradiol (ESTRACE) tablet 0.5 mg, 0.5 mg, Oral, Daily, Valerie Concepcion MD    labetalol (NORMODYNE,TRANDATE) injection 10 mg, 10 mg, Intravenous, Q4H PRN, Valerie Concepcion MD    losartan (COZAAR) tablet 100 mg, 100 mg, Oral, Daily, Valerie Concepcion MD    nebivolol (BYSTOLIC) tablet 5 mg, 5 mg, Oral, Q24H, Valerie Concepcion MD    oxyCODONE (ROXICODONE) immediate release tablet 5 mg, 5 mg, Oral, Q4H PRN, Valerie Concepcion MD, 5 mg at 07/24/23 1548    sodium chloride 0.9 % bolus 500 mL, 500 mL, Intravenous, TID PRN, Valerie Concepcion MD    sodium chloride 0.9 % flush 10 mL, 10 mL, Intravenous, Q12H, Valerie Concepcion MD    sodium chloride 0.9 % flush 10 mL, 10 mL, Intravenous, PRN, Valerie Concepcion MD    sodium chloride 0.9 % infusion 40 mL, 40 mL, Intravenous, PRN, Valerie Concepcion MD    venlafaxine XR (EFFEXOR-XR) 24 hr capsule 75 mg, 75 mg, Oral, Daily, Valerie Concepcion MD    Please refer to the medical record for a full medication list    Review of Systems:    Constitutional-- + Fever, chills. + fatigue.  HEENT-- No new vision, hearing or throat complaints.  No epistaxis or oral sores.  Denies odynophagia or dysphagia.  No odynophagia or dysphagia. No headache, photophobia or neck stiffness.  CV-- No chest pain, palpitation or syncope  Resp-- No SOB/cough/Hemoptysis  GI- No nausea, vomiting, or diarrhea.  No hematochezia, melena, or hematemesis. Denies jaundice or chronic liver disease.  -- No dysuria, hematuria, or flank pain.  Denies hesitancy, urgency or flank pain.  Lymph- no swollen lymph nodes in neck/axilla or groin.   Heme- No active bruising or bleeding; no Hx of DVT or PE.  MS-- no swelling or pain in the bones or joints of arms/legs.  No new back pain.  Neuro--Other than right knee infection as noted per HPI--> No acute focal weakness or numbness in the arms or legs.  No seizures.  Skin--No  generalized rashes or lesions    Physical Exam:   Vital Signs   Temp:  [97.7 °F (36.5 °C)-97.8 °F (36.6 °C)] 97.7 °F (36.5 °C)  Heart Rate:  [85] 85  Resp:  [16] 16  BP: (139)/(84) 139/84    Temp  Min: 97.7 °F (36.5 °C)  Max: 97.8 °F (36.6 °C)  BP  Min: 139/84  Max: 139/84  Pulse  Min: 85  Max: 85  Resp  Min: 16  Max: 16  SpO2  Min: 95 %  Max: 95 %    Blood pressure 139/84, pulse 85, temperature 97.7 °F (36.5 °C), resp. rate 16, SpO2 95 %, not currently breastfeeding.  GENERAL: Awake and alert, in no acute distress. Appears stated age.  Obese  HEENT:  Normocephalic, atraumatic.  No external or lesions noted.  No thrush noted.  EYES: PERRL. No conjunctival injection. No icterus.   HEART: RRR, no murmur  LUNGS: Clear to auscultation and percussion. No respiratory distress, no use of accessory muscles.  ABDOMEN: Obese abdomen. Soft, nontender, nondistended. No appreciable HSM.    MSK: right knee with swelling, erythema, increased warmth, and tenderness to palpation. Erythema extends down her right leg somewhat.  Has a dressing in place over her incision site with some drainage seeping through the dressing.  GENITAL: no Donald catheter  SKIN: no generalized rashes.  No peripheral stigmata of infective endocarditis noted.  PSYCHIATRIC: cooperative.  Appropriate mood and affect  EXT:  No cellulitic change.  NEURO: awake alert and oriented ×4.  Normal speech and cognition    Results Review:   I reviewed the patient's new clinical results.        Results from last 7 days   Lab Units 07/24/23  1249   SODIUM mmol/L 140   POTASSIUM mmol/L 4.2   CHLORIDE mmol/L 103   CO2 mmol/L 25.0   BUN mg/dL 15   CREATININE mg/dL 0.78   GLUCOSE mg/dL 117*   CALCIUM mg/dL 9.2     Results from last 7 days   Lab Units 07/24/23  1249   ALK PHOS U/L 196*   BILIRUBIN mg/dL 0.5   ALT (SGPT) U/L 47*   AST (SGOT) U/L 43*     Results from last 7 days   Lab Units 07/24/23  1249   SED RATE mm/hr 95*     Results from last 7 days   Lab Units 07/24/23  1244    CRP mg/dL 16.82*         Results from last 7 days   Lab Units 07/24/23  1249   LACTATE mmol/L 1.0     Estimated Creatinine Clearance: 94.1 mL/min (by C-G formula based on SCr of 0.78 mg/dL).  CPK          7/24/2023    12:49   Common Labsle   Creatine Kinase 64       Procalitonin Results:       Brief Urine Lab Results       None           No results found for: SITE, ALLENTEST, PHART, RWV6ESE, PO2ART, GHM0LEI, BASEEXCESS, X1GTPUWX, HGBBG, HCTABG, OXYHEMOGLOBI, METHHGBN, CARBOXYHGB, CO2CT, BAROMETRIC, MODALITY, FIO2     Microbiology:    Blood culture x2: In process    Right knee surgical culture: In process    Radiology:  Imaging Results (Last 72 Hours)       ** No results found for the last 72 hours. 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 Most likely culprits are staphylococcus and streptococcus. P acnes is also a possibility. GNR infection seems less likely. There is a possibility that this is just a superficial infection but given that the patient is high risk in the setting of new hardware, plan is for I&D with poly exchange tomorrow by orthopedic surgery. Superficial wound culture has been sent and is in progress and surgical cultures will be sent tomorrow. She does have significant elevated inflammatory markers.   Mildly elevated LFTs  Morbid obesity-complicates all aspects of care.  Predisposes the patient to poor wound healing and infection.  History of left knee replacement in 2017  HTN  HLD    RECOMMENDATIONS:    -Continue to closely monitor CBC with differential and CMP.  Baseline CPK level is okay  -Follow pending blood cultures and superficial wound culture from the right knee  -Continue Daptomycin 6 mg/kg IV every 24 hours  -Continue Ceftriaxone 2 g IV every 24 hours  -Plan for I&D and poly-exchange by Dr. Reynoso tomorrow. I will follow surgical cultures    She will likely need a long course  of IV antibiotics. Final IV antibiotic course depends on extent of infection during procedure and final culture results.    I discussed in length with the patient and her  at bedside today    Thank you for asking me to see Imelda Elaine.  Our group would be pleased to follow this patient over the course of their hospitalization and assist with outpatient antimicrobial therapy, as indicated.  Further recommendations depend on the results of the cultures and clinical course.    Complex MDM.  At some risk for loss of right limb    Nam Adams MD  7/24/2023

## 2023-07-24 NOTE — PLAN OF CARE
Patient alert and oriented x 4. Direct admit from Dr. Milner office. Serosanguinous drainage to right knee incision, wound culture collected then applied covaderm dressing, redness around incision and to calf. Denies n/t. Received 5mg oxycodone for pain after walking to bathroom with 1 assist. 1 hr later still in pain, requesting something else for pain. Notified Dr. AYALA, received order for scheduled tylenol and increased oxycodone to 10mg. NPO after mn, consent signed for I&D.

## 2023-07-24 NOTE — CONSULTS
"  Patient: Imelda Elaine    Date of Admission: 7/24/2023 11:08 AM    YOB: 1950    Medical Record Number: [id number]    Attending Physician: Valerie Concepcion MD    Consulting Physician: Sunny Reynoso MD      Chief Complaints: Infection of knee [M00.9]  Postoperative wound infection [T81.49XA]  Right prosthetic knee infection      History of Present Illness: Imelda is a very pleasant 72-year-old female who underwent right total knee replacement on 6/29, 3 1/2 weeks ago.  Initially did well but feels like she strained her knee the week after surgery and since then had increased pain and swelling.  When seen in the office 1 week ago on 7/17 she had some mild erythema distal aspect of the incision so was placed on oral Doxycyline.  She presented to the office this morning with increased redness, pain and swelling along with some drainage from the inferior aspect of her incision.  Complaining of fever and chills as well.  We sent her to [facility] as a direct admit to Dr. AYALA along with infectious disease consult with a plan for right knee irrigation and debridement with poly exchange tomorrow.       Allergies   Allergen Reactions    Primidone Other (See Comments)     Hair loss    Topiramate Diarrhea    Tuberculin Tests Swelling and Rash     Pt has chest xrays to check     Zostavax [Zoster Vaccine Live] Swelling and Rash     \"Swelling of injection site of arm\"        Home Medications:  Medications Prior to Admission   Medication Sig Dispense Refill Last Dose    acetaminophen (TYLENOL) 650 MG 8 hr tablet Take 1 tablet by mouth Every 8 (Eight) Hours. Take scheduled for a week then prn       aspirin 325 MG EC tablet Take 1 tablet by mouth Daily for 30 days. 30 tablet 0     atorvastatin (LIPITOR) 10 MG tablet Take 1 tablet by mouth Every Night. 90 tablet 1     cetirizine (zyrTEC) 10 MG tablet Take 1 tablet by mouth Daily.       estradiol (ESTRACE) 0.5 MG tablet Take 1 tablet by mouth Daily. 90 " tablet 3     ipratropium (ATROVENT) 0.06 % nasal spray 1 spray into the nostril(s) as directed by provider As Needed.       ketoconazole (NIZORAL) 2 % shampoo        lansoprazole (PREVACID) 30 MG capsule Take 1 capsule by mouth Daily.       losartan (COZAAR) 100 MG tablet Take 1 tablet by mouth Daily. 90 tablet 1     multivitamin with minerals tablet tablet Take 1 tablet by mouth Daily.       nebivolol (BYSTOLIC) 5 MG tablet Take 1 tablet by mouth Daily. 90 tablet 3     oxyCODONE (Roxicodone) 5 MG immediate release tablet Take 1 tablet by mouth Every 4 (Four) Hours As Needed for Moderate Pain. 40 tablet 0     ropivacaine (NAROPIN) 0.2 % infusion (INFUSYSTEM) 2 mg/hr by Peripheral Nerve route Continuous.       venlafaxine XR (EFFEXOR-XR) 75 MG 24 hr capsule Take 1 capsule by mouth Daily.       zonisamide (ZONEGRAN) 100 MG capsule 2 capsules per day (Patient taking differently: Take 2 capsules by mouth Daily. 2 capsules per day) 180 capsule 3        Current Medications:  Scheduled Meds:atorvastatin, 10 mg, Oral, Nightly  cefTRIAXone, 2,000 mg, Intravenous, Q24H  cetirizine, 10 mg, Oral, Daily  DAPTOmycin, 600 mg, Intravenous, Q24H  estradiol, 0.5 mg, Oral, Daily  losartan, 100 mg, Oral, Daily  nebivolol, 5 mg, Oral, Q24H  senna-docusate sodium, 2 tablet, Oral, BID  sodium chloride, 10 mL, Intravenous, Q12H  venlafaxine XR, 75 mg, Oral, Daily      Continuous Infusions:   PRN Meds:.  senna-docusate sodium **AND** polyethylene glycol **AND** bisacodyl **AND** bisacodyl    labetalol    oxyCODONE    sodium chloride    sodium chloride    sodium chloride    Past Medical History:   Diagnosis Date    Anesthesia 2002    low bp only with foot surgery, has had surgeries since and no issue per pt report    Arthritis     Back pain     Depression     Difficulty walking     Left knee replacement  need right knee replacement    GERD (gastroesophageal reflux disease)     High cholesterol     Hypertension     Knee pain     BILATERAL     "Seasonal allergies     Tremor, essential         Past Surgical History:   Procedure Laterality Date    ANTERIOR AND POSTERIOR VAGINAL REPAIR N/A 2017    Vaginal enterocele / anterior posterior colporrhaphy;  Jose Maria Chowdhury MD;  Location:  [facility];  Service:     BLADDER SUSPENSION      MESH TVT?     COLONOSCOPY      Dr Chung - normal    EXPLORATORY LAPAROTOMY      \"BOWEL INTO PELVIC BONE\" /Mesh [place]     EXPLORATORY LAPAROTOMY  2004    AQUILES/ abd sacrocolpopexy/post repair    FOOT SURGERY Left     KNEE ARTHROSCOPY Left     meniscus repair    LAPAROSCOPIC CHOLECYSTECTOMY Right     ID ARTHRP KNE CONDYLE&PLATU MEDIAL&LAT COMPARTMENTS Left 2017    Procedure: TOTAL KNEE ARTHROPLASTY LEFT;  Surgeon: Sunny Reynoso MD;  Location:  [facility];  Service: Orthopedics    TONSILLECTOMY      TOTAL ABDOMINAL HYSTERECTOMY WITH SALPINGO OOPHORECTOMY Bilateral     TOTAL KNEE ARTHROPLASTY Right 2023    Procedure: TOTAL KNEE ARTHROPLASTY WITH CORI ROBOT - RIGHT;  Surgeon: Sunny Reynoso MD;  Location:  [facility];  Service: Robotics - Ortho;  Laterality: Right;    TUBAL ABDOMINAL LIGATION      VENTRAL HERNIA REPAIR N/A 2016    Procedure: LAPAROSCOPIC REPAIR OF ABDOMINAL WALL HERNIA WITH MESH, CONVERTED TO OPEN ;  Surgeon: Bertha Delacruz MD;  Location:  [facility];  Service:         Social History     Occupational History    Not on file   Tobacco Use    Smoking status: Former     Packs/day: 1.50     Years: 35.00     Pack years: 52.50     Types: Cigarettes     Start date: 10/1/1967     Quit date: 1986     Years since quittin.1    Smokeless tobacco: Never   Vaping Use    Vaping Use: Never used   Substance and Sexual Activity    Alcohol use: No    Drug use: Never    Sexual activity: Defer     Partners: Male     Birth control/protection: Surgical, Abstinence, Post-menopausal, Tubal ligation, Hysterectomy     Comment: Total hysterectomy      Social History     Social " History Narrative    Patient consumes 1-2 cups high-caffeine coffee, 1 [employer] sweet tea daily.     Patient lives at home with her .         Family History   Problem Relation Age of Onset    Hypertension Mother     Diabetes Mother     Heart attack Mother 59         from MI    Cancer Father     Aneurysm Father 57    Hepatitis Brother         Hep B    Kidney disease Brother 58    Alzheimer's disease Maternal Grandmother     Pneumonia Maternal Grandmother     Parkinsonism Maternal Grandmother         Along w/Alzheimer’s    Leukemia Maternal Grandfather     No Known Problems Paternal Grandfather     No Known Problems Daughter     No Known Problems Son     Ovarian cancer Neg Hx     Breast cancer Neg Hx          Review of Systems:     Musculoskeletal: Right knee pain and swelling otherwise negative or noncontributory to the orthopedic exam    Physical Exam: 72 y.o. female  General Appearance:    Alert, cooperative, in no acute distress                   Vitals:    23 1141   BP: 139/84   BP Location: Right arm   Patient Position: Lying   Pulse: 85   Resp: 16   Temp: 97.8 °F (36.6 °C)   TempSrc: Oral   SpO2: 95%        Extremities:  Erythema surrounding the distal third of her right knee midline incision with some serosanguineous drainage as well.   Proximal aspect of the incision is healed well.  She is able to perform a straight leg raise  Neurovascular intact distally  Thigh and calf soft and nontender      Diagnostic Tests:    No visits with results within 2 Day(s) from this visit.   Latest known visit with results is:   Admission on 2023, Discharged on 2023   Component Date Value Ref Range Status    Glucose 2023 131 (H)  65 - 99 mg/dL Final    BUN 2023 28 (H)  8 - 23 mg/dL Final    Creatinine 2023 1.15 (H)  0.57 - 1.00 mg/dL Final    Sodium 2023 140  136 - 145 mmol/L Final    Potassium 2023 5.2  3.5 - 5.2 mmol/L Final    Slight hemolysis detected by analyzer.  Results may be affected.    Chloride 06/30/2023 109 (H)  98 - 107 mmol/L Final    CO2 06/30/2023 22.0  22.0 - 29.0 mmol/L Final    Calcium 06/30/2023 8.4 (L)  8.6 - 10.5 mg/dL Final    BUN/Creatinine Ratio 06/30/2023 24.3  7.0 - 25.0 Final    Anion Gap 06/30/2023 9.0  5.0 - 15.0 mmol/L Final    eGFR 06/30/2023 51.0 (L)  >60.0 mL/min/1.73 Final    WBC 06/30/2023 9.34  3.40 - 10.80 10*3/mm3 Final    RBC 06/30/2023 3.65 (L)  3.77 - 5.28 10*6/mm3 Final    Hemoglobin 06/30/2023 10.5 (L)  12.0 - 15.9 g/dL Final    Hematocrit 06/30/2023 34.0  34.0 - 46.6 % Final    MCV 06/30/2023 93.2  79.0 - 97.0 fL Final    MCH 06/30/2023 28.8  26.6 - 33.0 pg Final    MCHC 06/30/2023 30.9 (L)  31.5 - 35.7 g/dL Final    RDW 06/30/2023 14.3  12.3 - 15.4 % Final    RDW-SD 06/30/2023 49.1  37.0 - 54.0 fl Final    MPV 06/30/2023 11.5  6.0 - 12.0 fL Final    Platelets 06/30/2023 169  140 - 450 10*3/mm3 Final    Neutrophil % 06/30/2023 76.7 (H)  42.7 - 76.0 % Final    Lymphocyte % 06/30/2023 11.9 (L)  19.6 - 45.3 % Final    Monocyte % 06/30/2023 11.1  5.0 - 12.0 % Final    Eosinophil % 06/30/2023 0.0 (L)  0.3 - 6.2 % Final    Basophil % 06/30/2023 0.1  0.0 - 1.5 % Final    Immature Grans % 06/30/2023 0.2  0.0 - 0.5 % Final    Neutrophils, Absolute 06/30/2023 7.16 (H)  1.70 - 7.00 10*3/mm3 Final    Lymphocytes, Absolute 06/30/2023 1.11  0.70 - 3.10 10*3/mm3 Final    Monocytes, Absolute 06/30/2023 1.04 (H)  0.10 - 0.90 10*3/mm3 Final    Eosinophils, Absolute 06/30/2023 0.00  0.00 - 0.40 10*3/mm3 Final    Basophils, Absolute 06/30/2023 0.01  0.00 - 0.20 10*3/mm3 Final    Immature Grans, Absolute 06/30/2023 0.02  0.00 - 0.05 10*3/mm3 Final    nRBC 06/30/2023 0.0  0.0 - 0.2 /100 WBC Final       Right knee x-rays in the office 7/17 revealed well-placed total knee arthroplasty with no acute findings other than swelling      Assessment: 72-year-old female with right prosthetic knee infection status post index knee replacement on 6/29    Patient Active  Problem List   Diagnosis    Morbid obesity    Palpitations    Essential hypertension    Mixed hyperlipidemia    Prediabetes    Osteoarthritis of left knee    Leukocytosis, mild, likely reactive    Abnormal EKG    Anxiety and depression    BPV (benign positional vertigo)    Elevated hemoglobin A1c    Gastroesophageal reflux disease without esophagitis    Osteopenia    Peripheral vascular disease    S/P total knee arthroplasty, left    Tremor, essential    NDIAYE (dyspnea on exertion)    Body mass index 40.0-44.9, adult    Abnormal blood level of copper    RBBB    Pre-operative clearance    Encounter for pre-operative cardiovascular clearance    Status post right knee replacement    PSVT (paroxysmal supraventricular tachycardia)    Postoperative wound infection, right knee           Plan:  The patient voiced understanding of the risks, benefits, and alternative forms of treatment that were discussed and the patient consents to proceed with right knee irrigation and debridement with poly exchange.   -She was admitted from the office today as a direct admit for right prosthetic knee infection.  There is a possibility it is just a superficial infection but regardless requires irrigation and debridement with plan for poly exchange as well.  She has been on oral Doxycycline for 1 week.  Advised Dr. Carlene jin from my standpoint to start broad-spectrum IV antibiotic coverage.  Wound culture was taken.  We will plan to send cultures at the time of surgery tomorrow as well.  -Antibiotic management per infectious disease recommendations.  Appreciate their assistance.  -When seen in her hospital room this afternoon reports she is already feeling better.  -She is scheduled for right knee irrigation and debridement with poly exchange tomorrow afternoon at 2 PM.  -Okay to get up with assist.  Otherwise we will hold formal therapy until after surgery.  -Medical management per Dr. AYALA.  Appreciate his assistance.  -Please call with  any questions or concerns.      Discharge Plan: TBD      Date: 7/24/2023    Sunny Reynoso MD

## 2023-07-25 ENCOUNTER — ANESTHESIA EVENT (OUTPATIENT)
Dept: PERIOP | Facility: HOSPITAL | Age: 73
DRG: 486 | End: 2023-07-25
Payer: MEDICARE

## 2023-07-25 ENCOUNTER — APPOINTMENT (OUTPATIENT)
Dept: GENERAL RADIOLOGY | Facility: HOSPITAL | Age: 73
DRG: 486 | End: 2023-07-25
Payer: MEDICARE

## 2023-07-25 ENCOUNTER — ANESTHESIA (OUTPATIENT)
Dept: PERIOP | Facility: HOSPITAL | Age: 73
DRG: 486 | End: 2023-07-25
Payer: MEDICARE

## 2023-07-25 ENCOUNTER — ANESTHESIA EVENT CONVERTED (OUTPATIENT)
Dept: ANESTHESIOLOGY | Facility: HOSPITAL | Age: 73
DRG: 486 | End: 2023-07-25
Payer: MEDICARE

## 2023-07-25 PROCEDURE — 87015 SPECIMEN INFECT AGNT CONCNTJ: CPT | Performed by: ORTHOPAEDIC SURGERY

## 2023-07-25 PROCEDURE — 25010000002 VANCOMYCIN 1 G RECONSTITUTED SOLUTION: Performed by: ORTHOPAEDIC SURGERY

## 2023-07-25 PROCEDURE — 25010000002 DEXAMETHASONE PER 1 MG

## 2023-07-25 PROCEDURE — 87176 TISSUE HOMOGENIZATION CULTR: CPT | Performed by: ORTHOPAEDIC SURGERY

## 2023-07-25 PROCEDURE — 87075 CULTR BACTERIA EXCEPT BLOOD: CPT | Performed by: ORTHOPAEDIC SURGERY

## 2023-07-25 PROCEDURE — 25010000002 SUGAMMADEX 500 MG/5ML SOLUTION

## 2023-07-25 PROCEDURE — 87186 SC STD MICRODIL/AGAR DIL: CPT | Performed by: ORTHOPAEDIC SURGERY

## 2023-07-25 PROCEDURE — 25010000002 BUPIVACAINE (PF) 0.25 % SOLUTION: Performed by: NURSE ANESTHETIST, CERTIFIED REGISTERED

## 2023-07-25 PROCEDURE — 87077 CULTURE AEROBIC IDENTIFY: CPT | Performed by: ORTHOPAEDIC SURGERY

## 2023-07-25 PROCEDURE — 25010000002 CEFTRIAXONE PER 250 MG: Performed by: INTERNAL MEDICINE

## 2023-07-25 PROCEDURE — 25010000002 FENTANYL CITRATE (PF) 50 MCG/ML SOLUTION

## 2023-07-25 PROCEDURE — 87116 MYCOBACTERIA CULTURE: CPT | Performed by: ORTHOPAEDIC SURGERY

## 2023-07-25 PROCEDURE — 25010000002 FENTANYL CITRATE (PF) 100 MCG/2ML SOLUTION

## 2023-07-25 PROCEDURE — 0SPC09Z REMOVAL OF LINER FROM RIGHT KNEE JOINT, OPEN APPROACH: ICD-10-PCS | Performed by: ORTHOPAEDIC SURGERY

## 2023-07-25 PROCEDURE — 25010000002 PROPOFOL 10 MG/ML EMULSION

## 2023-07-25 PROCEDURE — C1713 ANCHOR/SCREW BN/BN,TIS/BN: HCPCS | Performed by: ORTHOPAEDIC SURGERY

## 2023-07-25 PROCEDURE — 73560 X-RAY EXAM OF KNEE 1 OR 2: CPT

## 2023-07-25 PROCEDURE — 25010000002 DAPTOMYCIN PER 1 MG: Performed by: INTERNAL MEDICINE

## 2023-07-25 PROCEDURE — 25010000002 HYDROMORPHONE PER 4 MG: Performed by: ORTHOPAEDIC SURGERY

## 2023-07-25 PROCEDURE — 25010000002 HYDROMORPHONE PER 4 MG: Performed by: INTERNAL MEDICINE

## 2023-07-25 PROCEDURE — 87206 SMEAR FLUORESCENT/ACID STAI: CPT | Performed by: ORTHOPAEDIC SURGERY

## 2023-07-25 PROCEDURE — 25010000002 ROPIVACAINE PER 1 MG: Performed by: NURSE ANESTHETIST, CERTIFIED REGISTERED

## 2023-07-25 PROCEDURE — 0SUV09Z SUPPLEMENT RIGHT KNEE JOINT, TIBIAL SURFACE WITH LINER, OPEN APPROACH: ICD-10-PCS | Performed by: ORTHOPAEDIC SURGERY

## 2023-07-25 PROCEDURE — 87102 FUNGUS ISOLATION CULTURE: CPT | Performed by: ORTHOPAEDIC SURGERY

## 2023-07-25 PROCEDURE — 25010000002 ONDANSETRON PER 1 MG

## 2023-07-25 PROCEDURE — 25010000002 VANCOMYCIN 1 G RECONSTITUTED SOLUTION 1 EACH VIAL: Performed by: ORTHOPAEDIC SURGERY

## 2023-07-25 PROCEDURE — 87070 CULTURE OTHR SPECIMN AEROBIC: CPT | Performed by: ORTHOPAEDIC SURGERY

## 2023-07-25 PROCEDURE — 87205 SMEAR GRAM STAIN: CPT | Performed by: ORTHOPAEDIC SURGERY

## 2023-07-25 DEVICE — IMPLANTABLE DEVICE
Type: IMPLANTABLE DEVICE | Site: KNEE | Status: FUNCTIONAL
Brand: CERAMENT™BONE VOID FILLER

## 2023-07-25 DEVICE — DEV CONTRL TISS STRATAFIX SYMM PDS PLUS VIL CT-1 60CM: Type: IMPLANTABLE DEVICE | Site: KNEE | Status: FUNCTIONAL

## 2023-07-25 DEVICE — LEGION PS HIGH FLEX XLPE SZ 3-4 10MM
Type: IMPLANTABLE DEVICE | Site: KNEE | Status: FUNCTIONAL
Brand: LEGION

## 2023-07-25 RX ORDER — DEXAMETHASONE SODIUM PHOSPHATE 4 MG/ML
INJECTION, SOLUTION INTRA-ARTICULAR; INTRALESIONAL; INTRAMUSCULAR; INTRAVENOUS; SOFT TISSUE AS NEEDED
Status: DISCONTINUED | OUTPATIENT
Start: 2023-07-25 | End: 2023-07-25 | Stop reason: SURG

## 2023-07-25 RX ORDER — SODIUM CHLORIDE, SODIUM LACTATE, POTASSIUM CHLORIDE, CALCIUM CHLORIDE 600; 310; 30; 20 MG/100ML; MG/100ML; MG/100ML; MG/100ML
9 INJECTION, SOLUTION INTRAVENOUS CONTINUOUS
Status: DISCONTINUED | OUTPATIENT
Start: 2023-07-25 | End: 2023-07-28 | Stop reason: HOSPADM

## 2023-07-25 RX ORDER — DIPHENHYDRAMINE HCL 25 MG
25 CAPSULE ORAL EVERY 6 HOURS PRN
Status: DISCONTINUED | OUTPATIENT
Start: 2023-07-25 | End: 2023-07-28 | Stop reason: HOSPADM

## 2023-07-25 RX ORDER — ROCURONIUM BROMIDE 10 MG/ML
INJECTION, SOLUTION INTRAVENOUS AS NEEDED
Status: DISCONTINUED | OUTPATIENT
Start: 2023-07-25 | End: 2023-07-25 | Stop reason: SURG

## 2023-07-25 RX ORDER — SODIUM CHLORIDE 0.9 % (FLUSH) 0.9 %
10 SYRINGE (ML) INJECTION AS NEEDED
Status: CANCELLED | OUTPATIENT
Start: 2023-07-25

## 2023-07-25 RX ORDER — LIDOCAINE HYDROCHLORIDE 10 MG/ML
0.5 INJECTION, SOLUTION EPIDURAL; INFILTRATION; INTRACAUDAL; PERINEURAL ONCE AS NEEDED
Status: DISCONTINUED | OUTPATIENT
Start: 2023-07-25 | End: 2023-07-25 | Stop reason: HOSPADM

## 2023-07-25 RX ORDER — HYDROMORPHONE HYDROCHLORIDE 1 MG/ML
0.5 INJECTION, SOLUTION INTRAMUSCULAR; INTRAVENOUS; SUBCUTANEOUS
Status: DISCONTINUED | OUTPATIENT
Start: 2023-07-25 | End: 2023-07-25 | Stop reason: HOSPADM

## 2023-07-25 RX ORDER — PROMETHAZINE HYDROCHLORIDE 25 MG/1
25 TABLET ORAL ONCE AS NEEDED
Status: DISCONTINUED | OUTPATIENT
Start: 2023-07-25 | End: 2023-07-25 | Stop reason: HOSPADM

## 2023-07-25 RX ORDER — ACETAMINOPHEN 500 MG
1000 TABLET ORAL EVERY 6 HOURS
Status: DISCONTINUED | OUTPATIENT
Start: 2023-07-25 | End: 2023-07-26

## 2023-07-25 RX ORDER — IPRATROPIUM BROMIDE AND ALBUTEROL SULFATE 2.5; .5 MG/3ML; MG/3ML
3 SOLUTION RESPIRATORY (INHALATION) ONCE AS NEEDED
Status: DISCONTINUED | OUTPATIENT
Start: 2023-07-25 | End: 2023-07-25 | Stop reason: HOSPADM

## 2023-07-25 RX ORDER — SODIUM CHLORIDE 9 MG/ML
100 INJECTION, SOLUTION INTRAVENOUS CONTINUOUS
Status: DISCONTINUED | OUTPATIENT
Start: 2023-07-25 | End: 2023-07-28 | Stop reason: HOSPADM

## 2023-07-25 RX ORDER — SODIUM CHLORIDE 9 MG/ML
40 INJECTION, SOLUTION INTRAVENOUS AS NEEDED
Status: CANCELLED | OUTPATIENT
Start: 2023-07-25

## 2023-07-25 RX ORDER — MAGNESIUM HYDROXIDE 1200 MG/15ML
LIQUID ORAL AS NEEDED
Status: DISCONTINUED | OUTPATIENT
Start: 2023-07-25 | End: 2023-07-25 | Stop reason: HOSPADM

## 2023-07-25 RX ORDER — OXYCODONE HYDROCHLORIDE 10 MG/1
10 TABLET ORAL EVERY 4 HOURS PRN
Status: DISCONTINUED | OUTPATIENT
Start: 2023-07-25 | End: 2023-07-28 | Stop reason: HOSPADM

## 2023-07-25 RX ORDER — FENTANYL CITRATE 50 UG/ML
INJECTION, SOLUTION INTRAMUSCULAR; INTRAVENOUS
Status: COMPLETED
Start: 2023-07-25 | End: 2023-07-25

## 2023-07-25 RX ORDER — HYDROMORPHONE HYDROCHLORIDE 1 MG/ML
0.5 INJECTION, SOLUTION INTRAMUSCULAR; INTRAVENOUS; SUBCUTANEOUS
Status: DISCONTINUED | OUTPATIENT
Start: 2023-07-25 | End: 2023-07-28 | Stop reason: HOSPADM

## 2023-07-25 RX ORDER — BISACODYL 5 MG/1
10 TABLET, DELAYED RELEASE ORAL DAILY PRN
Status: DISCONTINUED | OUTPATIENT
Start: 2023-07-25 | End: 2023-07-28 | Stop reason: HOSPADM

## 2023-07-25 RX ORDER — PROPOFOL 10 MG/ML
VIAL (ML) INTRAVENOUS AS NEEDED
Status: DISCONTINUED | OUTPATIENT
Start: 2023-07-25 | End: 2023-07-25 | Stop reason: SURG

## 2023-07-25 RX ORDER — ONDANSETRON 2 MG/ML
4 INJECTION INTRAMUSCULAR; INTRAVENOUS ONCE AS NEEDED
Status: DISCONTINUED | OUTPATIENT
Start: 2023-07-25 | End: 2023-07-25 | Stop reason: HOSPADM

## 2023-07-25 RX ORDER — VANCOMYCIN HYDROCHLORIDE 1 G/20ML
INJECTION, POWDER, LYOPHILIZED, FOR SOLUTION INTRAVENOUS AS NEEDED
Status: DISCONTINUED | OUTPATIENT
Start: 2023-07-25 | End: 2023-07-25 | Stop reason: HOSPADM

## 2023-07-25 RX ORDER — FAMOTIDINE 10 MG/ML
20 INJECTION, SOLUTION INTRAVENOUS ONCE
Status: COMPLETED | OUTPATIENT
Start: 2023-07-25 | End: 2023-07-25

## 2023-07-25 RX ORDER — MIDAZOLAM HYDROCHLORIDE 1 MG/ML
0.5 INJECTION INTRAMUSCULAR; INTRAVENOUS
Status: DISCONTINUED | OUTPATIENT
Start: 2023-07-25 | End: 2023-07-25 | Stop reason: HOSPADM

## 2023-07-25 RX ORDER — ROPIVACAINE HYDROCHLORIDE 2 MG/ML
INJECTION, SOLUTION EPIDURAL; INFILTRATION; PERINEURAL CONTINUOUS
Status: DISCONTINUED | OUTPATIENT
Start: 2023-07-25 | End: 2023-07-28 | Stop reason: HOSPADM

## 2023-07-25 RX ORDER — DIPHENHYDRAMINE HYDROCHLORIDE 50 MG/ML
25 INJECTION INTRAMUSCULAR; INTRAVENOUS EVERY 6 HOURS PRN
Status: DISCONTINUED | OUTPATIENT
Start: 2023-07-25 | End: 2023-07-28 | Stop reason: HOSPADM

## 2023-07-25 RX ORDER — HYDROCODONE BITARTRATE AND ACETAMINOPHEN 5; 325 MG/1; MG/1
TABLET ORAL
Status: COMPLETED
Start: 2023-07-25 | End: 2023-07-25

## 2023-07-25 RX ORDER — HYDROMORPHONE HYDROCHLORIDE 1 MG/ML
0.5 INJECTION, SOLUTION INTRAMUSCULAR; INTRAVENOUS; SUBCUTANEOUS EVERY 4 HOURS PRN
Status: DISCONTINUED | OUTPATIENT
Start: 2023-07-25 | End: 2023-07-25 | Stop reason: SDUPTHER

## 2023-07-25 RX ORDER — ONDANSETRON 2 MG/ML
4 INJECTION INTRAMUSCULAR; INTRAVENOUS EVERY 6 HOURS PRN
Status: DISCONTINUED | OUTPATIENT
Start: 2023-07-25 | End: 2023-07-28 | Stop reason: HOSPADM

## 2023-07-25 RX ORDER — LABETALOL HYDROCHLORIDE 5 MG/ML
5 INJECTION, SOLUTION INTRAVENOUS
Status: DISCONTINUED | OUTPATIENT
Start: 2023-07-25 | End: 2023-07-25 | Stop reason: HOSPADM

## 2023-07-25 RX ORDER — NALOXONE HCL 0.4 MG/ML
0.4 VIAL (ML) INJECTION AS NEEDED
Status: DISCONTINUED | OUTPATIENT
Start: 2023-07-25 | End: 2023-07-25 | Stop reason: HOSPADM

## 2023-07-25 RX ORDER — DROPERIDOL 2.5 MG/ML
0.62 INJECTION, SOLUTION INTRAMUSCULAR; INTRAVENOUS
Status: DISCONTINUED | OUTPATIENT
Start: 2023-07-25 | End: 2023-07-25 | Stop reason: HOSPADM

## 2023-07-25 RX ORDER — BISACODYL 10 MG
10 SUPPOSITORY, RECTAL RECTAL DAILY PRN
Status: DISCONTINUED | OUTPATIENT
Start: 2023-07-25 | End: 2023-07-25 | Stop reason: SDUPTHER

## 2023-07-25 RX ORDER — NALOXONE HCL 0.4 MG/ML
0.1 VIAL (ML) INJECTION
Status: DISCONTINUED | OUTPATIENT
Start: 2023-07-25 | End: 2023-07-28 | Stop reason: HOSPADM

## 2023-07-25 RX ORDER — SODIUM CHLORIDE 9 MG/ML
40 INJECTION, SOLUTION INTRAVENOUS AS NEEDED
Status: DISCONTINUED | OUTPATIENT
Start: 2023-07-25 | End: 2023-07-25 | Stop reason: HOSPADM

## 2023-07-25 RX ORDER — SODIUM CHLORIDE 0.9 % (FLUSH) 0.9 %
3-10 SYRINGE (ML) INJECTION AS NEEDED
Status: DISCONTINUED | OUTPATIENT
Start: 2023-07-25 | End: 2023-07-25 | Stop reason: HOSPADM

## 2023-07-25 RX ORDER — SODIUM CHLORIDE 0.9 % (FLUSH) 0.9 %
10 SYRINGE (ML) INJECTION EVERY 12 HOURS SCHEDULED
Status: CANCELLED | OUTPATIENT
Start: 2023-07-25

## 2023-07-25 RX ORDER — PROMETHAZINE HYDROCHLORIDE 25 MG/1
25 SUPPOSITORY RECTAL ONCE AS NEEDED
Status: DISCONTINUED | OUTPATIENT
Start: 2023-07-25 | End: 2023-07-25 | Stop reason: HOSPADM

## 2023-07-25 RX ORDER — OXYCODONE HYDROCHLORIDE 5 MG/1
5 TABLET ORAL EVERY 4 HOURS PRN
Status: DISCONTINUED | OUTPATIENT
Start: 2023-07-25 | End: 2023-07-28 | Stop reason: HOSPADM

## 2023-07-25 RX ORDER — FENTANYL CITRATE 50 UG/ML
INJECTION, SOLUTION INTRAMUSCULAR; INTRAVENOUS AS NEEDED
Status: DISCONTINUED | OUTPATIENT
Start: 2023-07-25 | End: 2023-07-25 | Stop reason: SURG

## 2023-07-25 RX ORDER — BUPIVACAINE HYDROCHLORIDE 2.5 MG/ML
INJECTION, SOLUTION EPIDURAL; INFILTRATION; INTRACAUDAL
Status: COMPLETED | OUTPATIENT
Start: 2023-07-25 | End: 2023-07-25

## 2023-07-25 RX ORDER — FAMOTIDINE 20 MG/1
20 TABLET, FILM COATED ORAL ONCE
Status: CANCELLED | OUTPATIENT
Start: 2023-07-25 | End: 2023-07-25

## 2023-07-25 RX ORDER — AMOXICILLIN 250 MG
2 CAPSULE ORAL 2 TIMES DAILY PRN
Status: DISCONTINUED | OUTPATIENT
Start: 2023-07-25 | End: 2023-07-28 | Stop reason: HOSPADM

## 2023-07-25 RX ORDER — ONDANSETRON 2 MG/ML
INJECTION INTRAMUSCULAR; INTRAVENOUS AS NEEDED
Status: DISCONTINUED | OUTPATIENT
Start: 2023-07-25 | End: 2023-07-25 | Stop reason: SURG

## 2023-07-25 RX ORDER — TRANEXAMIC ACID 10 MG/ML
1000 INJECTION, SOLUTION INTRAVENOUS ONCE
Status: COMPLETED | OUTPATIENT
Start: 2023-07-25 | End: 2023-07-25

## 2023-07-25 RX ORDER — SUCCINYLCHOLINE/SOD CL,ISO/PF 200MG/10ML
SYRINGE (ML) INTRAVENOUS AS NEEDED
Status: DISCONTINUED | OUTPATIENT
Start: 2023-07-25 | End: 2023-07-25 | Stop reason: SURG

## 2023-07-25 RX ORDER — HYDRALAZINE HYDROCHLORIDE 20 MG/ML
5 INJECTION INTRAMUSCULAR; INTRAVENOUS
Status: DISCONTINUED | OUTPATIENT
Start: 2023-07-25 | End: 2023-07-25 | Stop reason: HOSPADM

## 2023-07-25 RX ORDER — FENTANYL CITRATE 50 UG/ML
50 INJECTION, SOLUTION INTRAMUSCULAR; INTRAVENOUS
Status: DISCONTINUED | OUTPATIENT
Start: 2023-07-25 | End: 2023-07-25 | Stop reason: HOSPADM

## 2023-07-25 RX ORDER — KETOROLAC TROMETHAMINE 30 MG/ML
15 INJECTION, SOLUTION INTRAMUSCULAR; INTRAVENOUS EVERY 6 HOURS PRN
Status: DISCONTINUED | OUTPATIENT
Start: 2023-07-25 | End: 2023-07-28 | Stop reason: HOSPADM

## 2023-07-25 RX ORDER — HYDROCODONE BITARTRATE AND ACETAMINOPHEN 5; 325 MG/1; MG/1
1 TABLET ORAL ONCE AS NEEDED
Status: DISCONTINUED | OUTPATIENT
Start: 2023-07-25 | End: 2023-07-25 | Stop reason: HOSPADM

## 2023-07-25 RX ORDER — ONDANSETRON 4 MG/1
4 TABLET, FILM COATED ORAL EVERY 6 HOURS PRN
Status: DISCONTINUED | OUTPATIENT
Start: 2023-07-25 | End: 2023-07-28 | Stop reason: HOSPADM

## 2023-07-25 RX ORDER — LIDOCAINE HYDROCHLORIDE 10 MG/ML
INJECTION, SOLUTION EPIDURAL; INFILTRATION; INTRACAUDAL; PERINEURAL AS NEEDED
Status: DISCONTINUED | OUTPATIENT
Start: 2023-07-25 | End: 2023-07-25 | Stop reason: SURG

## 2023-07-25 RX ORDER — SODIUM CHLORIDE 0.9 % (FLUSH) 0.9 %
3 SYRINGE (ML) INJECTION EVERY 12 HOURS SCHEDULED
Status: DISCONTINUED | OUTPATIENT
Start: 2023-07-25 | End: 2023-07-25 | Stop reason: HOSPADM

## 2023-07-25 RX ORDER — DROPERIDOL 2.5 MG/ML
0.62 INJECTION, SOLUTION INTRAMUSCULAR; INTRAVENOUS ONCE AS NEEDED
Status: DISCONTINUED | OUTPATIENT
Start: 2023-07-25 | End: 2023-07-25 | Stop reason: HOSPADM

## 2023-07-25 RX ORDER — TRANEXAMIC ACID 10 MG/ML
1000 INJECTION, SOLUTION INTRAVENOUS ONCE
Status: DISCONTINUED | OUTPATIENT
Start: 2023-07-25 | End: 2023-07-25 | Stop reason: HOSPADM

## 2023-07-25 RX ORDER — DOCUSATE SODIUM 100 MG/1
100 CAPSULE, LIQUID FILLED ORAL 2 TIMES DAILY PRN
Status: DISCONTINUED | OUTPATIENT
Start: 2023-07-25 | End: 2023-07-28 | Stop reason: HOSPADM

## 2023-07-25 RX ADMIN — SENNOSIDES AND DOCUSATE SODIUM 2 TABLET: 50; 8.6 TABLET ORAL at 20:08

## 2023-07-25 RX ADMIN — DEXAMETHASONE SODIUM PHOSPHATE 4 MG: 4 INJECTION, SOLUTION INTRAMUSCULAR; INTRAVENOUS at 14:40

## 2023-07-25 RX ADMIN — FENTANYL CITRATE 50 MCG: 50 INJECTION, SOLUTION INTRAMUSCULAR; INTRAVENOUS at 15:02

## 2023-07-25 RX ADMIN — ACETAMINOPHEN 1000 MG: 500 TABLET, FILM COATED ORAL at 20:08

## 2023-07-25 RX ADMIN — FAMOTIDINE 20 MG: 10 INJECTION INTRAVENOUS at 14:00

## 2023-07-25 RX ADMIN — HYDROCODONE BITARTRATE AND ACETAMINOPHEN 1 TABLET: 5; 325 TABLET ORAL at 17:21

## 2023-07-25 RX ADMIN — DAPTOMYCIN 600 MG: 500 INJECTION, POWDER, LYOPHILIZED, FOR SOLUTION INTRAVENOUS at 09:17

## 2023-07-25 RX ADMIN — ATORVASTATIN CALCIUM 10 MG: 10 TABLET, FILM COATED ORAL at 20:08

## 2023-07-25 RX ADMIN — ROCURONIUM BROMIDE 50 MG: 10 SOLUTION INTRAVENOUS at 14:47

## 2023-07-25 RX ADMIN — NEBIVOLOL 5 MG: 5 TABLET ORAL at 08:10

## 2023-07-25 RX ADMIN — SUGAMMADEX 300 MG: 100 INJECTION, SOLUTION INTRAVENOUS at 16:26

## 2023-07-25 RX ADMIN — ONDANSETRON 4 MG: 2 INJECTION INTRAMUSCULAR; INTRAVENOUS at 14:28

## 2023-07-25 RX ADMIN — LOSARTAN POTASSIUM 100 MG: 50 TABLET, FILM COATED ORAL at 08:10

## 2023-07-25 RX ADMIN — Medication 200 MG: at 14:37

## 2023-07-25 RX ADMIN — SODIUM CHLORIDE 100 ML/HR: 9 INJECTION, SOLUTION INTRAVENOUS at 18:38

## 2023-07-25 RX ADMIN — HYDROMORPHONE HYDROCHLORIDE 0.5 MG: 1 INJECTION, SOLUTION INTRAMUSCULAR; INTRAVENOUS; SUBCUTANEOUS at 18:38

## 2023-07-25 RX ADMIN — Medication 1000 MG: at 17:37

## 2023-07-25 RX ADMIN — LIDOCAINE HYDROCHLORIDE 50 MG: 10 INJECTION, SOLUTION EPIDURAL; INFILTRATION; INTRACAUDAL; PERINEURAL at 14:37

## 2023-07-25 RX ADMIN — PROPOFOL 200 MG: 10 INJECTION, EMULSION INTRAVENOUS at 14:37

## 2023-07-25 RX ADMIN — ACETAMINOPHEN 1000 MG: 500 TABLET ORAL at 00:19

## 2023-07-25 RX ADMIN — TRANEXAMIC ACID 1000 MG: 10 INJECTION, SOLUTION INTRAVENOUS at 15:34

## 2023-07-25 RX ADMIN — Medication 10 ML: at 20:10

## 2023-07-25 RX ADMIN — SODIUM CHLORIDE, POTASSIUM CHLORIDE, SODIUM LACTATE AND CALCIUM CHLORIDE 9 ML/HR: 600; 310; 30; 20 INJECTION, SOLUTION INTRAVENOUS at 14:00

## 2023-07-25 RX ADMIN — FENTANYL CITRATE 50 MCG: 50 INJECTION, SOLUTION INTRAMUSCULAR; INTRAVENOUS at 18:03

## 2023-07-25 RX ADMIN — OXYCODONE HYDROCHLORIDE 10 MG: 10 TABLET ORAL at 06:37

## 2023-07-25 RX ADMIN — TRANEXAMIC ACID 1000 MG: 10 INJECTION, SOLUTION INTRAVENOUS at 14:44

## 2023-07-25 RX ADMIN — FENTANYL CITRATE 50 MCG: 50 INJECTION, SOLUTION INTRAMUSCULAR; INTRAVENOUS at 17:21

## 2023-07-25 RX ADMIN — OXYCODONE HYDROCHLORIDE 10 MG: 10 TABLET ORAL at 10:20

## 2023-07-25 RX ADMIN — FENTANYL CITRATE 50 MCG: 50 INJECTION, SOLUTION INTRAMUSCULAR; INTRAVENOUS at 14:37

## 2023-07-25 RX ADMIN — BUPIVACAINE HYDROCHLORIDE 20 ML: 2.5 INJECTION, SOLUTION EPIDURAL; INFILTRATION; INTRACAUDAL; PERINEURAL at 16:34

## 2023-07-25 RX ADMIN — SODIUM CHLORIDE 2000 MG: 900 INJECTION INTRAVENOUS at 08:10

## 2023-07-25 NOTE — PAYOR COMM NOTE
"[facility]   phone: [phone]  fax: [phone]    ref# [id number]    Imelda Khan (72 y.o. Female)       Date of Birth   1950    Social Security Number       Address   [address] [place] [id number]    Home Phone   [phone]    MRN   [id number]       Advent   Christianity    Marital Status                               Admission Date   23    Admission Type   Urgent    Admitting Provider   Valerie Concepcion MD    Attending Provider   Valerie Concepcion MD    Department, Room/Bed   [facility] 3G, S357/1       Discharge Date       Discharge Disposition       Discharge Destination                                 Attending Provider: Valerie Concepcion MD    Allergies: Primidone, Topiramate, Tuberculin Tests, Zostavax [Zoster Vaccine Live]    Isolation: None   Infection: None   Code Status: Prior    Ht: 157.5 cm (62\")   Wt: 150 kg (330 lb)    Admission Cmt: None   Principal Problem: Postoperative wound infection, right knee [T81.49XA]                   Active Insurance as of 2023       Primary Coverage       Payor Plan Insurance Group Employer/Plan Group    ANTHEM MEDICARE REPLACEMENT ANTHEM MEDICARE ADVANTAGE [id number]       Payor Plan Address Payor Plan Phone Number Payor Plan Fax Number Effective Dates     [id number] [phone]  2022 - None Entered    [facility] [id number]         Subscriber Name Subscriber Birth Date Member ID       IMELDA KHAN 1950 [id number]                     Emergency Contacts        (Rel.) Home Phone Work Phone Mobile Phone    Rangel Khan (Kevyn) (Spouse) [phone] -- [phone]                 History & Physical        Valerie Concepcion MD at 23 1154          Patient Name: Imelda Khan  MRN: [id number]  : 1950  DOS: 2023    Attending: Valerie Concepcion MD    Primary Care Provider: Comfort Colunga MD      Chief complaint:  Right Knee Pain, " "redness, drainage.    Subjective  Patient is a pleasant 72 y.o. female presented as a direct admit from Dr. Reynoso's office after presenting there with worsening pain, erythema and drainage from her right knee.    Patient is known to us from prior hospitalizations including left total knee arthroplasty admission in November 2017 and more recently and admitted on 6/29/2023 for right total knee arthroplasty.  She did well postoperatively and was discharged home on postop day 1.  She did have a PSVT episode for which she was seen by cardiology and was placed on a beta-blocker.    Her postop course was going quite well up until 3 days postop when she felt tear as she was bending her knee.  She continued to have pain on a daily basis was followed by erythema especially the lower aspect of the incision.  She was seen by Dr. Granados in his office a week ago and was started on doxycycline.  Unfortunately her symptoms worsened and pain persisted.  She had intermittent chills, no documented fever.  Had decreased appetite, no nausea or vomiting.  No chest pain shortness of breath, no diarrhea or constipation.  She has avoided taking pain medication but more recently she had to reach for oxycodone to control her pain.    After being seen by  today it was felt that she will need aggressive management of her cellulitis/wound infection.  She is directly admitted.  Plan is for I&D, washout, likely poly exchange tomorrow.       Allergies   Allergen Reactions    Primidone Other (See Comments)     Hair loss    Topiramate Diarrhea    Tuberculin Tests Swelling and Rash     Pt has chest xrays to check     Zostavax [Zoster Vaccine Live] Swelling and Rash     \"Swelling of injection site of arm\"        Medications Prior to Admission   Medication Sig Dispense Refill Last Dose    acetaminophen (TYLENOL) 650 MG 8 hr tablet Take 1 tablet by mouth Every 8 (Eight) Hours. Take scheduled for a week then prn       aspirin 325 MG EC tablet Take 1 " tablet by mouth Daily for 30 days. 30 tablet 0     atorvastatin (LIPITOR) 10 MG tablet Take 1 tablet by mouth Every Night. 90 tablet 1     cetirizine (zyrTEC) 10 MG tablet Take 1 tablet by mouth Daily.       estradiol (ESTRACE) 0.5 MG tablet Take 1 tablet by mouth Daily. 90 tablet 3     ipratropium (ATROVENT) 0.06 % nasal spray 1 spray into the nostril(s) as directed by provider As Needed.       ketoconazole (NIZORAL) 2 % shampoo        lansoprazole (PREVACID) 30 MG capsule Take 1 capsule by mouth Daily.       losartan (COZAAR) 100 MG tablet Take 1 tablet by mouth Daily. 90 tablet 1     multivitamin with minerals tablet tablet Take 1 tablet by mouth Daily.       nebivolol (BYSTOLIC) 5 MG tablet Take 1 tablet by mouth Daily. 90 tablet 3     oxyCODONE (Roxicodone) 5 MG immediate release tablet Take 1 tablet by mouth Every 4 (Four) Hours As Needed for Moderate Pain. 40 tablet 0     ropivacaine (NAROPIN) 0.2 % infusion (INFUSYSTEM) 2 mg/hr by Peripheral Nerve route Continuous.       venlafaxine XR (EFFEXOR-XR) 75 MG 24 hr capsule Take 1 capsule by mouth Daily.       zonisamide (ZONEGRAN) 100 MG capsule 2 capsules per day (Patient taking differently: Take 2 capsules by mouth Daily. 2 capsules per day) 180 capsule 3       Past Medical History:   Diagnosis Date    Anesthesia 2002    low bp only with foot surgery, has had surgeries since and no issue per pt report    Arthritis     Back pain     Depression     Difficulty walking     Left knee replacement  need right knee replacement    GERD (gastroesophageal reflux disease)     High cholesterol     Hypertension     Knee pain     BILATERAL    Seasonal allergies     Tremor, essential      Past Surgical History:   Procedure Laterality Date    ANTERIOR AND POSTERIOR VAGINAL REPAIR N/A 4/17/2017    Vaginal enterocele / anterior posterior colporrhaphy;  Jose Maria Chowdhury MD;  Location: [facility] OR;  Service:     BLADDER SUSPENSION  1999    MESH TVT?     COLONOSCOPY  2018    Dr Chung -  "normal    EXPLORATORY LAPAROTOMY      \"BOWEL INTO PELVIC BONE\" /Mesh [place]     EXPLORATORY LAPAROTOMY  2004    AQUILES/ abd sacrocolpopexy/post repair    FOOT SURGERY Left     KNEE ARTHROSCOPY Left     meniscus repair    LAPAROSCOPIC CHOLECYSTECTOMY Right     [place] ARTHRP KNE CONDYLE&PLATU MEDIAL&LAT COMPARTMENTS Left 2017    Procedure: TOTAL KNEE ARTHROPLASTY LEFT;  Surgeon: Sunny Reynoso MD;  Location:  [facility] OR;  Service: Orthopedics    TONSILLECTOMY      TOTAL ABDOMINAL HYSTERECTOMY WITH SALPINGO OOPHORECTOMY Bilateral     TOTAL KNEE ARTHROPLASTY Right 2023    Procedure: TOTAL KNEE ARTHROPLASTY WITH CORI ROBOT - RIGHT;  Surgeon: Sunny Reynoso MD;  Location:  [facility] OR;  Service: Robotics - Ortho;  Laterality: Right;    TUBAL ABDOMINAL LIGATION      VENTRAL HERNIA REPAIR N/A 2016    Procedure: LAPAROSCOPIC REPAIR OF ABDOMINAL WALL HERNIA WITH MESH, CONVERTED TO OPEN ;  Surgeon: Bertha Delacruz MD;  Location:  [facility] OR;  Service:      Family History   Problem Relation Age of Onset    Hypertension Mother     Diabetes Mother     Heart attack Mother 59         from MI    Cancer Father     Aneurysm Father 57    Hepatitis Brother         Hep B    Kidney disease Brother 58    Alzheimer's disease Maternal Grandmother     Pneumonia Maternal Grandmother     Parkinsonism Maternal Grandmother         Along w/Alzheimer’s    Leukemia Maternal Grandfather     No Known Problems Paternal Grandfather     No Known Problems Daughter     No Known Problems Son     Ovarian cancer Neg Hx     Breast cancer Neg Hx      Social History     Tobacco Use    Smoking status: Former     Packs/day: 1.50     Years: 35.00     Pack years: 52.50     Types: Cigarettes     Start date: 10/1/1967     Quit date: 1986     Years since quittin.1    Smokeless tobacco: Never   Vaping Use    Vaping Use: Never used   Substance Use Topics    Alcohol use: No    Drug use: Never       Review of " Systems  Pertinent items are noted in HPI, all other systems reviewed and negative    Vital Signs  LMP  (LMP Unknown)     Physical Exam:    General Appearance:    Alert, cooperative, in no acute distress   Head:    Normocephalic, without obvious abnormality, atraumatic   Eyes:            Lids and lashes normal, conjunctivae and sclerae normal, no   icterus, no pallor, corneas clear    Ears:    Ears appear intact with no abnormalities noted   Throat:   No oral lesions, no thrush, oral mucosa moist   Neck:   No adenopathy, supple, trachea midline, no thyromegaly         Lungs:     Clear to auscultation,respirations regular, even and                   unlabored    Heart:    Regular rhythm and normal rate, normal S1 and S2, no       murmur, no gallop   Abdomen:     Normal bowel sounds, no masses, no organomegaly, soft        non-tender, non-distended, no guarding, morbidly obese   Genitalia:    Deferred   Extremities: Right LE, CDI dressing on knee, erythema with mild swelling.  There is drainage from incision mostly the lower half.   Pulses:   Pulses palpable and equal bilaterally   Skin:   No bleeding, bruising or rash   Neurologic:   Cranial nerves 2 - 12 grossly intact, intact flexion and dorsiflexion bilateral feet.      I reviewed the patient's new clinical results.             Invalid input(s): NEUTOPHILPCT,  EOSPCT        Invalid input(s): LABALBU, PROT  Lab Results   Component Value Date    HGBA1C 5.90 (H) 06/15/2023      Latest Reference Range & Units 06/30/23 05:20   Sodium 136 - 145 mmol/L 140   Potassium 3.5 - 5.2 mmol/L 5.2   Chloride 98 - 107 mmol/L 109 (H)   CO2 22.0 - 29.0 mmol/L 22.0   Anion Gap 5.0 - 15.0 mmol/L 9.0   BUN 8 - 23 mg/dL 28 (H)   Creatinine 0.57 - 1.00 mg/dL 1.15 (H)   BUN/Creatinine Ratio 7.0 - 25.0  24.3   eGFR >60.0 mL/min/1.73 51.0 (L)   Glucose 65 - 99 mg/dL 131 (H)   Calcium 8.6 - 10.5 mg/dL 8.4 (L)   (H): Data is abnormally high  (L): Data is abnormally low   Latest Reference  Range & Units 06/30/23 05:20   WBC 3.40 - 10.80 10*3/mm3 9.34   RBC 3.77 - 5.28 10*6/mm3 3.65 (L)   Hemoglobin 12.0 - 15.9 g/dL 10.5 (L)   Hematocrit 34.0 - 46.6 % 34.0   Platelets 140 - 450 10*3/mm3 169   RDW 12.3 - 15.4 % 14.3   MCV 79.0 - 97.0 fL 93.2   MCH 26.6 - 33.0 pg 28.8   MCHC 31.5 - 35.7 g/dL 30.9 (L)   MPV 6.0 - 12.0 fL 11.5   RDW-SD 37.0 - 54.0 fl 49.1   (L): Data is abnormally low    Assessment and Plan:       Postoperative wound infection, right knee    Morbid obesity    Essential hypertension    Anxiety and depression    Elevated hemoglobin A1c    Peripheral vascular disease    PSVT (paroxysmal supraventricular tachycardia)      Plan  Admit for further management.    Wound culture, blood cultures, initial labs including CBC, , CMP, CRP, sed rate, and CPK.    Broad-spectrum IV antibiotics to include daptomycin and ceftriaxone, ID consult to follow.    Anxiety and depression: Resume home med regimen.        Hypertension:  Resume home medications as appropriate, formulary substitution when indicated.  Holding parameters.  Prn medications for elevated blood pressure.    Morbid obesity: Complicates all aspects of care.    D/w pt, , and with Dr.Coy. Betts disclaimer:  Part of this encounter note is an electronic transcription/translation of spoken language to printed text. The electronic translation of spoken language may permit erroneous, or at times, nonsensical words or phrases to be inadvertently transcribed; Although I have reviewed the note for such errors, some may still exist.    Valerie Concepcion MD  07/24/23  11:54 EDT            Electronically signed by Valerie Concepcion MD at 07/24/23 0795       Lab Results (last 24 hours)       Procedure Component Value Units Date/Time    Wound Culture - Wound, Knee, Right [[id number]]  (Abnormal) Collected: 07/24/23 1236    Specimen: Wound from Knee, Right Updated: 07/25/23 0968     Wound Culture Light growth (2+) Streptococcus agalactiae  (Group B)     Comment:   This organism is considered to be universally susceptible to penicillin.  No further antibiotic testing will be performed. If Clindamycin or Erythromycin is the drug of choice, notify the laboratory within 7 days to request susceptibility testing.         Scant growth (1+) Mixed Gram Negative Lissette     Gram Stain No WBCs or organisms seen    Blood Culture - Blood, Arm, Left [[id number]] Collected: 07/24/23 1415    Specimen: Blood from Arm, Left Updated: 07/24/23 1508    Blood Culture - Blood, Arm, Right [[id number]] Collected: 07/24/23 1415    Specimen: Blood from Arm, Right Updated: 07/24/23 1508    Comprehensive Metabolic Panel [[id number]]  (Abnormal) Collected: 07/24/23 1249    Specimen: Blood Updated: 07/24/23 1344     Glucose 117 mg/dL      BUN 15 mg/dL      Creatinine 0.78 mg/dL      Sodium 140 mmol/L      Potassium 4.2 mmol/L      Chloride 103 mmol/L      CO2 25.0 mmol/L      Calcium 9.2 mg/dL      Total Protein 6.7 g/dL      Albumin 3.1 g/dL      ALT (SGPT) 47 U/L      AST (SGOT) 43 U/L      Alkaline Phosphatase 196 U/L      Total Bilirubin 0.5 mg/dL      Globulin 3.6 gm/dL      Comment: Calculated Result        A/G Ratio 0.9 g/dL      BUN/Creatinine Ratio 19.2     Anion Gap 12.0 mmol/L      eGFR 81.3 mL/min/1.73     Narrative:      GFR Normal >60  Chronic Kidney Disease <60  Kidney Failure <15    The GFR formula is only valid for adults with stable renal function between ages 18 and 70.    CK [[id number]]  (Normal) Collected: 07/24/23 1249    Specimen: Blood Updated: 07/24/23 1344     Creatine Kinase 64 U/L     C-reactive Protein [[id number]]  (Abnormal) Collected: 07/24/23 1249    Specimen: Blood Updated: 07/24/23 1344     C-Reactive Protein 16.82 mg/dL     Lactic Acid, Plasma [[id number]]  (Normal) Collected: 07/24/23 1249    Specimen: Blood Updated: 07/24/23 1342     Lactate 1.0 mmol/L      Comment: Falsely depressed results may occur on samples drawn from patients receiving  N-Acetylcysteine (NAC) or Metamizole.       Sedimentation Rate [[id number]]  (Abnormal) Collected: 23 1249    Specimen: Blood Updated: 23 1316     Sed Rate 95 mm/hr           Imaging Results (Last 24 Hours)       ** No results found for the last 24 hours. **             Physician Progress Notes (last 24 hours)        Valerie Concepcion MD at 23 1011          IM progress note      Imelda Elaine  [id number]  1950     LOS: 1 day     Attending: Valerie Concepcion MD    Primary Care Provider: Comfort Colunga MD      Chief Complaint/Reason for visit:  No chief complaint on file.      Subjective    Only c/o pain. No f/c/n/vom. No sob.   Awaits procedure.     Objective        Visit Vitals  /75   Pulse 82   Temp 98 °F (36.7 °C) (Oral)   Resp 20   LMP  (LMP Unknown)   SpO2 96%     Temp (24hrs), Av.9 °F (36.6 °C), Min:97.7 °F (36.5 °C), Max:98.3 °F (36.8 °C)      Intake/Output:    Intake/Output Summary (Last 24 hours) at 2023 1011  Last data filed at 2023 0435  Gross per 24 hour   Intake 720 ml   Output 1100 ml   Net -380 ml           Physical Exam:     General Appearance:    Alert, cooperative, in no acute distress   Head:    Normocephalic, without obvious abnormality, atraumatic    Lungs:     Normal effort, symmetric chest rise,  clear to      auscultation bilaterally              Heart:    Regular rhythm and normal rate, normal S1 and S2    Abdomen:     Normal bowel sounds, no masses, no organomegaly, soft        non-tender, non-distended, no guarding, no rebound                tenderness   Extremities:   Swelling and erythema of right knee especially distal half of incision with drainage.   No clubbing, cyanosis or edema.  No deformities.    Pulses:   Pulses palpable and equal bilaterally   Skin:   No bleeding, bruising or rash          Results Review:     I reviewed the patient's new clinical results.         Invalid input(s): NEUTOPHILPCT,  EOSPCT  Results from last 7  days   Lab Units 07/24/23  1249   SODIUM mmol/L 140   POTASSIUM mmol/L 4.2   CHLORIDE mmol/L 103   CO2 mmol/L 25.0   BUN mg/dL 15   CREATININE mg/dL 0.78   CALCIUM mg/dL 9.2   BILIRUBIN mg/dL 0.5   ALK PHOS U/L 196*   ALT (SGPT) U/L 47*   AST (SGOT) U/L 43*   GLUCOSE mg/dL 117*     I reviewed the patient's new imaging including images and reports.   Latest Reference Range & Units 07/24/23 12:49   C-Reactive Protein 0.00 - 0.50 mg/dL 16.82 (H)   Lactate 0.5 - 2.0 mmol/L 1.0   (H): Data is abnormally high     Latest Reference Range & Units 07/24/23 12:49   Sed Rate 0 - 30 mm/hr 95 (H)   (H): Data is abnormally high  All medications reviewed.   acetaminophen, 1,000 mg, Oral, Q6H  atorvastatin, 10 mg, Oral, Nightly  cefTRIAXone, 2,000 mg, Intravenous, Q24H  cetirizine, 10 mg, Oral, Daily  DAPTOmycin, 600 mg, Intravenous, Q24H  estradiol, 0.5 mg, Oral, Daily  losartan, 100 mg, Oral, Daily  nebivolol, 5 mg, Oral, Q24H  senna-docusate sodium, 2 tablet, Oral, BID  sodium chloride, 10 mL, Intravenous, Q12H  venlafaxine XR, 75 mg, Oral, Daily      polyethylene glycol, 17 g, Daily PRN   And  bisacodyl, 5 mg, Daily PRN   And  bisacodyl, 10 mg, Daily PRN  HYDROmorphone, 0.5 mg, Q4H PRN  labetalol, 10 mg, Q4H PRN  oxyCODONE, 10 mg, Q4H PRN  sodium chloride, 500 mL, TID PRN  sodium chloride, 10 mL, PRN  sodium chloride, 40 mL, PRN        Assessment & Plan       Postoperative wound infection, right knee    Morbid obesity    Essential hypertension    Anxiety and depression    Elevated hemoglobin A1c    Peripheral vascular disease    PSVT (paroxysmal supraventricular tachycardia)         Plan  For procedure today, likely I and D, wash out, poly exchange.    Follow Wound culture, blood cultures, labs including inflammatory marker.      Continue Broad-spectrum IV antibiotics to include daptomycin and ceftriaxone, ID consulted, Dr Nam Adams to follow.     Anxiety and depression: Resumed home med regimen.           Hypertension:  Resume home medications as appropriate, formulary substitution when indicated.  Holding parameters.  Prn medications for elevated blood pressure.     Morbid obesity: Complicates all aspects of care.    Valerie Concepcion MD  07/25/23  10:11 EDT      Electronically signed by Valerie Concepcion MD at 07/25/23 1014          Consult Notes (last 24 hours)        Nam Adams MD at 07/24/23 1634        Consult Orders    1. Inpatient Infectious Diseases Consult [[id number]] ordered by Valerie Concepcion MD at 07/24/23 1143                     INFECTIOUS DISEASE CONSULT/INITIAL HOSPITAL VISIT    Imelda Elaine  1950  [id number]    Date of consult: 7/24/2023    Admit date: 7/24/2023    Requesting Provider: Sunny Reynoso,*  Evaluating physician: Nam Adams MD  Reason for Consultation: Postop wound infection   Chief Complaint: right knee infection      Subjective   History of present illness:  Imelda Elaine is a  72 y.o.  Yr old female with a past medical history of hypertension, HLD,a dn osteoarthritis s/p left knee replacement in 2017 who underwent a right total knee replacement 6/29/2023. The patient initially did well post-operatively but subsequently started having swelling of the knee and increased pain about 1 week after surgery with mild erythema around incision site. This occurred right after she bent down when she was cleaning up after going to the bathroom and felt a pop behind her right knee. She was seen in clinic by Dr. Reynoso and was started on oral Bactrim about 1 week ago. Presented back to Dr. Reynoso's clinic today and had increased erythema, pain, and swelling along with drainage from incision site. She was also experiencing some fevers and chills at home. She was directly admitted to Dr. Concepcion's service at [facility]. Plan by Dr. Reynoso is to take the patient to the OR tomorrow for right knee I&D with poly exchange. The patient has remained afebrile since arrival.  "ESR was elevated to 95 and CRP was  16.8. Lactic acid was 1.0. Wound cx from right knee is in progress and blood cultures have been sent and are in progress. Cr was 0.78. LFTs were mildly elevated with ALT 47, AST 43, Alk Phos 196. T gemini was normal at 0.5. No new radiographic data. The patient has been started on IV Daptomycin and Ceftriaxone by the primary team. ID has been consulted for abx recs.     Past Medical History:   Diagnosis Date    Anesthesia 2002    low bp only with foot surgery, has had surgeries since and no issue per pt report    Arthritis     Back pain     Depression     Difficulty walking     Left knee replacement  need right knee replacement    GERD (gastroesophageal reflux disease)     High cholesterol     Hypertension     Knee pain     BILATERAL    Seasonal allergies     Tremor, essential        Past Surgical History:   Procedure Laterality Date    ANTERIOR AND POSTERIOR VAGINAL REPAIR N/A 4/17/2017    Vaginal enterocele / anterior posterior colporrhaphy;  Jose Maria Chowdhury MD;  Location:  [facility] OR;  Service:     BLADDER SUSPENSION  1999    MESH TVT?     COLONOSCOPY  2018    Dr Chung - normal    EXPLORATORY LAPAROTOMY  2010    \"BOWEL INTO PELVIC BONE\" /Mesh [place]     EXPLORATORY LAPAROTOMY  04/2004    AQUILES/ abd sacrocolpopexy/post repair    FOOT SURGERY Left     KNEE ARTHROSCOPY Left     meniscus repair    LAPAROSCOPIC CHOLECYSTECTOMY Right 2000    [place] ARTHRP KNE CONDYLE&PLATU MEDIAL&LAT COMPARTMENTS Left 11/16/2017    Procedure: TOTAL KNEE ARTHROPLASTY LEFT;  Surgeon: Sunny Reynoso MD;  Location:  [facility] OR;  Service: Orthopedics    TONSILLECTOMY      TOTAL ABDOMINAL HYSTERECTOMY WITH SALPINGO OOPHORECTOMY Bilateral 1989    TOTAL KNEE ARTHROPLASTY Right 6/29/2023    Procedure: TOTAL KNEE ARTHROPLASTY WITH CORI ROBOT - RIGHT;  Surgeon: Sunny Reynoso MD;  Location:  [facility] OR;  Service: Robotics - Ortho;  Laterality: Right;    TUBAL ABDOMINAL LIGATION      VENTRAL HERNIA REPAIR " "N/A 6/21/2016    Procedure: LAPAROSCOPIC REPAIR OF ABDOMINAL WALL HERNIA WITH MESH, CONVERTED TO OPEN ;  Surgeon: Bertha Delacruz MD;  Location: [facility] OR;  Service:        Pediatric History   Patient Parents    Not on file     Other Topics Concern    Not on file   Social History Narrative    Patient consumes 1-2 cups high-caffeine coffee, 1 [employer] sweet tea daily.     Patient lives at home with her .        family history includes Alzheimer's disease in her maternal grandmother; Aneurysm (age of onset: 57) in her father; Cancer in her father; Diabetes in her mother; Heart attack (age of onset: 59) in her mother; Hepatitis in her brother; Hypertension in her mother; Kidney disease (age of onset: 58) in her brother; Leukemia in her maternal grandfather; No Known Problems in her daughter, paternal grandfather, and son; Parkinsonism in her maternal grandmother; Pneumonia in her maternal grandmother.    Allergies   Allergen Reactions    Primidone Other (See Comments)     Hair loss    Topiramate Diarrhea    Tuberculin Tests Swelling and Rash     Pt has chest xrays to check     Zostavax [Zoster Vaccine Live] Swelling and Rash     \"Swelling of injection site of arm\"       Immunization History   Administered Date(s) Administered    COVID-19 (MODERNA) BIVALENT 12+YRS 09/04/2022    COVID-19 (MODERNA) Monovalent Original Booster 04/19/2022    COVID-19 (PFIZER) Purple Cap Monovalent 02/06/2021, 02/27/2021, 09/26/2021    Fluad Quad 65+ 09/14/2020    Fluzone High Dose =>65 Years ([employer] ONLY) 10/02/2017, 09/22/2018, 10/04/2019    Influenza, Unspecified 09/14/2020    Pneumococcal Conjugate 13-Valent (PCV13) 10/02/2017    Pneumococcal Polysaccharide (PPSV23) 10/04/2019       Medication:    Current Facility-Administered Medications:     atorvastatin (LIPITOR) tablet 10 mg, 10 mg, Oral, Nightly, Valerie Concepcion MD    sennosides-docusate (PERICOLACE) 8.6-50 MG per tablet 2 tablet, 2 tablet, Oral, BID **AND** " polyethylene glycol (MIRALAX) packet 17 g, 17 g, Oral, Daily PRN **AND** bisacodyl (DULCOLAX) EC tablet 5 mg, 5 mg, Oral, Daily PRN **AND** bisacodyl (DULCOLAX) suppository 10 mg, 10 mg, Rectal, Daily PRN, Valerie Concepcion MD    cefTRIAXone (ROCEPHIN) 2000 mg/100 mL 0.9% NS IVPB (MBP), 2,000 mg, Intravenous, Q24H, Valerie Concepcion MD, 2,000 mg at 07/24/23 1613    cetirizine (zyrTEC) tablet 10 mg, 10 mg, Oral, Daily, Valerie Concepcion MD    DAPTOmycin (CUBICIN) 600 mg in sodium chloride 0.9 % 50 mL IVPB, 600 mg, Intravenous, Q24H, Valerie Concepcion MD, Last Rate: 100 mL/hr at 07/24/23 1519, 600 mg at 07/24/23 1519    estradiol (ESTRACE) tablet 0.5 mg, 0.5 mg, Oral, Daily, Valerie Concepcion MD    labetalol (NORMODYNE,TRANDATE) injection 10 mg, 10 mg, Intravenous, Q4H PRN, Valerie Concepcion MD    losartan (COZAAR) tablet 100 mg, 100 mg, Oral, Daily, Valerie Concepcion MD    nebivolol (BYSTOLIC) tablet 5 mg, 5 mg, Oral, Q24H, Valerie Concepcion MD    oxyCODONE (ROXICODONE) immediate release tablet 5 mg, 5 mg, Oral, Q4H PRN, Valerie Concepcion MD, 5 mg at 07/24/23 1548    sodium chloride 0.9 % bolus 500 mL, 500 mL, Intravenous, TID PRN, Valerie Concepcion MD    sodium chloride 0.9 % flush 10 mL, 10 mL, Intravenous, Q12H, Valerie Concepcion MD    sodium chloride 0.9 % flush 10 mL, 10 mL, Intravenous, PRN, Valerie Concepcion MD    sodium chloride 0.9 % infusion 40 mL, 40 mL, Intravenous, PRN, Valerie Concepcion MD    venlafaxine XR (EFFEXOR-XR) 24 hr capsule 75 mg, 75 mg, Oral, Daily, Valerie Concepcion MD    Please refer to the medical record for a full medication list    Review of Systems:    Constitutional-- + Fever, chills. + fatigue.  HEENT-- No new vision, hearing or throat complaints.  No epistaxis or oral sores.  Denies odynophagia or dysphagia.  No odynophagia or dysphagia. No headache, photophobia or neck stiffness.  CV-- No chest pain, palpitation or syncope  Resp-- No  SOB/cough/Hemoptysis  GI- No nausea, vomiting, or diarrhea.  No hematochezia, melena, or hematemesis. Denies jaundice or chronic liver disease.  -- No dysuria, hematuria, or flank pain.  Denies hesitancy, urgency or flank pain.  Lymph- no swollen lymph nodes in neck/axilla or groin.   Heme- No active bruising or bleeding; no Hx of DVT or PE.  MS-- no swelling or pain in the bones or joints of arms/legs.  No new back pain.  Neuro--Other than right knee infection as noted per HPI--> No acute focal weakness or numbness in the arms or legs.  No seizures.  Skin--No generalized rashes or lesions    Physical Exam:   Vital Signs   Temp:  [97.7 °F (36.5 °C)-97.8 °F (36.6 °C)] 97.7 °F (36.5 °C)  Heart Rate:  [85] 85  Resp:  [16] 16  BP: (139)/(84) 139/84    Temp  Min: 97.7 °F (36.5 °C)  Max: 97.8 °F (36.6 °C)  BP  Min: 139/84  Max: 139/84  Pulse  Min: 85  Max: 85  Resp  Min: 16  Max: 16  SpO2  Min: 95 %  Max: 95 %    Blood pressure 139/84, pulse 85, temperature 97.7 °F (36.5 °C), resp. rate 16, SpO2 95 %, not currently breastfeeding.  GENERAL: Awake and alert, in no acute distress. Appears stated age.  Obese  HEENT:  Normocephalic, atraumatic.  No external or lesions noted.  No thrush noted.  EYES: PERRL. No conjunctival injection. No icterus.   HEART: RRR, no murmur  LUNGS: Clear to auscultation and percussion. No respiratory distress, no use of accessory muscles.  ABDOMEN: Obese abdomen. Soft, nontender, nondistended. No appreciable HSM.    MSK: right knee with swelling, erythema, increased warmth, and tenderness to palpation. Erythema extends down her right leg somewhat.  Has a dressing in place over her incision site with some drainage seeping through the dressing.  GENITAL: no Donald catheter  SKIN: no generalized rashes.  No peripheral stigmata of infective endocarditis noted.  PSYCHIATRIC: cooperative.  Appropriate mood and affect  EXT:  No cellulitic change.  NEURO: awake alert and oriented ×4.  Normal speech and  cognition    Results Review:   I reviewed the patient's new clinical results.        Results from last 7 days   Lab Units 07/24/23  1249   SODIUM mmol/L 140   POTASSIUM mmol/L 4.2   CHLORIDE mmol/L 103   CO2 mmol/L 25.0   BUN mg/dL 15   CREATININE mg/dL 0.78   GLUCOSE mg/dL 117*   CALCIUM mg/dL 9.2     Results from last 7 days   Lab Units 07/24/23  1249   ALK PHOS U/L 196*   BILIRUBIN mg/dL 0.5   ALT (SGPT) U/L 47*   AST (SGOT) U/L 43*     Results from last 7 days   Lab Units 07/24/23  1249   SED RATE mm/hr 95*     Results from last 7 days   Lab Units 07/24/23  1249   CRP mg/dL 16.82*         Results from last 7 days   Lab Units 07/24/23  1249   LACTATE mmol/L 1.0     Estimated Creatinine Clearance: 94.1 mL/min (by C-G formula based on SCr of 0.78 mg/dL).  CPK          7/24/2023    12:49   Common Labsle   Creatine Kinase 64       Procalitonin Results:       Brief Urine Lab Results       None           No results found for: SITE, ALLENTEST, PHART, SFH5TQR, PO2ART, RCC0HCG, BASEEXCESS, R0OVAULX, HGBBG, HCTABG, OXYHEMOGLOBI, METHHGBN, CARBOXYHGB, CO2CT, BAROMETRIC, MODALITY, FIO2     Microbiology:    Blood culture x2: In process    Right knee surgical culture: In process    Radiology:  Imaging Results (Last 72 Hours)       ** No results found for the last 72 hours. 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 Most likely culprits are staphylococcus and streptococcus. P acnes is also a possibility. GNR infection seems less likely. There is a possibility that this is just a superficial infection but given that the patient is high risk in the setting of new hardware, plan is for I&D with poly exchange tomorrow by orthopedic surgery. Superficial wound culture has been sent and is in progress and surgical cultures will be sent tomorrow. She does have significant elevated inflammatory markers.   Mildly elevated  LFTs  Morbid obesity-complicates all aspects of care.  Predisposes the patient to poor wound healing and infection.  History of left knee replacement in 2017  HTN  HLD    RECOMMENDATIONS:    -Continue to closely monitor CBC with differential and CMP.  Baseline CPK level is okay  -Follow pending blood cultures and superficial wound culture from the right knee  -Continue Daptomycin 6 mg/kg IV every 24 hours  -Continue Ceftriaxone 2 g IV every 24 hours  -Plan for I&D and poly-exchange by Dr. Reynoso tomorrow. I will follow surgical cultures    She will likely need a long course of IV antibiotics. Final IV antibiotic course depends on extent of infection during procedure and final culture results.    I discussed in length with the patient and her  at bedside today    Thank you for asking me to see Imelda Elaine.  Our group would be pleased to follow this patient over the course of their hospitalization and assist with outpatient antimicrobial therapy, as indicated.  Further recommendations depend on the results of the cultures and clinical course.    Complex MDM.  At some risk for loss of right limb    Nam Adams MD  7/24/2023     Electronically signed by Nam Adams MD at 07/24/23 1811       Sunny Reynoso MD at 07/24/23 1233            Patient: Imelda Elaine    Date of Admission: 7/24/2023 11:08 AM    YOB: 1950    Medical Record Number: [id number]    Attending Physician: Valerie Concepcion MD    Consulting Physician: Sunny Reynoso MD      Chief Complaints: Infection of knee [M00.9]  Postoperative wound infection [T81.49XA]  Right prosthetic knee infection      History of Present Illness: Imelda is a very pleasant 72-year-old female who underwent right total knee replacement on 6/29, 3 1/2 weeks ago.  Initially did well but feels like she strained her knee the week after surgery and since then had increased pain and swelling.  When seen in the office 1 week  "ago on 7/17 she had some mild erythema distal aspect of the incision so was placed on oral Doxycyline.  She presented to the office this morning with increased redness, pain and swelling along with some drainage from the inferior aspect of her incision.  Complaining of fever and chills as well.  We sent her to [facility] as a direct admit to Dr. AYALA along with infectious disease consult with a plan for right knee irrigation and debridement with poly exchange tomorrow.       Allergies   Allergen Reactions    Primidone Other (See Comments)     Hair loss    Topiramate Diarrhea    Tuberculin Tests Swelling and Rash     Pt has chest xrays to check     Zostavax [Zoster Vaccine Live] Swelling and Rash     \"Swelling of injection site of arm\"        Home Medications:  Medications Prior to Admission   Medication Sig Dispense Refill Last Dose    acetaminophen (TYLENOL) 650 MG 8 hr tablet Take 1 tablet by mouth Every 8 (Eight) Hours. Take scheduled for a week then prn       aspirin 325 MG EC tablet Take 1 tablet by mouth Daily for 30 days. 30 tablet 0     atorvastatin (LIPITOR) 10 MG tablet Take 1 tablet by mouth Every Night. 90 tablet 1     cetirizine (zyrTEC) 10 MG tablet Take 1 tablet by mouth Daily.       estradiol (ESTRACE) 0.5 MG tablet Take 1 tablet by mouth Daily. 90 tablet 3     ipratropium (ATROVENT) 0.06 % nasal spray 1 spray into the nostril(s) as directed by provider As Needed.       ketoconazole (NIZORAL) 2 % shampoo        lansoprazole (PREVACID) 30 MG capsule Take 1 capsule by mouth Daily.       losartan (COZAAR) 100 MG tablet Take 1 tablet by mouth Daily. 90 tablet 1     multivitamin with minerals tablet tablet Take 1 tablet by mouth Daily.       nebivolol (BYSTOLIC) 5 MG tablet Take 1 tablet by mouth Daily. 90 tablet 3     oxyCODONE (Roxicodone) 5 MG immediate release tablet Take 1 tablet by mouth Every 4 (Four) Hours As Needed for Moderate Pain. 40 tablet 0     ropivacaine (NAROPIN) 0.2 % infusion (INFUSYSTEM) 2 " "mg/hr by Peripheral Nerve route Continuous.       venlafaxine XR (EFFEXOR-XR) 75 MG 24 hr capsule Take 1 capsule by mouth Daily.       zonisamide (ZONEGRAN) 100 MG capsule 2 capsules per day (Patient taking differently: Take 2 capsules by mouth Daily. 2 capsules per day) 180 capsule 3        Current Medications:  Scheduled Meds:atorvastatin, 10 mg, Oral, Nightly  cefTRIAXone, 2,000 mg, Intravenous, Q24H  cetirizine, 10 mg, Oral, Daily  DAPTOmycin, 600 mg, Intravenous, Q24H  estradiol, 0.5 mg, Oral, Daily  losartan, 100 mg, Oral, Daily  nebivolol, 5 mg, Oral, Q24H  senna-docusate sodium, 2 tablet, Oral, BID  sodium chloride, 10 mL, Intravenous, Q12H  venlafaxine XR, 75 mg, Oral, Daily      Continuous Infusions:   PRN Meds:.  senna-docusate sodium **AND** polyethylene glycol **AND** bisacodyl **AND** bisacodyl    labetalol    oxyCODONE    sodium chloride    sodium chloride    sodium chloride    Past Medical History:   Diagnosis Date    Anesthesia 2002    low bp only with foot surgery, has had surgeries since and no issue per pt report    Arthritis     Back pain     Depression     Difficulty walking     Left knee replacement  need right knee replacement    GERD (gastroesophageal reflux disease)     High cholesterol     Hypertension     Knee pain     BILATERAL    Seasonal allergies     Tremor, essential         Past Surgical History:   Procedure Laterality Date    ANTERIOR AND POSTERIOR VAGINAL REPAIR N/A 4/17/2017    Vaginal enterocele / anterior posterior colporrhaphy;  Jose Maria Chowdhury MD;  Location: [facility] OR;  Service:     BLADDER SUSPENSION  1999    MESH TVT?     COLONOSCOPY  2018    Dr Chung - normal    EXPLORATORY LAPAROTOMY  2010    \"BOWEL INTO PELVIC BONE\" /Mesh [place]     EXPLORATORY LAPAROTOMY  04/2004    AQUILES/ abd sacrocolpopexy/post repair    FOOT SURGERY Left     KNEE ARTHROSCOPY Left     meniscus repair    LAPAROSCOPIC CHOLECYSTECTOMY Right 2000    FL ARTHRP KNE CONDYLE&PLATU MEDIAL&LAT COMPARTMENTS Left " 2017    Procedure: TOTAL KNEE ARTHROPLASTY LEFT;  Surgeon: Sunny Reynoso MD;  Location:  [facility] OR;  Service: Orthopedics    TONSILLECTOMY      TOTAL ABDOMINAL HYSTERECTOMY WITH SALPINGO OOPHORECTOMY Bilateral     TOTAL KNEE ARTHROPLASTY Right 2023    Procedure: TOTAL KNEE ARTHROPLASTY WITH CORI ROBOT - RIGHT;  Surgeon: Sunny Reynoso MD;  Location:  [facility] OR;  Service: Robotics - Ortho;  Laterality: Right;    TUBAL ABDOMINAL LIGATION      VENTRAL HERNIA REPAIR N/A 2016    Procedure: LAPAROSCOPIC REPAIR OF ABDOMINAL WALL HERNIA WITH MESH, CONVERTED TO OPEN ;  Surgeon: Bertha Delacruz MD;  Location:  [facility] OR;  Service:         Social History     Occupational History    Not on file   Tobacco Use    Smoking status: Former     Packs/day: 1.50     Years: 35.00     Pack years: 52.50     Types: Cigarettes     Start date: 10/1/1967     Quit date: 1986     Years since quittin.1    Smokeless tobacco: Never   Vaping Use    Vaping Use: Never used   Substance and Sexual Activity    Alcohol use: No    Drug use: Never    Sexual activity: Defer     Partners: Male     Birth control/protection: Surgical, Abstinence, Post-menopausal, Tubal ligation, Hysterectomy     Comment: Total hysterectomy      Social History     Social History Narrative    Patient consumes 1-2 cups high-caffeine coffee, 1 [employer] sweet tea daily.     Patient lives at home with her .         Family History   Problem Relation Age of Onset    Hypertension Mother     Diabetes Mother     Heart attack Mother 59         from MI    Cancer Father     Aneurysm Father 57    Hepatitis Brother         Hep B    Kidney disease Brother 58    Alzheimer's disease Maternal Grandmother     Pneumonia Maternal Grandmother     Parkinsonism Maternal Grandmother         Along w/Alzheimer’s    Leukemia Maternal Grandfather     No Known Problems Paternal Grandfather     No Known Problems Daughter     No Known Problems Son      Ovarian cancer Neg Hx     Breast cancer Neg Hx          Review of Systems:     Musculoskeletal: Right knee pain and swelling otherwise negative or noncontributory to the orthopedic exam    Physical Exam: 72 y.o. female  General Appearance:    Alert, cooperative, in no acute distress                   Vitals:    07/24/23 1141   BP: 139/84   BP Location: Right arm   Patient Position: Lying   Pulse: 85   Resp: 16   Temp: 97.8 °F (36.6 °C)   TempSrc: Oral   SpO2: 95%        Extremities:  Erythema surrounding the distal third of her right knee midline incision with some serosanguineous drainage as well.   Proximal aspect of the incision is healed well.  She is able to perform a straight leg raise  Neurovascular intact distally  Thigh and calf soft and nontender      Diagnostic Tests:    No visits with results within 2 Day(s) from this visit.   Latest known visit with results is:   Admission on 06/29/2023, Discharged on 06/30/2023   Component Date Value Ref Range Status    Glucose 06/30/2023 131 (H)  65 - 99 mg/dL Final    BUN 06/30/2023 28 (H)  8 - 23 mg/dL Final    Creatinine 06/30/2023 1.15 (H)  0.57 - 1.00 mg/dL Final    Sodium 06/30/2023 140  136 - 145 mmol/L Final    Potassium 06/30/2023 5.2  3.5 - 5.2 mmol/L Final    Slight hemolysis detected by analyzer. Results may be affected.    Chloride 06/30/2023 109 (H)  98 - 107 mmol/L Final    CO2 06/30/2023 22.0  22.0 - 29.0 mmol/L Final    Calcium 06/30/2023 8.4 (L)  8.6 - 10.5 mg/dL Final    BUN/Creatinine Ratio 06/30/2023 24.3  7.0 - 25.0 Final    Anion Gap 06/30/2023 9.0  5.0 - 15.0 mmol/L Final    eGFR 06/30/2023 51.0 (L)  >60.0 mL/min/1.73 Final    WBC 06/30/2023 9.34  3.40 - 10.80 10*3/mm3 Final    RBC 06/30/2023 3.65 (L)  3.77 - 5.28 10*6/mm3 Final    Hemoglobin 06/30/2023 10.5 (L)  12.0 - 15.9 g/dL Final    Hematocrit 06/30/2023 34.0  34.0 - 46.6 % Final    MCV 06/30/2023 93.2  79.0 - 97.0 fL Final    MCH 06/30/2023 28.8  26.6 - 33.0 pg Final    MCHC  06/30/2023 30.9 (L)  31.5 - 35.7 g/dL Final    RDW 06/30/2023 14.3  12.3 - 15.4 % Final    RDW-SD 06/30/2023 49.1  37.0 - 54.0 fl Final    MPV 06/30/2023 11.5  6.0 - 12.0 fL Final    Platelets 06/30/2023 169  140 - 450 10*3/mm3 Final    Neutrophil % 06/30/2023 76.7 (H)  42.7 - 76.0 % Final    Lymphocyte % 06/30/2023 11.9 (L)  19.6 - 45.3 % Final    Monocyte % 06/30/2023 11.1  5.0 - 12.0 % Final    Eosinophil % 06/30/2023 0.0 (L)  0.3 - 6.2 % Final    Basophil % 06/30/2023 0.1  0.0 - 1.5 % Final    Immature Grans % 06/30/2023 0.2  0.0 - 0.5 % Final    Neutrophils, Absolute 06/30/2023 7.16 (H)  1.70 - 7.00 10*3/mm3 Final    Lymphocytes, Absolute 06/30/2023 1.11  0.70 - 3.10 10*3/mm3 Final    Monocytes, Absolute 06/30/2023 1.04 (H)  0.10 - 0.90 10*3/mm3 Final    Eosinophils, Absolute 06/30/2023 0.00  0.00 - 0.40 10*3/mm3 Final    Basophils, Absolute 06/30/2023 0.01  0.00 - 0.20 10*3/mm3 Final    Immature Grans, Absolute 06/30/2023 0.02  0.00 - 0.05 10*3/mm3 Final    nRBC 06/30/2023 0.0  0.0 - 0.2 /100 WBC Final       Right knee x-rays in the office 7/17 revealed well-placed total knee arthroplasty with no acute findings other than swelling      Assessment: 72-year-old female with right prosthetic knee infection status post index knee replacement on 6/29    Patient Active Problem List   Diagnosis    Morbid obesity    Palpitations    Essential hypertension    Mixed hyperlipidemia    Prediabetes    Osteoarthritis of left knee    Leukocytosis, mild, likely reactive    Abnormal EKG    Anxiety and depression    BPV (benign positional vertigo)    Elevated hemoglobin A1c    Gastroesophageal reflux disease without esophagitis    Osteopenia    Peripheral vascular disease    S/P total knee arthroplasty, left    Tremor, essential    NDIAYE (dyspnea on exertion)    Body mass index 40.0-44.9, adult    Abnormal blood level of copper    RBBB    Pre-operative clearance    Encounter for pre-operative cardiovascular clearance    Status post  right knee replacement    PSVT (paroxysmal supraventricular tachycardia)    Postoperative wound infection, right knee           Plan:  The patient voiced understanding of the risks, benefits, and alternative forms of treatment that were discussed and the patient consents to proceed with right knee irrigation and debridement with poly exchange.   -She was admitted from the office today as a direct admit for right prosthetic knee infection.  There is a possibility it is just a superficial infection but regardless requires irrigation and debridement with plan for poly exchange as well.  She has been on oral Doxycycline for 1 week.  Advised Dr. Carlene jin from my standpoint to start broad-spectrum IV antibiotic coverage.  Wound culture was taken.  We will plan to send cultures at the time of surgery tomorrow as well.  -Antibiotic management per infectious disease recommendations.  Appreciate their assistance.  -When seen in her hospital room this afternoon reports she is already feeling better.  -She is scheduled for right knee irrigation and debridement with poly exchange tomorrow afternoon at 2 PM.  -Okay to get up with assist.  Otherwise we will hold formal therapy until after surgery.  -Medical management per Dr. AYALA.  Appreciate his assistance.  -Please call with any questions or concerns.      Discharge Plan: TBD      Date: 7/24/2023    Sunny Reynoso MD     Electronically signed by Sunny Reynoso MD at 07/25/23 6029

## 2023-07-25 NOTE — BRIEF OP NOTE
KNEE INCISION AND DRAINAGE  Progress Note    Imeldadariusz Elaine  7/25/2023    Pre-op Diagnosis:   right prosthetic knee infection       Post-Op Diagnosis Codes:     * Infection of prosthetic right knee joint [T84.53XA]    Procedure/CPT® Codes:  NE REVJ TOTAL KNEE ARTHRP W/WO ALGRFT 1 COMPONENT [85755]      Procedure(s): KNEE INCISION AND DRAINAGE WITH POLY EXCHANGE - RIGHT              Surgeon(s):  Sunny Reynoso MD    Anesthesia: General with adductor canal block    Staff:   Circulator: Harmony Lira RN; Sandra Ruano RN  Scrub Person: Светлана Benjamin, PhD; Liliana Maldonado; Ewelina Alexander  Vendor Representative: Alban Ospina ([employer])  Nursing Assistant: Aroldo Marin  Assistant: Alec Bella PA-C  Assistant: Alec Bella PA-C      Estimated Blood Loss: 200ml    Urine Voided: * No values recorded between 7/25/2023  2:29 PM and 7/25/2023  4:30 PM *    Specimens:                Specimens       ID Source Type Tests Collected By Collected At Frozen?    1 Knee, Right Tissue ANAEROBIC CULTURE  FUNGAL CULTURE  TISSUE / BONE CULTURE  AFB CULTURE   Sunny Reynoso MD 7/25/23 1530     Description: Superficial synovium    This specimen was not marked as sent.    2 Knee, Right Tissue ANAEROBIC CULTURE  FUNGAL CULTURE  TISSUE / BONE CULTURE  AFB CULTURE   Sunny Reynoso MD 7/25/23 1543     Description: Deep synovium    This specimen was not marked as sent.                  Drains:   External Urinary Catheter (Active)   Site Assessment Clean;Skin intact 07/25/23 1220   Application/Removal external catheter changed 07/25/23 0815   Collection Container Wall suction 07/25/23 1220   Wall suction (mmHG) 100 mmHG 07/24/23 1500   Securement Method Securing device 07/25/23 0600   Catheter care complete Yes 07/24/23 1500   Output (mL) 200 mL 07/25/23 0815       Findings: Right knee superficial infected hematoma.  Fascia was intact and healed well.  No obvious deep prosthetic  knee infection.  Components are stable.        Complications: None    Implants: [employer] 10 mm polyethylene and Stimulan beads with vancomycin    Assistant: Alec Bella PA-C  was responsible for performing the following activities: Retraction, assistance with implantation of components and closure and their skilled assistance was necessary for the success of this case.    Sunny Reynoso MD     Date: 7/25/2023  Time: 16:44 EDT

## 2023-07-25 NOTE — PROGRESS NOTES
IM progress note      Imelda Elaine  [id number]  1950     LOS: 1 day     Attending: Valerie Concepcion MD    Primary Care Provider: Comfort Colunga MD      Chief Complaint/Reason for visit:  No chief complaint on file.      Subjective    Only c/o pain. No f/c/n/vom. No sob.   Awaits procedure.     Objective        Visit Vitals  /75   Pulse 82   Temp 98 °F (36.7 °C) (Oral)   Resp 20   LMP  (LMP Unknown)   SpO2 96%     Temp (24hrs), Av.9 °F (36.6 °C), Min:97.7 °F (36.5 °C), Max:98.3 °F (36.8 °C)      Intake/Output:    Intake/Output Summary (Last 24 hours) at 2023 1011  Last data filed at 2023 0435  Gross per 24 hour   Intake 720 ml   Output 1100 ml   Net -380 ml           Physical Exam:     General Appearance:    Alert, cooperative, in no acute distress   Head:    Normocephalic, without obvious abnormality, atraumatic    Lungs:     Normal effort, symmetric chest rise,  clear to      auscultation bilaterally              Heart:    Regular rhythm and normal rate, normal S1 and S2    Abdomen:     Normal bowel sounds, no masses, no organomegaly, soft        non-tender, non-distended, no guarding, no rebound                tenderness   Extremities:   Swelling and erythema of right knee especially distal half of incision with drainage.   No clubbing, cyanosis or edema.  No deformities.    Pulses:   Pulses palpable and equal bilaterally   Skin:   No bleeding, bruising or rash          Results Review:     I reviewed the patient's new clinical results.         Invalid input(s): NEUTOPHILPCT,  EOSPCT  Results from last 7 days   Lab Units 23  1249   SODIUM mmol/L 140   POTASSIUM mmol/L 4.2   CHLORIDE mmol/L 103   CO2 mmol/L 25.0   BUN mg/dL 15   CREATININE mg/dL 0.78   CALCIUM mg/dL 9.2   BILIRUBIN mg/dL 0.5   ALK PHOS U/L 196*   ALT (SGPT) U/L 47*   AST (SGOT) U/L 43*   GLUCOSE mg/dL 117*     I reviewed the patient's new imaging including images and reports.   Latest Reference Range &  Units 07/24/23 12:49   C-Reactive Protein 0.00 - 0.50 mg/dL 16.82 (H)   Lactate 0.5 - 2.0 mmol/L 1.0   (H): Data is abnormally high     Latest Reference Range & Units 07/24/23 12:49   Sed Rate 0 - 30 mm/hr 95 (H)   (H): Data is abnormally high  All medications reviewed.   acetaminophen, 1,000 mg, Oral, Q6H  atorvastatin, 10 mg, Oral, Nightly  cefTRIAXone, 2,000 mg, Intravenous, Q24H  cetirizine, 10 mg, Oral, Daily  DAPTOmycin, 600 mg, Intravenous, Q24H  estradiol, 0.5 mg, Oral, Daily  losartan, 100 mg, Oral, Daily  nebivolol, 5 mg, Oral, Q24H  senna-docusate sodium, 2 tablet, Oral, BID  sodium chloride, 10 mL, Intravenous, Q12H  venlafaxine XR, 75 mg, Oral, Daily      polyethylene glycol, 17 g, Daily PRN   And  bisacodyl, 5 mg, Daily PRN   And  bisacodyl, 10 mg, Daily PRN  HYDROmorphone, 0.5 mg, Q4H PRN  labetalol, 10 mg, Q4H PRN  oxyCODONE, 10 mg, Q4H PRN  sodium chloride, 500 mL, TID PRN  sodium chloride, 10 mL, PRN  sodium chloride, 40 mL, PRN        Assessment & Plan       Postoperative wound infection, right knee    Morbid obesity    Essential hypertension    Anxiety and depression    Elevated hemoglobin A1c    Peripheral vascular disease    PSVT (paroxysmal supraventricular tachycardia)         Plan  For procedure today, likely I and D, wash out, poly exchange.    Follow Wound culture, blood cultures, labs including inflammatory marker.      Continue Broad-spectrum IV antibiotics to include daptomycin and ceftriaxone, ID consulted, Dr Nam Adams to follow.     Anxiety and depression: Resumed home med regimen.          Hypertension:  Resume home medications as appropriate, formulary substitution when indicated.  Holding parameters.  Prn medications for elevated blood pressure.     Morbid obesity: Complicates all aspects of care.    Valerie Concepcion MD  07/25/23  10:11 EDT

## 2023-07-25 NOTE — CASE MANAGEMENT/SOCIAL WORK
Continued Stay Note  [facility]     Patient Name: Imelda Elaine  MRN: [id number]  Today's Date: 7/25/2023    Admit Date: 7/24/2023    Plan: TBD   Discharge Plan       Row Name 07/25/23 1559       Plan    Plan TBD    Patient/Family in Agreement with Plan yes    Plan Comments Met with Ms. Elaine at the bedside for discharge planning.    Ms. Elaien lives with her , Kevyn, in [facility].  She states that prior to admission, she ambulated with a HD rolling walker.  She states she has an elevator to her condo.  Also has bath bench at home.  She states she has never gone to IPR, used home health, or infused IV abx at home.    PCP is Comfort Colunga.  Insurance is [employer] with no interruption in coverage.  ACP documents are filed to [facility].    DC plan is TBD.  Ms. Elaine is agreeable to IPR if needed.  CM will follow up to assist with DC planning.    Final Discharge Disposition Code 30 - still a patient                                  Expected Discharge Date and Time       Expected Discharge Date Expected Discharge Time    Jul 27, 2023               Ashely Bray RN

## 2023-07-25 NOTE — ANESTHESIA PROCEDURE NOTES
Peripheral Block      Patient reassessed immediately prior to procedure    Start time: 7/25/2023 4:34 PM  Reason for block: at surgeon's request and post-op pain management  Performed by  Anesthesiologist: Wellington Cesar MD  Preanesthetic Checklist  Completed: patient identified, IV checked, site marked, risks and benefits discussed, surgical consent, monitors and equipment checked, pre-op evaluation and timeout performed  Prep:  Pt Position: supine  Sterile barriers:cap, gloves, mask, sterile barriers and washed/disinfected hands  Prep: ChloraPrep  Patient monitoring: blood pressure monitoring, continuous pulse oximetry and EKG  Procedure  Performed under: spinal  Guidance:ultrasound guided    ULTRASOUND INTERPRETATION.  Using ultrasound guidance a 20 G gauge needle was placed in close proximity to the nerve, at which point, under ultrasound guidance anesthetic was injected in the area of the nerve and spread of the anesthesia was seen on ultrasound in close proximity thereto.  There were no abnormalities seen on ultrasound; a digital image was taken; and the patient tolerated the procedure with no complications. Images:still images obtained, printed/placed on chart    Laterality:right  Block Type:adductor canal block  Injection Technique:catheter  Needle Type:Tuohy and echogenic  Needle Gauge:18 G  Resistance on Injection: none  Catheter Size:20 G (20g)    Medications Used: bupivacaine PF (MARCAINE) 0.25 % injection - Injection   20 mL - 7/25/2023 4:34:00 PM      Medications  Preservative Free Saline:10ml    Post Assessment  Injection Assessment: negative aspiration for heme, incremental injection and no paresthesia on injection  Patient Tolerance:comfortable throughout block  Complications:no  Additional Notes  CATHETER   A high-frequency linear transducer, with sterile cover, was placed on the anterior mid-thigh (between the anterior superior iliac spine and patella). The transducer was then moved medially to  "identify the Sartorius muscle (Marshall), Vastus Medialis muscle (VMM), Superficial Femoral Artery (SFA) and Vein. The transducer was then moved cephalad or caudad to position the SFA in the middle of the Marshall. The insertion site was prepped and draped in sterile fashion. Skin and cutaneous tissue was infiltrated with 2-5 ml of 1% Lidocaine. Using ultrasound-guidance, an 18-gauge InvestCloudiplex Ultra 360 Touhy needle was advanced in plane from lateral to medial. Preservative-free normal saline was utilized for hydro-dissection of tissue, advancement of Touhy, and to confirm needle placement below the fascial plane of the Marshall where the Nerve to the VMM is located. Local anesthetic (LA) 5 ml deposited here. The Touhy needle continues its path lateral to the SFA at the level of the Saphenous Nerve. The remainder of the LA was deposited at the 10-11 o'clock position of the SFA. This injection created a space between the Marshall and the SFA. Aspiration every 5 ml to prevent intravascular injection. Injection was completed with negative aspiration of blood and negative intravascular injection. Injection pressures were normal with minimal resistance. A 20-gauge InvestCloudiplex Echo catheter was placed through the needle and advance out the tip of the Touhy 3-5 cm anterior to the SFA. The Touhy needle was then removed, and final catheter position verified at the 12 o'clock position to the SFA. The catheter was secured in the usual fashion with skin glue, benzoin, steri-strips, CHG tegaderm and label noting \"Nerve Block Catheter\". Jerk tape applied at yellow connector and catheter connection.           "

## 2023-07-25 NOTE — ANESTHESIA POSTPROCEDURE EVALUATION
Patient: Imelda Elaine    Procedure Summary       Date: 07/25/23 Room / Location:  [facility]  /  [facility]    Anesthesia Start: 1428 Anesthesia Stop: 1650    Procedure: KNEE INCISION AND DRAINAGE WITH POLY EXCHANGE - RIGHT (Right: Knee) Diagnosis:       Infection of prosthetic right knee joint      (right prosthetic knee infection)    Surgeons: Sunny Reynoso MD Provider: Wellington Cesar MD    Anesthesia Type: general ASA Status: 3            Anesthesia Type: general    Vitals  Vitals Value Taken Time   /67 07/25/23 1651   Temp 97 °F (36.1 °C) 07/25/23 1651   Pulse 88 07/25/23 1651   Resp 16 07/25/23 1651   SpO2 96 % 07/25/23 1651           Post Anesthesia Care and Evaluation    Patient location during evaluation: PACU  Patient participation: complete - patient participated  Level of consciousness: awake and alert  Pain score: 0  Pain management: adequate    Airway patency: patent  Anesthetic complications: No anesthetic complications  PONV Status: none  Cardiovascular status: hemodynamically stable and acceptable  Respiratory status: nonlabored ventilation, acceptable and nasal cannula  Hydration status: acceptable

## 2023-07-25 NOTE — ANESTHESIA PROCEDURE NOTES
Airway  Urgency: elective    Date/Time: 7/25/2023 2:38 PM  Airway not difficult    General Information and Staff    Patient location during procedure: OR    Indications and Patient Condition  Indications for airway management: airway protection    Preoxygenated: yes  MILS not maintained throughout  Mask difficulty assessment: 0 - not attempted    Final Airway Details  Final airway type: endotracheal airway      Successful airway: ETT  Cuffed: yes   Successful intubation technique: direct laryngoscopy, video laryngoscopy and RSI  Facilitating devices/methods: intubating stylet and cricoid pressure  Endotracheal tube insertion site: oral  Blade: Hernandez  Blade size: 3  ETT size (mm): 7.5  Cormack-Lehane Classification: grade I - full view of glottis  Placement verified by: chest auscultation and capnometry   Measured from: lips  ETT/EBT  to lips (cm): 20  Number of attempts at approach: 1  Assessment: lips, teeth, and gum same as pre-op and atraumatic intubation    Additional Comments  Negative epigastric sounds, Breath sound equal bilaterally with symmetric chest rise and fall

## 2023-07-25 NOTE — PROGRESS NOTES
INFECTIOUS DISEASE Progress note    Imelda Elaine  1950  [id number]    Date of consult: 7/24/23    Admit date: 7/24/2023    Requesting Provider: Sunny Reynoso,*  Evaluating physician: Nam Adams MD  Reason for Consultation: Postop wound infection   Chief Complaint: right knee infection      Subjective   History of present illness:  Imelda Elaine is a  72 y.o.  Yr old female with a past medical history of hypertension, HLD,a dn osteoarthritis s/p left knee replacement in 2017 who underwent a right total knee replacement 6/29/2023. The patient initially did well post-operatively but subsequently started having swelling of the knee and increased pain about 1 week after surgery with mild erythema around incision site. This occurred right after she bent down when she was cleaning up after going to the bathroom and felt a pop behind her right knee. She was seen in clinic by Dr. Reynoso and was started on oral Bactrim about 1 week ago. Presented back to Dr. Reynoso's clinic today and had increased erythema, pain, and swelling along with drainage from incision site. She was also experiencing some fevers and chills at home. She was directly admitted to Dr. Concepcion's service at [facility]. Plan by Dr. Reynoso is to take the patient to the OR tomorrow for right knee I&D with poly exchange. The patient has remained afebrile since arrival. ESR was elevated to 95 and CRP was  16.8. Lactic acid was 1.0. Wound cx from right knee is in progress and blood cultures have been sent and are in progress. Cr was 0.78. LFTs were mildly elevated with ALT 47, AST 43, Alk Phos 196. T gemini was normal at 0.5. No new radiographic data. The patient has been started on IV Daptomycin and Ceftriaxone by the primary team. ID has been consulted for abx recs.     Subjective:    7/25/23: The patient has ongoing pain and redness around her right knee.  No new complaints today.  No fevers.  No nausea, vomiting, or diarrhea.  No new rashes  "reported.  Plan for OR later today. Nursing reports difficulty with peripheral IV access and asks for PICC line placement.    Past Medical History:   Diagnosis Date    Anesthesia 2002    low bp only with foot surgery, has had surgeries since and no issue per pt report    Arthritis     Back pain     Depression     Difficulty walking     Left knee replacement  need right knee replacement    GERD (gastroesophageal reflux disease)     High cholesterol     Hypertension     Knee pain     BILATERAL    Seasonal allergies     Tremor, essential        Past Surgical History:   Procedure Laterality Date    ANTERIOR AND POSTERIOR VAGINAL REPAIR N/A 4/17/2017    Vaginal enterocele / anterior posterior colporrhaphy;  Jose Maria Chowdhury MD;  Location:  [facility];  Service:     BLADDER SUSPENSION  1999    MESH TVT?     COLONOSCOPY  2018    Dr Chugn - normal    EXPLORATORY LAPAROTOMY  2010    \"BOWEL INTO PELVIC BONE\" /Mesh [place]     EXPLORATORY LAPAROTOMY  04/2004    AQUILES/ abd sacrocolpopexy/post repair    FOOT SURGERY Left     KNEE ARTHROSCOPY Left     meniscus repair    LAPAROSCOPIC CHOLECYSTECTOMY Right 2000    SD ARTHRP KNE CONDYLE&PLATU MEDIAL&LAT COMPARTMENTS Left 11/16/2017    Procedure: TOTAL KNEE ARTHROPLASTY LEFT;  Surgeon: Sunny Reynoso MD;  Location:  [facility];  Service: Orthopedics    TONSILLECTOMY      TOTAL ABDOMINAL HYSTERECTOMY WITH SALPINGO OOPHORECTOMY Bilateral 1989    TOTAL KNEE ARTHROPLASTY Right 6/29/2023    Procedure: TOTAL KNEE ARTHROPLASTY WITH CORI ROBOT - RIGHT;  Surgeon: Sunny Reynoso MD;  Location:  [facility];  Service: Robotics - Ortho;  Laterality: Right;    TUBAL ABDOMINAL LIGATION      VENTRAL HERNIA REPAIR N/A 6/21/2016    Procedure: LAPAROSCOPIC REPAIR OF ABDOMINAL WALL HERNIA WITH MESH, CONVERTED TO OPEN ;  Surgeon: Bertha Delacruz MD;  Location:  [facility];  Service:        Pediatric History   Patient Parents    Not on file     Other Topics Concern    Not on file   Social " "History Narrative    Patient consumes 1-2 cups high-caffeine coffee, 1 [employer] sweet tea daily.     Patient lives at home with her .        family history includes Alzheimer's disease in her maternal grandmother; Aneurysm (age of onset: 57) in her father; Cancer in her father; Diabetes in her mother; Heart attack (age of onset: 59) in her mother; Hepatitis in her brother; Hypertension in her mother; Kidney disease (age of onset: 58) in her brother; Leukemia in her maternal grandfather; No Known Problems in her daughter, paternal grandfather, and son; Parkinsonism in her maternal grandmother; Pneumonia in her maternal grandmother.    Allergies   Allergen Reactions    Primidone Other (See Comments)     Hair loss    Topiramate Diarrhea    Tuberculin Tests Swelling and Rash     Pt has chest xrays to check     Zostavax [Zoster Vaccine Live] Swelling and Rash     \"Swelling of injection site of arm\"       Immunization History   Administered Date(s) Administered    COVID-19 (MODERNA) BIVALENT 12+YRS 09/04/2022    COVID-19 (MODERNA) Monovalent Original Booster 04/19/2022    COVID-19 (PFIZER) Purple Cap Monovalent 02/06/2021, 02/27/2021, 09/26/2021    Fluad Quad 65+ 09/14/2020    Fluzone High Dose =>65 Years ([facility] ONLY) 10/02/2017, 09/22/2018, 10/04/2019    Influenza, Unspecified 09/14/2020    Pneumococcal Conjugate 13-Valent (PCV13) 10/02/2017    Pneumococcal Polysaccharide (PPSV23) 10/04/2019       Medication:    Current Facility-Administered Medications:     [MAR Hold] acetaminophen (TYLENOL) tablet 1,000 mg, 1,000 mg, Oral, Q6H, Valerie Concepcion MD, 1,000 mg at 07/25/23 0019    [MAR Hold] atorvastatin (LIPITOR) tablet 10 mg, 10 mg, Oral, Nightly, Valerie Concepcion MD, 10 mg at 07/24/23 2033    [MAR Hold] sennosides-docusate (PERICOLACE) 8.6-50 MG per tablet 2 tablet, 2 tablet, Oral, BID, 2 tablet at 07/24/23 2046 **AND** [MAR Hold] polyethylene glycol (MIRALAX) packet 17 g, 17 g, Oral, Daily PRN **AND** " [MAR Hold] bisacodyl (DULCOLAX) EC tablet 5 mg, 5 mg, Oral, Daily PRN **AND** [MAR Hold] bisacodyl (DULCOLAX) suppository 10 mg, 10 mg, Rectal, Daily PRN, Valerie Concepcion MD    cefTRIAXone (ROCEPHIN) 2000 mg/100 mL 0.9% NS IVPB (MBP), 2,000 mg, Intravenous, Q24H, Valerie Concepcion MD, 2,000 mg at 07/25/23 0810    [MAR Hold] cetirizine (zyrTEC) tablet 10 mg, 10 mg, Oral, Daily, Valerie Concepcion MD    DAPTOmycin (CUBICIN) 600 mg in sodium chloride 0.9 % 50 mL IVPB, 600 mg, Intravenous, Q24H, Valerie Concepcion MD, Last Rate: 100 mL/hr at 07/25/23 0917, 600 mg at 07/25/23 0917    droperidol (INAPSINE) injection 0.625 mg, 0.625 mg, Intravenous, Q15 Min PRN **OR** droperidol (INAPSINE) injection 0.625 mg, 0.625 mg, Intramuscular, Once PRN, Sophia Maddox CRNA    [MAR Hold] estradiol (ESTRACE) tablet 0.5 mg, 0.5 mg, Oral, Daily, Valerie Concepcion MD    fentaNYL citrate (PF) (SUBLIMAZE) injection 50 mcg, 50 mcg, Intravenous, Q5 Min PRN, Sophia Maddox CRNA    hydrALAZINE (APRESOLINE) injection 5 mg, 5 mg, Intravenous, Q10 Min PRN, Sophia Maddox CRNA    HYDROcodone-acetaminophen (NORCO) 5-325 MG per tablet 1 tablet, 1 tablet, Oral, Once PRN, Sophia Maddox CRNA    [MAR Hold] HYDROmorphone (DILAUDID) injection 0.5 mg, 0.5 mg, Intravenous, Q4H PRN, Valerie Concepcion MD    HYDROmorphone (DILAUDID) injection 0.5 mg, 0.5 mg, Intravenous, Q10 Min PRN, Sophia Maddox CRNA    ipratropium-albuterol (DUO-NEB) nebulizer solution 3 mL, 3 mL, Nebulization, Once PRN, Sophia Maddox CRNA    labetalol (NORMODYNE,TRANDATE) injection 10 mg, 10 mg, Intravenous, Q4H PRN, Valerie Concepcion MD    labetalol (NORMODYNE,TRANDATE) injection 5 mg, 5 mg, Intravenous, Q5 Min PRN, Sophia Maddox CRNA    lactated ringers bolus 250 mL, 250 mL, Intravenous, Once PRN, Sophia Maddox CRNA    lactated ringers infusion, 9 mL/hr, Intravenous, Continuous, Giovani Bojorquez MD, Last Rate: 9 mL/hr at 07/25/23  1428, Currently Infusing at 07/25/23 1428    losartan (COZAAR) tablet 100 mg, 100 mg, Oral, Daily, Valerie Concepcion MD, 100 mg at 07/25/23 0810    naloxone (NARCAN) injection 0.4 mg, 0.4 mg, Intravenous, PRN, Sophia Maddox CRNA    nebivolol (BYSTOLIC) tablet 5 mg, 5 mg, Oral, Q24H, Valerie Concepcion MD, 5 mg at 07/25/23 0810    ondansetron (ZOFRAN) injection 4 mg, 4 mg, Intravenous, Once PRN, Sophia Maddox CRNA    [MAR Hold] oxyCODONE (ROXICODONE) immediate release tablet 10 mg, 10 mg, Oral, Q4H PRN, Valerie Concepcion MD, 10 mg at 07/25/23 1020    promethazine (PHENERGAN) suppository 25 mg, 25 mg, Rectal, Once PRN **OR** promethazine (PHENERGAN) tablet 25 mg, 25 mg, Oral, Once PRN, Sophia Maddox CRNA    ropivacaine (NAROPIN) 0.2 % infusion (INFUSYSTEM), , Peripheral Nerve, Continuous, Jose Maria Philip CRNA    [MAR Hold] sodium chloride 0.9 % bolus 500 mL, 500 mL, Intravenous, TID PRN, Valerie Concepcion MD    [MAR Hold] sodium chloride 0.9 % flush 10 mL, 10 mL, Intravenous, Q12H, Valerie Concepcion MD, 10 mL at 07/24/23 2033    [MAR Hold] sodium chloride 0.9 % flush 10 mL, 10 mL, Intravenous, PRN, Valerie Concepcion MD    sodium chloride 0.9 % flush 3 mL, 3 mL, Intravenous, Q12H, Sophia Maddox CRNA    sodium chloride 0.9 % flush 3-10 mL, 3-10 mL, Intravenous, PRN, Sophia Maddox CRNA    [MAR Hold] sodium chloride 0.9 % infusion 40 mL, 40 mL, Intravenous, PRN, Valerie Concepcion MD    sodium chloride 0.9 % infusion 40 mL, 40 mL, Intravenous, PRN, Sophia Maddox, CRNA    [MAR Hold] venlafaxine XR (EFFEXOR-XR) 24 hr capsule 75 mg, 75 mg, Oral, Daily, Valerie Concepcion MD    Please refer to the medical record for a full medication list    Review of Systems:    As above    Physical Exam:   Vital Signs   Temp:  [97 °F (36.1 °C)-98.3 °F (36.8 °C)] 97.4 °F (36.3 °C)  Heart Rate:  [75-90] 90  Resp:  [16-20] 16  BP: (133-178)/(59-89) 144/78    Temp  Min: 97 °F (36.1 °C)  Max: 98.3 °F  "(36.8 °C)  BP  Min: 133/71  Max: 178/89  Pulse  Min: 75  Max: 90  Resp  Min: 16  Max: 20  SpO2  Min: 94 %  Max: 97 %    Blood pressure 144/78, pulse 90, temperature 97.4 °F (36.3 °C), temperature source Temporal, resp. rate 16, height 157.5 cm (62\"), weight (!) 150 kg (330 lb), SpO2 96 %, not currently breastfeeding.  GENERAL: Awake and alert, in no acute distress. Appears stated age.  Obese  HEENT:  Normocephalic, atraumatic.  No external or lesions noted.  No thrush noted.  EYES:  No conjunctival injection. No icterus.   HEART: RRR, no murmur  LUNGS: CTA B. Nonlabored breathing  ABDOMEN: Obese abdomen. Soft, nontender, nondistended. No appreciable HSM.    MSK: right knee with swelling, erythema, increased warmth, and tenderness to palpation. Erythema extends down her right leg somewhat.  Has a dressing in place over her incision site with some drainage seeping through the dressing.  GENITAL: no Donald catheter  SKIN: no generalized rashes.  No peripheral stigmata of infective endocarditis noted.  PSYCHIATRIC: cooperative.  Appropriate mood and affect  EXT:  No cellulitic change.  NEURO: awake alert and oriented ×4.  Normal speech and cognition    Results Review:   I reviewed the patient's new clinical results.        Results from last 7 days   Lab Units 07/24/23  1249   SODIUM mmol/L 140   POTASSIUM mmol/L 4.2   CHLORIDE mmol/L 103   CO2 mmol/L 25.0   BUN mg/dL 15   CREATININE mg/dL 0.78   GLUCOSE mg/dL 117*   CALCIUM mg/dL 9.2     Results from last 7 days   Lab Units 07/24/23  1249   ALK PHOS U/L 196*   BILIRUBIN mg/dL 0.5   ALT (SGPT) U/L 47*   AST (SGOT) U/L 43*     Results from last 7 days   Lab Units 07/24/23  1249   SED RATE mm/hr 95*     Results from last 7 days   Lab Units 07/24/23  1249   CRP mg/dL 16.82*         Results from last 7 days   Lab Units 07/24/23  1249   LACTATE mmol/L 1.0     Estimated Creatinine Clearance: 94.1 mL/min (by C-G formula based on SCr of 0.78 mg/dL).  CPK          7/24/2023    " 12:49   Common Labsle   Creatine Kinase 64       Procalitonin Results:       Brief Urine Lab Results       None           No results found for: SITE, ALLENTEST, PHART, OYR8JKR, PO2ART, WKL2QJU, BASEEXCESS, N5OENMGV, HGBBG, HCTABG, OXYHEMOGLOBI, METHHGBN, CARBOXYHGB, CO2CT, BAROMETRIC, MODALITY, FIO2     Microbiology:    Blood culture x2: In process    Right knee surgical culture: In process    Radiology:  Imaging Results (Last 72 Hours)       Procedure Component Value Units Date/Time    XR Knee 1 or 2 View Right [[id number]] Resulted: 07/25/23 1656     Updated: 07/25/23 1708            IMPRESSION:     Problems:  Right knee cellulitis and 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 Most likely culprits are staphylococcus and streptococcus. P acnes is also a possibility. GNR infection seems less likely. There is a possibility that this is just a superficial infection but given that the patient is high risk in the setting of new hardware, plan is for I&D with poly exchange tomorrow by orthopedic surgery. Superficial wound culture has been sent and is in progress and surgical cultures will be sent tomorrow. She does have significant elevated inflammatory markers.   Mildly elevated LFTs  Morbid obesity-complicates all aspects of care.  Predisposes the patient to poor wound healing and infection.  History of left knee replacement in 2017  HTN  HLD    RECOMMENDATIONS:    -Continue to closely monitor CBC with differential and CMP.  Baseline CPK level is okay  -Follow pending blood cultures and superficial wound culture from the right knee  -Continue Daptomycin 6 mg/kg IV every 24 hours  -Continue Ceftriaxone 2 g IV every 24 hours  -Plan for I&D and poly-exchange by Dr. Reynoso Today. I will follow surgical cultures  -PICC line consult placed    She will likely need a long course of IV antibiotics. Final IV antibiotic course depends on extent of infection during procedure and final  culture results.    I discussed in length with the patient and her  at bedside today    I discussed with nursing staff today    Thank you for asking me to see Imelda Elaine.  Our group would be pleased to follow this patient over the course of their hospitalization and assist with outpatient antimicrobial therapy, as indicated.  Further recommendations depend on the results of the cultures and clinical course.    Complex MDM.  At some risk for loss of right limb    Nam Adams MD  7/25/2023

## 2023-07-25 NOTE — ANESTHESIA PREPROCEDURE EVALUATION
Anesthesia Evaluation     Patient summary reviewed and Nursing notes reviewed   no history of anesthetic complications:   NPO Solid Status: > 8 hours  NPO Liquid Status: > 8 hours           Airway   Mallampati: II  TM distance: >3 FB  Neck ROM: full  No difficulty expected  Dental      Pulmonary - normal exam   (+) ,shortness of breath  Cardiovascular - normal exam    (+) hypertensiondysrhythmias, NDIAYE, PVD, hyperlipidemia      Neuro/Psych  (+) psychiatric history  GI/Hepatic/Renal/Endo    (+) morbid obesity, GERD    Musculoskeletal     (+) back pain  Abdominal    Substance History      OB/GYN          Other   arthritis,                   Anesthesia Plan    ASA 3     general     intravenous induction     Anesthetic plan, risks, benefits, and alternatives have been provided, discussed and informed consent has been obtained with: patient.    Plan discussed with CRNA.    CODE STATUS:

## 2023-07-25 NOTE — OP NOTE
PREOPERATIVE DIAGNOSIS: Acute right knee prosthetic infection.     POSTOPERATIVE DIAGNOSIS: Right knee infected superficial hematoma with possible acute prosthetic right knee infection.     PROCEDURE PERFORMED: Right knee irrigation and debridement with poly exchange for revision of 1 arthroplasty component.     SURGEON: Sunny Reynoso MD     ASSISTANT: Alec Bella PA-C    ANESTHESIA: General with adductor block.     ESTIMATED BLOOD LOSS:  200 mL.     COMPLICATIONS: None.     SPECIMENS: Superficial and deep synovium for culture.     TOURNIQUET TIME: 67 minutes at 300 mmHg.     INDICATIONS FOR PROCEDURE: Imelda is a very pleasant 72-year-old female who underwent right total knee arthroplasty on 06/29/2023, approximately 3-1/2 weeks ago. She initially did well but starting 1 week ago developed some erythema at the distal aspect of the incision. She was started on oral doxycycline. Presented to clinic yesterday morning with increased redness and drainage from the distal aspect of the incision. She was admitted directly to [facility] and scheduled for right knee irrigation and debridement with polyethylene exchange for acute prosthetic knee infection today. Left knee doing well status post left total knee arthroplasty a few years ago. She is morbidly obese which complicates all aspects of her care. We discussed all the risks, benefits and alternatives of right total knee arthroplasty irrigation and debridement with poly exchange. She agreed to proceed and surgical consent form was signed. She was admitted to Dr. AYALA for medical management and Infectious Disease was consulted for antibiotic management.    DESCRIPTION OF PROCEDURE: She was seen in the preoperative holding area and the right knee was marked to confirm the correct operative site. She was seen by Anesthesia. She had received her scheduled antibiotics this morning so no additional antibiotics were given. She was brought back to the OR. General  anesthesia was induced without difficulty. Nonsterile tourniquet was applied to the right thigh. The right lower extremity was prepped and draped in the usual sterile fashion. Timeout was performed to confirm right total knee arthroplasty I&D with poly exchange on patient Imelda Elaine. The right lower extremity was exsanguinated with an Esmarch and tourniquet was inflated to 300 mmHg. With the knee flexed the midline incision was reopened with the #10 blade scalpel. Immediately after we got through the subcu layer we encountered a copious amount of infected-appearing hematoma and synovium. This was debrided with a rongeur and Pulsavac lavage. The fascia and medial parapatellar arthrotomy repair was seen to be fully healed and intact with no exposed hardware, so we thoroughly cleaned the superficial tissue with vancomycin, Pulsavac lavage, saline, as well as a 3-minute Betadine soak. Satisfied with the superficial irrigation and debridement, we then opened up the medial parapatellar arthrotomy with a fresh #10 blade scalpel. There was no obvious deep prosthetic knee infection. The poly was removed. Some deep synovium was sent for culture as well. The deep tissue and components were irrigated with Pulsavac lavage, with vancomycin as well, and a 3-minute Betadine soak was also performed. We then placed a new 10 mm polyethylene that seemed to be fully engaged in the tibial tray. We then placed vancomycin-impregnated Stimulan antibiotic cement beads deep in the knee as well as superficial. The deep fascia was closed with #2 PDS and #1 Stratafix suture. The dermis was closed with interrupted #1 PDS as well as 2-0 PDS sutures and 2-0 Prolene sutures. The skin was then closed with running horizontal mattress #0 nylon suture. Sterile dressing was then applied with Xeroform, 4 x 4s, ABD, soft roll and a 6-inch Ace bandage. Tourniquet had been deflated at 67 minutes. Anesthesia was reversed without difficulty. Transferred to  recovery in stable condition. All sponge and needle counts were correct x2.     PLAN: Postoperative plan is she will be readmitted to Dr. AYALA for medical management. Continue antibiotics per Infectious Disease recommendations pending culture results. Mobilize as tolerated with therapy starting tomorrow.  for any home health needs.

## 2023-07-26 PROBLEM — Z98.890 S/P DEBRIDEMENT: Status: ACTIVE | Noted: 2023-07-26

## 2023-07-26 LAB
ANION GAP SERPL CALCULATED.3IONS-SCNC: 9 MMOL/L (ref 5–15)
BASOPHILS # BLD AUTO: 0.01 10*3/MM3 (ref 0–0.2)
BASOPHILS NFR BLD AUTO: 0.1 % (ref 0–1.5)
BUN SERPL-MCNC: 15 MG/DL (ref 8–23)
BUN/CREAT SERPL: 23.1 (ref 7–25)
CALCIUM SPEC-SCNC: 8.5 MG/DL (ref 8.6–10.5)
CHLORIDE SERPL-SCNC: 103 MMOL/L (ref 98–107)
CO2 SERPL-SCNC: 27 MMOL/L (ref 22–29)
CREAT SERPL-MCNC: 0.65 MG/DL (ref 0.57–1)
DEPRECATED RDW RBC AUTO: 51 FL (ref 37–54)
EGFRCR SERPLBLD CKD-EPI 2021: 94.3 ML/MIN/1.73
EOSINOPHIL # BLD AUTO: 0 10*3/MM3 (ref 0–0.4)
EOSINOPHIL NFR BLD AUTO: 0 % (ref 0.3–6.2)
ERYTHROCYTE [DISTWIDTH] IN BLOOD BY AUTOMATED COUNT: 14.9 % (ref 12.3–15.4)
GLUCOSE SERPL-MCNC: 143 MG/DL (ref 65–99)
HCT VFR BLD AUTO: 27.2 % (ref 34–46.6)
HGB BLD-MCNC: 8.4 G/DL (ref 12–15.9)
IMM GRANULOCYTES # BLD AUTO: 0.04 10*3/MM3 (ref 0–0.05)
IMM GRANULOCYTES NFR BLD AUTO: 0.5 % (ref 0–0.5)
LYMPHOCYTES # BLD AUTO: 0.71 10*3/MM3 (ref 0.7–3.1)
LYMPHOCYTES NFR BLD AUTO: 8.5 % (ref 19.6–45.3)
MCH RBC QN AUTO: 29.1 PG (ref 26.6–33)
MCHC RBC AUTO-ENTMCNC: 30.9 G/DL (ref 31.5–35.7)
MCV RBC AUTO: 94.1 FL (ref 79–97)
MONOCYTES # BLD AUTO: 0.6 10*3/MM3 (ref 0.1–0.9)
MONOCYTES NFR BLD AUTO: 7.1 % (ref 5–12)
NEUTROPHILS NFR BLD AUTO: 7.04 10*3/MM3 (ref 1.7–7)
NEUTROPHILS NFR BLD AUTO: 83.8 % (ref 42.7–76)
NRBC BLD AUTO-RTO: 0 /100 WBC (ref 0–0.2)
PLATELET # BLD AUTO: 253 10*3/MM3 (ref 140–450)
PMV BLD AUTO: 10.9 FL (ref 6–12)
POTASSIUM SERPL-SCNC: 5.1 MMOL/L (ref 3.5–5.2)
RBC # BLD AUTO: 2.89 10*6/MM3 (ref 3.77–5.28)
SODIUM SERPL-SCNC: 139 MMOL/L (ref 136–145)
WBC NRBC COR # BLD: 8.4 10*3/MM3 (ref 3.4–10.8)

## 2023-07-26 PROCEDURE — C1894 INTRO/SHEATH, NON-LASER: HCPCS

## 2023-07-26 PROCEDURE — 97110 THERAPEUTIC EXERCISES: CPT

## 2023-07-26 PROCEDURE — 97165 OT EVAL LOW COMPLEX 30 MIN: CPT

## 2023-07-26 PROCEDURE — 97116 GAIT TRAINING THERAPY: CPT

## 2023-07-26 PROCEDURE — 80048 BASIC METABOLIC PNL TOTAL CA: CPT | Performed by: ORTHOPAEDIC SURGERY

## 2023-07-26 PROCEDURE — 97162 PT EVAL MOD COMPLEX 30 MIN: CPT

## 2023-07-26 PROCEDURE — 97535 SELF CARE MNGMENT TRAINING: CPT

## 2023-07-26 PROCEDURE — 97530 THERAPEUTIC ACTIVITIES: CPT

## 2023-07-26 PROCEDURE — 25010000002 DAPTOMYCIN PER 1 MG: Performed by: ORTHOPAEDIC SURGERY

## 2023-07-26 PROCEDURE — 25010000002 CEFTRIAXONE PER 250 MG: Performed by: ORTHOPAEDIC SURGERY

## 2023-07-26 PROCEDURE — 25010000002 PIPERACILLIN SOD-TAZOBACTAM PER 1 G: Performed by: INTERNAL MEDICINE

## 2023-07-26 PROCEDURE — C1751 CATH, INF, PER/CENT/MIDLINE: HCPCS

## 2023-07-26 PROCEDURE — 85025 COMPLETE CBC W/AUTO DIFF WBC: CPT | Performed by: ORTHOPAEDIC SURGERY

## 2023-07-26 RX ORDER — SODIUM CHLORIDE 0.9 % (FLUSH) 0.9 %
10 SYRINGE (ML) INJECTION AS NEEDED
Status: DISCONTINUED | OUTPATIENT
Start: 2023-07-26 | End: 2023-07-28 | Stop reason: HOSPADM

## 2023-07-26 RX ORDER — SODIUM CHLORIDE 0.9 % (FLUSH) 0.9 %
10 SYRINGE (ML) INJECTION EVERY 12 HOURS SCHEDULED
Status: DISCONTINUED | OUTPATIENT
Start: 2023-07-26 | End: 2023-07-28 | Stop reason: HOSPADM

## 2023-07-26 RX ORDER — SODIUM CHLORIDE 0.9 % (FLUSH) 0.9 %
20 SYRINGE (ML) INJECTION AS NEEDED
Status: DISCONTINUED | OUTPATIENT
Start: 2023-07-26 | End: 2023-07-28 | Stop reason: HOSPADM

## 2023-07-26 RX ORDER — ACETAMINOPHEN 500 MG
1000 TABLET ORAL EVERY 8 HOURS
Status: DISCONTINUED | OUTPATIENT
Start: 2023-07-26 | End: 2023-07-28 | Stop reason: HOSPADM

## 2023-07-26 RX ADMIN — APIXABAN 2.5 MG: 2.5 TABLET, FILM COATED ORAL at 19:13

## 2023-07-26 RX ADMIN — ACETAMINOPHEN 1000 MG: 500 TABLET, FILM COATED ORAL at 08:15

## 2023-07-26 RX ADMIN — PIPERACILLIN SODIUM AND TAZOBACTAM SODIUM 3.38 G: 3; .375 INJECTION, SOLUTION INTRAVENOUS at 20:44

## 2023-07-26 RX ADMIN — Medication 10 ML: at 19:13

## 2023-07-26 RX ADMIN — ACETAMINOPHEN 1000 MG: 500 TABLET, FILM COATED ORAL at 02:00

## 2023-07-26 RX ADMIN — Medication 10 ML: at 08:17

## 2023-07-26 RX ADMIN — SENNOSIDES AND DOCUSATE SODIUM 2 TABLET: 50; 8.6 TABLET ORAL at 19:13

## 2023-07-26 RX ADMIN — LOSARTAN POTASSIUM 100 MG: 50 TABLET, FILM COATED ORAL at 08:15

## 2023-07-26 RX ADMIN — ESTRADIOL 0.5 MG: 0.5 TABLET ORAL at 08:17

## 2023-07-26 RX ADMIN — ACETAMINOPHEN 1000 MG: 500 TABLET ORAL at 20:43

## 2023-07-26 RX ADMIN — SENNOSIDES AND DOCUSATE SODIUM 2 TABLET: 50; 8.6 TABLET ORAL at 08:16

## 2023-07-26 RX ADMIN — VENLAFAXINE HYDROCHLORIDE 75 MG: 75 CAPSULE, EXTENDED RELEASE ORAL at 08:14

## 2023-07-26 RX ADMIN — SODIUM CHLORIDE 100 ML/HR: 9 INJECTION, SOLUTION INTRAVENOUS at 19:12

## 2023-07-26 RX ADMIN — Medication 10 ML: at 19:15

## 2023-07-26 RX ADMIN — APIXABAN 2.5 MG: 2.5 TABLET, FILM COATED ORAL at 08:15

## 2023-07-26 RX ADMIN — Medication 10 ML: at 13:53

## 2023-07-26 RX ADMIN — SODIUM CHLORIDE 2000 MG: 900 INJECTION INTRAVENOUS at 12:46

## 2023-07-26 RX ADMIN — DAPTOMYCIN 600 MG: 500 INJECTION, POWDER, LYOPHILIZED, FOR SOLUTION INTRAVENOUS at 08:17

## 2023-07-26 RX ADMIN — ATORVASTATIN CALCIUM 10 MG: 10 TABLET, FILM COATED ORAL at 19:13

## 2023-07-26 RX ADMIN — NEBIVOLOL 5 MG: 5 TABLET ORAL at 08:16

## 2023-07-26 RX ADMIN — CETIRIZINE HYDROCHLORIDE 10 MG: 10 TABLET, FILM COATED ORAL at 08:14

## 2023-07-26 NOTE — PLAN OF CARE
Goal Outcome Evaluation:  Plan of Care Reviewed With: patient        Progress: improving     Pt is alert and oriented x4. Vss on 2L NC. NSR on tele. Continuous EtCO2 monitoring. Pt has slept off and on throughout the shift. No c/o of pain. Ace wrap to right knee is CDI. Infu block in place for pain management. Up with an assist x1-2, gait belt, and walker; ambulated in room and to the bathroom this shift. Waffle mattress and Bilateral foot pumps in place. Purewick in place and voiding spontaneously. Anticipating PICC consult today. Will continue to monitor.

## 2023-07-26 NOTE — THERAPY EVALUATION
"Patient Name: Imelda Elaine  : 1950    MRN: [id number]                              Today's Date: 2023       Admit Date: 2023    Visit Dx:     ICD-10-CM ICD-9-CM   1. Infection of right knee  M00.9 686.9     Patient Active Problem List   Diagnosis    Morbid obesity    Palpitations    Essential hypertension    Mixed hyperlipidemia    Prediabetes    Osteoarthritis of left knee    Leukocytosis, mild, likely reactive    Abnormal EKG    Anxiety and depression    BPV (benign positional vertigo)    Elevated hemoglobin A1c    Gastroesophageal reflux disease without esophagitis    Osteopenia    Peripheral vascular disease    S/P total knee arthroplasty, left    Tremor, essential    NDIAYE (dyspnea on exertion)    Body mass index 40.0-44.9, adult    Abnormal blood level of copper    RBBB    Pre-operative clearance    Encounter for pre-operative cardiovascular clearance    Status post right knee replacement    PSVT (paroxysmal supraventricular tachycardia)    Postoperative wound infection, right knee    S/P I&D right knee with poly exchange     Past Medical History:   Diagnosis Date    Anesthesia     low bp only with foot surgery, has had surgeries since and no issue per pt report    Arthritis     Back pain     Depression     Difficulty walking     Left knee replacement  need right knee replacement    GERD (gastroesophageal reflux disease)     High cholesterol     Hypertension     Knee pain     BILATERAL    Seasonal allergies     Tremor, essential      Past Surgical History:   Procedure Laterality Date    ANTERIOR AND POSTERIOR VAGINAL REPAIR N/A 2017    Vaginal enterocele / anterior posterior colporrhaphy;  Jose Maria Chowdhury MD;  [facility];  Service:     BLADDER SUSPENSION      MESH TVT?     COLONOSCOPY      Dr Chung - normal    EXPLORATORY LAPAROTOMY      \"BOWEL INTO PELVIC BONE\" /[place]     EXPLORATORY LAPAROTOMY  2004    AQUILES/ abd sacrocolpopexy/post repair    FOOT " SURGERY Left     KNEE ARTHROSCOPY Left     meniscus repair    KNEE INCISION AND DRAINAGE Right 7/25/2023    Procedure: KNEE INCISION AND DRAINAGE WITH POLY EXCHANGE - RIGHT;  Surgeon: Sunny Reynoso MD;  Location:  [facility];  Service: Orthopedics;  Laterality: Right;    LAPAROSCOPIC CHOLECYSTECTOMY Right 2000    [place] ARTHRP KNE CONDYLE&PLATU MEDIAL&LAT COMPARTMENTS Left 11/16/2017    Procedure: TOTAL KNEE ARTHROPLASTY LEFT;  Surgeon: Sunny Reynoso MD;  Location:  [facility];  Service: Orthopedics    TONSILLECTOMY      TOTAL ABDOMINAL HYSTERECTOMY WITH SALPINGO OOPHORECTOMY Bilateral 1989    TOTAL KNEE ARTHROPLASTY Right 6/29/2023    Procedure: TOTAL KNEE ARTHROPLASTY WITH CORI ROBOT - RIGHT;  Surgeon: Sunny Reynoso MD;  Location:  [facility];  Service: Robotics - Ortho;  Laterality: Right;    TUBAL ABDOMINAL LIGATION      VENTRAL HERNIA REPAIR N/A 6/21/2016    Procedure: LAPAROSCOPIC REPAIR OF ABDOMINAL WALL HERNIA WITH MESH, CONVERTED TO OPEN ;  Surgeon: Bertha Delacruz MD;  Location:  [facility];  Service:       General Information       Row Name 07/26/23 1202          OT Time and Intention    Document Type evaluation  -JY     Mode of Treatment occupational therapy;individual therapy  -JY       Row Name 07/26/23 1202          General Information    Patient Profile Reviewed yes  -JY     Prior Level of Function min assist:;all household mobility;transfer;bed mobility;gait;independent:;feeding;grooming;bathing;mod assist:;dressing;dependent:;home management;cooking;cleaning  pt w/R TKA (6/29/23), immediately following sx pt able to amb with cane and more I in ADLs w/ AE and IADL, since recent decline w/ new adm dx resulting in R knee I&D pt has req'd increased A for LBD, kiesha care, bathing and using FWW, hired A for IADLs  -JY     Existing Precautions/Restrictions fall;other (see comments)  adductor canal nerve cath, monitor vitals (earlier tachycardia)  -JY     Barriers to Rehab medically  complex;previous functional deficit  -JJAMIE       Row Name 07/26/23 1202          Occupational Profile    Environmental Supports and Barriers (Occupational Profile) tub/shower with tub t/f bench w/ newly installed grab bars, elevated toilet, has elevator to enter condo; DME: has Rwx, LH AE, needs wipe A  -TAN       Row Name 07/26/23 1202          Living Environment    People in Home spouse;other (see comments)  able to assist as needed  -JJAMIE       Row Name 07/26/23 1202          Home Main Entrance    Number of Stairs, Main Entrance none  -JY       Row Name 07/26/23 1202          Stairs Within Home, Primary    Number of Stairs, Within Home, Primary none  -JY       Row Name 07/26/23 1202          Cognition    Orientation Status (Cognition) oriented x 4  -JJAMIE       Row Name 07/26/23 1202          Safety Issues, Functional Mobility    Safety Issues Affecting Function (Mobility) awareness of need for assistance;insight into deficits/self-awareness;positioning of assistive device;problem-solving;safety precaution awareness;safety precautions follow-through/compliance;sequencing abilities  -JJAMIE     Impairments Affecting Function (Mobility) endurance/activity tolerance;shortness of breath;strength;balance  -JJAMIE     Comment, Safety Issues/Impairments (Mobility) pt alert and able to follow commands, increased exertion with more dynamic demands  -JJAMIE               User Key  (r) = Recorded By, (t) = Taken By, (c) = Cosigned By      Initials Name Provider Type    Apryl Fisher OT Occupational Therapist                     Mobility/ADL's       Row Name 07/26/23 1213          Bed Mobility    Bed Mobility supine-sit;sit-supine;scooting/bridging  -JJAMIE     Scooting/Bridging [place] (Bed Mobility) minimum assist (75% patient effort);verbal cues  -JJAMIE     Supine-Sit [place] (Bed Mobility) minimum assist (75% patient effort);verbal cues  -JJAMIE     Sit-Supine [place] (Bed Mobility) moderate assist (50% patient effort);verbal cues   -JY     Bed Mobility, Safety Issues decreased use of legs for bridging/pushing  -JY     Assistive Device (Bed Mobility) bed rails;head of bed elevated  -JY     Comment, (Bed Mobility) skilled cues for optimal hand placement and seq to advance LEs to EOB and upright trunk into sitting, close monitoring and A to advance RLE d/t weakness and posterior skin integrity concerns, OT lifted off bed surface and pt assisted in advancing with increased time and effort throughout, pt used strength at UEs to push self into sitting and reach for bedrail to come to side; denied any dizziness or feeling LH at EOB, increased exertion w/ task; pt req'd mod A for elevation of BLEs to bed height while managing UB and once at bed height able to assist in scooting laterally and upward; close monitoring of lines, tubes, nerve cath throughout; pt plans to sleep in recliner at home  -[email or web address]       Row Name 07/26/23 1213          Transfers    Transfers sit-stand transfer;stand-sit transfer;toilet transfer  -JJAMIE     Comment, (Transfers) skilled cues for optimal hand placement for controlled ascend, descend specifically pushing up from seated surface and reaching back prior to sitting while stepping RLE forward for comfort; increased cues for mgmt of nerve cath lines during transition; pt demo'd gross, large momentum boost to stand and increased time to achieve upright standing w/ UEs at FWW  -TAN       Row Name 07/26/23 1213          Sit-Stand Transfer    Sit-Stand [place] (Transfers) minimum assist (75% patient effort);verbal cues  -JY     Assistive Device (Sit-Stand Transfers) walker, front-wheeled  -[email or web address]       Row Name 07/26/23 1213          Stand-Sit Transfer    Stand-Sit [place] (Transfers) contact guard;verbal cues  -JY     Assistive Device (Stand-Sit Transfers) walker, front-wheeled  -TAN     Comment, (Stand-Sit Transfer) increased time to achieve optimal body position and slow lower to chair while reaching back and stepping RLE  forward (increased pain during last descend when pt neglected to step forward despite cues)  -JY       Row Name 07/26/23 1213          Toilet Transfer    Type (Toilet Transfer) sit-stand;stand-sit  -JY     [place] Level (Toilet Transfer) minimum assist (75% patient effort);verbal cues  -JY     Assistive Device (Toilet Transfer) commode;grab bars/safety frame;raised toilet seat;walker, front-wheeled  -JY     Comment, (Toilet Transfer) increased time and effort to descend toward toilet and position self atop toilet  -JY       Row Name 07/26/23 1213          Functional Mobility    Functional Mobility- Ind. Level contact guard assist;verbal cues required  -J     Functional Mobility- Device walker, front-wheeled  -JY     Functional Mobility-Distance (Feet) --  in room ADL related t/fs  -JY     Functional Mobility- Comment defer to PT for specifics however during in room ADL related mobility pt req'd gross CGA w/ improved fluidity in stepping with increased time; utilized FWW throughout, cues for sustaining close proximity to AD throughout and optimal positioning of AD during turning, avoiding pivot at R knee; incresaed exertion with completion of fxl mobility  -       Row Name 07/26/23 1213          Activities of Daily Living    BADL Assessment/Intervention lower body dressing;grooming;toileting  -[facility] Name 07/26/23 1213          Mobility    Extremity Weight-bearing Status right lower extremity  -JY     Right Lower Extremity (Weight-bearing Status) weight-bearing as tolerated (WBAT)  -[facility] Name 07/26/23 1213          Lower Body Dressing Assessment/Training    [place] Level (Lower Body Dressing) doff;don;socks;dependent (less than 25% patient effort)  -JY     Assistive Devices (Lower Body Dressing) other (see comments)  pt endorses having all needed AE for LBD and LBD and self perceives competency in use  -JY     Position (Lower Body Dressing) supine;sitting up in bed  -JY     Comment, (Lower  Body Dressing) reviewed w/ pt optimal position, tech, seq for LBD including threading and unthreading given more impacted RLE with AE as needed (pt has at home and not at hospital) and emphasized close monitoring of nerve cath to reduce risk of dislodging; pt u/a to reach distally enough at either LE to attempt d/d w/o AE  -JY       Row Name 07/26/23 1213          Grooming Assessment/Training    [place] Level (Grooming) wash face, hands;standby assist;verbal cues  -JY     Position (Grooming) supported standing;sink side  -JY     Comment, (Grooming) pt able to wash hands at sink side w/o physical A. SBA for provision of safety cues and optimal placement of AD at sink for max support; pt with forward flexion and rest upon sink top indicating fatigue with more dynamic demands  -JY       Row Name 07/26/23 1213          Toileting Assessment/Training    [place] Level (Toileting) toileting skills;adjust/manage clothing;minimum assist (75% patient effort);change pad/brief;dependent (less than 25% patient effort);perform perineal hygiene;moderate assist (50% patient effort);verbal cues  -JY     Assistive Devices (Toileting) commode;grab bar/safety frame;raised toilet seat  -JY     Position (Toileting) unsupported sitting;supported standing  -JY     Comment, (Toileting) extensive education regarding more I toileting hygiene, safe position for hygiene to be completed with options for AE, adaptive or compensatory approach; pt demonstrates fxl ER to reach posterior body however is limited by body habitus and has difficulty in fully reaching for thorough care; educated on wipe A and issued for further training, wipe vs toilet paper for ease, possible bidet and squeeze bottle for warm rinse; pt places self in less than optimal position currently to reach posterior area and is at increased risk for falls or RLE injury  -JY               User Key  (r) = Recorded By, (t) = Taken By, (c) = Cosigned By      Initials Name  Provider Type    Apryl Fisher OT Occupational Therapist                   Obj/Interventions       Row Name 07/26/23 1239          Sensory Assessment (Somatosensory)    Sensory Assessment (Somatosensory) bilateral UE;sensation intact  -JY     Bilateral UE Sensory Assessment general sensation;light touch awareness;light touch localization;intact  -JY     Sensory Assessment denies any numbness or tingling and able to recognize LT stimuli as intact and symmetrical at BUEs  -JY       Row Name 07/26/23 1239          Vision Assessment/Intervention    Visual Impairment/Limitations corrective lenses full-time  -J     Vision Assessment Comment denies any acute changes to vision  -       Row Name 07/26/23 1239          Range of Motion Comprehensive    General Range of Motion bilateral upper extremity ROM WFL  -[facility] Name 07/26/23 1239          Strength Comprehensive (MMT)    General Manual Muscle Testing (MMT) Assessment upper extremity strength deficits identified  -     Comment, General Manual Muscle Testing (MMT) Assessment E MMS grossly 4 to 4+/5 per MMT  -[facility] Name 07/26/23 1239          Motor Skills    Motor Skills functional endurance;coordination  -J     Coordination bilateral;upper extremity;finger to nose;other (see comments);WFL  finger thumb opposition  -J     Functional Endurance decreased activity tolerance toward more dynamic tasks, exerted with standing to complete perineal area hygiene s/p toileting and repetitive wiping completed, pt with difficulty managing clothing garments d/t exertion from reaching bathroom, toileting needs  -[facility] Name 07/26/23 1239          Balance    Balance Assessment sitting static balance;sitting dynamic balance;standing static balance;standing dynamic balance  -JY     Static Sitting Balance supervision  -JY     Dynamic Sitting Balance standby assist  -JY     Position, Sitting Balance unsupported;sitting edge of bed  -JY     Static Standing  Balance contact guard;verbal cues  -JY     Dynamic Standing Balance contact guard;minimal assist;verbal cues  -JY     Position/Device Used, Standing Balance supported;walker, front-wheeled  -JY     Balance Interventions sitting;standing;static;dynamic;sit to stand;supported;occupation based/functional task  -JY     Comment, Balance no overt LOB during seated or standing tasks, utilized FWW throughout  -JY               User Key  (r) = Recorded By, (t) = Taken By, (c) = Cosigned By      Initials Name Provider Type    Apryl Fisher, YADIEL Occupational Therapist                   Goals/Plan       Row Name 07/26/23 1347          Bed Mobility Goal 1 (OT)    Activity/Assistive Device (Bed Mobility Goal 1, OT) rolling to left;rolling to right;sit to supine;supine to sit  -JY     [place] Level/Cues Needed (Bed Mobility Goal 1, OT) contact guard required;verbal cues required  -JY     Time Frame (Bed Mobility Goal 1, OT) long term goal (LTG);by discharge  -JY     Progress/Outcomes (Bed Mobility Goal 1, OT) new goal  -JY       Row Name 07/26/23 1347          Transfer Goal 1 (OT)    Activity/Assistive Device (Transfer Goal 1, OT) sit-to-stand/stand-to-sit;bed-to-chair/chair-to-bed;toilet;commode, bedside without drop arms;commode;walker, rolling  -JY     [place] Level/Cues Needed (Transfer Goal 1, OT) contact guard required;verbal cues required  -JY     Time Frame (Transfer Goal 1, OT) long term goal (LTG);by discharge  -JY     Progress/Outcome (Transfer Goal 1, OT) new goal  -JY       Row Name 07/26/23 1347          Dressing Goal 1 (OT)    Activity/Device (Dressing Goal 1, OT) lower body dressing;other (see comments)  d/d LB garments with AE PRN  -JY     [place]/Cues Needed (Dressing Goal 1, OT) minimum assist (75% or more patient effort);verbal cues required  -JY     Time Frame (Dressing Goal 1, OT) long term goal (LTG);by discharge  -JY     Progress/Outcome (Dressing Goal 1, OT) new goal  -JY       Row Name  07/26/23 1347          Toileting Goal 1 (OT)    Activity/Device (Toileting Goal 1, OT) adjust/manage clothing;perform perineal hygiene;commode;commode, bedside without drop arms;grab bar/safety frame;raised toilet seat;toilet paper aid;other (see comments)  w/ AE, adaptive and/or compensatory strategies as needed  -JY     [place] Level/Cues Needed (Toileting Goal 1, OT) contact guard required;verbal cues required  -JY     Time Frame (Toileting Goal 1, OT) long term goal (LTG);by discharge  -JY     Progress/Outcome (Toileting Goal 1, OT) new goal  -[facility] Name 07/26/23 1347          Strength Goal 1 (OT)    Strength Goal 1 (OT) Pt to complete seated HEP encompassing BUEs targeting strength, endurance w/ progressive sets/reps/resistance in order to improve integration in ADLs, related t/fs, mobility  -JY     Time Frame (Strength Goal 1, OT) long term goal (LTG);by discharge  -JY     Progress/Outcome (Strength Goal 1, OT) goal ongoing  -JY       Row Name 07/26/23 2381          Therapy Assessment/Plan (OT)    Planned Therapy Interventions (OT) activity tolerance training;adaptive equipment training;BADL retraining;functional balance retraining;occupation/activity based interventions;patient/caregiver education/training;ROM/therapeutic exercise;strengthening exercise;transfer/mobility retraining  -               User Key  (r) = Recorded By, (t) = Taken By, (c) = Cosigned By      Initials Name Provider Type    Apryl Fisher, YADIEL Occupational Therapist                   Clinical Impression       Row Name 07/26/23 1309          Pain Assessment    Pretreatment Pain Rating 0/10 - no pain  -JY     Posttreatment Pain Rating 0/10 - no pain  -JY     Pre/Posttreatment Pain Comment denies any pain and tolerated all OT interventions  -     Pain Intervention(s) Repositioned;Ambulation/increased activity;Elevated;Cold applied  -JY       Row Name 07/26/23 6234          Plan of Care Review    Plan of Care Reviewed With  patient  -JY     Progress no change  OT IE  -JY     Outcome Evaluation OT evaluation completed. Pt presents with decreased I in ADLs, related t/fs, mobility compared to PLOF. Pt limited by decreased activity tolerance with increased exertion and SOA with more dynamic tasks (SPO2 > 90% throughout), impaired balance, muscle weakness at BUEs, decreased distal reach impacting LEs and fatigue. Pt has LH AE at  home and has received prior training related to LB ADLs however would benefit from review given new underlying impairments and impact on function. Currently, pt presents with new needs related to toileting hygiene with decreased (safe) reach and is observed in unsafe positions attempt to reach increasing pt fall/injury risk. Pt is below occupational performance baseline and would benefit from IPOT POC and IRF at d/c when medically ready.  -TAN       Row Name 07/26/23 1336          Therapy Assessment/Plan (OT)    Patient/Family Therapy Goal Statement (OT) to maximize I in ADLs, related t/fs, mobility, return to PLOF  -JY     Rehab Potential (OT) good, to achieve stated therapy goals  -J     Criteria for Skilled Therapeutic Interventions Met (OT) yes;skilled treatment is necessary  -JY     Therapy Frequency (OT) daily  -TAN       Row Name 07/26/23 8499          Therapy Plan Review/Discharge Plan (OT)    Anticipated Discharge Disposition (OT) inpatient rehabilitation facility  -TAN       Row Name 07/26/23 1338          Vital Signs    Pre Systolic BP Rehab 155  -JY     Pre Treatment Diastolic BP 80  -JY     Post Systolic BP Rehab 136  -JY     Post Treatment Diastolic BP 79  -JY     Pretreatment Heart Rate (beats/min) 89  -JY     Intratreatment Heart Rate (beats/min) 103  -JY     Posttreatment Heart Rate (beats/min) 100  -JY     Pre SpO2 (%) 94  -JY     O2 Delivery Pre Treatment room air  -JY     O2 Delivery Intra Treatment room air  -JY     Post SpO2 (%) 94  -JY     O2 Delivery Post Treatment room air  -JY     Pre  Patient Position Supine  -JY     Intra Patient Position Standing  -JY     Post Patient Position Supine  -JY       Row Name 07/26/23 1339          Positioning and Restraints    Pre-Treatment Position in bed  -JY     Post Treatment Position bed  -JY     In Bed notified nsg;fowlers;call light within reach;encouraged to call for assist;exit alarm on;side rails up x2;legs elevated;heels elevated;SCD pump applied  ice pack to R knee, infublock in place  -JY               User Key  (r) = Recorded By, (t) = Taken By, (c) = Cosigned By      Initials Name Provider Type    Apryl Fisher, YADIEL Occupational Therapist                   Outcome Measures       Row Name 07/26/23 1351          How much help from another is currently needed...    Putting on and taking off regular lower body clothing? 1  -JY     Bathing (including washing, rinsing, and drying) 2  -JY     Toileting (which includes using toilet bed pan or urinal) 3  -JY     Putting on and taking off regular upper body clothing 3  -JY     Taking care of personal grooming (such as brushing teeth) 4  -JY     Eating meals 4  -JY     AM-PAC 6 Clicks Score (OT) 17  -JY       Row Name 07/26/23 0815          How much help from another person do you currently need...    Turning from your back to your side while in flat bed without using bedrails? 2  -LC     Moving from lying on back to sitting on the side of a flat bed without bedrails? 2  -LC     Moving to and from a bed to a chair (including a wheelchair)? 2  -LC     Standing up from a chair using your arms (e.g., wheelchair, bedside chair)? 2  -LC     Climbing 3-5 steps with a railing? 2  -LC     To walk in hospital room? 2  -LC     AM-PAC 6 Clicks Score (PT) 12  -LC     Highest level of mobility 4 --> Transferred to chair/commode  -LC       Row Name 07/26/23 1351          Functional Assessment    Outcome Measure Options AM-PAC 6 Clicks Daily Activity (OT)  -JY               User Key  (r) = Recorded By, (t) = Taken By, (c) =  Cosigned By      Initials Name Provider Type     Soraya Ayala, RN Registered Nurse    Apryl Fisher OT Occupational Therapist                    Occupational Therapy Education       Title: PT OT SLP Therapies (In Progress)       Topic: Occupational Therapy (In Progress)       Point: ADL training (In Progress)       Description:   Instruct learner(s) on proper safety adaptation and remediation techniques during self care or transfers.   Instruct in proper use of assistive devices.                  Learning Progress Summary             Patient Acceptance, E,D, NR by TAN at 7/26/2023 1044                         Point: Home exercise program (Not Started)       Description:   Instruct learner(s) on appropriate technique for monitoring, assisting and/or progressing therapeutic exercises/activities.                  Learner Progress:  Not documented in this visit.              Point: Precautions (In Progress)       Description:   Instruct learner(s) on prescribed precautions during self-care and functional transfers.                  Learning Progress Summary             Patient Acceptance, E,D, NR by TAN at 7/26/2023 1044                         Point: Body mechanics (In Progress)       Description:   Instruct learner(s) on proper positioning and spine alignment during self-care, functional mobility activities and/or exercises.                  Learning Progress Summary             Patient Acceptance, E,D, NR by TAN at 7/26/2023 1044                                         User Key       Initials Effective Dates Name Provider Type Discipline    TAN 06/16/21 -  Apryl Gamboa OT Occupational Therapist OT                  OT Recommendation and Plan  Planned Therapy Interventions (OT): activity tolerance training, adaptive equipment training, BADL retraining, functional balance retraining, occupation/activity based interventions, patient/caregiver education/training, ROM/therapeutic exercise, strengthening exercise,  transfer/mobility retraining  Therapy Frequency (OT): daily  Plan of Care Review  Plan of Care Reviewed With: patient  Progress: no change (OT IE)  Outcome Evaluation: OT evaluation completed. Pt presents with decreased I in ADLs, related t/fs, mobility compared to PLOF. Pt limited by decreased activity tolerance with increased exertion and SOA with more dynamic tasks (SPO2 > 90% throughout), impaired balance, muscle weakness at BUEs, decreased distal reach impacting LEs and fatigue. Pt has LH AE at  home and has received prior training related to LB ADLs however would benefit from review given new underlying impairments and impact on function. Currently, pt presents with new needs related to toileting hygiene with decreased (safe) reach and is observed in unsafe positions attempt to reach increasing pt fall/injury risk. Pt is below occupational performance baseline and would benefit from IPOT POC and IRF at d/c when medically ready.     Time Calculation:   Evaluation Complexity (OT)  Review Occupational Profile/Medical/Therapy History Complexity: brief/low complexity  Assessment, Occupational Performance/Identification of Deficit Complexity: 1-3 performance deficits  Clinical Decision Making Complexity (OT): problem focused assessment/low complexity  Overall Complexity of Evaluation (OT): low complexity     Time Calculation- OT       Row Name 07/26/23 1353             Time Calculation- OT    OT Start Time 1044  -JY      OT Received On 07/26/23  -JY      OT Goal Re-Cert Due Date 08/05/23  -JY         Timed Charges    85512 - OT Therapeutic Activity Minutes 29  -JY      26792 - OT Self Care/Mgmt Minutes 26  -JY         Untimed Charges    OT Eval/Re-eval Minutes 49  -JY         Total Minutes    Timed Charges Total Minutes 55  -JY      Untimed Charges Total Minutes 49  -JY       Total Minutes 104  -JY                User Key  (r) = Recorded By, (t) = Taken By, (c) = Cosigned By      Initials Name Provider Type    JY  Apryl Gamboa OT Occupational Therapist                  Therapy Charges for Today       Code Description Service Date Service Provider Modifiers Qty    79989460277  OT THERAPEUTIC ACT EA 15 MIN 7/26/2023 Apryl Gamboa OT GO 2    21224077001  OT SELF CARE/MGMT/TRAIN EA 15 MIN 7/26/2023 Apryl Gamboa OT GO 2    [id number]  OT EVAL LOW COMPLEXITY 4 7/26/2023 Apryl Gamboa OT GO 1                 Apryl Gamboa OT  7/26/2023

## 2023-07-26 NOTE — THERAPY EVALUATION
"Patient Name: Imelda Elaine  : 1950    MRN: [id number]                              Today's Date: 2023       Admit Date: 2023    Visit Dx:     ICD-10-CM ICD-9-CM   1. Infection of right knee  M00.9 686.9     Patient Active Problem List   Diagnosis    Morbid obesity    Palpitations    Essential hypertension    Mixed hyperlipidemia    Prediabetes    Osteoarthritis of left knee    Leukocytosis, mild, likely reactive    Abnormal EKG    Anxiety and depression    BPV (benign positional vertigo)    Elevated hemoglobin A1c    Gastroesophageal reflux disease without esophagitis    Osteopenia    Peripheral vascular disease    S/P total knee arthroplasty, left    Tremor, essential    NDIAYE (dyspnea on exertion)    Body mass index 40.0-44.9, adult    Abnormal blood level of copper    RBBB    Pre-operative clearance    Encounter for pre-operative cardiovascular clearance    Status post right knee replacement    PSVT (paroxysmal supraventricular tachycardia)    Postoperative wound infection, right knee    S/P I&D right knee with poly exchange     Past Medical History:   Diagnosis Date    Anesthesia     low bp only with foot surgery, has had surgeries since and no issue per pt report    Arthritis     Back pain     Depression     Difficulty walking     Left knee replacement  need right knee replacement    GERD (gastroesophageal reflux disease)     High cholesterol     Hypertension     Knee pain     BILATERAL    Seasonal allergies     Tremor, essential      Past Surgical History:   Procedure Laterality Date    ANTERIOR AND POSTERIOR VAGINAL REPAIR N/A 2017    Vaginal enterocele / anterior posterior colporrhaphy;  Jose Maria Chowdhury MD;  Location: [facility];  Service:     BLADDER SUSPENSION      MESH TVT?     COLONOSCOPY      Dr Chung - normal    EXPLORATORY LAPAROTOMY      \"BOWEL INTO PELVIC BONE\" /Mesh [place]     EXPLORATORY LAPAROTOMY  2004    AQUILES/ abd sacrocolpopexy/post repair    FOOT " SURGERY Left     KNEE ARTHROSCOPY Left     meniscus repair    KNEE INCISION AND DRAINAGE Right 7/25/2023    Procedure: KNEE INCISION AND DRAINAGE WITH POLY EXCHANGE - RIGHT;  Surgeon: Sunny Reynoso MD;  Location:  [facility];  Service: Orthopedics;  Laterality: Right;    LAPAROSCOPIC CHOLECYSTECTOMY Right 2000    OR ARTHRP KNE CONDYLE&PLATU MEDIAL&LAT COMPARTMENTS Left 11/16/2017    Procedure: TOTAL KNEE ARTHROPLASTY LEFT;  Surgeon: Sunny Reynoso MD;  Location:  [facility];  Service: Orthopedics    TONSILLECTOMY      TOTAL ABDOMINAL HYSTERECTOMY WITH SALPINGO OOPHORECTOMY Bilateral 1989    TOTAL KNEE ARTHROPLASTY Right 6/29/2023    Procedure: TOTAL KNEE ARTHROPLASTY WITH CORI ROBOT - RIGHT;  Surgeon: Sunny Reynoso MD;  Location:  [facility];  Service: Robotics - Ortho;  Laterality: Right;    TUBAL ABDOMINAL LIGATION      VENTRAL HERNIA REPAIR N/A 6/21/2016    Procedure: LAPAROSCOPIC REPAIR OF ABDOMINAL WALL HERNIA WITH MESH, CONVERTED TO OPEN ;  Surgeon: Bertha Delacruz MD;  Location:  [facility];  Service:       General Information       Row Name 07/26/23 1450          Physical Therapy Time and Intention    Document Type evaluation  -     Mode of Treatment physical therapy  -       Row Name 07/26/23 1450          General Information    Patient Profile Reviewed yes  -HP     Prior Level of Function min assist:;transfer;community mobility;gait;all household mobility;bed mobility;ADL's  -     Existing Precautions/Restrictions fall;other (see comments)  adductor canal nerve cath, monitor vitals (earlier tachycardia)  tethered heart monitor this date  -     Barriers to Rehab medically complex;previous functional deficit  -       Row Name 07/26/23 1450          Living Environment    People in Home spouse  -       Row Name 07/26/23 1450          Home Main Entrance    Number of Stairs, Main Entrance none  -HP       Row Name 07/26/23 1450          Stairs Within Home, Primary    Number  of Stairs, Within Home, Primary none  -       Row Name 07/26/23 1450          Cognition    Orientation Status (Cognition) oriented x 4  -       Row Name 07/26/23 1450          Safety Issues, Functional Mobility    Safety Issues Affecting Function (Mobility) insight into deficits/self-awareness;awareness of need for assistance;safety precaution awareness;sequencing abilities;problem-solving  -     Impairments Affecting Function (Mobility) endurance/activity tolerance;shortness of breath;strength;balance;pain;range of motion (ROM)  -               User Key  (r) = Recorded By, (t) = Taken By, (c) = Cosigned By      Initials Name Provider Type     Radha Beatty, PT Physical Therapist                   Mobility       Row Name 07/26/23 1451          Bed Mobility    Bed Mobility supine-sit;sit-supine;scooting/bridging  -     Supine-Sit [place] (Bed Mobility) minimum assist (75% patient effort);verbal cues  -     Sit-Supine [place] (Bed Mobility) maximum assist (25% patient effort);verbal cues;nonverbal cues (demo/gesture)  -     Assistive Device (Bed Mobility) bed rails;head of bed elevated  -     Comment, (Bed Mobility) VC for sequencng. Heavy reliance on bed rails and assistance to complete transfer. Pt reports she sleeps in recliner at home.  -       Row Name 07/26/23 1451          Transfers    Comment, (Transfers) Pt performed STS with Min A x2 progressing to CGA intermittently with FWW>  -       Row Name 07/26/23 1451          Sit-Stand Transfer    Sit-Stand [place] (Transfers) minimum assist (75% patient effort);verbal cues;2 person assist  -     Assistive Device (Sit-Stand Transfers) walker, front-wheeled  -       Row Name 07/26/23 1451          Gait/Stairs (Locomotion)    [place] Level (Gait) contact guard  -     Assistive Device (Gait) walker, front-wheeled  -     Distance in Feet (Gait) 140  -     Deviations/Abnormal Patterns (Gait) bilateral deviations;base of  support, wide;weight shifting decreased;stride length decreased;gait speed decreased  -     Bilateral Gait Deviations forward flexed posture;heel strike decreased  -     Comment, (Gait/Stairs) Pt amb with step-through gait pattern with VC for sequencing. Forward flexed posture, heavy reliance on BUE for support, wide KEVIN, decreased R knee ROM, and slow gait speed. Multiple standing rest breaks due to SOA and tachycardia (135 bpm). Activity limited by deconditioning and fatigue.  -       Row Name 07/26/23 1451          Mobility    Extremity Weight-bearing Status right lower extremity  -     Right Lower Extremity (Weight-bearing Status) weight-bearing as tolerated (WBAT)  -               User Key  (r) = Recorded By, (t) = Taken By, (c) = Cosigned By      Initials Name Provider Type     Radha Beatty, PT Physical Therapist                   Obj/Interventions       [facility] Name 07/26/23 1453          Range of Motion Comprehensive    General Range of Motion lower extremity range of motion deficits identified  -     Comment, General Range of Motion R knee ROM 15-70  -[facility] Name 07/26/23 1454          Strength Comprehensive (MMT)    General Manual Muscle Testing (MMT) Assessment lower extremity strength deficits identified  -     Comment, General Manual Muscle Testing (MMT) Assessment Pt able to perform B active ankle DF and SLR  -[facility] Name 07/26/23 1459          Motor Skills    Therapeutic Exercise knee;ankle  -[facility] Name 07/26/23 145          Knee (Therapeutic Exercise)    Knee (Therapeutic Exercise) isometric exercises;strengthening exercise  -     Knee Isometrics (Therapeutic Exercise) right;quad sets;10 repetitions  -     Knee Strengthening (Therapeutic Exercise) right;SLR (straight leg raise);SAQ (short arc quad);LAQ (long arc quad);heel slides;10 repetitions  -[facility] Name 07/26/23 1458          Ankle (Therapeutic Exercise)    Ankle (Therapeutic Exercise) AROM (active  range of motion)  -     Ankle AROM (Therapeutic Exercise) bilateral;dorsiflexion;plantarflexion;10 repetitions  -       Row Name 07/26/23 1452          Balance    Balance Assessment sitting static balance;sitting dynamic balance;sit to stand dynamic balance;standing static balance;standing dynamic balance  -     Static Sitting Balance standby assist  -     Dynamic Sitting Balance standby assist  -     Position, Sitting Balance sitting edge of bed  -     Static Standing Balance contact guard  -     Dynamic Standing Balance contact guard  -     Position/Device Used, Standing Balance supported;walker, rolling  -     Balance Interventions sitting;standing;sit to stand;occupation based/functional task  -       Row Name 07/26/23 1452          Sensory Assessment (Somatosensory)    Sensory Assessment (Somatosensory) LE sensation intact  -               User Key  (r) = Recorded By, (t) = Taken By, (c) = Cosigned By      Initials Name Provider Type     Radha Beatty, PT Physical Therapist                   Goals/Plan       Row Name 07/26/23 5241          Bed Mobility Goal 1 (PT)    Activity/Assistive Device (Bed Mobility Goal 1, PT) sit to supine/supine to sit  -     [place] Level/Cues Needed (Bed Mobility Goal 1, PT) contact guard required  -     Time Frame (Bed Mobility Goal 1, PT) long term goal (LTG);10 days  -     Progress/Outcomes (Bed Mobility Goal 1, PT) goal ongoing  -       Row Name 07/26/23 6165          Transfer Goal 1 (PT)    Activity/Assistive Device (Transfer Goal 1, PT) sit-to-stand/stand-to-sit  -     [place] Level/Cues Needed (Transfer Goal 1, PT) modified independence  -     Time Frame (Transfer Goal 1, PT) long term goal (LTG);10 days  -     Progress/Outcome (Transfer Goal 1, PT) goal ongoing  -       Row Name 07/26/23 8571          Gait Training Goal 1 (PT)    Activity/Assistive Device (Gait Training Goal 1, PT) gait (walking locomotion)  -      [place] Level (Gait Training Goal 1, PT) standby assist  -HP     Distance (Gait Training Goal 1, PT) 150  -HP     Time Frame (Gait Training Goal 1, PT) long term goal (LTG);10 days  -HP     Progress/Outcome (Gait Training Goal 1, PT) goal ongoing  -       Row Name 07/26/23 2937          ROM Goal 1 (PT)    ROM Goal 1 (PT) R knee ROM 0-90  -HP     Time Frame (ROM Goal 1, PT) long-term goal (LTG);3 days  -HP     Progress/Outcome (ROM Goal 1, PT) goal ongoing  -       Row Name 07/26/23 8880          Therapy Assessment/Plan (PT)    Planned Therapy Interventions (PT) balance training;bed mobility training;home exercise program;gait training;patient/family education;transfer training;stretching;strengthening;stair training;ROM (range of motion)  -               User Key  (r) = Recorded By, (t) = Taken By, (c) = Cosigned By      Initials Name Provider Type     Radha Beatty, PT Physical Therapist                   Clinical Impression       Row Name 07/26/23 8376          Pain    Pretreatment Pain Rating 0/10 - no pain  -     Posttreatment Pain Rating 0/10 - no pain  -[facility] Name 07/26/23 1458          Plan of Care Review    Plan of Care Reviewed With patient;spouse  -     Progress no change  -     Outcome Evaluation Pt amb in [place] with FWW and CGA. Increased time and effort required to complete task. Tachycardia and generalized fatigue limited functional mobility. HEP and precautions reviewed with pt. Pt would benefit from IRF prior to returning home to promote increased independence with functional mobility.  -       Row Name 07/26/23 2020          Therapy Assessment/Plan (PT)    Rehab Potential (PT) good, to achieve stated therapy goals  -     Criteria for Skilled Interventions Met (PT) yes;meets criteria;skilled treatment is necessary  -     Therapy Frequency (PT) 2 times/day  -       Row Name 07/26/23 2514          Vital Signs    Pre Systolic BP Rehab --  BP and O2 stable  -      Pretreatment Heart Rate (beats/min) 91  -HP     Intratreatment Heart Rate (beats/min) 135  -HP     Posttreatment Heart Rate (beats/min) 101  -HP     Pre Patient Position Supine  -HP     Intra Patient Position Standing  -HP     Post Patient Position Supine  -HP       Row Name 07/26/23 1455          Positioning and Restraints    Pre-Treatment Position in bed  -HP     Post Treatment Position bed  -HP     In Bed supine;fowlers;call light within reach;encouraged to call for assist;exit alarm on;side rails up x3  -HP               User Key  (r) = Recorded By, (t) = Taken By, (c) = Cosigned By      Initials Name Provider Type    HP Radha Beatty, PT Physical Therapist                   Outcome Measures       Row Name 07/26/23 1458 07/26/23 0815       How much help from another person do you currently need...    Turning from your back to your side while in flat bed without using bedrails? 2  -HP 2  -LC    Moving from lying on back to sitting on the side of a flat bed without bedrails? 2  -HP 2  -LC    Moving to and from a bed to a chair (including a wheelchair)? 3  -HP 2  -LC    Standing up from a chair using your arms (e.g., wheelchair, bedside chair)? 3  -HP 2  -LC    Climbing 3-5 steps with a railing? 2  -HP 2  -LC    To walk in hospital room? 3  -HP 2  -LC    AM-PAC 6 Clicks Score (PT) 15  -HP 12  -LC    Highest level of mobility 4 --> Transferred to chair/commode  -HP 4 --> Transferred to chair/commode  -LC      Row Name 07/26/23 1458          PADD    Diagnosis 1  -HP     Gender 1  -HP     Age Group 1  -HP     Gait Distance 0  -HP     Assist Level 1  -HP     Home Support 3  -HP     PADD Score 7  -HP     Patient Preference extended rehabilitation  -HP     Prediction by PADD Score extended rehabilitation  -HP       Row Name 07/26/23 1458 07/26/23 1351       Functional Assessment    Outcome Measure Options AM-PAC 6 Clicks Basic Mobility (PT);PADD  -HP AM-PAC 6 Clicks Daily Activity (OT)  -JY              User Key  (r) =  Recorded By, (t) = Taken By, (c) = Cosigned By      Initials Name Provider Type    LC Soraya Ayala, RN Registered Nurse    Apryl Fisher, OT Occupational Therapist    Radha Mead, PT Physical Therapist                                 Physical Therapy Education       Title: PT OT SLP Therapies (In Progress)       Topic: Physical Therapy (Done)       Point: Mobility training (Done)       Learning Progress Summary             Patient Acceptance, E,D, VU,NR by  at 7/26/2023 1459                         Point: Home exercise program (Done)       Learning Progress Summary             Patient Acceptance, E,D, VU,NR by  at 7/26/2023 1459                         Point: Body mechanics (Done)       Learning Progress Summary             Patient Acceptance, E,D, VU,NR by  at 7/26/2023 1459                         Point: Precautions (Done)       Learning Progress Summary             Patient Acceptance, E,D, VU,NR by  at 7/26/2023 1459                                         User Key       Initials Effective Dates Name Provider Type Discipline     06/01/21 -  Radha Beatty, YANICK Physical Therapist PT                  PT Recommendation and Plan  Planned Therapy Interventions (PT): balance training, bed mobility training, home exercise program, gait training, patient/family education, transfer training, stretching, strengthening, stair training, ROM (range of motion)  Plan of Care Reviewed With: patient, spouse  Progress: no change  Outcome Evaluation: Pt amb in [place] with FWW and CGA. Increased time and effort required to complete task. Tachycardia and generalized fatigue limited functional mobility. HEP and precautions reviewed with pt. Pt would benefit from IRF prior to returning home to promote increased independence with functional mobility.     Time Calculation:   PT Evaluation Complexity  History, PT Evaluation Complexity: 1-2 personal factors and/or comorbidities  Examination of Body Systems (PT Eval  Complexity): total of 3 or more elements  Clinical Presentation (PT Evaluation Complexity): evolving  Clinical Decision Making (PT Evaluation Complexity): moderate complexity  Overall Complexity (PT Evaluation Complexity): moderate complexity     PT Charges       Row Name 07/26/23 0821             Time Calculation    Start Time 0821  -HP      PT Received On 07/26/23  -HP      PT Goal Re-Cert Due Date 08/05/23  -HP         Timed Charges    74680 - PT Therapeutic Exercise Minutes 10  -HP      95988 - Gait Training Minutes  10  -HP      50697 - PT Therapeutic Activity Minutes 5  -HP         Untimed Charges    PT Eval/Re-eval Minutes 47  -HP         Total Minutes    Timed Charges Total Minutes 25  -HP      Untimed Charges Total Minutes 47  -HP       Total Minutes 72  -HP                User Key  (r) = Recorded By, (t) = Taken By, (c) = Cosigned By      Initials Name Provider Type     Radha Beatty, PT Physical Therapist                  Therapy Charges for Today       Code Description Service Date Service Provider Modifiers Qty    68010378981 HC PT THER PROC EA 15 MIN 7/26/2023 Radha Beatty, PT GP 1    89950637095 HC GAIT TRAINING EA 15 MIN 7/26/2023 Radha Beatty, PT GP 1    80615373167 HC PT EVAL MOD COMPLEXITY 4 7/26/2023 Radha Beatty, PT GP 1            PT G-Codes  Outcome Measure Options: AM-PAC 6 Clicks Basic Mobility (PT), PADD  AM-PAC 6 Clicks Score (PT): 15  AM-PAC 6 Clicks Score (OT): 17  PT Discharge Summary  Anticipated Discharge Disposition (PT): inpatient rehabilitation facility    Radha Beatty PT  7/26/2023

## 2023-07-26 NOTE — PROGRESS NOTES
"/80 (BP Location: Right arm, Patient Position: Lying)   Pulse 86   Temp 98.2 °F (36.8 °C) (Oral)   Resp 18   Ht 157.5 cm (62\")   Wt (!) 150 kg (330 lb)   LMP  (LMP Unknown)   SpO2 93%   BMI 60.36 kg/m²     Lab Results (last 24 hours)       Procedure Component Value Units Date/Time    CBC & Differential [[id number]]  (Abnormal) Collected: 07/26/23 0341    Specimen: Blood Updated: 07/26/23 0618    Narrative:      The following orders were created for panel order CBC & Differential.  Procedure                               Abnormality         Status                     ---------                               -----------         ------                     CBC Auto Differential[[id number]]        Abnormal            Final result                 Please view results for these tests on the individual orders.    CBC Auto Differential [[id number]]  (Abnormal) Collected: 07/26/23 0341    Specimen: Blood Updated: 07/26/23 0618     WBC 8.40 10*3/mm3      RBC 2.89 10*6/mm3      Hemoglobin 8.4 g/dL      Hematocrit 27.2 %      MCV 94.1 fL      MCH 29.1 pg      MCHC 30.9 g/dL      RDW 14.9 %      RDW-SD 51.0 fl      MPV 10.9 fL      Platelets 253 10*3/mm3      Neutrophil % 83.8 %      Lymphocyte % 8.5 %      Monocyte % 7.1 %      Eosinophil % 0.0 %      Basophil % 0.1 %      Immature Grans % 0.5 %      Neutrophils, Absolute 7.04 10*3/mm3      Lymphocytes, Absolute 0.71 10*3/mm3      Monocytes, Absolute 0.60 10*3/mm3      Eosinophils, Absolute 0.00 10*3/mm3      Basophils, Absolute 0.01 10*3/mm3      Immature Grans, Absolute 0.04 10*3/mm3      nRBC 0.0 /100 WBC     Basic Metabolic Panel [[id number]]  (Abnormal) Collected: 07/26/23 0341    Specimen: Blood Updated: 07/26/23 0611     Glucose 143 mg/dL      BUN 15 mg/dL      Creatinine 0.65 mg/dL      Sodium 139 mmol/L      Potassium 5.1 mmol/L      Chloride 103 mmol/L      CO2 27.0 mmol/L      Calcium 8.5 mg/dL      BUN/Creatinine Ratio 23.1     Anion Gap 9.0 mmol/L      eGFR " 94.3 mL/min/1.73     Narrative:      GFR Normal >60  Chronic Kidney Disease <60  Kidney Failure <15    The GFR formula is only valid for adults with stable renal function between ages 18 and 70.    Tissue / Bone Culture - Tissue, Knee, Right [[id number]] Collected: 07/25/23 1543    Specimen: Tissue from Knee, Right Updated: 07/25/23 1929     Gram Stain Few (2+) WBCs per low power field      No organisms seen    Tissue / Bone Culture - Tissue, Knee, Right [[id number]] Collected: 07/25/23 1530    Specimen: Tissue from Knee, Right Updated: 07/25/23 1928     Gram Stain Moderate (3+) WBCs per low power field      No organisms seen    Fungus Culture - Tissue, Knee, Right [[id number]] Collected: 07/25/23 1543    Specimen: Tissue from Knee, Right Updated: 07/25/23 1715    AFB Culture - Tissue, Knee, Right [[id number]] Collected: 07/25/23 1543    Specimen: Tissue from Knee, Right Updated: 07/25/23 1715    Anaerobic Culture - Tissue, Knee, Right [[id number]] Collected: 07/25/23 1543    Specimen: Tissue from Knee, Right Updated: 07/25/23 1715    Fungus Culture - Tissue, Knee, Right [[id number]] Collected: 07/25/23 1530    Specimen: Tissue from Knee, Right Updated: 07/25/23 1709    AFB Culture - Tissue, Knee, Right [[id number]] Collected: 07/25/23 1530    Specimen: Tissue from Knee, Right Updated: 07/25/23 1709    Anaerobic Culture - Tissue, Knee, Right [[id number]] Collected: 07/25/23 1530    Specimen: Tissue from Knee, Right Updated: 07/25/23 1709    Blood Culture - Blood, Arm, Right [[id number]]  (Normal) Collected: 07/24/23 1415    Specimen: Blood from Arm, Right Updated: 07/25/23 1515     Blood Culture No growth at 24 hours    Blood Culture - Blood, Arm, Left [[id number]]  (Normal) Collected: 07/24/23 1415    Specimen: Blood from Arm, Left Updated: 07/25/23 1515     Blood Culture No growth at 24 hours    Wound Culture - Wound, Knee, Right [[id number]]  (Abnormal) Collected: 07/24/23 1236    Specimen: Wound from Knee, Right Updated:  07/25/23 0938     Wound Culture Light growth (2+) Streptococcus agalactiae (Group B)     Comment:   This organism is considered to be universally susceptible to penicillin.  No further antibiotic testing will be performed. If Clindamycin or Erythromycin is the drug of choice, notify the laboratory within 7 days to request susceptibility testing.         Scant growth (1+) Mixed Gram Negative Lissette     Gram Stain No WBCs or organisms seen            Imaging Results (Last 24 Hours)       Procedure Component Value Units Date/Time    XR Knee 1 or 2 View Right [[id number]] Collected: 07/25/23 1716     Updated: 07/25/23 1721    Narrative:      XR KNEE 1 OR 2 VW RIGHT    Date of Exam: 7/25/2023 4:56 PM EDT    Indication: Post-Op Knee Arthoplasty    Comparison: 6/29/2023    Findings:  Postoperative changes of right knee arthroplasty revision with placement of antibiotic beads. Expected soft tissue gas. Alignment appears intact. No evidence of immediate hardware complication or periprosthetic fracture.      Impression:      Impression:  Postoperative changes of right knee arthroplasty revision with placement of antibiotic beads. No evidence of immediate hardware complication.      Electronically Signed: Tate Bellamy    7/25/2023 5:18 PM EDT    Workstation ID: [id number]            Patient Care Team:  Comfort Colunga MD as PCP - General (Internal Medicine)  Florinda Lopez APRN as Nurse Practitioner (Interventional Cardiology)  Lisa Grimm APRN as Nurse Practitioner (Nurse Practitioner)    SUBJECTIVE: Reports she is doing well this morning.  Pain improved compared to before surgery.  She has been up to the bathroom with nursing.    PHYSICAL EXAM  Right knee incision is clean, dry and intact  Decreased swelling and erythema   Able to perform a straight leg raise  Thigh and calf soft and nontender   neurovascular intact distally         Postoperative wound infection, right knee    Morbid obesity    Essential  hypertension    Anxiety and depression    Elevated hemoglobin A1c    Peripheral vascular disease    PSVT (paroxysmal supraventricular tachycardia)      PLAN / DISPOSITION: 72-year-old female postop day #1 status post right knee I&D with poly exchange for acute prosthetic knee infection  -Dressing changed this morning.  Incision healing well so Aquacel dressing was placed.  -Continue IV antibiotics per infectious disease recommendations.  Wound swab cultures from Monday positive for group B strep.  Surgery cultures pending.  Superficial and deep synovium was sent.  Plan for PICC line placement today.  -Mobilize as tolerated with therapy  -Eliquis for DVT prophylaxis  - for home health needs versus inpatient rehab.  Patient prefers home if possible.    Sunny Reynoso MD  07/26/23  07:37 EDT

## 2023-07-26 NOTE — PLAN OF CARE
Goal Outcome Evaluation:              Outcome Evaluation: Patient on room air. Ropivacaine pump in place, patient denies needing additional PRNs. Patient ambulating to bathroom, ambulated in hallway with therapy. Patient reeducated about deep breathing, incentive spirometer use, repositioning, safety precautions.

## 2023-07-26 NOTE — PROGRESS NOTES
"IM progress note      Imelda Elaine  [id number]  1950     LOS: 2 days     Attending: Valerie Concepcion MD    Primary Care Provider: Comfort Colunga MD      Chief Complaint/Reason for visit:  No chief complaint on file.      Subjective   Doing well. Good pain control. Denies f/c/n/v/sob/cp.    Objective        Visit Vitals  /80 (BP Location: Right arm, Patient Position: Lying)   Pulse 86   Temp 98.2 °F (36.8 °C) (Oral)   Resp 18   Ht 157.5 cm (62\")   Wt (!) 150 kg (330 lb)   LMP  (LMP Unknown)   SpO2 93%   BMI 60.36 kg/m²     Temp (24hrs), Av.7 °F (36.5 °C), Min:97 °F (36.1 °C), Max:98.2 °F (36.8 °C)      Physical Exam:     General Appearance:    Alert, cooperative, in no acute distress   Head:    Normocephalic, without obvious abnormality, atraumatic    Lungs:     Normal effort, symmetric chest rise,  clear to      auscultation bilaterally              Heart:    Regular rhythm and normal rate, normal S1 and S2    Abdomen:     Normal bowel sounds, no masses, no organomegaly, soft        non-tender, non-distended, no guarding, no rebound                tenderness. Obese   Extremities:   Right knee Aquacel x2 CDI. Nerve block present. Flexion and dorsiflexion intact bilateral feet.   Pulses:   Pulses palpable and equal bilaterally   Skin:   No bleeding, bruising or rash          Results Review:     I reviewed the patient's new clinical results.   Results from last 7 days   Lab Units 23  0341   WBC 10*3/mm3 8.40   HEMOGLOBIN g/dL 8.4*   HEMATOCRIT % 27.2*   PLATELETS 10*3/mm3 253     Results from last 7 days   Lab Units 23  0341 23  1249   SODIUM mmol/L 139 140   POTASSIUM mmol/L 5.1 4.2   CHLORIDE mmol/L 103 103   CO2 mmol/L 27.0 25.0   BUN mg/dL 15 15   CREATININE mg/dL 0.65 0.78   CALCIUM mg/dL 8.5* 9.2   BILIRUBIN mg/dL  --  0.5   ALK PHOS U/L  --  196*   ALT (SGPT) U/L  --  47*   AST (SGOT) U/L  --  43*   GLUCOSE mg/dL 143* 117*     I reviewed the patient's new imaging " including images and reports.   Latest Reference Range & Units 07/24/23 12:49   C-Reactive Protein 0.00 - 0.50 mg/dL 16.82 (H)   Lactate 0.5 - 2.0 mmol/L 1.0   (H): Data is abnormally high     Latest Reference Range & Units 07/24/23 12:49   Sed Rate 0 - 30 mm/hr 95 (H)   (H): Data is abnormally high    Microbiology Results (last 21 days)    Collected Updated Procedure Result Status    07/25/2023 1543 07/25/2023 1715 Anaerobic Culture - Tissue, Knee, Right [[id number]]   Tissue from Knee, Right    In process Component Value   No component results             07/25/2023 1543 07/25/2023 1715 Fungus Culture - Tissue, Knee, Right [[id number]]   Tissue from Knee, Right    In process Component Value   No component results             07/25/2023 1543 07/26/2023 1038 Tissue / Bone Culture - Tissue, Knee, Right [[id number]]   (Abnormal)   Tissue from Knee, Right    Preliminary result Component Value   Tissue Culture Scant growth (1+) Pseudomonas species Abnormal  P   Gram Stain Few (2+) WBCs per low power field P    No organisms seen P             07/25/2023 1543 07/26/2023 1132 AFB Culture - Tissue, Knee, Right [[id number]]   Tissue from Knee, Right    Preliminary result Component Value   AFB Stain No acid fast bacilli seen on concentrated smear P             07/25/2023 1530 07/25/2023 1709 Anaerobic Culture - Tissue, Knee, Right [[id number]]   Tissue from Knee, Right    In process Component Value   No component results             07/25/2023 1530 07/25/2023 1709 Fungus Culture - Tissue, Knee, Right [[id number]]   Tissue from Knee, Right    In process Component Value   No component results             07/25/2023 1530 07/26/2023 1039 Tissue / Bone Culture - Tissue, Knee, Right [[id number]]   Tissue from Knee, Right    Preliminary result Component Value   Tissue Culture No growth P   Gram Stain Moderate (3+) WBCs per low power field P    No organisms seen P             07/25/2023 1530 07/26/2023 1131 AFB Culture - Tissue, Knee, Right  [[id number]]   Tissue from Knee, Right    Preliminary result Component Value   AFB Stain No acid fast bacilli seen on concentrated smear P             07/24/2023 1415 07/25/2023 1515 Blood Culture - Blood, Arm, Right [[id number]]   Blood from Arm, Right    Preliminary result Component Value   Blood Culture No growth at 24 hours P             07/24/2023 1415 07/25/2023 1515 Blood Culture - Blood, Arm, Left [[id number]]   Blood from Arm, Left    Preliminary result Component Value   Blood Culture No growth at 24 hours P             07/24/2023 1236 07/26/2023 0939 Wound Culture - Wound, Knee, Right [[id number]]    (Abnormal)   Wound from Knee, Right    Preliminary result Component Value   Wound Culture Light growth (2+) Streptococcus agalactiae (Group B) Abnormal  P      This organism is considered to be universally susceptible to penicillin.  No further antibiotic testing will be performed. If Clindamycin or Erythromycin is the drug of choice, notify the laboratory within 7 days to request susceptibility testing.    Scant growth (1+) Pseudomonas species Abnormal  P    Rare Gram Negative Bacilli Abnormal  P   Gram Stain No WBCs or organisms seen P          All medications reviewed.   acetaminophen, 1,000 mg, Oral, Q6H  apixaban, 2.5 mg, Oral, Q12H  atorvastatin, 10 mg, Oral, Nightly  cefTRIAXone, 2,000 mg, Intravenous, Q24H  cetirizine, 10 mg, Oral, Daily  estradiol, 0.5 mg, Oral, Daily  losartan, 100 mg, Oral, Daily  nebivolol, 5 mg, Oral, Q24H  senna-docusate sodium, 2 tablet, Oral, BID  sodium chloride, 10 mL, Intravenous, Q12H  venlafaxine XR, 75 mg, Oral, Daily      bisacodyl, 10 mg, Daily PRN  polyethylene glycol, 17 g, Daily PRN   And  bisacodyl, 5 mg, Daily PRN   And  bisacodyl, 10 mg, Daily PRN  diphenhydrAMINE, 25 mg, Q6H PRN   Or  diphenhydrAMINE, 25 mg, Q6H PRN  docusate sodium, 100 mg, BID PRN  HYDROmorphone, 0.5 mg, Q2H PRN   And  naloxone, 0.1 mg, Q5 Min PRN  ketorolac, 15 mg, Q6H PRN  labetalol, 10 mg, Q4H  PRN  magnesium hydroxide, 10 mL, Daily PRN  ondansetron, 4 mg, Q6H PRN   Or  ondansetron, 4 mg, Q6H PRN  oxyCODONE, 10 mg, Q4H PRN  oxyCODONE, 5 mg, Q4H PRN  senna-docusate sodium, 2 tablet, BID PRN  [MAR Hold] sodium chloride, 500 mL, TID PRN  sodium chloride, 10 mL, PRN  sodium chloride, 40 mL, PRN        Assessment & Plan       S/P I&D right knee with poly exchange    Postoperative wound infection, right knee    Morbid obesity    Essential hypertension    Anxiety and depression    Elevated hemoglobin A1c    Peripheral vascular disease    PSVT (paroxysmal supraventricular tachycardia)         Plan  1. PT/OT- WBAT RLE  2. Pain control-prns, AC nerve block  3. IS-encouraged  4. DVT proph- mechs/Eliquis  5. Bowel regimen  6. Monitor post-op labs  7. DC planning for home. CM following    Follow Wound culture, blood cultures, labs including inflammatory marker.      Continue Broad-spectrum IV antibiotics to include daptomycin and ceftriaxone, ID consulted, Dr Nam Adams to follow. Likely to dc with PICC line.     Anxiety and depression: Resumed home med regimen.        Hypertension:  Resume home medications as appropriate, formulary substitution when indicated.  Holding parameters.  Prn medications for elevated blood pressure.     Morbid obesity: Complicates all aspects of care.      FLOR Conner  07/26/23  11:46 EDT

## 2023-07-26 NOTE — PROGRESS NOTES
INFECTIOUS DISEASE Progress note    Imelda Elaine  1950  [id number]    Date of consult: 7/24/23    Admit date: 7/24/2023    Requesting Provider: Sunny Reynoso,*  Evaluating physician: Nam Adams MD  Reason for Consultation: Postop wound infection   Chief Complaint: right knee infection      Subjective   History of present illness:  Imelda Elaine is a  72 y.o.  Yr old female with a past medical history of hypertension, HLD,a dn osteoarthritis s/p left knee replacement in 2017 who underwent a right total knee replacement 6/29/2023. The patient initially did well post-operatively but subsequently started having swelling of the knee and increased pain about 1 week after surgery with mild erythema around incision site. This occurred right after she bent down when she was cleaning up after going to the bathroom and felt a pop behind her right knee. She was seen in clinic by Dr. Reynoso and was started on oral Bactrim about 1 week ago. Presented back to Dr. Reynoso's clinic today and had increased erythema, pain, and swelling along with drainage from incision site. She was also experiencing some fevers and chills at home. She was directly admitted to Dr. Concepcion's service at [facility]. Plan by Dr. Reynoso is to take the patient to the OR tomorrow for right knee I&D with poly exchange. The patient has remained afebrile since arrival. ESR was elevated to 95 and CRP was  16.8. Lactic acid was 1.0. Wound cx from right knee is in progress and blood cultures have been sent and are in progress. Cr was 0.78. LFTs were mildly elevated with ALT 47, AST 43, Alk Phos 196. T gemini was normal at 0.5. No new radiographic data. The patient has been started on IV Daptomycin and Ceftriaxone by the primary team. ID has been consulted for abx recs.     Subjective:    7/25/23: The patient has ongoing pain and redness around her right knee.  No new complaints today.  No fevers.  No nausea, vomiting, or diarrhea.  No new rashes  "reported.  Plan for OR later today. Nursing reports difficulty with peripheral IV access and asks for PICC line placement.    7/26/23: The patient is doing okay today.  Right knee pain and redness is improving somewhat.  Not having any fevers.  No nausea, vomiting, or diarrhea.    Past Medical History:   Diagnosis Date    Anesthesia 2002    low bp only with foot surgery, has had surgeries since and no issue per pt report    Arthritis     Back pain     Depression     Difficulty walking     Left knee replacement  need right knee replacement    GERD (gastroesophageal reflux disease)     High cholesterol     Hypertension     Knee pain     BILATERAL    Seasonal allergies     Tremor, essential        Past Surgical History:   Procedure Laterality Date    ANTERIOR AND POSTERIOR VAGINAL REPAIR N/A 4/17/2017    Vaginal enterocele / anterior posterior colporrhaphy;  Jose Maria Chowdhury MD;  Location:  [facility] OR;  Service:     BLADDER SUSPENSION  1999    MESH TVT?     COLONOSCOPY  2018    Dr Chung - normal    EXPLORATORY LAPAROTOMY  2010    \"BOWEL INTO PELVIC BONE\" /Mesh [place]     EXPLORATORY LAPAROTOMY  04/2004    AQUILES/ abd sacrocolpopexy/post repair    FOOT SURGERY Left     KNEE ARTHROSCOPY Left     meniscus repair    KNEE INCISION AND DRAINAGE Right 7/25/2023    Procedure: KNEE INCISION AND DRAINAGE WITH POLY EXCHANGE - RIGHT;  Surgeon: Sunny Reynoso MD;  Location:  [facility] OR;  Service: Orthopedics;  Laterality: Right;    LAPAROSCOPIC CHOLECYSTECTOMY Right 2000    [place] ARTHRP KNE CONDYLE&PLATU MEDIAL&LAT COMPARTMENTS Left 11/16/2017    Procedure: TOTAL KNEE ARTHROPLASTY LEFT;  Surgeon: Sunny Reynoso MD;  Location:  [facility] OR;  Service: Orthopedics    TONSILLECTOMY      TOTAL ABDOMINAL HYSTERECTOMY WITH SALPINGO OOPHORECTOMY Bilateral 1989    TOTAL KNEE ARTHROPLASTY Right 6/29/2023    Procedure: TOTAL KNEE ARTHROPLASTY WITH CORI ROBOT - RIGHT;  Surgeon: Sunny Reynoso MD;  Location:  [facility] OR;  " "Service: Robotics - Ortho;  Laterality: Right;    TUBAL ABDOMINAL LIGATION      VENTRAL HERNIA REPAIR N/A 6/21/2016    Procedure: LAPAROSCOPIC REPAIR OF ABDOMINAL WALL HERNIA WITH MESH, CONVERTED TO OPEN ;  Surgeon: Bertha Delacruz MD;  Location: [facility] OR;  Service:        Pediatric History   Patient Parents    Not on file     Other Topics Concern    Not on file   Social History Narrative    Patient consumes 1-2 cups high-caffeine coffee, 1 [employer] sweet tea daily.     Patient lives at home with her .        family history includes Alzheimer's disease in her maternal grandmother; Aneurysm (age of onset: 57) in her father; Cancer in her father; Diabetes in her mother; Heart attack (age of onset: 59) in her mother; Hepatitis in her brother; Hypertension in her mother; Kidney disease (age of onset: 58) in her brother; Leukemia in her maternal grandfather; No Known Problems in her daughter, paternal grandfather, and son; Parkinsonism in her maternal grandmother; Pneumonia in her maternal grandmother.    Allergies   Allergen Reactions    Primidone Other (See Comments)     Hair loss    Topiramate Diarrhea    Tuberculin Tests Swelling and Rash     Pt has chest xrays to check     Zostavax [Zoster Vaccine Live] Swelling and Rash     \"Swelling of injection site of arm\"       Immunization History   Administered Date(s) Administered    COVID-19 (MODERNA) BIVALENT 12+YRS 09/04/2022    COVID-19 (MODERNA) Monovalent Original Booster 04/19/2022    COVID-19 (PFIZER) Purple Cap Monovalent 02/06/2021, 02/27/2021, 09/26/2021    Fluad Quad 65+ 09/14/2020    Fluzone High Dose =>65 Years ([employer] ONLY) 10/02/2017, 09/22/2018, 10/04/2019    Influenza, Unspecified 09/14/2020    Pneumococcal Conjugate 13-Valent (PCV13) 10/02/2017    Pneumococcal Polysaccharide (PPSV23) 10/04/2019       Medication:    Current Facility-Administered Medications:     acetaminophen (TYLENOL) tablet 1,000 mg, 1,000 mg, Oral, Q8H, Grisel Leonardo, " APRN    apixaban (ELIQUIS) tablet 2.5 mg, 2.5 mg, Oral, Q12H, Sunny Reynoso MD, 2.5 mg at 07/26/23 0815    atorvastatin (LIPITOR) tablet 10 mg, 10 mg, Oral, Nightly, Sunny Reynoso MD, 10 mg at 07/25/23 2008    bisacodyl (DULCOLAX) EC tablet 10 mg, 10 mg, Oral, Daily PRN, Sunny Reynoso MD    sennosides-docusate (PERICOLACE) 8.6-50 MG per tablet 2 tablet, 2 tablet, Oral, BID, 2 tablet at 07/26/23 0816 **AND** polyethylene glycol (MIRALAX) packet 17 g, 17 g, Oral, Daily PRN **AND** bisacodyl (DULCOLAX) EC tablet 5 mg, 5 mg, Oral, Daily PRN **AND** bisacodyl (DULCOLAX) suppository 10 mg, 10 mg, Rectal, Daily PRN, Sunny Reynoso MD    cetirizine (zyrTEC) tablet 10 mg, 10 mg, Oral, Daily, Sunny Reynoso MD, 10 mg at 07/26/23 0814    diphenhydrAMINE (BENADRYL) capsule 25 mg, 25 mg, Oral, Q6H PRN **OR** diphenhydrAMINE (BENADRYL) injection 25 mg, 25 mg, Intravenous, Q6H PRN, Sunny Reynoso MD    docusate sodium (COLACE) capsule 100 mg, 100 mg, Oral, BID PRN, Sunny Reynoso MD    estradiol (ESTRACE) tablet 0.5 mg, 0.5 mg, Oral, Daily, Sunny Reynoso MD, 0.5 mg at 07/26/23 0817    HYDROmorphone (DILAUDID) injection 0.5 mg, 0.5 mg, Intravenous, Q2H PRN, 0.5 mg at 07/25/23 1838 **AND** naloxone (NARCAN) injection 0.1 mg, 0.1 mg, Intravenous, Q5 Min PRN, Sunny Reynoso MD    ketorolac (TORADOL) injection 15 mg, 15 mg, Intravenous, Q6H PRN, Sunny Reynoso MD    labetalol (NORMODYNE,TRANDATE) injection 10 mg, 10 mg, Intravenous, Q4H PRN, Sunny Reynoso MD    lactated ringers infusion, 9 mL/hr, Intravenous, Continuous, Sunny Reynoso MD, Last Rate: 9 mL/hr at 07/25/23 1428, Currently Infusing at 07/25/23 1428    losartan (COZAAR) tablet 100 mg, 100 mg, Oral, Daily, Sunny Reynoso MD, 100 mg at 07/26/23 0815    magnesium hydroxide (MILK OF MAGNESIA) 400 MG/5ML suspension 10 mL, 10 mL, Oral, Daily PRN,  Sunny Reynoso MD    nebivolol (BYSTOLIC) tablet 5 mg, 5 mg, Oral, Q24H, Sunny Reynoso MD, 5 mg at 07/26/23 0816    ondansetron (ZOFRAN) tablet 4 mg, 4 mg, Oral, Q6H PRN **OR** ondansetron (ZOFRAN) injection 4 mg, 4 mg, Intravenous, Q6H PRN, Sunny Reynoso MD    oxyCODONE (ROXICODONE) immediate release tablet 10 mg, 10 mg, Oral, Q4H PRN, Sunny Reynoso MD    oxyCODONE (ROXICODONE) immediate release tablet 5 mg, 5 mg, Oral, Q4H PRN, Sunny Reynoso MD    piperacillin-tazobactam (ZOSYN) 3.375 g in iso-osmotic dextrose 50 ml (premix), 3.375 g, Intravenous, Once, Nam Adams MD    [START ON 7/27/2023] piperacillin-tazobactam (ZOSYN) 3.375 g in iso-osmotic dextrose 50 ml (premix), 3.375 g, Intravenous, Q8H, Nam Adams MD    ropivacaine (NAROPIN) 0.2 % infusion (INFUSYSTEM), , Peripheral Nerve, Continuous, Sunny Reynoso MD, 1,000 mg at 07/25/23 1737    sennosides-docusate (PERICOLACE) 8.6-50 MG per tablet 2 tablet, 2 tablet, Oral, BID PRN, Sunny Reynoso MD    [MAR Hold] sodium chloride 0.9 % bolus 500 mL, 500 mL, Intravenous, TID PRN, Valerie Concepcion MD    sodium chloride 0.9 % bolus 500 mL, 500 mL, Intravenous, TID PRN, Grisel Leonardo APRN    sodium chloride 0.9 % flush 10 mL, 10 mL, Intravenous, Q12H, Sunny Reynoso MD, 10 mL at 07/26/23 0817    sodium chloride 0.9 % flush 10 mL, 10 mL, Intravenous, PRN, Sunny Reynoso MD    sodium chloride 0.9 % flush 10 mL, 10 mL, Intravenous, Q12H, Sunny Reynoso MD, 10 mL at 07/26/23 1353    sodium chloride 0.9 % flush 10 mL, 10 mL, Intravenous, PRN, Sunny Reynoso MD    sodium chloride 0.9 % flush 20 mL, 20 mL, Intravenous, PRN, Sunny Reynoso MD    sodium chloride 0.9 % infusion 40 mL, 40 mL, Intravenous, PRN, Sunny Reynoso MD    sodium chloride 0.9 % infusion, 100 mL/hr, Intravenous, Continuous, Sunny Reynoso MD,  "Last Rate: 100 mL/hr at 07/25/23 1838, 100 mL/hr at 07/25/23 1838    venlafaxine XR (EFFEXOR-XR) 24 hr capsule 75 mg, 75 mg, Oral, Daily, Sunny Reynoso MD, 75 mg at 07/26/23 0814    Please refer to the medical record for a full medication list    Review of Systems:    As above    Physical Exam:   Vital Signs   Temp:  [97.6 °F (36.4 °C)-98.2 °F (36.8 °C)] 98.1 °F (36.7 °C)  Heart Rate:  [78-89] 86  Resp:  [16-20] 18  BP: ()/(43-80) 99/43    Temp  Min: 97.6 °F (36.4 °C)  Max: 98.2 °F (36.8 °C)  BP  Min: 99/43  Max: 155/80  Pulse  Min: 78  Max: 89  Resp  Min: 16  Max: 20  SpO2  Min: 91 %  Max: 96 %    Blood pressure 99/43, pulse 86, temperature 98.1 °F (36.7 °C), temperature source Oral, resp. rate 18, height 157.5 cm (62\"), weight (!) 150 kg (330 lb), SpO2 91 %, not currently breastfeeding.  GENERAL: Awake and alert, in no acute distress. Appears stated age.  Obese  HEENT:  Normocephalic, atraumatic.  No external or lesions noted.  No thrush noted.  EYES:  No conjunctival injection. No icterus.   HEART: RRR, no murmur  LUNGS: CTA B. Nonlabored breathing  ABDOMEN: Obese abdomen. Nondistended  MSK: Right knee with sterile surgical dressing in place over incision site does have some faint surrounding erythema over lateral portion and medial portion of the knee but seems to be improving somewhat.  GENITAL: no Donald catheter  SKIN: no generalized rashes.  No peripheral stigmata of infective endocarditis noted.  PSYCHIATRIC: cooperative.  Appropriate mood and affect  EXT:  No cellulitic change.  NEURO: awake alert and oriented ×4.  Normal speech and cognition    Results Review:   I reviewed the patient's new clinical results.    Results from last 7 days   Lab Units 07/26/23  0341   WBC 10*3/mm3 8.40   HEMOGLOBIN g/dL 8.4*   HEMATOCRIT % 27.2*   PLATELETS 10*3/mm3 253     Results from last 7 days   Lab Units 07/26/23  0341   SODIUM mmol/L 139   POTASSIUM mmol/L 5.1   CHLORIDE mmol/L 103   CO2 mmol/L 27.0 "   BUN mg/dL 15   CREATININE mg/dL 0.65   GLUCOSE mg/dL 143*   CALCIUM mg/dL 8.5*     Results from last 7 days   Lab Units 07/24/23  1249   ALK PHOS U/L 196*   BILIRUBIN mg/dL 0.5   ALT (SGPT) U/L 47*   AST (SGOT) U/L 43*     Results from last 7 days   Lab Units 07/24/23  1249   SED RATE mm/hr 95*     Results from last 7 days   Lab Units 07/24/23  1249   CRP mg/dL 16.82*         Results from last 7 days   Lab Units 07/24/23  1249   LACTATE mmol/L 1.0     Estimated Creatinine Clearance: 112.9 mL/min (by C-G formula based on SCr of 0.65 mg/dL).  CPK          7/24/2023    12:49   Common Labsle   Creatine Kinase 64       Procalitonin Results:       Brief Urine Lab Results       None           No results found for: SITE, ALLENTEST, PHART, MKE4STQ, PO2ART, EAP6ELH, BASEEXCESS, S0CBRODV, HGBBG, HCTABG, OXYHEMOGLOBI, METHHGBN, CARBOXYHGB, CO2CT, BAROMETRIC, MODALITY, FIO2     Microbiology:    Blood culture x2: In process    Right knee surgical culture: In process    Radiology:  Imaging Results (Last 72 Hours)       Procedure Component Value Units Date/Time    XR Knee 1 or 2 View Right [[id number]] Collected: 07/25/23 1716     Updated: 07/25/23 1721    Narrative:      XR KNEE 1 OR 2 VW RIGHT    Date of Exam: 7/25/2023 4:56 PM EDT    Indication: Post-Op Knee Arthoplasty    Comparison: 6/29/2023    Findings:  Postoperative changes of right knee arthroplasty revision with placement of antibiotic beads. Expected soft tissue gas. Alignment appears intact. No evidence of immediate hardware complication or periprosthetic fracture.      Impression:      Impression:  Postoperative changes of right knee arthroplasty revision with placement of antibiotic beads. No evidence of immediate hardware complication.      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 Now with group B strep and Pseudomonas species from superficial culture from right knee.  Also has Pseudomonas species from surgical culture growing so far.  Mildly elevated LFTs  Morbid obesity-complicates all aspects of care.  Predisposes the patient to poor wound healing and infection.  History of left knee replacement in 2017  HTN  HLD    RECOMMENDATIONS:    -Continue to closely monitor CBC with differential and CMP.  Baseline CPK level is okay  -Follow pending blood cultures and superficial wound culture from the right knee.  Blood cultures remain no growth to date.  Superficial wound culture with group B strep and Pseudomonas species and also possibly another gram-negative denise. I will follow culture for final results.  -Stop daptomycin as no signs of MRSA  -Stop ceftriaxone.  Broadened to Zosyn for now  -Status post I&D and poly-exchange by Dr. Reynoso 7/25/23. I will follow surgical cultures-Now with Pseudomonas species  -PICC line placement today    She will likely need a long course of IV antibiotics. Final IV antibiotic course recommendations pending further culture results.    I discussed in length with the patient and her  at bedside today        Thank you for asking me to see Imelda Elaine.  Our group would be pleased to follow this patient over the course of their hospitalization and assist with outpatient antimicrobial therapy, as indicated.  Further recommendations depend on the results of the cultures and clinical course.    Complex MDM.  At some risk for loss of right limb    Nam Adams MD  7/26/2023    [Follow-Up: _____] : a [unfilled] follow-up visit

## 2023-07-26 NOTE — PROGRESS NOTES
[facility]    Acute pain service Inpatient Progress Note    Patient Name: Imelda Elaine  :  1950  MRN:  [id number]        Acute Pain  Service Inpatient Progress Note:    Analgesia:Good  Pain Score:0/10  LOC: alert and awake  Resp Status: room air  Cardiac: VS stable  Side Effects:None  Catheter Site:clean, dressing intact and dry  Cath type: peripheral nerve cath with ON Q  Volume: 1mL,8ml, 8ml InfuSystem Pump.  Catheter Plan:Catheter to remain Insitu and Continue catheter infusion rate unchanged  Comments:

## 2023-07-26 NOTE — PLAN OF CARE
Goal Outcome Evaluation:  Plan of Care Reviewed With: patient        Progress: no change (OT IE)  Outcome Evaluation: OT evaluation completed. Pt presents with decreased I in ADLs, related t/fs, mobility compared to PLOF. Pt limited by decreased activity tolerance with increased exertion and SOA with more dynamic tasks (SPO2 > 90% throughout), impaired balance, muscle weakness at BUEs, decreased distal reach impacting LEs and fatigue. Pt has LH AE at  home and has received prior training related to LB ADLs however would benefit from review given new underlying impairments and impact on function. Currently, pt presents with new needs related to toileting hygiene with decreased (safe) reach and is observed in unsafe positions attempt to reach increasing pt fall/injury risk. Pt is below occupational performance baseline and would benefit from IPOT POC and IRF at d/c when medically ready.      Anticipated Discharge Disposition (OT): inpatient rehabilitation facility

## 2023-07-27 LAB
ANION GAP SERPL CALCULATED.3IONS-SCNC: 15 MMOL/L (ref 5–15)
BACTERIA SPEC AEROBE CULT: ABNORMAL
BASOPHILS # BLD AUTO: 0.03 10*3/MM3 (ref 0–0.2)
BASOPHILS NFR BLD AUTO: 0.4 % (ref 0–1.5)
BUN SERPL-MCNC: 22 MG/DL (ref 8–23)
BUN/CREAT SERPL: 23.7 (ref 7–25)
CALCIUM SPEC-SCNC: 8.6 MG/DL (ref 8.6–10.5)
CHLORIDE SERPL-SCNC: 104 MMOL/L (ref 98–107)
CO2 SERPL-SCNC: 22 MMOL/L (ref 22–29)
CREAT SERPL-MCNC: 0.93 MG/DL (ref 0.57–1)
DEPRECATED RDW RBC AUTO: 52.8 FL (ref 37–54)
EGFRCR SERPLBLD CKD-EPI 2021: 65.8 ML/MIN/1.73
EOSINOPHIL # BLD AUTO: 0.18 10*3/MM3 (ref 0–0.4)
EOSINOPHIL NFR BLD AUTO: 2.1 % (ref 0.3–6.2)
ERYTHROCYTE [DISTWIDTH] IN BLOOD BY AUTOMATED COUNT: 15.3 % (ref 12.3–15.4)
GLUCOSE SERPL-MCNC: 104 MG/DL (ref 65–99)
GRAM STN SPEC: ABNORMAL
HCT VFR BLD AUTO: 27.3 % (ref 34–46.6)
HGB BLD-MCNC: 8.2 G/DL (ref 12–15.9)
IMM GRANULOCYTES # BLD AUTO: 0.05 10*3/MM3 (ref 0–0.05)
IMM GRANULOCYTES NFR BLD AUTO: 0.6 % (ref 0–0.5)
LYMPHOCYTES # BLD AUTO: 1.92 10*3/MM3 (ref 0.7–3.1)
LYMPHOCYTES NFR BLD AUTO: 22.4 % (ref 19.6–45.3)
MCH RBC QN AUTO: 28.6 PG (ref 26.6–33)
MCHC RBC AUTO-ENTMCNC: 30 G/DL (ref 31.5–35.7)
MCV RBC AUTO: 95.1 FL (ref 79–97)
MONOCYTES # BLD AUTO: 0.9 10*3/MM3 (ref 0.1–0.9)
MONOCYTES NFR BLD AUTO: 10.5 % (ref 5–12)
NEUTROPHILS NFR BLD AUTO: 5.48 10*3/MM3 (ref 1.7–7)
NEUTROPHILS NFR BLD AUTO: 64 % (ref 42.7–76)
NRBC BLD AUTO-RTO: 0 /100 WBC (ref 0–0.2)
PLATELET # BLD AUTO: 254 10*3/MM3 (ref 140–450)
PMV BLD AUTO: 10.8 FL (ref 6–12)
POTASSIUM SERPL-SCNC: 4.5 MMOL/L (ref 3.5–5.2)
RBC # BLD AUTO: 2.87 10*6/MM3 (ref 3.77–5.28)
SODIUM SERPL-SCNC: 141 MMOL/L (ref 136–145)
WBC NRBC COR # BLD: 8.56 10*3/MM3 (ref 3.4–10.8)

## 2023-07-27 PROCEDURE — 25010000002 PIPERACILLIN SOD-TAZOBACTAM PER 1 G: Performed by: INTERNAL MEDICINE

## 2023-07-27 PROCEDURE — 97530 THERAPEUTIC ACTIVITIES: CPT

## 2023-07-27 PROCEDURE — 85025 COMPLETE CBC W/AUTO DIFF WBC: CPT | Performed by: ORTHOPAEDIC SURGERY

## 2023-07-27 PROCEDURE — 97116 GAIT TRAINING THERAPY: CPT

## 2023-07-27 PROCEDURE — 80048 BASIC METABOLIC PNL TOTAL CA: CPT | Performed by: NURSE PRACTITIONER

## 2023-07-27 PROCEDURE — 97110 THERAPEUTIC EXERCISES: CPT

## 2023-07-27 RX ADMIN — ESTRADIOL 0.5 MG: 0.5 TABLET ORAL at 09:40

## 2023-07-27 RX ADMIN — NEBIVOLOL 5 MG: 5 TABLET ORAL at 09:40

## 2023-07-27 RX ADMIN — SODIUM CHLORIDE 100 ML/HR: 9 INJECTION, SOLUTION INTRAVENOUS at 16:20

## 2023-07-27 RX ADMIN — ACETAMINOPHEN 1000 MG: 500 TABLET ORAL at 14:15

## 2023-07-27 RX ADMIN — SENNOSIDES AND DOCUSATE SODIUM 2 TABLET: 50; 8.6 TABLET ORAL at 09:41

## 2023-07-27 RX ADMIN — PIPERACILLIN SODIUM AND TAZOBACTAM SODIUM 3.38 G: 3; .375 INJECTION, SOLUTION INTRAVENOUS at 17:56

## 2023-07-27 RX ADMIN — CETIRIZINE HYDROCHLORIDE 10 MG: 10 TABLET, FILM COATED ORAL at 09:40

## 2023-07-27 RX ADMIN — Medication 10 ML: at 09:41

## 2023-07-27 RX ADMIN — VENLAFAXINE HYDROCHLORIDE 75 MG: 75 CAPSULE, EXTENDED RELEASE ORAL at 09:40

## 2023-07-27 RX ADMIN — ATORVASTATIN CALCIUM 10 MG: 10 TABLET, FILM COATED ORAL at 20:10

## 2023-07-27 RX ADMIN — ACETAMINOPHEN 1000 MG: 500 TABLET ORAL at 05:00

## 2023-07-27 RX ADMIN — APIXABAN 2.5 MG: 2.5 TABLET, FILM COATED ORAL at 09:40

## 2023-07-27 RX ADMIN — APIXABAN 2.5 MG: 2.5 TABLET, FILM COATED ORAL at 20:10

## 2023-07-27 RX ADMIN — PIPERACILLIN SODIUM AND TAZOBACTAM SODIUM 3.38 G: 3; .375 INJECTION, SOLUTION INTRAVENOUS at 09:42

## 2023-07-27 RX ADMIN — ACETAMINOPHEN 1000 MG: 500 TABLET ORAL at 20:10

## 2023-07-27 RX ADMIN — PIPERACILLIN SODIUM AND TAZOBACTAM SODIUM 3.38 G: 3; .375 INJECTION, SOLUTION INTRAVENOUS at 01:11

## 2023-07-27 RX ADMIN — SODIUM CHLORIDE 500 ML: 9 INJECTION, SOLUTION INTRAVENOUS at 05:08

## 2023-07-27 NOTE — THERAPY TREATMENT NOTE
"Patient Name: Imelda Elaine  : 1950    MRN: [id number]                              Today's Date: 2023       Admit Date: 2023    Visit Dx:     ICD-10-CM ICD-9-CM   1. Infection of right knee  M00.9 686.9     Patient Active Problem List   Diagnosis    Morbid obesity    Palpitations    Essential hypertension    Mixed hyperlipidemia    Prediabetes    Osteoarthritis of left knee    Leukocytosis, mild, likely reactive    Abnormal EKG    Anxiety and depression    BPV (benign positional vertigo)    Elevated hemoglobin A1c    Gastroesophageal reflux disease without esophagitis    Osteopenia    Peripheral vascular disease    S/P total knee arthroplasty, left    Tremor, essential    NDIAYE (dyspnea on exertion)    Body mass index 40.0-44.9, adult    Abnormal blood level of copper    RBBB    Pre-operative clearance    Encounter for pre-operative cardiovascular clearance    Status post right knee replacement    PSVT (paroxysmal supraventricular tachycardia)    Postoperative wound infection, right knee    S/P I&D right knee with poly exchange     Past Medical History:   Diagnosis Date    Anesthesia     low bp only with foot surgery, has had surgeries since and no issue per pt report    Arthritis     Back pain     Depression     Difficulty walking     Left knee replacement  need right knee replacement    GERD (gastroesophageal reflux disease)     High cholesterol     Hypertension     Knee pain     BILATERAL    Seasonal allergies     Tremor, essential      Past Surgical History:   Procedure Laterality Date    ANTERIOR AND POSTERIOR VAGINAL REPAIR N/A 2017    Vaginal enterocele / anterior posterior colporrhaphy;  Jose Maria Chowdhury MD;  Location: [facility];  Service:     BLADDER SUSPENSION      MESH TVT?     COLONOSCOPY      Dr Chung - normal    EXPLORATORY LAPAROTOMY      \"BOWEL INTO PELVIC BONE\" /Mesh [place]     EXPLORATORY LAPAROTOMY  2004    AQUILES/ abd sacrocolpopexy/post repair    FOOT " SURGERY Left     KNEE ARTHROSCOPY Left     meniscus repair    KNEE INCISION AND DRAINAGE Right 7/25/2023    Procedure: KNEE INCISION AND DRAINAGE WITH POLY EXCHANGE - RIGHT;  Surgeon: Sunny Reynoso MD;  Location: [facility] OR;  Service: Orthopedics;  Laterality: Right;    LAPAROSCOPIC CHOLECYSTECTOMY Right 2000    SD ARTHRP KNE CONDYLE&PLATU MEDIAL&LAT COMPARTMENTS Left 11/16/2017    Procedure: TOTAL KNEE ARTHROPLASTY LEFT;  Surgeon: Sunny Reynoso MD;  Location:  [facility];  Service: Orthopedics    TONSILLECTOMY      TOTAL ABDOMINAL HYSTERECTOMY WITH SALPINGO OOPHORECTOMY Bilateral 1989    TOTAL KNEE ARTHROPLASTY Right 6/29/2023    Procedure: TOTAL KNEE ARTHROPLASTY WITH CORI ROBOT - RIGHT;  Surgeon: Sunny Reynoso MD;  Location:  [facility];  Service: Robotics - Ortho;  Laterality: Right;    TUBAL ABDOMINAL LIGATION      VENTRAL HERNIA REPAIR N/A 6/21/2016    Procedure: LAPAROSCOPIC REPAIR OF ABDOMINAL WALL HERNIA WITH MESH, CONVERTED TO OPEN ;  Surgeon: Bertha Delacruz MD;  Location: [facility] OR;  Service:       General Information       Row Name 07/27/23 1148          Physical Therapy Time and Intention    Document Type therapy note (daily note)  -CM     Mode of Treatment physical therapy;individual therapy  -CM       Row Name 07/27/23 1148          General Information    Patient Profile Reviewed yes  -CM     Existing Precautions/Restrictions fall;other (see comments)  adductor nerve cath  -CM     Barriers to Rehab medically complex;previous functional deficit  -CM       Row Name 07/27/23 1148          Cognition    Orientation Status (Cognition) oriented x 4  -CM       Row Name 07/27/23 1148          Safety Issues, Functional Mobility    Safety Issues Affecting Function (Mobility) awareness of need for assistance;insight into deficits/self-awareness;safety precaution awareness;safety precautions follow-through/compliance;sequencing abilities  -CM     Impairments Affecting Function  (Mobility) endurance/activity tolerance;shortness of breath;strength;balance;pain;range of motion (ROM)  -CM               User Key  (r) = Recorded By, (t) = Taken By, (c) = Cosigned By      Initials Name Provider Type    Tiffany Kingston PT Physical Therapist                   Mobility       Row Name 07/27/23 1149          Bed Mobility    Bed Mobility supine-sit;sit-supine  -CM     Scooting/Bridging [address] (Bed Mobility) dependent (less than 25% patient effort);2 person assist;verbal cues  -CM     Supine-Sit [address] (Bed Mobility) minimum assist (75% patient effort);verbal cues;1 person assist  -CM     Sit-Supine [address] (Bed Mobility) maximum assist (25% patient effort);verbal cues;nonverbal cues (demo/gesture);2 person assist  -CM     Assistive Device (Bed Mobility) bed rails;head of bed elevated;draw sheet  -CM     Comment, (Bed Mobility) Cues provided for sequencing, [place] at RLE for sup to sit, maxAx2 at BLEs and trunk for sit to sup  -CM       Row Name 07/27/23 1149          Transfers    Comment, (Transfers) upon sitting EOB, patient was noted to have increased drainage onto R knee dressing, her RN was notified and present to manage the dressing, OOB mobility deferred as the dressing became saturated quickly and patient returned to supine for management of dressing  -CM       Row Name 07/27/23 1149          Sit-Stand Transfer    Sit-Stand [address] (Transfers) unable to assess  -CM       [facility] Name 07/27/23 1149          Gait/Stairs (Locomotion)    [address] Level (Gait) unable to assess  -CM       Row Name 07/27/23 1149          Mobility    Extremity Weight-bearing Status right lower extremity  -CM     Right Lower Extremity (Weight-bearing Status) weight-bearing as tolerated (WBAT)  -CM               User Key  (r) = Recorded By, (t) = Taken By, (c) = Cosigned By      Initials Name Provider Type    Tiffany Kingston PT Physical Therapist                   Obj/Interventions        Row Name 07/27/23 1151          Range of Motion Comprehensive    General Range of Motion lower extremity range of motion deficits identified  -CM     Comment, General Range of Motion R knee ROM 10-65  -CM       Row Name 07/27/23 1151          Motor Skills    Therapeutic Exercise hip;knee;ankle  -CM       Row Name 07/27/23 1151          Hip (Therapeutic Exercise)    Hip (Therapeutic Exercise) strengthening exercise  -CM     Hip Strengthening (Therapeutic Exercise) right;supine;heel slides;10 repetitions;other (see comments)  with gait belt assistance, limited ROM  -CM       Row Name 07/27/23 1151          Knee (Therapeutic Exercise)    Knee (Therapeutic Exercise) isometric exercises;strengthening exercise  -CM     Knee Isometrics (Therapeutic Exercise) right;10 repetitions;quad sets;3 second hold  -CM     Knee Strengthening (Therapeutic Exercise) right;SLR (straight leg raise);SAQ (short arc quad);LAQ (long arc quad);sitting;heel slides  AAROM SLR, limited ROM with sitting heel slides, no overpressure applied  -CM       Row Name 07/27/23 1151          Ankle (Therapeutic Exercise)    Ankle (Therapeutic Exercise) AROM (active range of motion)  -CM     Ankle AROM (Therapeutic Exercise) bilateral;dorsiflexion;plantarflexion;15 repititions  -CM       Row Name 07/27/23 1151          Balance    Balance Assessment sitting static balance;sitting dynamic balance  -CM     Static Sitting Balance standby assist  -CM     Dynamic Sitting Balance standby assist  -CM     Position, Sitting Balance sitting edge of bed;other (see comments)  intermittent BUE support  -CM               User Key  (r) = Recorded By, (t) = Taken By, (c) = Cosigned By      Initials Name Provider Type    Tiffany Kingston PT Physical Therapist                   Goals/Plan    No documentation.                  Clinical Impression       Row Name 07/27/23 1154          Pain    Additional Documentation Pain Scale: FACES Pre/Post-Treatment (Group)  -CM        Row Name 07/27/23 1154          Pain Scale: FACES Pre/Post-Treatment    Pain: FACES Scale, Pretreatment 0-->no hurt  -CM     Posttreatment Pain Rating 4-->hurts little more  -CM     Pain Location - Side/Orientation Right  -CM     Pain Location generalized  -CM     Pain Location - knee  -CM     Pre/Posttreatment Pain Comment patient denies pain pre treatment, increased pain with therex, but patient tolerated well  -CM       Row Name 07/27/23 1154          Plan of Care Review    Plan of Care Reviewed With patient  -CM     Progress no change  -CM     Outcome Evaluation Patient limited in mobility today by increased drainage from incision. She completed RLE therex supine in bed and sitting EOB. Out of bed mobility and ambulation deferred due to drainage from incision requiring nursing intervention. Patient remains limited by deficits in strength, endurance, and balance, as well as RLE ROM. Will continue to progress to patient's tolerance. Recommend D/C to IPR.  -CM       Row Name 07/27/23 1154          Vital Signs    Pre Systolic BP Rehab 97  -CM     Pre Treatment Diastolic BP 71  -CM     Post Systolic BP Rehab 128  -CM     Post Treatment Diastolic BP 65  -CM     Pretreatment Heart Rate (beats/min) 85  -CM     Posttreatment Heart Rate (beats/min) 92  -CM     Pre SpO2 (%) 98  -CM     O2 Delivery Pre Treatment room air  -CM     O2 Delivery Intra Treatment room air  -CM     O2 Delivery Post Treatment room air  -CM     Pre Patient Position Supine  -CM     Intra Patient Position Sitting  -CM     Post Patient Position Supine  -CM       Row Name 07/27/23 1154          Positioning and Restraints    Pre-Treatment Position in bed  -CM     Post Treatment Position bed  -CM     In Bed supine;notified nsg;with nsg;with other staff  -CM               User Key  (r) = Recorded By, (t) = Taken By, (c) = Cosigned By      Initials Name Provider Type    Tiffany Kingston, PT Physical Therapist                   Outcome Measures        Row Name 07/27/23 1158          How much help from another person do you currently need...    Turning from your back to your side while in flat bed without using bedrails? 2  -CM     Moving from lying on back to sitting on the side of a flat bed without bedrails? 3  -CM     Moving to and from a bed to a chair (including a wheelchair)? 3  -CM     Standing up from a chair using your arms (e.g., wheelchair, bedside chair)? 3  -CM     Climbing 3-5 steps with a railing? 2  -CM     To walk in hospital room? 3  -CM     AM-PAC 6 Clicks Score (PT) 16  -CM     Highest level of mobility 5 --> Static standing  -CM       Row Name 07/27/23 1158          Functional Assessment    Outcome Measure Options AM-PAC 6 Clicks Basic Mobility (PT)  -CM               User Key  (r) = Recorded By, (t) = Taken By, (c) = Cosigned By      Initials Name Provider Type    CM Tiffany Espinosa, PT Physical Therapist                                 Physical Therapy Education       Title: PT OT SLP Therapies (In Progress)       Topic: Physical Therapy (Done)       Point: Mobility training (Done)       Learning Progress Summary             Patient Acceptance, E, VU by CM at 7/27/2023 1158    Acceptance, E,D, VU,NR by  at 7/26/2023 1459                         Point: Home exercise program (Done)       Learning Progress Summary             Patient Acceptance, E, VU by CM at 7/27/2023 1158    Acceptance, E,D, VU,NR by  at 7/26/2023 1459                         Point: Body mechanics (Done)       Learning Progress Summary             Patient Acceptance, E, VU by CM at 7/27/2023 1158    Acceptance, E,D, VU,NR by  at 7/26/2023 1459                         Point: Precautions (Done)       Learning Progress Summary             Patient Acceptance, E, VU by CM at 7/27/2023 1158    Acceptance, E,D, VU,NR by  at 7/26/2023 1459                                         User Key       Initials Effective Dates Name Provider Type Discipline     06/01/21 -   Radha Beatty, PT Physical Therapist PT    CM 09/22/22 -  Tiffany Espinosa PT Physical Therapist PT                  PT Recommendation and Plan     Plan of Care Reviewed With: patient  Progress: no change  Outcome Evaluation: Patient limited in mobility today by increased drainage from incision. She completed RLE therex supine in bed and sitting EOB. Out of bed mobility and ambulation deferred due to drainage from incision requiring nursing intervention. Patient remains limited by deficits in strength, endurance, and balance, as well as RLE ROM. Will continue to progress to patient's tolerance. Recommend D/C to IPR.     Time Calculation:         PT Charges       Row Name 07/27/23 1158             Time Calculation    Start Time 1034  -CM      PT Received On 07/27/23  -CM      PT Goal Re-Cert Due Date 08/05/23  -CM         Timed Charges    03931 - PT Therapeutic Exercise Minutes 20  -CM      91100 - PT Therapeutic Activity Minutes 10  -CM         Total Minutes    Timed Charges Total Minutes 30  -CM       Total Minutes 30  -CM                User Key  (r) = Recorded By, (t) = Taken By, (c) = Cosigned By      Initials Name Provider Type    Tiffany Kingston, YANICK Physical Therapist                  Therapy Charges for Today       Code Description Service Date Service Provider Modifiers Qty    43125217206 HC PT THER PROC EA 15 MIN 7/27/2023 Tiffany Espinosa, PT GP 1    63879269909 HC PT THERAPEUTIC ACT EA 15 MIN 7/27/2023 Tiffany sEpinosa, PT GP 1            PT G-Codes  Outcome Measure Options: AM-PAC 6 Clicks Basic Mobility (PT)  AM-PAC 6 Clicks Score (PT): 16  AM-PAC 6 Clicks Score (OT): 17  PT Discharge Summary  Anticipated Discharge Disposition (PT): inpatient rehabilitation facility    Tiffany Espinosa PT  7/27/2023

## 2023-07-27 NOTE — PROGRESS NOTES
"/62 (BP Location: Left arm, Patient Position: Lying)   Pulse 70   Temp 97.6 °F (36.4 °C) (Oral)   Resp 18   Ht 157.5 cm (62\")   Wt (!) 150 kg (330 lb)   LMP  (LMP Unknown)   SpO2 93%   BMI 60.36 kg/m²     Lab Results (last 24 hours)       Procedure Component Value Units Date/Time    Basic Metabolic Panel [[id number]]  (Abnormal) Collected: 07/27/23 0530    Specimen: Blood Updated: 07/27/23 0658     Glucose 104 mg/dL      BUN 22 mg/dL      Creatinine 0.93 mg/dL      Sodium 141 mmol/L      Potassium 4.5 mmol/L      Comment: Slight hemolysis detected by analyzer. Results may be affected.        Chloride 104 mmol/L      CO2 22.0 mmol/L      Calcium 8.6 mg/dL      BUN/Creatinine Ratio 23.7     Anion Gap 15.0 mmol/L      eGFR 65.8 mL/min/1.73     Narrative:      GFR Normal >60  Chronic Kidney Disease <60  Kidney Failure <15    The GFR formula is only valid for adults with stable renal function between ages 18 and 70.    CBC & Differential [[id number]]  (Abnormal) Collected: 07/27/23 0530    Specimen: Blood Updated: 07/27/23 0622    Narrative:      The following orders were created for panel order CBC & Differential.  Procedure                               Abnormality         Status                     ---------                               -----------         ------                     CBC Auto Differential[[id number]]        Abnormal            Final result                 Please view results for these tests on the individual orders.    CBC Auto Differential [[id number]]  (Abnormal) Collected: 07/27/23 0530    Specimen: Blood Updated: 07/27/23 0622     WBC 8.56 10*3/mm3      RBC 2.87 10*6/mm3      Hemoglobin 8.2 g/dL      Hematocrit 27.3 %      MCV 95.1 fL      MCH 28.6 pg      MCHC 30.0 g/dL      RDW 15.3 %      RDW-SD 52.8 fl      MPV 10.8 fL      Platelets 254 10*3/mm3      Neutrophil % 64.0 %      Lymphocyte % 22.4 %      Monocyte % 10.5 %      Eosinophil % 2.1 %      Basophil % 0.4 %      Immature Grans " % 0.6 %      Neutrophils, Absolute 5.48 10*3/mm3      Lymphocytes, Absolute 1.92 10*3/mm3      Monocytes, Absolute 0.90 10*3/mm3      Eosinophils, Absolute 0.18 10*3/mm3      Basophils, Absolute 0.03 10*3/mm3      Immature Grans, Absolute 0.05 10*3/mm3      nRBC 0.0 /100 WBC     Blood Culture - Blood, Arm, Right [[id number]]  (Normal) Collected: 07/24/23 1415    Specimen: Blood from Arm, Right Updated: 07/26/23 1515     Blood Culture No growth at 2 days    Blood Culture - Blood, Arm, Left [[id number]]  (Normal) Collected: 07/24/23 1415    Specimen: Blood from Arm, Left Updated: 07/26/23 1515     Blood Culture No growth at 2 days    AFB Culture - Tissue, Knee, Right [[id number]] Collected: 07/25/23 1543    Specimen: Tissue from Knee, Right Updated: 07/26/23 1132     AFB Stain No acid fast bacilli seen on concentrated smear    AFB Culture - Tissue, Knee, Right [[id number]] Collected: 07/25/23 1530    Specimen: Tissue from Knee, Right Updated: 07/26/23 1131     AFB Stain No acid fast bacilli seen on concentrated smear    Tissue / Bone Culture - Tissue, Knee, Right [[id number]] Collected: 07/25/23 1530    Specimen: Tissue from Knee, Right Updated: 07/26/23 1039     Tissue Culture No growth     Gram Stain Moderate (3+) WBCs per low power field      No organisms seen    Tissue / Bone Culture - Tissue, Knee, Right [[id number]]  (Abnormal) Collected: 07/25/23 1543    Specimen: Tissue from Knee, Right Updated: 07/26/23 1038     Tissue Culture Scant growth (1+) Pseudomonas species     Gram Stain Few (2+) WBCs per low power field      No organisms seen    Wound Culture - Wound, Knee, Right [[id number]]  (Abnormal) Collected: 07/24/23 1236    Specimen: Wound from Knee, Right Updated: 07/26/23 0939     Wound Culture Light growth (2+) Streptococcus agalactiae (Group B)     Comment:   This organism is considered to be universally susceptible to penicillin.  No further antibiotic testing will be performed. If Clindamycin or Erythromycin  is the drug of choice, notify the laboratory within 7 days to request susceptibility testing.         Scant growth (1+) Pseudomonas species      Rare Gram Negative Bacilli     Gram Stain No WBCs or organisms seen    Narrative:      Dr. Adams requested work up of gram negative rods 07/26/23 at 08:37.            Imaging Results (Last 24 Hours)       ** No results found for the last 24 hours. **            Patient Care Team:  Comfort Colunga MD as PCP - General (Internal Medicine)  Florinda Lopez APRN as Nurse Practitioner (Interventional Cardiology)  Lisa Grimm APRN as Nurse Practitioner (Nurse Practitioner)    SUBJECTIVE: Reports she slept better last night.  Pain is well controlled.  Walked in the [place] with therapy yesterday.    PHYSICAL EXAM  Right knee Aquacel dressing is clean, dry and intact except for 2 small areas of spotty drainage.  Decreased erythema and swelling overall  Thigh and calf soft and nontender  Neurovascular intact distally       S/P I&D right knee with poly exchange    Morbid obesity    Essential hypertension    Anxiety and depression    Elevated hemoglobin A1c    Peripheral vascular disease    PSVT (paroxysmal supraventricular tachycardia)    Postoperative wound infection, right knee      PLAN / DISPOSITION: 72-year-old female postop day #2 status post right knee I&D with poly exchange for acute prosthetic knee infection  -Cultures positive for Pseudomonas and group B strep.  Light growth Pseudomonas from deep synovium as well.  Discussed with Dr. Nam Adams this morning.  Agree with 6 weeks IV antibiotics.  PICC line in place.  Plan for another dressing change prior to discharge.  -Mobilize as tolerated with therapy  -Eliquis for DVT prophylaxis  - for home health needs versus inpatient rehab.  Patient prefers home if possible.  Would be okay with me to discharge home as early as tomorrow if she does well with therapy today and antibiotic/home health  plan in place.    Sunny Reynoso MD  07/27/23  07:02 EDT

## 2023-07-27 NOTE — PLAN OF CARE
Goal Outcome Evaluation:         Patient mobilised out of bed with PT.Antibiotics  continued.     Bed        changed to  Specialty bed.Planning for Discharge alli I medically fit.

## 2023-07-27 NOTE — THERAPY TREATMENT NOTE
"Patient Name: Imelda Elaine  : 1950    MRN: [id number]                              Today's Date: 2023       Admit Date: 2023    Visit Dx:     ICD-10-CM ICD-9-CM   1. Infection of right knee  M00.9 686.9     Patient Active Problem List   Diagnosis    Morbid obesity    Palpitations    Essential hypertension    Mixed hyperlipidemia    Prediabetes    Osteoarthritis of left knee    Leukocytosis, mild, likely reactive    Abnormal EKG    Anxiety and depression    BPV (benign positional vertigo)    Elevated hemoglobin A1c    Gastroesophageal reflux disease without esophagitis    Osteopenia    Peripheral vascular disease    S/P total knee arthroplasty, left    Tremor, essential    NDIAYE (dyspnea on exertion)    Body mass index 40.0-44.9, adult    Abnormal blood level of copper    RBBB    Pre-operative clearance    Encounter for pre-operative cardiovascular clearance    Status post right knee replacement    PSVT (paroxysmal supraventricular tachycardia)    Postoperative wound infection, right knee    S/P I&D right knee with poly exchange     Past Medical History:   Diagnosis Date    Anesthesia     low bp only with foot surgery, has had surgeries since and no issue per pt report    Arthritis     Back pain     Depression     Difficulty walking     Left knee replacement  need right knee replacement    GERD (gastroesophageal reflux disease)     High cholesterol     Hypertension     Knee pain     BILATERAL    Seasonal allergies     Tremor, essential      Past Surgical History:   Procedure Laterality Date    ANTERIOR AND POSTERIOR VAGINAL REPAIR N/A 2017    Vaginal enterocele / anterior posterior colporrhaphy;  Jose Maria Chowdhury MD;  Location: [facility];  Service:     BLADDER SUSPENSION      MESH TVT?     COLONOSCOPY      Dr Chung - normal    EXPLORATORY LAPAROTOMY      \"BOWEL INTO PELVIC BONE\" /Mesh [place]     EXPLORATORY LAPAROTOMY  2004    AQUILES/ abd sacrocolpopexy/post repair    FOOT " SURGERY Left     KNEE ARTHROSCOPY Left     meniscus repair    KNEE INCISION AND DRAINAGE Right 7/25/2023    Procedure: KNEE INCISION AND DRAINAGE WITH POLY EXCHANGE - RIGHT;  Surgeon: Sunny Reynoso MD;  Location:  [facility];  Service: Orthopedics;  Laterality: Right;    LAPAROSCOPIC CHOLECYSTECTOMY Right 2000    DC ARTHRP KNE CONDYLE&PLATU MEDIAL&LAT COMPARTMENTS Left 11/16/2017    Procedure: TOTAL KNEE ARTHROPLASTY LEFT;  Surgeon: Sunny Reynoso MD;  Location:  [facility];  Service: Orthopedics    TONSILLECTOMY      TOTAL ABDOMINAL HYSTERECTOMY WITH SALPINGO OOPHORECTOMY Bilateral 1989    TOTAL KNEE ARTHROPLASTY Right 6/29/2023    Procedure: TOTAL KNEE ARTHROPLASTY WITH CORI ROBOT - RIGHT;  Surgeon: Sunny Reynoso MD;  Location:  [facility];  Service: Robotics - Ortho;  Laterality: Right;    TUBAL ABDOMINAL LIGATION      VENTRAL HERNIA REPAIR N/A 6/21/2016    Procedure: LAPAROSCOPIC REPAIR OF ABDOMINAL WALL HERNIA WITH MESH, CONVERTED TO OPEN ;  Surgeon: Bertha Delacruz MD;  Location: [facility];  Service:       General Information       Row Name 07/27/23 [facility] 07/27/23 1148       Physical Therapy Time and Intention    Document Type therapy note (daily note)  -CM therapy note (daily note)  -CM    Mode of Treatment physical therapy;individual therapy  -CM physical therapy;individual therapy  -CM      Row Name 07/27/23 1628 07/27/23 1148       General Information    Patient Profile Reviewed yes  -CM yes  -CM    Existing Precautions/Restrictions fall;other (see comments)  adductor nerve cath, RLE incision drainage  -CM fall;other (see comments)  adductor nerve cath  -CM    Barriers to Rehab medically complex;previous functional deficit  -CM medically complex;previous functional deficit  -CM      Row Name 07/27/23 1628 07/27/23 1148       Cognition    Orientation Status (Cognition) oriented x 4  -CM oriented x 4  -CM      Row Name 07/27/23 1628 07/27/23 1148       Safety Issues, Functional  Mobility    Safety Issues Affecting Function (Mobility) awareness of need for assistance;insight into deficits/self-awareness;judgment;safety precaution awareness;safety precautions follow-through/compliance;sequencing abilities  -CM awareness of need for assistance;insight into deficits/self-awareness;safety precaution awareness;safety precautions follow-through/compliance;sequencing abilities  -CM    Impairments Affecting Function (Mobility) endurance/activity tolerance;shortness of breath;strength;balance;pain;range of motion (ROM)  -CM endurance/activity tolerance;shortness of breath;strength;balance;pain;range of motion (ROM)  -CM              User Key  (r) = Recorded By, (t) = Taken By, (c) = Cosigned By      Initials Name Provider Type    Tiffany Kingston PT Physical Therapist                   Mobility       Row Name 07/27/23 1629 07/27/23 1149       Bed Mobility    Bed Mobility supine-sit;sit-supine  -CM supine-sit;sit-supine  -CM    Scooting/Bridging [place] (Bed Mobility) -- dependent (less than 25% patient effort);2 person assist;verbal cues  -CM    Supine-Sit [place] (Bed Mobility) minimum assist (75% patient effort);verbal cues;1 person assist  -CM minimum assist (75% patient effort);verbal cues;1 person assist  -CM    Sit-Supine [place] (Bed Mobility) verbal cues;nonverbal cues (demo/gesture);2 person assist;dependent (less than 25% patient effort)  depx3  -CM maximum assist (25% patient effort);verbal cues;nonverbal cues (demo/gesture);2 person assist  -CM    Assistive Device (Bed Mobility) bed rails;head of bed elevated;draw sheet  -CM bed rails;head of bed elevated;draw sheet  -CM    Comment, (Bed Mobility) Kenrick at RLE for sup to sit, depx3 for sit to sup as patient had been switched to a specialty bed and had difficulty transferring sit to sup  -CM Cues provided for sequencing, Kenrick at RLE for sup to sit, maxAx2 at BLEs and trunk for sit to sup  -CM      Row Name 07/27/23  1149          Transfers    Comment, (Transfers) upon sitting EOB, patient was noted to have increased drainage onto R knee dressing, her RN was notified and present to manage the dressing, OOB mobility deferred as the dressing became saturated quickly and patient returned to supine for management of dressing  -CM       Row Name 07/27/23 1629 07/27/23 1149       Sit-Stand Transfer    Sit-Stand [place] (Transfers) minimum assist (75% patient effort);1 person assist;verbal cues  -CM unable to assess  -CM    Assistive Device (Sit-Stand Transfers) walker, front-wheeled  -CM --    Comment, (Sit-Stand Transfer) cues for safe hand placement  -CM --      Row Name 07/27/23 1629 07/27/23 1149       Gait/Stairs (Locomotion)    [place] Level (Gait) minimum assist (75% patient effort);1 person assist;verbal cues  -CM unable to assess  -CM    Assistive Device (Gait) walker, front-wheeled  -CM --    Distance in Feet (Gait) 15+15  -CM --    Deviations/Abnormal Patterns (Gait) bilateral deviations;base of support, wide;weight shifting decreased;stride length decreased;gait speed decreased  -CM --    Bilateral Gait Deviations forward flexed posture;heel strike decreased  -CM --    Comment, (Gait/Stairs) Patient ambulated to bathroom and back with a step to gait pattern. Cues provided for upright posture with forward gaze, patient with minimal correction in gait mechanics. She did take a seated rest break on commode where she was SOA and O2 sat noted to be at 85%. Cues for PLB provided, however patient continues to perform aggressive valsalva maneuvers on toilet despite cues not to. While on toilet, patient noted to have increased drainage from incision, RN notified and was present managing post treatment  -CM --      Row Name 07/27/23 1629 07/27/23 1149       Mobility    Extremity Weight-bearing Status right lower extremity  -CM right lower extremity  -CM    Right Lower Extremity (Weight-bearing Status) weight-bearing as  tolerated (WBAT)  -CM weight-bearing as tolerated (WBAT)  -CM              User Key  (r) = Recorded By, (t) = Taken By, (c) = Cosigned By      Initials Name Provider Type    Tiffany Kingston PT Physical Therapist                   Obj/Interventions       Row Name 07/27/23 1151          Range of Motion Comprehensive    General Range of Motion lower extremity range of motion deficits identified  -CM     Comment, General Range of Motion R knee ROM 10-65  -CM       Row Name 07/27/23 1632 07/27/23 1151       Motor Skills    Therapeutic Exercise other (see comments)  deferred as nursing present redressing incision following ambulation, encouraged ankle pumps and quad sets this evening. Patient agreeable  -CM hip;knee;ankle  -CM      Row Name 07/27/23 1151          Hip (Therapeutic Exercise)    Hip (Therapeutic Exercise) strengthening exercise  -CM     Hip Strengthening (Therapeutic Exercise) right;supine;heel slides;10 repetitions;other (see comments)  with gait belt assistance, limited ROM  -CM       Row Name 07/27/23 1151          Knee (Therapeutic Exercise)    Knee (Therapeutic Exercise) isometric exercises;strengthening exercise  -CM     Knee Isometrics (Therapeutic Exercise) right;10 repetitions;quad sets;3 second hold  -CM     Knee Strengthening (Therapeutic Exercise) right;SLR (straight leg raise);SAQ (short arc quad);LAQ (long arc quad);sitting;heel slides  AAROM SLR, limited ROM with sitting heel slides, no overpressure applied  -CM       Row Name 07/27/23 1151          Ankle (Therapeutic Exercise)    Ankle (Therapeutic Exercise) AROM (active range of motion)  -CM     Ankle AROM (Therapeutic Exercise) bilateral;dorsiflexion;plantarflexion;15 repititions  -CM       Row Name 07/27/23 1632 07/27/23 1151       Balance    Balance Assessment sitting static balance;standing static balance;standing dynamic balance  -CM sitting static balance;sitting dynamic balance  -CM    Static Sitting Balance contact guard   -CM standby assist  -CM    Dynamic Sitting Balance -- standby assist  -CM    Position, Sitting Balance unsupported;sitting edge of bed  -CM sitting edge of bed;other (see comments)  intermittent BUE support  -CM    Static Standing Balance contact guard  -CM --    Dynamic Standing Balance minimal assist  -CM --    Position/Device Used, Standing Balance supported;walker, front-wheeled  -CM --              User Key  (r) = Recorded By, (t) = Taken By, (c) = Cosigned By      Initials Name Provider Type    Tiffany Kingston PT Physical Therapist                   Goals/Plan    No documentation.                  Clinical Impression       Row Name 07/27/23 1154          Pain    Additional Documentation Pain Scale: FACES Pre/Post-Treatment (Group)  -CM       Row Name 07/27/23 1634 07/27/23 1154       Pain Scale: FACES Pre/Post-Treatment    Pain: FACES Scale, Pretreatment 2-->hurts little bit  -CM 0-->no hurt  -CM    Posttreatment Pain Rating 6-->hurts even more  -CM 4-->hurts little more  -CM    Pain Location - Side/Orientation Right  -CM Right  -CM    Pain Location generalized  -CM generalized  -CM    Pain Location - knee  -CM knee  -CM    Pre/Posttreatment Pain Comment Rn aware and managing  -CM patient denies pain pre treatment, increased pain with therex, but patient tolerated well  -CM      Row Name 07/27/23 1634 07/27/23 1154       Plan of Care Review    Plan of Care Reviewed With patient  -CM patient  -CM    Progress declining  -CM no change  -CM    Outcome Evaluation Patient requiring increased assistance for mobility this afternoon. Her mobility and ambulation distance continues to be limited by increased drainage from incision, RN and MD aware. She ambulated 15'+15' Kenrick with FWW and with a seated rest break, mild desat on RA. IPPT remains indicated to address deficits. Continue to strongly recommend IPR.  -CM Patient limited in mobility today by increased drainage from incision. She completed RLE therex  supine in bed and sitting EOB. Out of bed mobility and ambulation deferred due to drainage from incision requiring nursing intervention. Patient remains limited by deficits in strength, endurance, and balance, as well as RLE ROM. Will continue to progress to patient's tolerance. Recommend D/C to IPR.  -CM      Row Name 07/27/23 1634 07/27/23 1154       Vital Signs    Pre Systolic BP Rehab --  RN and MD cleared PT for treatment  -CM 97  -CM    Pre Treatment Diastolic BP -- 71  -CM    Post Systolic BP Rehab -- 128  -CM    Post Treatment Diastolic BP -- 65  -CM    Pretreatment Heart Rate (beats/min) -- 85  -CM    Intratreatment Heart Rate (beats/min) 115  -CM --    Posttreatment Heart Rate (beats/min) -- 92  -CM    Pre SpO2 (%) -- 98  -CM    O2 Delivery Pre Treatment room air  -CM room air  -CM    Intra SpO2 (%) 85  -CM --    O2 Delivery Intra Treatment room air  -CM room air  -CM    O2 Delivery Post Treatment room air  -CM room air  -CM    Pre Patient Position Supine  -CM Supine  -CM    Intra Patient Position Sitting  -CM Sitting  -CM    Post Patient Position Supine  -CM Supine  -CM      Row Name 07/27/23 1634 07/27/23 1154       Positioning and Restraints    Pre-Treatment Position in bed  -CM in bed  -CM    Post Treatment Position bed  -CM bed  -CM    In Bed supine;with nsg;with other staff;notified nsg  -CM supine;notified nsg;with nsg;with other staff  -CM              User Key  (r) = Recorded By, (t) = Taken By, (c) = Cosigned By      Initials Name Provider Type    Tiffany Kingston, PT Physical Therapist                   Outcome Measures       Row Name 07/27/23 1637 07/27/23 1158       How much help from another person do you currently need...    Turning from your back to your side while in flat bed without using bedrails? 2  -CM 2  -CM    Moving from lying on back to sitting on the side of a flat bed without bedrails? 2  -CM 3  -CM    Moving to and from a bed to a chair (including a wheelchair)? 3  -CM 3   -CM    Standing up from a chair using your arms (e.g., wheelchair, bedside chair)? 3  -CM 3  -CM    Climbing 3-5 steps with a railing? 2  -CM 2  -CM    To walk in hospital room? 3  -CM 3  -CM    AM-PAC 6 Clicks Score (PT) 15  -CM 16  -CM    Highest level of mobility 4 --> Transferred to chair/commode  -CM 5 --> Static standing  -CM      Row Name 07/27/23 1158          Functional Assessment    Outcome Measure Options AM-PAC 6 Clicks Basic Mobility (PT)  -CM               User Key  (r) = Recorded By, (t) = Taken By, (c) = Cosigned By      Initials Name Provider Type     Tiffany Espinosa, YANICK Physical Therapist                                 Physical Therapy Education       Title: PT OT SLP Therapies (In Progress)       Topic: Physical Therapy (Done)       Point: Mobility training (Done)       Learning Progress Summary             Patient Acceptance, E, VU by CM at 7/27/2023 1637    Acceptance, E, VU by CM at 7/27/2023 1158    Acceptance, E,D, VU,NR by  at 7/26/2023 1459                         Point: Home exercise program (Done)       Learning Progress Summary             Patient Acceptance, E, VU by CM at 7/27/2023 1637    Acceptance, E, VU by CM at 7/27/2023 1158    Acceptance, E,D, VU,NR by  at 7/26/2023 1459                         Point: Body mechanics (Done)       Learning Progress Summary             Patient Acceptance, E, VU by CM at 7/27/2023 1637    Acceptance, E, VU by CM at 7/27/2023 1158    Acceptance, E,D, VU,NR by  at 7/26/2023 1459                         Point: Precautions (Done)       Learning Progress Summary             Patient Acceptance, E, VU by CM at 7/27/2023 1637    Acceptance, E, VU by CM at 7/27/2023 1158    Acceptance, E,D, VU,NR by  at 7/26/2023 1459                                         User Key       Initials Effective Dates Name Provider Type [facility] 06/01/21 -  Radha Beatty, PT Physical Therapist PT    CM 09/22/22 -  Tiffany Espinosa PT Physical  Therapist PT                  PT Recommendation and Plan     Plan of Care Reviewed With: patient  Progress: declining  Outcome Evaluation: Patient requiring increased assistance for mobility this afternoon. Her mobility and ambulation distance continues to be limited by increased drainage from incision, RN and MD aware. She ambulated 15'+15' Kenrick with FWW and with a seated rest break, mild desat on RA. IPPT remains indicated to address deficits. Continue to strongly recommend IPR.     Time Calculation:         PT Charges       Row Name 07/27/23 1637 07/27/23 1158          Time Calculation    Start Time 1533  -CM 1034  -CM     PT Received On 07/27/23  -CM 07/27/23  -CM     PT Goal Re-Cert Due Date 08/05/23  -CM 08/05/23  -CM        Timed Charges    90346 - PT Therapeutic Exercise Minutes -- 20  -CM     61657 - Gait Training Minutes  10  -CM --     20147 - PT Therapeutic Activity Minutes 30  -CM 10  -CM        Total Minutes    Timed Charges Total Minutes 40  -CM 30  -CM      Total Minutes 40  -CM 30  -CM               User Key  (r) = Recorded By, (t) = Taken By, (c) = Cosigned By      Initials Name Provider Type    CM Tiffany Espinosa, PT Physical Therapist                  Therapy Charges for Today       Code Description Service Date Service Provider Modifiers Qty    13317165922 HC PT THER PROC EA 15 MIN 7/27/2023 Tiffany Espinosa, PT GP 1    96943835451 HC PT THERAPEUTIC ACT EA 15 MIN 7/27/2023 Tiffany Espinosa, PT GP 1    36188832374 HC GAIT TRAINING EA 15 MIN 7/27/2023 Tiffany Espinosa, PT GP 1    88364630582 HC PT THERAPEUTIC ACT EA 15 MIN 7/27/2023 Tiffany Espinosa, PT GP 2            PT G-Codes  Outcome Measure Options: AM-PAC 6 Clicks Basic Mobility (PT)  AM-PAC 6 Clicks Score (PT): 15  AM-PAC 6 Clicks Score (OT): 17  PT Discharge Summary  Anticipated Discharge Disposition (PT): inpatient rehabilitation facility    Tiffany Espinosa PT  7/27/2023

## 2023-07-27 NOTE — PLAN OF CARE
Goal Outcome Evaluation:  Plan of Care Reviewed With: patient        Progress: no change  Outcome Evaluation: Patient limited in mobility today by increased drainage from incision. She completed RLE therex supine in bed and sitting EOB. Out of bed mobility and ambulation deferred due to drainage from incision requiring nursing intervention. Patient remains limited by deficits in strength, endurance, and balance, as well as RLE ROM. Will continue to progress to patient's tolerance. Recommend D/C to [facility].      Anticipated Discharge Disposition (PT): inpatient rehabilitation facility

## 2023-07-27 NOTE — PROGRESS NOTES
[facility]    Acute pain service Inpatient Progress Note    Patient Name: Imelda Elaine  :  1950  MRN:  [id number]        Acute Pain  Service Inpatient Progress Note:    Analgesia:Excellent  Pain Score:0/10  LOC: alert and awake  Resp Status: room air  Cardiac: VS stable  Side Effects:None  Catheter Site:clean, dressing intact and dry  Cath type: peripheral nerve cath with ON Q  Volume: 1mL,8ml, 8ml InfuSystem Pump.  Dosing/Volume: ropivacaine 0.2%  Catheter Plan:Catheter to remain Insitu and Continue catheter infusion rate unchanged  Comments:

## 2023-07-27 NOTE — PROGRESS NOTES
"IM progress note      Imelda Elaine  [id number]  1950     LOS: 3 days     Attending: Valerie Concepcion MD    Primary Care Provider: Comfort Colunga MD      Chief Complaint/Reason for visit:  No chief complaint on file.      Subjective   Feels ok. Fair pain control. No f/c/n/vom/sob.     Objective        Visit Vitals  /74 (BP Location: Left arm, Patient Position: Lying)   Pulse 81   Temp 97.8 °F (36.6 °C) (Oral)   Resp 18   Ht 157.5 cm (62\")   Wt (!) 150 kg (330 lb)   LMP  (LMP Unknown)   SpO2 97%   BMI 60.36 kg/m²     Temp (24hrs), Av.8 °F (36.6 °C), Min:97.6 °F (36.4 °C), Max:98.1 °F (36.7 °C)      Physical Exam:     General Appearance:    Alert, cooperative, in no acute distress   Head:    Normocephalic, without obvious abnormality, atraumatic    Lungs:     Normal effort, symmetric chest rise,  clear to      auscultation bilaterally              Heart:    Regular rhythm and normal rate, normal S1 and S2    Abdomen:     Normal bowel sounds, no masses, no organomegaly, soft        non-tender, non-distended, no guarding, no rebound  tenderness. Obese   Extremities:   Right knee Aquacel x2 CDI. Nerve block present. Flexion and dorsiflexion intact bilateral feet. Less erythema.    Pulses:   Pulses palpable and equal bilaterally   Skin:   No bleeding, bruising or rash          Results Review:     I reviewed the patient's new clinical results.   Results from last 7 days   Lab Units 23  0530 23  034   WBC 10*3/mm3 8.56 8.40   HEMOGLOBIN g/dL 8.2* 8.4*   HEMATOCRIT % 27.3* 27.2*   PLATELETS 10*3/mm3 254 253       Results from last 7 days   Lab Units 23  0530 23  0341 23  1249   SODIUM mmol/L 141 139 140   POTASSIUM mmol/L 4.5 5.1 4.2   CHLORIDE mmol/L 104 103 103   CO2 mmol/L 22.0 27.0 25.0   BUN mg/dL 22 15 15   CREATININE mg/dL 0.93 0.65 0.78   CALCIUM mg/dL 8.6 8.5* 9.2   BILIRUBIN mg/dL  --   --  0.5   ALK PHOS U/L  --   --  196*   ALT (SGPT) U/L  --   --  47* "   AST (SGOT) U/L  --   --  43*   GLUCOSE mg/dL 104* 143* 117*     Microbiology Results (last 21 days)    Collected Updated Procedure Result Status    07/25/2023 1543 07/25/2023 1715 Anaerobic Culture - Tissue, Knee, Right [[id number]]   Tissue from Knee, Right    In process Component Value   No component results             07/25/2023 1543 07/25/2023 1715 Fungus Culture - Tissue, Knee, Right [[id number]]   Tissue from Knee, Right    In process Component Value   No component results             07/25/2023 1543 07/27/2023 0747 Tissue / Bone Culture - Tissue, Knee, Right [[id number]]   (Abnormal)   Tissue from Knee, Right    Preliminary result Component Value   Tissue Culture Scant growth (1+) Pseudomonas species Abnormal  P   Gram Stain Few (2+) WBCs per low power field P    No organisms seen P             07/25/2023 1543 07/26/2023 1132 AFB Culture - Tissue, Knee, Right [[id number]]   Tissue from Knee, Right    Preliminary result Component Value   AFB Stain No acid fast bacilli seen on concentrated smear P             07/25/2023 1530 07/25/2023 1709 Anaerobic Culture - Tissue, Knee, Right [[id number]]   Tissue from Knee, Right    In process Component Value   No component results             07/25/2023 1530 07/25/2023 1709 Fungus Culture - Tissue, Knee, Right [[id number]]   Tissue from Knee, Right    In process Component Value   No component results             07/25/2023 1530 07/27/2023 0807 Tissue / Bone Culture - Tissue, Knee, Right [[id number]]   (Abnormal)   Tissue from Knee, Right    Preliminary result Component Value   Tissue Culture Rare Pseudomonas species Abnormal  P   Gram Stain Moderate (3+) WBCs per low power field P    No organisms seen P             07/25/2023 1530 07/26/2023 1131 AFB Culture - Tissue, Knee, Right [[id number]]   Tissue from Knee, Right    Preliminary result Component Value   AFB Stain No acid fast bacilli seen on concentrated smear P             07/24/2023 1415 07/26/2023 1515 Blood Culture -  Blood, Arm, Right [[id number]]   Blood from Arm, Right    Preliminary result Component Value   Blood Culture No growth at 2 days P             07/24/2023 1415 07/26/2023 1515 Blood Culture - Blood, Arm, Left [[id number]]   Blood from Arm, Left    Preliminary result Component Value   Blood Culture No growth at 2 days P             07/24/2023 1236 07/27/2023 0735 Wound Culture - Wound, Knee, Right [[id number]]     (Abnormal)   Wound from Knee, Right    Final result Component Value   Wound Culture Light growth (2+) Streptococcus agalactiae (Group B) Abnormal       This organism is considered to be universally susceptible to penicillin.  No further antibiotic testing will be performed. If Clindamycin or Erythromycin is the drug of choice, notify the laboratory within 7 days to request susceptibility testing.    Scant growth (1+) Pseudomonas aeruginosa Abnormal     Rare Escherichia coli Abnormal    Gram Stain No WBCs or organisms seen       Susceptibility     Pseudomonas aeruginosa Escherichia coli     NAVIN NAVIN     Ampicillin   4 Susceptible     Ampicillin + Sulbactam   <=2 Susceptible     Cefepime <=1 Susceptible <=1 Susceptible     Ceftazidime <=1 Susceptible <=1 Susceptible     Ceftriaxone   <=1 Susceptible     Ciprofloxacin <=0.25 Susceptible       Gentamicin <=1 Susceptible <=1 Susceptible     Levofloxacin <=0.12 Susceptible <=0.12 Susceptible     Piperacillin + Tazobactam <=4 Susceptible <=4 Susceptible     Tetracycline   <=1 Susceptible     Trimethoprim + Sulfamethoxazole   <=20 Susceptible                 Linear View     Susceptibility Comments          I reviewed the patient's new imaging including images and reports.   Latest Reference Range & Units 07/24/23 12:49   C-Reactive Protein 0.00 - 0.50 mg/dL 16.82 (H)   Lactate 0.5 - 2.0 mmol/L 1.0   (H): Data is abnormally high     Latest Reference Range & Units 07/24/23 12:49   Sed Rate 0 - 30 mm/hr 95 (H)   (H): Data is abnormally high    Microbiology Results (last 21  days)    Collected Updated Procedure Result Status    07/25/2023 1543 07/25/2023 1715 Anaerobic Culture - Tissue, Knee, Right [[id number]]   Tissue from Knee, Right    In process Component Value   No component results             07/25/2023 1543 07/25/2023 1715 Fungus Culture - Tissue, Knee, Right [[id number]]   Tissue from Knee, Right    In process Component Value   No component results             07/25/2023 1543 07/26/2023 1038 Tissue / Bone Culture - Tissue, Knee, Right [[id number]]   (Abnormal)   Tissue from Knee, Right    Preliminary result Component Value   Tissue Culture Scant growth (1+) Pseudomonas species Abnormal  P   Gram Stain Few (2+) WBCs per low power field P    No organisms seen P             07/25/2023 1543 07/26/2023 1132 AFB Culture - Tissue, Knee, Right [[id number]]   Tissue from Knee, Right    Preliminary result Component Value   AFB Stain No acid fast bacilli seen on concentrated smear P             07/25/2023 1530 07/25/2023 1709 Anaerobic Culture - Tissue, Knee, Right [[id number]]   Tissue from Knee, Right    In process Component Value   No component results             07/25/2023 1530 07/25/2023 1709 Fungus Culture - Tissue, Knee, Right [[id number]]   Tissue from Knee, Right    In process Component Value   No component results             07/25/2023 1530 07/26/2023 1039 Tissue / Bone Culture - Tissue, Knee, Right [[id number]]   Tissue from Knee, Right    Preliminary result Component Value   Tissue Culture No growth P   Gram Stain Moderate (3+) WBCs per low power field P    No organisms seen P             07/25/2023 1530 07/26/2023 1131 AFB Culture - Tissue, Knee, Right [[id number]]   Tissue from Knee, Right    Preliminary result Component Value   AFB Stain No acid fast bacilli seen on concentrated smear P             07/24/2023 1415 07/25/2023 1515 Blood Culture - Blood, Arm, Right [[id number]]   Blood from Arm, Right    Preliminary result Component Value   Blood Culture No growth at 24 hours P              07/24/2023 1415 07/25/2023 1515 Blood Culture - Blood, Arm, Left [[id number]]   Blood from Arm, Left    Preliminary result Component Value   Blood Culture No growth at 24 hours P             07/24/2023 1236 07/26/2023 0939 Wound Culture - Wound, Knee, Right [[id number]]    (Abnormal)   Wound from Knee, Right    Preliminary result Component Value   Wound Culture Light growth (2+) Streptococcus agalactiae (Group B) Abnormal  P      This organism is considered to be universally susceptible to penicillin.  No further antibiotic testing will be performed. If Clindamycin or Erythromycin is the drug of choice, notify the laboratory within 7 days to request susceptibility testing.    Scant growth (1+) Pseudomonas species Abnormal  P    Rare Gram Negative Bacilli Abnormal  P   Gram Stain No WBCs or organisms seen P          All medications reviewed.   acetaminophen, 1,000 mg, Oral, Q8H  apixaban, 2.5 mg, Oral, Q12H  atorvastatin, 10 mg, Oral, Nightly  cetirizine, 10 mg, Oral, Daily  estradiol, 0.5 mg, Oral, Daily  losartan, 100 mg, Oral, Daily  nebivolol, 5 mg, Oral, Q24H  piperacillin-tazobactam, 3.375 g, Intravenous, Q8H  senna-docusate sodium, 2 tablet, Oral, BID  sodium chloride, 10 mL, Intravenous, Q12H  sodium chloride, 10 mL, Intravenous, Q12H  venlafaxine XR, 75 mg, Oral, Daily      bisacodyl, 10 mg, Daily PRN  polyethylene glycol, 17 g, Daily PRN   And  bisacodyl, 5 mg, Daily PRN   And  bisacodyl, 10 mg, Daily PRN  diphenhydrAMINE, 25 mg, Q6H PRN   Or  diphenhydrAMINE, 25 mg, Q6H PRN  docusate sodium, 100 mg, BID PRN  HYDROmorphone, 0.5 mg, Q2H PRN   And  naloxone, 0.1 mg, Q5 Min PRN  ketorolac, 15 mg, Q6H PRN  labetalol, 10 mg, Q4H PRN  magnesium hydroxide, 10 mL, Daily PRN  ondansetron, 4 mg, Q6H PRN   Or  ondansetron, 4 mg, Q6H PRN  oxyCODONE, 10 mg, Q4H PRN  oxyCODONE, 5 mg, Q4H PRN  senna-docusate sodium, 2 tablet, BID PRN  [MAR Hold] sodium chloride, 500 mL, TID PRN  sodium chloride, 500 mL, TID  PRN  sodium chloride, 10 mL, PRN  sodium chloride, 10 mL, PRN  sodium chloride, 20 mL, PRN  sodium chloride, 40 mL, PRN        Assessment & Plan       S/P I&D right knee with poly exchange    Morbid obesity    Essential hypertension    Anxiety and depression    Elevated hemoglobin A1c    Peripheral vascular disease    PSVT (paroxysmal supraventricular tachycardia)    Postoperative wound infection, right knee    S/p PICC placement.        Plan  1. PT/OT- WBAT RLE  2. Pain control-prns, AC nerve block  3. IS-encouraged  [facility]. [facility]  5. Bowel regimen  6. Monitor post-op labs  7. DC planning for home. CM following    Follow Wound culture, blood cultures, labs including inflammatory marker.      Continue Broad-spectrum IV antibiotics to include daptomycin and Zosyn,  ID consulted, Dr Nam Adams following .         Anxiety and depression: Resumed home med regimen.        Hypertension:  Resumed home medications as appropriate, formulary substitution when indicated.  Holding parameters.  Prn medications for elevated blood pressure.     Morbid obesity: Complicates all aspects of care.      Valerie Concepcion MD  07/27/23  11:24 EDT

## 2023-07-27 NOTE — PLAN OF CARE
Goal Outcome Evaluation:  Plan of Care Reviewed With: patient        Progress: declining  Outcome Evaluation: Patient requiring increased assistance for mobility this afternoon. Her mobility and ambulation distance continues to be limited by increased drainage from incision, RN and MD aware. She ambulated 15'+15' [place] with FWW and with a seated rest break, mild desat on RA. IPPT remains indicated to address deficits. Continue to strongly recommend IPR.      Anticipated Discharge Disposition (PT): inpatient rehabilitation facility

## 2023-07-27 NOTE — PLAN OF CARE
Moderate to severe edema noted to BLE. Mild erythema noted to anterior aspect of right knee. Hydrofiber dressing CDI. Maintenance fluid started on this shift. VSS on RA. SR on cardiac mx. Patient denies pain/discomfort at this time. Call light in reach.

## 2023-07-27 NOTE — PROGRESS NOTES
INFECTIOUS DISEASE Progress note    Imelda Elaine  1950  [id number]    Date of consult: 7/24/23    Admit date: 7/24/2023    Requesting Provider: Sunny Reynoso,*  Evaluating physician: Nam Adams MD  Reason for Consultation: Postop wound infection   Chief Complaint: right knee infection      Subjective   History of present illness:  Imelda Elaine is a  72 y.o.  Yr old female with a past medical history of hypertension, HLD,a dn osteoarthritis s/p left knee replacement in 2017 who underwent a right total knee replacement 6/29/2023. The patient initially did well post-operatively but subsequently started having swelling of the knee and increased pain about 1 week after surgery with mild erythema around incision site. This occurred right after she bent down when she was cleaning up after going to the bathroom and felt a pop behind her right knee. She was seen in clinic by Dr. Reynoso and was started on oral Bactrim about 1 week ago. Presented back to Dr. Reynoso's clinic today and had increased erythema, pain, and swelling along with drainage from incision site. She was also experiencing some fevers and chills at home. She was directly admitted to Dr. Concepcion's service at [facility]. Plan by Dr. Reynoso is to take the patient to the OR tomorrow for right knee I&D with poly exchange. The patient has remained afebrile since arrival. ESR was elevated to 95 and CRP was  16.8. Lactic acid was 1.0. Wound cx from right knee is in progress and blood cultures have been sent and are in progress. Cr was 0.78. LFTs were mildly elevated with ALT 47, AST 43, Alk Phos 196. T gemini was normal at 0.5. No new radiographic data. The patient has been started on IV Daptomycin and Ceftriaxone by the primary team. ID has been consulted for abx recs.     Subjective:    7/25/23: The patient has ongoing pain and redness around her right knee.  No new complaints today.  No fevers.  No nausea, vomiting, or diarrhea.  No new rashes  "reported.  Plan for OR later today. Nursing reports difficulty with peripheral IV access and asks for PICC line placement.    7/26/23: The patient is doing okay today.  Right knee pain and redness is improving somewhat.  Not having any fevers.  No nausea, vomiting, or diarrhea.    7/27/23: The patient had a PICC line placed yesterday.  Still having some pain and swelling of her right knee but slightly improved.  She is not having any fevers.  No nausea, vomiting, or diarrhea.  Superficial wound cultures grew Pseudomonas, E. Coli, and group B strep.  Deeper surgical cultures with Pseudomonas species with final ID and susceptibilities pending.    Past Medical History:   Diagnosis Date    Anesthesia 2002    low bp only with foot surgery, has had surgeries since and no issue per pt report    Arthritis     Back pain     Depression     Difficulty walking     Left knee replacement  need right knee replacement    GERD (gastroesophageal reflux disease)     High cholesterol     Hypertension     Knee pain     BILATERAL    Seasonal allergies     Tremor, essential        Past Surgical History:   Procedure Laterality Date    ANTERIOR AND POSTERIOR VAGINAL REPAIR N/A 4/17/2017    Vaginal enterocele / anterior posterior colporrhaphy;  Jose Maria Chowdhury MD;  Location:  [facility];  Service:     BLADDER SUSPENSION  1999    MESH TVT?     COLONOSCOPY  2018    Dr Chung - normal    EXPLORATORY LAPAROTOMY  2010    \"BOWEL INTO PELVIC BONE\" /Mesh [place]     EXPLORATORY LAPAROTOMY  04/2004    AQUILES/ abd sacrocolpopexy/post repair    FOOT SURGERY Left     KNEE ARTHROSCOPY Left     meniscus repair    KNEE INCISION AND DRAINAGE Right 7/25/2023    Procedure: KNEE INCISION AND DRAINAGE WITH POLY EXCHANGE - RIGHT;  Surgeon: Sunny Reynoso MD;  Location:  [facility];  Service: Orthopedics;  Laterality: Right;    LAPAROSCOPIC CHOLECYSTECTOMY Right 2000    CA ARTHRP KNE CONDYLE&PLATU MEDIAL&LAT COMPARTMENTS Left 11/16/2017    Procedure: TOTAL " "KNEE ARTHROPLASTY LEFT;  Surgeon: Sunny Reynoso MD;  Location:  [facility];  Service: Orthopedics    TONSILLECTOMY      TOTAL ABDOMINAL HYSTERECTOMY WITH SALPINGO OOPHORECTOMY Bilateral 1989    TOTAL KNEE ARTHROPLASTY Right 6/29/2023    Procedure: TOTAL KNEE ARTHROPLASTY WITH CORI ROBOT - RIGHT;  Surgeon: Sunny Reynoso MD;  Location:  [facility];  Service: Robotics - Ortho;  Laterality: Right;    TUBAL ABDOMINAL LIGATION      VENTRAL HERNIA REPAIR N/A 6/21/2016    Procedure: LAPAROSCOPIC REPAIR OF ABDOMINAL WALL HERNIA WITH MESH, CONVERTED TO OPEN ;  Surgeon: Bertha Delacruz MD;  Location:  [facility];  Service:        Pediatric History   Patient Parents    Not on file     Other Topics Concern    Not on file   Social History Narrative    Patient consumes 1-2 cups high-caffeine coffee, 1 [employer] sweet tea daily.     Patient lives at home with her .        family history includes Alzheimer's disease in her maternal grandmother; Aneurysm (age of onset: 57) in her father; Cancer in her father; Diabetes in her mother; Heart attack (age of onset: 59) in her mother; Hepatitis in her brother; Hypertension in her mother; Kidney disease (age of onset: 58) in her brother; Leukemia in her maternal grandfather; No Known Problems in her daughter, paternal grandfather, and son; Parkinsonism in her maternal grandmother; Pneumonia in her maternal grandmother.    Allergies   Allergen Reactions    Primidone Other (See Comments)     Hair loss    Topiramate Diarrhea    Tuberculin Tests Swelling and Rash     Pt has chest xrays to check     Zostavax [Zoster Vaccine Live] Swelling and Rash     \"Swelling of injection site of arm\"       Immunization History   Administered Date(s) Administered    COVID-19 (MODERNA) BIVALENT 12+YRS 09/04/2022    COVID-19 (MODERNA) Monovalent Original Booster 04/19/2022    COVID-19 (PFIZER) Purple Cap Monovalent 02/06/2021, 02/27/2021, 09/26/2021    Fluad Quad 65+ 09/14/2020    " Fluzone High Dose =>65 Years ([facility] ONLY) 10/02/2017, 09/22/2018, 10/04/2019    Influenza, Unspecified 09/14/2020    Pneumococcal Conjugate 13-Valent (PCV13) 10/02/2017    Pneumococcal Polysaccharide (PPSV23) 10/04/2019       Medication:    Current Facility-Administered Medications:     acetaminophen (TYLENOL) tablet 1,000 mg, 1,000 mg, Oral, Q8H, Grisel Leonardo, APRN, 1,000 mg at 07/27/23 0500    apixaban (ELIQUIS) tablet 2.5 mg, 2.5 mg, Oral, Q12H, Sunny Reynoso MD, 2.5 mg at 07/27/23 0940    atorvastatin (LIPITOR) tablet 10 mg, 10 mg, Oral, Nightly, Sunny Reynoso MD, 10 mg at 07/26/23 1913    bisacodyl (DULCOLAX) EC tablet 10 mg, 10 mg, Oral, Daily PRN, Sunny Reynoso MD    sennosides-docusate (PERICOLACE) 8.6-50 MG per tablet 2 tablet, 2 tablet, Oral, BID, 2 tablet at 07/27/23 0941 **AND** polyethylene glycol (MIRALAX) packet 17 g, 17 g, Oral, Daily PRN **AND** bisacodyl (DULCOLAX) EC tablet 5 mg, 5 mg, Oral, Daily PRN **AND** bisacodyl (DULCOLAX) suppository 10 mg, 10 mg, Rectal, Daily PRN, Sunny Reynoso MD    cetirizine (zyrTEC) tablet 10 mg, 10 mg, Oral, Daily, Sunny Reynoso MD, 10 mg at 07/27/23 0940    diphenhydrAMINE (BENADRYL) capsule 25 mg, 25 mg, Oral, Q6H PRN **OR** diphenhydrAMINE (BENADRYL) injection 25 mg, 25 mg, Intravenous, Q6H PRN, Sunny Reynoso MD    docusate sodium (COLACE) capsule 100 mg, 100 mg, Oral, BID PRN, Sunny Reynoso MD    estradiol (ESTRACE) tablet 0.5 mg, 0.5 mg, Oral, Daily, Sunny Reynoso MD, 0.5 mg at 07/27/23 0940    HYDROmorphone (DILAUDID) injection 0.5 mg, 0.5 mg, Intravenous, Q2H PRN, 0.5 mg at 07/25/23 1838 **AND** naloxone (NARCAN) injection 0.1 mg, 0.1 mg, Intravenous, Q5 Min PRN, Sunny Reynoso MD    ketorolac (TORADOL) injection 15 mg, 15 mg, Intravenous, Q6H PRN, Sunny Reynoso MD    labetalol (NORMODYNE,TRANDATE) injection 10 mg, 10 mg, Intravenous, Q4H PRN,  Sunny Reynoso MD    lactated ringers infusion, 9 mL/hr, Intravenous, Continuous, Sunny Reynoso MD, Last Rate: 9 mL/hr at 07/25/23 1428, Currently Infusing at 07/25/23 1428    losartan (COZAAR) tablet 100 mg, 100 mg, Oral, Daily, Sunny Reynoso MD, 100 mg at 07/26/23 0815    magnesium hydroxide (MILK OF MAGNESIA) 400 MG/5ML suspension 10 mL, 10 mL, Oral, Daily PRN, Sunny Reynoso MD    nebivolol (BYSTOLIC) tablet 5 mg, 5 mg, Oral, Q24H, Sunny Reynoso MD, 5 mg at 07/27/23 0940    ondansetron (ZOFRAN) tablet 4 mg, 4 mg, Oral, Q6H PRN **OR** ondansetron (ZOFRAN) injection 4 mg, 4 mg, Intravenous, Q6H PRN, Sunny Reynoso MD    oxyCODONE (ROXICODONE) immediate release tablet 10 mg, 10 mg, Oral, Q4H PRN, Sunny Reynoso MD    oxyCODONE (ROXICODONE) immediate release tablet 5 mg, 5 mg, Oral, Q4H PRN, Sunny Reynoso MD    piperacillin-tazobactam (ZOSYN) 3.375 g in iso-osmotic dextrose 50 ml (premix), 3.375 g, Intravenous, Q8H, Nam Adams MD, 3.375 g at 07/27/23 0942    ropivacaine (NAROPIN) 0.2 % infusion (INFUSYSTEM), , Peripheral Nerve, Continuous, Sunny Reynoso MD, 1,000 mg at 07/25/23 1737    sennosides-docusate (PERICOLACE) 8.6-50 MG per tablet 2 tablet, 2 tablet, Oral, BID PRN, Sunny Reynoso MD    [MAR Hold] sodium chloride 0.9 % bolus 500 mL, 500 mL, Intravenous, TID PRN, Valerie Concepcion MD    sodium chloride 0.9 % bolus 500 mL, 500 mL, Intravenous, TID PRN, Grisel Leonardo, APRN, Last Rate: 250 mL/hr at 07/27/23 0508, 500 mL at 07/27/23 0508    sodium chloride 0.9 % flush 10 mL, 10 mL, Intravenous, Q12H, Sunny Reynoso MD, 10 mL at 07/27/23 0941    sodium chloride 0.9 % flush 10 mL, 10 mL, Intravenous, PRN, Sunny Reynoso MD    sodium chloride 0.9 % flush 10 mL, 10 mL, Intravenous, Q12H, Sunny Reynoso MD, 10 mL at 07/27/23 0941    sodium chloride 0.9 % flush 10 mL, 10  "mL, Intravenous, PRN, Sunny Reynoso MD    sodium chloride 0.9 % flush 20 mL, 20 mL, Intravenous, PRN, Sunny Reynoso MD    sodium chloride 0.9 % infusion 40 mL, 40 mL, Intravenous, PRN, Sunny Reynoso MD    sodium chloride 0.9 % infusion, 100 mL/hr, Intravenous, Continuous, Sunny Reynoso MD, Last Rate: 100 mL/hr at 07/26/23 1912, 100 mL/hr at 07/26/23 1912    venlafaxine XR (EFFEXOR-XR) 24 hr capsule 75 mg, 75 mg, Oral, Daily, Sunny Reynoso MD, 75 mg at 07/27/23 0940    Please refer to the medical record for a full medication list    Review of Systems:    As above    Physical Exam:   Vital Signs   Temp:  [97.6 °F (36.4 °C)-98.1 °F (36.7 °C)] 97.6 °F (36.4 °C)  Heart Rate:  [70-91] 71  Resp:  [18] 18  BP: ()/(43-79) 99/46    Temp  Min: 97.6 °F (36.4 °C)  Max: 98.1 °F (36.7 °C)  BP  Min: 99/46  Max: 136/79  Pulse  Min: 70  Max: 91  Resp  Min: 18  Max: 18  SpO2  Min: 91 %  Max: 96 %    Blood pressure 99/46, pulse 71, temperature 97.6 °F (36.4 °C), temperature source Oral, resp. rate 18, height 157.5 cm (62\"), weight (!) 150 kg (330 lb), SpO2 95 %, not currently breastfeeding.  GENERAL: Awake and alert, in no acute distress. Appears stated age.  Obese  HEENT:  Normocephalic, atraumatic.  No external or lesions noted.  No thrush noted.  EYES:  No conjunctival injection. No icterus.   HEART: RRR, no murmur  LUNGS: CTA B. Nonlabored breathing  ABDOMEN: Obese abdomen. Nondistended  MSK: Right knee with sterile surgical dressing in place over incision site does have some faint surrounding erythema over lateral portion and medial portion of the knee-Stable  GENITAL: External urinary catheter in place  SKIN: No new generalized rashes noted  PSYCHIATRIC: cooperative.  Appropriate mood and affect  EXT:  No cellulitic change.  NEURO: awake alert and oriented ×4.  Normal speech and cognition    PICC line site is without any erythema    Results Review:   I reviewed the " patient's new clinical results.    Results from last 7 days   Lab Units 07/27/23  0530 07/26/23  0341   WBC 10*3/mm3 8.56 8.40   HEMOGLOBIN g/dL 8.2* 8.4*   HEMATOCRIT % 27.3* 27.2*   PLATELETS 10*3/mm3 254 253     Results from last 7 days   Lab Units 07/27/23  0530   SODIUM mmol/L 141   POTASSIUM mmol/L 4.5   CHLORIDE mmol/L 104   CO2 mmol/L 22.0   BUN mg/dL 22   CREATININE mg/dL 0.93   GLUCOSE mg/dL 104*   CALCIUM mg/dL 8.6     Results from last 7 days   Lab Units 07/24/23  1249   ALK PHOS U/L 196*   BILIRUBIN mg/dL 0.5   ALT (SGPT) U/L 47*   AST (SGOT) U/L 43*     Results from last 7 days   Lab Units 07/24/23  1249   SED RATE mm/hr 95*     Results from last 7 days   Lab Units 07/24/23  1249   CRP mg/dL 16.82*         Results from last 7 days   Lab Units 07/24/23  1249   LACTATE mmol/L 1.0     Estimated Creatinine Clearance: 78.9 mL/min (by C-G formula based on SCr of 0.93 mg/dL).  CPK          7/24/2023    12:49   Common Labsle   Creatine Kinase 64       Procalitonin Results:       Brief Urine Lab Results       None           No results found for: SITE, ALLENTEST, PHART, KML7CXH, PO2ART, FNE0UWV, BASEEXCESS, C4EBVVWM, HGBBG, HCTABG, OXYHEMOGLOBI, METHHGBN, CARBOXYHGB, CO2CT, BAROMETRIC, MODALITY, FIO2     Microbiology:    Blood culture x2: In process    Right knee surgical culture: In process    Radiology:  Imaging Results (Last 72 Hours)       Procedure Component Value Units Date/Time    XR Knee 1 or 2 View Right [[id number]] Collected: 07/25/23 1716     Updated: 07/25/23 1721    Narrative:      XR KNEE 1 OR 2 VW RIGHT    Date of Exam: 7/25/2023 4:56 PM EDT    Indication: Post-Op Knee Arthoplasty    Comparison: 6/29/2023    Findings:  Postoperative changes of right knee arthroplasty revision with placement of antibiotic beads. Expected soft tissue gas. Alignment appears intact. No evidence of immediate hardware complication or periprosthetic fracture.      Impression:      Impression:  Postoperative changes of  right knee arthroplasty revision with placement of antibiotic beads. No evidence of immediate hardware complication.      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 Now with group B strep, E. Coli, and Pseudomonas species from superficial culture from right knee.  Also has Pseudomonas species from surgical culture growing so far.  Mildly elevated LFTs  Morbid obesity-complicates all aspects of care.  Predisposes the patient to poor wound healing and infection.  History of left knee replacement in 2017  HTN  HLD    RECOMMENDATIONS:    -Continue to closely monitor CBC with differential and CMP.  Baseline CPK level is okay  -Follow pending blood cultures and superficial wound culture from the right knee.  Blood cultures remain no growth to date.  Superficial wound culture with group B strep, Pseudomonas, and E. coli  -Continue Zosyn for now  -Status post I&D and poly-exchange by Dr. Reynoso 7/25/23. I will follow surgical cultures-Now with Pseudomonas species, Final susceptibilities pending  -Status post PICC line placement      I discussed with nursing staff at bedside today    I discussed her case with Dr. Reynoso today.  He agrees with treatment with 6 weeks of IV antibiotics and continue with close monitoring.    Tentative antibiotic plan below pending final surgical culture results. May need to discharge to [facility] or another rehab facility at time of discharge for physical therapy. If she discharges to [facility] then one of my partners can follow her there. I will be off from 7/28-7/30. I will have Dr. Sterling Adams check on the patient tomorrow for me.    UM/KRISTINA:  Zosyn 4.5g IV q8h (can be dosed 13.5g IV q24h continuous infusion when she eventually discharges home as her  works and could not infuse her 3 times  daily). Anticipate continuing this antibiotic at least until 9/4/23 for a 6 week course.  Weekly CBC with differential, CMP, CRP  Change the PICC line dressing weekly with a Biopatch or Tegaderm CHG gel dressing   Can be followed by one of my partners at [facility] if she discharges there. Otherwise she will need to follow up in my clinic within 1 week of her discharge.  Please fax a copy of my orders to [facility] at [phone] and call [phone] with final discharge plans     Thank you for asking me to see Imelda Elaine.     Complex MDM.  At some risk for loss of right limb. I spent greater than 45 minutes on the patient's case today, greater than 50% of which was spent in conference and care coordination.    Nam Adams MD  7/27/2023

## 2023-07-28 VITALS
DIASTOLIC BLOOD PRESSURE: 82 MMHG | BODY MASS INDEX: 53.92 KG/M2 | SYSTOLIC BLOOD PRESSURE: 141 MMHG | WEIGHT: 293 LBS | HEART RATE: 88 BPM | TEMPERATURE: 97.8 F | HEIGHT: 62 IN | RESPIRATION RATE: 18 BRPM | OXYGEN SATURATION: 94 %

## 2023-07-28 LAB
BACTERIA SPEC AEROBE CULT: ABNORMAL
BACTERIA SPEC AEROBE CULT: ABNORMAL
BASOPHILS # BLD AUTO: 0.02 10*3/MM3 (ref 0–0.2)
BASOPHILS NFR BLD AUTO: 0.3 % (ref 0–1.5)
DEPRECATED RDW RBC AUTO: 51.4 FL (ref 37–54)
EOSINOPHIL # BLD AUTO: 0.33 10*3/MM3 (ref 0–0.4)
EOSINOPHIL NFR BLD AUTO: 4.4 % (ref 0.3–6.2)
ERYTHROCYTE [DISTWIDTH] IN BLOOD BY AUTOMATED COUNT: 15.3 % (ref 12.3–15.4)
GRAM STN SPEC: ABNORMAL
HCT VFR BLD AUTO: 25.2 % (ref 34–46.6)
HGB BLD-MCNC: 7.8 G/DL (ref 12–15.9)
IMM GRANULOCYTES # BLD AUTO: 0.03 10*3/MM3 (ref 0–0.05)
IMM GRANULOCYTES NFR BLD AUTO: 0.4 % (ref 0–0.5)
LYMPHOCYTES # BLD AUTO: 1.76 10*3/MM3 (ref 0.7–3.1)
LYMPHOCYTES NFR BLD AUTO: 23.6 % (ref 19.6–45.3)
MCH RBC QN AUTO: 28.7 PG (ref 26.6–33)
MCHC RBC AUTO-ENTMCNC: 31 G/DL (ref 31.5–35.7)
MCV RBC AUTO: 92.6 FL (ref 79–97)
MONOCYTES # BLD AUTO: 0.77 10*3/MM3 (ref 0.1–0.9)
MONOCYTES NFR BLD AUTO: 10.3 % (ref 5–12)
NEUTROPHILS NFR BLD AUTO: 4.54 10*3/MM3 (ref 1.7–7)
NEUTROPHILS NFR BLD AUTO: 61 % (ref 42.7–76)
NRBC BLD AUTO-RTO: 0 /100 WBC (ref 0–0.2)
PLATELET # BLD AUTO: 229 10*3/MM3 (ref 140–450)
PMV BLD AUTO: 10.9 FL (ref 6–12)
RBC # BLD AUTO: 2.72 10*6/MM3 (ref 3.77–5.28)
WBC NRBC COR # BLD: 7.45 10*3/MM3 (ref 3.4–10.8)

## 2023-07-28 PROCEDURE — 97110 THERAPEUTIC EXERCISES: CPT

## 2023-07-28 PROCEDURE — 97164 PT RE-EVAL EST PLAN CARE: CPT

## 2023-07-28 PROCEDURE — 97607 NEG PRS WND THR NDME<=50SQCM: CPT

## 2023-07-28 PROCEDURE — 25010000002 PIPERACILLIN SOD-TAZOBACTAM PER 1 G: Performed by: INTERNAL MEDICINE

## 2023-07-28 PROCEDURE — 85025 COMPLETE CBC W/AUTO DIFF WBC: CPT | Performed by: ORTHOPAEDIC SURGERY

## 2023-07-28 RX ORDER — AMOXICILLIN 250 MG
2 CAPSULE ORAL 2 TIMES DAILY PRN
Start: 2023-07-28

## 2023-07-28 RX ORDER — ROPIVACAINE HYDROCHLORIDE 2 MG/ML
1 INJECTION, SOLUTION EPIDURAL; INFILTRATION; PERINEURAL CONTINUOUS
Start: 2023-07-28

## 2023-07-28 RX ORDER — PSEUDOEPHEDRINE HCL 30 MG
100 TABLET ORAL 2 TIMES DAILY PRN
Start: 2023-07-28

## 2023-07-28 RX ADMIN — CETIRIZINE HYDROCHLORIDE 10 MG: 10 TABLET, FILM COATED ORAL at 09:23

## 2023-07-28 RX ADMIN — Medication 10 ML: at 09:28

## 2023-07-28 RX ADMIN — PIPERACILLIN SODIUM AND TAZOBACTAM SODIUM 3.38 G: 3; .375 INJECTION, SOLUTION INTRAVENOUS at 03:12

## 2023-07-28 RX ADMIN — ACETAMINOPHEN 1000 MG: 500 TABLET ORAL at 05:04

## 2023-07-28 RX ADMIN — ACETAMINOPHEN 1000 MG: 500 TABLET ORAL at 13:56

## 2023-07-28 RX ADMIN — LOSARTAN POTASSIUM 100 MG: 50 TABLET, FILM COATED ORAL at 09:24

## 2023-07-28 RX ADMIN — ESTRADIOL 0.5 MG: 0.5 TABLET ORAL at 09:19

## 2023-07-28 RX ADMIN — APIXABAN 2.5 MG: 2.5 TABLET, FILM COATED ORAL at 09:23

## 2023-07-28 RX ADMIN — Medication 10 ML: at 09:27

## 2023-07-28 RX ADMIN — VENLAFAXINE HYDROCHLORIDE 75 MG: 75 CAPSULE, EXTENDED RELEASE ORAL at 09:23

## 2023-07-28 RX ADMIN — PIPERACILLIN SODIUM AND TAZOBACTAM SODIUM 3.38 G: 3; .375 INJECTION, SOLUTION INTRAVENOUS at 09:42

## 2023-07-28 RX ADMIN — NEBIVOLOL 5 MG: 5 TABLET ORAL at 09:25

## 2023-07-28 NOTE — THERAPY RE-EVALUATION
"Acute Care - Wound/Debridement Initial Evaluation  [facility]     Patient Name: Imelda Elaine  : 1950  MRN: [id number]  Today's Date: 2023                Admit Date: 2023    Visit Dx:    ICD-10-CM ICD-9-CM   1. Infection of right knee  M00.9 686.9       Patient Active Problem List   Diagnosis    Morbid obesity    Palpitations    Essential hypertension    Mixed hyperlipidemia    Prediabetes    Osteoarthritis of left knee    Leukocytosis, mild, likely reactive    Abnormal EKG    Anxiety and depression    BPV (benign positional vertigo)    Elevated hemoglobin A1c    Gastroesophageal reflux disease without esophagitis    Osteopenia    Peripheral vascular disease    S/P total knee arthroplasty, left    Tremor, essential    NDIAYE (dyspnea on exertion)    Body mass index 40.0-44.9, adult    Abnormal blood level of copper    RBBB    Pre-operative clearance    Encounter for pre-operative cardiovascular clearance    Status post right knee replacement    PSVT (paroxysmal supraventricular tachycardia)    Postoperative wound infection, right knee    S/P I&D right knee with poly exchange        Past Medical History:   Diagnosis Date    Anesthesia     low bp only with foot surgery, has had surgeries since and no issue per pt report    Arthritis     Back pain     Depression     Difficulty walking     Left knee replacement  need right knee replacement    GERD (gastroesophageal reflux disease)     High cholesterol     Hypertension     Knee pain     BILATERAL    Seasonal allergies     Tremor, essential         Past Surgical History:   Procedure Laterality Date    ANTERIOR AND POSTERIOR VAGINAL REPAIR N/A 2017    Vaginal enterocele / anterior posterior colporrhaphy;  Jose Maria Chowdhury MD;  Location: [facility];  Service:     BLADDER SUSPENSION      MESH TVT?     COLONOSCOPY      Dr Chung - normal    EXPLORATORY LAPAROTOMY      \"BOWEL INTO PELVIC BONE\" /Mesh [place]     EXPLORATORY LAPAROTOMY  " 04/2004    AQUILES/ abd sacrocolpopexy/post repair    FOOT SURGERY Left     KNEE ARTHROSCOPY Left     meniscus repair    KNEE INCISION AND DRAINAGE Right 7/25/2023    Procedure: KNEE INCISION AND DRAINAGE WITH POLY EXCHANGE - RIGHT;  Surgeon: Sunny Reynoso MD;  Location:  [facility];  Service: Orthopedics;  Laterality: Right;    LAPAROSCOPIC CHOLECYSTECTOMY Right 2000    HI ARTHRP KNE CONDYLE&PLATU MEDIAL&LAT COMPARTMENTS Left 11/16/2017    Procedure: TOTAL KNEE ARTHROPLASTY LEFT;  Surgeon: Sunny Reynoso MD;  Location:  [facility];  Service: Orthopedics    TONSILLECTOMY      TOTAL ABDOMINAL HYSTERECTOMY WITH SALPINGO OOPHORECTOMY Bilateral 1989    TOTAL KNEE ARTHROPLASTY Right 6/29/2023    Procedure: TOTAL KNEE ARTHROPLASTY WITH CORI ROBOT - RIGHT;  Surgeon: Sunny Reynoso MD;  Location:  [facility];  Service: Robotics - Ortho;  Laterality: Right;    TUBAL ABDOMINAL LIGATION      VENTRAL HERNIA REPAIR N/A 6/21/2016    Procedure: LAPAROSCOPIC REPAIR OF ABDOMINAL WALL HERNIA WITH MESH, CONVERTED TO OPEN ;  Surgeon: Bertha Delacruz MD;  Location:  [facility];  Service:            Wound 07/24/23 1145 Right knee (Active)   Dressing Appearance dry;intact;no drainage 07/28/23 0800   Closure Other (Comment) 07/28/23 1000   Periwound warm 07/28/23 1000   Periwound Temperature warm 07/27/23 1800   Drainage Characteristics/Odor serosanguineous 07/27/23 2000   Drainage Amount moderate 07/27/23 2000   Care, Wound dressing removed 07/27/23 1600   Dressing Care dressing changed;border dressing 07/27/23 2339       Wound 07/25/23 Right anterior knee Incision (Active)   Dressing Appearance no drainage;dry;intact 07/28/23 0800   Base moist;pink 07/28/23 0745   Periwound edematous 07/28/23 1000   Periwound Temperature warm 07/28/23 1000   Drainage Characteristics/Odor serosanguineous 07/28/23 0745   Drainage Amount none 07/28/23 1000   Care, Wound negative pressure wound therapy 07/28/23 1000   Dressing Care  dressing changed;border dressing 07/27/23 2339       NPWT (Negative Pressure Wound Therapy) 07/28/23 0745 R knee (Active)   Therapy Setting continuous therapy 07/28/23 0745   Dressing other (see comments) 07/28/23 0745   Pressure Setting 125 mmHg 07/28/23 0745   Sponges Inserted 1 07/28/23 0745   Finger sweep complete Other (see somments0 07/28/23 0745         WOUND DEBRIDEMENT                     PT Assessment (last 12 hours)       PT Evaluation and Treatment       Row Name 07/28/23 0745          Physical Therapy Time and Intention    Subjective Information complains of;weakness;fatigue  -     Document Type evaluation;therapy note (daily note);wound care  -     Mode of Treatment individual therapy;physical therapy  -       Row Name 07/28/23 0745          General Information    Patient Profile Reviewed yes  -     Patient Observations alert;cooperative;agree to therapy  -     Pertinent History of Current Functional Problem Draining R knee incision  -     Risks Reviewed patient:;increased discomfort  -     Benefits Reviewed patient:;improve skin integrity  -     Barriers to Rehab medically complex;previous functional deficit;physical barrier  -       Row Name 07/28/23 0745          Pain    Pretreatment Pain Rating 0/10 - no pain  -     Posttreatment Pain Rating 0/10 - no pain  -     Pre/Posttreatment Pain Comment 5/10 pain with touch to incision line and periwound areas  -     Pain Intervention(s) Repositioned  -       Row Name 07/28/23 0745          Health Promotion    Additional Documentation NPWT (Negative Pressure Wound Therapy) (LDA)  -       Row Name             Wound 07/24/23 1145 Right knee    Wound - Properties Group Placement Date: 07/24/23  -SJ Placement Time: 1145  -SJ Side: Right  -SJ Location: knee  -SJ    Retired Wound - Properties Group Placement Date: 07/24/23  -SJ Placement Time: 1145  -SJ Side: Right  -SJ Location: knee  -SJ    Retired Wound - Properties Group Date first  assessed: 07/24/23  -SJ Time first assessed: 1145  -SJ Side: Right  -SJ Location: knee  -SJ      Row Name 07/28/23 0745          Wound 07/25/23 Right anterior knee Incision    Wound - Properties Group Placement Date: 07/25/23  -SS Side: Right  -SS Orientation: anterior  -SS Location: knee  -SS Primary Wound Type: Incision  -SS Additional Comments: Xeroform, 4x4, abd, softroll, ace  -SS    Dressing Appearance intact;moist drainage  -MF     Base moist;pink  -MF     Periwound edematous  -MF     Periwound Temperature warm  -MF     Drainage Characteristics/Odor serosanguineous  -MF     Drainage Amount moderate  -MF     Care, Wound cleansed with;soap and water;negative pressure wound therapy  -MF     Retired Wound - Properties Group Placement Date: 07/25/23  -SS Side: Right  -SS Orientation: anterior  -SS Location: knee  -SS Primary Wound Type: Incision  -SS Additional Comments: Xeroform, 4x4, abd, softroll, ace  -SS    Retired Wound - Properties Group Date first assessed: 07/25/23  -SS Side: Right  -SS Location: knee  -SS Primary Wound Type: Incision  -SS Additional Comments: Xeroform, 4x4, abd, softroll, ace  -SS      Row Name 07/28/23 0745          NPWT (Negative Pressure Wound Therapy) 07/28/23 0745 R knee    NPWT (Negative Pressure Wound Therapy) - Properties Group Placement Date: 07/28/23  -MF Placement Time: 0745  -MF Location: R knee  -MF    Therapy Setting continuous therapy  -MF     Dressing other (see comments)  prevena dressing  -MF     Pressure Setting 125 mmHg  -MF     Sponges Inserted 1  -MF     Finger sweep complete Other (see somments0  n/a intact incision  -MF     Retired NPWT (Negative Pressure Wound Therapy) - Properties Group Placement Date: 07/28/23  -MF Placement Time: 0745  -MF Location: R knee  -MF    Retired NPWT (Negative Pressure Wound Therapy) - Properties Group Placement Date: 07/28/23  -MF Placement Time: 0745  -MF Location: R knee  -MF      Row Name 07/28/23 0745          Coping    Observed  Emotional State calm;cooperative  -     Verbalized Emotional State acceptance  -     Trust Relationship/Rapport care explained  -       Row Name 07/28/23 0745          Plan of Care Review    Plan of Care Reviewed With patient  -     Outcome Evaluation Pt presents with draining R knee incision. PT placed a prevena wound vac to help manage exudate and improve skin integrity and healing potential.  -       Row Name 07/28/23 0745          Positioning and Restraints    Pre-Treatment Position in bed  -     Post Treatment Position bed  -     In Bed supine;call light within reach  -       Row Name 07/28/23 0745          Therapy Assessment/Plan (PT)    Patient/Family Therapy Goals Statement (PT) stop draining  -     PT Diagnosis (PT) draining R knee incision  -     Rehab Potential (PT) fair, will monitor progress closely  -     Criteria for Skilled Interventions Met (PT) yes;meets criteria;skilled treatment is necessary  -       Row Name 07/28/23 0745          Therapy Plan Review/Discharge Plan (PT)    Therapy Plan Review (PT) evaluation/treatment results reviewed;care plan/treatment goals reviewed;risks/benefits reviewed;current/potential barriers reviewed;participants voiced agreement with care plan;participants included;patient  -       Row Name 07/28/23 0745          Physical Therapy Goals    Wound Care Goal Selection (PT) wound care, PT goal 1  -       Row Name 07/28/23 0745          Wound Care Goal 1 (PT)    Wound Care Goal 1 (PT) Pt to have no drainage from R knee incision.  -     Time Frame (Wound Care Goal 1, PT) 10 days  -               User Key  (r) = Recorded By, (t) = Taken By, (c) = Cosigned By      Initials Name Provider Type    MF Adis Phillips, PT Physical Therapist    Sandra Cheek RN Registered Nurse    Jayashree Box RN Registered Nurse                  Physical Therapy Education       Title: PT OT SLP Therapies (In Progress)       Topic: Physical  Therapy (Done)       Point: Mobility training (Done)       Learning Progress Summary             Patient Acceptance, E, VU,NR by  at 7/28/2023 1032    Comment: Reviewed importance of frequent OOB mobility and ambulation, HEP, PT POC and DC recs    Acceptance, E, NR by PT at 7/27/2023 1739    Acceptance, E, VU by CM at 7/27/2023 1637    Acceptance, E, VU by CM at 7/27/2023 1158    Acceptance, E,D, VU,NR by HP at 7/26/2023 1459                         Point: Home exercise program (Done)       Learning Progress Summary             Patient Acceptance, E, VU,NR by  at 7/28/2023 1032    Comment: Reviewed importance of frequent OOB mobility and ambulation, HEP, PT POC and DC recs    Acceptance, E, NR by PT at 7/27/2023 1739    Acceptance, E, VU by CM at 7/27/2023 1637    Acceptance, E, VU by CM at 7/27/2023 1158    Acceptance, E,D, VU,NR by HP at 7/26/2023 1459                         Point: Body mechanics (Done)       Learning Progress Summary             Patient Acceptance, E, VU,NR by  at 7/28/2023 1032    Comment: Reviewed importance of frequent OOB mobility and ambulation, HEP, PT POC and DC recs    Acceptance, E, NR by PT at 7/27/2023 1739    Acceptance, E, VU by CM at 7/27/2023 1637    Acceptance, E, VU by CM at 7/27/2023 1158    Acceptance, E,D, VU,NR by  at 7/26/2023 1459                         Point: Precautions (Done)       Learning Progress Summary             Patient Acceptance, E, VU,NR by  at 7/28/2023 1032    Comment: Reviewed importance of frequent OOB mobility and ambulation, HEP, PT POC and DC recs    Acceptance, E, NR by PT at 7/27/2023 1739    Acceptance, E, VU by CM at 7/27/2023 1637    Acceptance, E, VU by CM at 7/27/2023 1158    Acceptance, E,D, VU,NR by  at 7/26/2023 1459                                         User Key       Initials Effective Dates Name Provider Type Discipline     09/21/21 -  Aroldo Longoria, PT Physical Therapist PT     06/01/21 -  Radha Beatty, PT Physical  Therapist PT    CM 09/22/22 -  Tiffany Espinosa, PT Physical Therapist PT    PT 06/30/23 -  Ariane Bowen, RN Registered Nurse Nurse                    Recommendation and Plan  Anticipated Discharge Disposition (PT): inpatient rehabilitation facility  Planned Therapy Interventions (PT): wound care  Therapy Frequency (PT): 2 times/day  Plan of Care Reviewed With: patient           Outcome Evaluation: Pt presents with draining R knee incision. PT placed a prevena wound vac to help manage exudate and improve skin integrity and healing potential.  Plan of Care Reviewed With: patient            Time Calculation   PT Charges       Row Name 07/28/23 1033 07/28/23 0745          Time Calculation    Start Time 0956  - 0745  -     PT Received On 07/28/23  - --     PT Goal Re-Cert Due Date 08/05/23  - 08/05/23  -        Timed Charges    21139 - PT Therapeutic Exercise Minutes 16  -LH --        Untimed Charges    PT Eval/Re-eval Minutes -- 25  -MF     Wound Care -- 07956 Neg Press (Non-DME) wound TO 50sqcm  -     [id number] Pressure wound to 50sqcm Non-DME -- 40  -MF        Total Minutes    Timed Charges Total Minutes 16  -LH --     Untimed Charges Total Minutes -- 65  -      Total Minutes 16  -LH 65  -MF               User Key  (r) = Recorded By, (t) = Taken By, (c) = Cosigned By      Initials Name Provider Type    Adis Kumari, PT Physical Therapist     Aroldo Longoria, PT Physical Therapist                            PT G-Codes  Outcome Measure Options: AM-PAC 6 Clicks Basic Mobility (PT)  AM-PAC 6 Clicks Score (PT): 14  AM-PAC 6 Clicks Score (OT): 17       Adis Phillips, PT  7/28/2023

## 2023-07-28 NOTE — PLAN OF CARE
Goal Outcome Evaluation:  Plan of Care Reviewed With: patient        Progress: no change  Outcome Evaluation: Pt declined all OOB mobility this session d/t fatigue from recent ambulation with nursing staff this AM. PT reviewed importance of frequent ambulation and OOB mobility. PT reviewed RLE HEP. R knee AROM 8-32 degrees. PT plans to continue current POC, continuing to recommend IPR at DC d/t generalized weakness, decreased functional activity tolerance, and decreased independence/safety with mobility.      Anticipated Discharge Disposition (PT): inpatient rehabilitation facility

## 2023-07-28 NOTE — THERAPY TREATMENT NOTE
"Patient Name: Imelda Elaine  : 1950    MRN: [id number]                              Today's Date: 2023       Admit Date: 2023    Visit Dx:     ICD-10-CM ICD-9-CM   1. Infection of right knee  M00.9 686.9     Patient Active Problem List   Diagnosis    Morbid obesity    Palpitations    Essential hypertension    Mixed hyperlipidemia    Prediabetes    Osteoarthritis of left knee    Leukocytosis, mild, likely reactive    Abnormal EKG    Anxiety and depression    BPV (benign positional vertigo)    Elevated hemoglobin A1c    Gastroesophageal reflux disease without esophagitis    Osteopenia    Peripheral vascular disease    S/P total knee arthroplasty, left    Tremor, essential    NDIAYE (dyspnea on exertion)    Body mass index 40.0-44.9, adult    Abnormal blood level of copper    RBBB    Pre-operative clearance    Encounter for pre-operative cardiovascular clearance    Status post right knee replacement    PSVT (paroxysmal supraventricular tachycardia)    Postoperative wound infection, right knee    S/P I&D right knee with poly exchange     Past Medical History:   Diagnosis Date    Anesthesia     low bp only with foot surgery, has had surgeries since and no issue per pt report    Arthritis     Back pain     Depression     Difficulty walking     Left knee replacement  need right knee replacement    GERD (gastroesophageal reflux disease)     High cholesterol     Hypertension     Knee pain     BILATERAL    Seasonal allergies     Tremor, essential      Past Surgical History:   Procedure Laterality Date    ANTERIOR AND POSTERIOR VAGINAL REPAIR N/A 2017    Vaginal enterocele / anterior posterior colporrhaphy;  Jose Maria Chowdhury MD;  Location: [facility];  Service:     BLADDER SUSPENSION      MESH TVT?     COLONOSCOPY      Dr Chung - normal    EXPLORATORY LAPAROTOMY      \"BOWEL INTO PELVIC BONE\" /Mesh [place]     EXPLORATORY LAPAROTOMY  2004    AQUILES/ abd sacrocolpopexy/post repair    FOOT " SURGERY Left     KNEE ARTHROSCOPY Left     meniscus repair    KNEE INCISION AND DRAINAGE Right 7/25/2023    Procedure: KNEE INCISION AND DRAINAGE WITH POLY EXCHANGE - RIGHT;  Surgeon: Sunny Reynoso MD;  Location:  [facility];  Service: Orthopedics;  Laterality: Right;    LAPAROSCOPIC CHOLECYSTECTOMY Right 2000    CO ARTHRP KNE CONDYLE&PLATU MEDIAL&LAT COMPARTMENTS Left 11/16/2017    Procedure: TOTAL KNEE ARTHROPLASTY LEFT;  Surgeon: Sunny Reynoso MD;  Location:  [facility];  Service: Orthopedics    TONSILLECTOMY      TOTAL ABDOMINAL HYSTERECTOMY WITH SALPINGO OOPHORECTOMY Bilateral 1989    TOTAL KNEE ARTHROPLASTY Right 6/29/2023    Procedure: TOTAL KNEE ARTHROPLASTY WITH CORI ROBOT - RIGHT;  Surgeon: Sunny Reynoso MD;  Location:  [facility];  Service: Robotics - Ortho;  Laterality: Right;    TUBAL ABDOMINAL LIGATION      VENTRAL HERNIA REPAIR N/A 6/21/2016    Procedure: LAPAROSCOPIC REPAIR OF ABDOMINAL WALL HERNIA WITH MESH, CONVERTED TO OPEN ;  Surgeon: Bertha Delacruz MD;  Location: [facility] OR;  Service:       General Information       Row Name 07/28/23 1025          Physical Therapy Time and Intention    Document Type therapy note (daily note)  -     Mode of Treatment physical therapy;individual therapy  -       Row Name 07/28/23 1025          General Information    Existing Precautions/Restrictions fall;other (see comments)  adductor nerve cath, prevena wound vac  -     Barriers to Rehab medically complex;previous functional deficit  -       Row Name 07/28/23 1025          Cognition    Orientation Status (Cognition) oriented x 4  -       Row Name 07/28/23 1025          Safety Issues, Functional Mobility    Safety Issues Affecting Function (Mobility) awareness of need for assistance;insight into deficits/self-awareness;safety precaution awareness;safety precautions follow-through/compliance  -     Impairments Affecting Function (Mobility) endurance/activity  tolerance;shortness of breath;strength;balance;pain;range of motion (ROM)  -               User Key  (r) = Recorded By, (t) = Taken By, (c) = Cosigned By      Initials Name Provider Type     Aroldo Longoria, PT Physical Therapist                   Mobility       Row Name 07/28/23 1026          Transfers    Comment, (Transfers) Pt declined all OOB mobility reporting she just got OOB with nursing and ambulated to the bathroom and back.  -[facility] Name 07/28/23 1026          Gait/Stairs (Locomotion)    Comment, (Gait/Stairs) Pt declined all OOB mobility reporting she just got OOB with nursing and ambulated to the bathroom and back.  -[facility] Name 07/28/23 1026          Mobility    Extremity Weight-bearing Status right lower extremity  -     Right Lower Extremity (Weight-bearing Status) weight-bearing as tolerated (WBAT)  -               User Key  (r) = Recorded By, (t) = Taken By, (c) = Cosigned By      Initials Name Provider Type     Aroldo Longoria, PT Physical Therapist                   Obj/Interventions       Row Name 07/28/23 1027          Range of Motion Comprehensive    Comment, General Range of Motion R knee AROM 8-32 degrees  -[facility] Name 07/28/23 1027          Motor Skills    Therapeutic Exercise hip;knee;ankle  -LH       Row Name 07/28/23 1027          Hip (Therapeutic Exercise)    Hip Strengthening (Therapeutic Exercise) right;flexion;extension;heel slides;supine;10 repetitions  -LH       Row Name 07/28/23 1027          Knee (Therapeutic Exercise)    Knee Isometrics (Therapeutic Exercise) bilateral;quad sets;supine;10 repetitions  -     Knee Strengthening (Therapeutic Exercise) right;SLR (straight leg raise);SAQ (short arc quad);supine;10 repetitions  -[facility] Name 07/28/23 1027          Ankle (Therapeutic Exercise)    Ankle AROM (Therapeutic Exercise) bilateral;dorsiflexion;plantarflexion;supine;10 repetitions  -               User Key  (r) = Recorded By, (t) = Taken  By, (c) = Cosigned By      Initials Name Provider Type     Aroldo Longoria, PT Physical Therapist                   Goals/Plan    No documentation.                  Clinical Impression       Row Name 07/28/23 1029          Pain    Pain Intervention(s) Cold applied;Cold pack;Repositioned;Ambulation/increased activity  -       Row Name 07/28/23 1029          Pain Scale: FACES Pre/Post-Treatment    Pain: FACES Scale, Pretreatment 2-->hurts little bit  -LH     Posttreatment Pain Rating 2-->hurts little bit  -     Pain Location generalized  -     Pain Location - knee  -       Row Name 07/28/23 1029          Plan of Care Review    Plan of Care Reviewed With patient  -     Progress no change  -     Outcome Evaluation Pt declined all OOB mobility this session d/t fatigue from recent ambulation with nursing staff this AM. PT reviewed importance of frequent ambulation and OOB mobility. PT reviewed RLE HEP. R knee AROM 8-32 degrees. PT plans to continue current POC, continuing to recommend IPR at [place] d/t generalized weakness, decreased functional activity tolerance, and decreased independence/safety with mobility.  -       Row Name 07/28/23 1029          Vital Signs    Pre Systolic BP Rehab --  CALLIE RN cleared for PT session  -     O2 Delivery Pre Treatment room air  -     O2 Delivery Intra Treatment room air  -     O2 Delivery Post Treatment room air  -     Pre Patient Position Supine  -     Intra Patient Position Supine  -     Post Patient Position Supine  -[facility] Name 07/28/23 1029          Positioning and Restraints    Pre-Treatment Position in bed  -     Post Treatment Position bed  -     In Bed fowlers;call light within reach;encouraged to call for assist;SCD pump applied;R heel elevated  -               User Key  (r) = Recorded By, (t) = Taken By, (c) = Cosigned By      Initials Name Provider Type     Aroldo Longoria, PT Physical Therapist                   Outcome Measures        Row Name 07/28/23 1032          How much help from another person do you currently need...    Turning from your back to your side while in flat bed without using bedrails? 2  -LH     Moving from lying on back to sitting on the side of a flat bed without bedrails? 2  -LH     Moving to and from a bed to a chair (including a wheelchair)? 2  -LH     Standing up from a chair using your arms (e.g., wheelchair, bedside chair)? 3  -LH     Climbing 3-5 steps with a railing? 2  -LH     To walk in hospital room? 3  -     AM-PAC 6 Clicks Score (PT) 14  -     Highest level of mobility 4 --> Transferred to chair/commode  -       Row Name 07/28/23 1032          Functional Assessment    Outcome Measure Options AM-PAC 6 Clicks Basic Mobility (PT)  -               User Key  (r) = Recorded By, (t) = Taken By, (c) = Cosigned By      Initials Name Provider Type     Aroldo Longoria, PT Physical Therapist                                 Physical Therapy Education       Title: PT OT SLP Therapies (In Progress)       Topic: Physical Therapy (Done)       Point: Mobility training (Done)       Learning Progress Summary             Patient Acceptance, E, VU,NR by  at 7/28/2023 1032    Comment: Reviewed importance of frequent OOB mobility and ambulation, HEP, PT POC and DC recs    Acceptance, E, NR by PT at 7/27/2023 1739    Acceptance, E, VU by CM at 7/27/2023 1637    Acceptance, E, VU by CM at 7/27/2023 1158    Acceptance, E,D, VU,NR by HP at 7/26/2023 1459                         Point: Home exercise program (Done)       Learning Progress Summary             Patient Acceptance, E, VU,NR by  at 7/28/2023 1032    Comment: Reviewed importance of frequent OOB mobility and ambulation, HEP, PT POC and DC recs    Acceptance, E, NR by PT at 7/27/2023 1739    Acceptance, E, VU by CM at 7/27/2023 1637    Acceptance, E, VU by CM at 7/27/2023 1158    Acceptance, E,D, VU,NR by HP at 7/26/2023 1459                         Point: Body  mechanics (Done)       Learning Progress Summary             Patient Acceptance, E, VU,NR by  at 7/28/2023 1032    Comment: Reviewed importance of frequent OOB mobility and ambulation, HEP, PT POC and DC recs    Acceptance, E, NR by PT at 7/27/2023 1739    Acceptance, E, VU by CM at 7/27/2023 1637    Acceptance, E, VU by CM at 7/27/2023 1158    Acceptance, E,D, VU,NR by  at 7/26/2023 1459                         Point: Precautions (Done)       Learning Progress Summary             Patient Acceptance, E, VU,NR by  at 7/28/2023 1032    Comment: Reviewed importance of frequent OOB mobility and ambulation, HEP, PT POC and DC recs    Acceptance, E, NR by PT at 7/27/2023 1739    Acceptance, E, VU by CM at 7/27/2023 1637    Acceptance, E, VU by CM at 7/27/2023 1158    Acceptance, E,D, VU,NR by HP at 7/26/2023 1459                                         User Key       Initials Effective Dates Name Provider Type Discipline     09/21/21 -  Aroldo Longoria, PT Physical Therapist PT     06/01/21 -  Radha Beatty, PT Physical Therapist PT     09/22/22 -  Tiffany Espinosa, PT Physical Therapist PT    PT 06/30/23 -  Ariane Bowen, RN Registered Nurse Nurse                  PT Recommendation and Plan     Plan of Care Reviewed With: patient  Progress: no change  Outcome Evaluation: Pt declined all OOB mobility this session d/t fatigue from recent ambulation with nursing staff this AM. PT reviewed importance of frequent ambulation and OOB mobility. PT reviewed RLE HEP. R knee AROM 8-32 degrees. PT plans to continue current POC, continuing to recommend IPR at [place] d/t generalized weakness, decreased functional activity tolerance, and decreased independence/safety with mobility.     Time Calculation:         PT Charges       Row Name 07/28/23 1033             Time Calculation    Start Time 0956  -      PT Received On 07/28/23  -      PT Goal Re-Cert Due Date 08/05/23  -         Timed Charges    44379 - PT  Therapeutic Exercise Minutes 16  -LH         Total Minutes    Timed Charges Total Minutes 16  -LH       Total Minutes 16  -LH                User Key  (r) = Recorded By, (t) = Taken By, (c) = Cosigned By      Initials Name Provider Type     Aroldo Longoria, PT Physical Therapist                  Therapy Charges for Today       Code Description Service Date Service Provider Modifiers Qty    [id number]  PT THER PROC EA 15 MIN 7/28/2023 Aroldo Longoria, PT GP 1            PT G-Codes  Outcome Measure Options: AM-PAC 6 Clicks Basic Mobility (PT)  AM-PAC 6 Clicks Score (PT): 14  AM-PAC 6 Clicks Score (OT): 17  PT Discharge Summary  Anticipated Discharge Disposition (PT): inpatient rehabilitation facility    Aroldo Longoria PT  7/28/2023

## 2023-07-28 NOTE — DISCHARGE SUMMARY
Patient Name: Imelda Elaine  MRN: [id number]  : 1950  DOS: 2023    Attending: Valerie Concepcion MD    Primary Care Provider: Comfort Colunga MD    Date of Admission:.2023 11:08 AM    Date of Discharge:  2023    Discharge Diagnosis:   S/P I&D right knee with poly exchange    Morbid obesity    Essential hypertension    Anxiety and depression    Elevated hemoglobin A1c    Peripheral vascular disease    PSVT (paroxysmal supraventricular tachycardia)    Postoperative wound infection, right knee      Hospital Course    At admit:  Patient is a pleasant 72 y.o. female presented as a direct admit from Dr. Reynoso's office after presenting there with worsening pain, erythema and drainage from her right knee.     Patient is known to us from prior hospitalizations including left total knee arthroplasty admission in 2017 and more recently and admitted on 2023 for right total knee arthroplasty.  She did well postoperatively and was discharged home on postop day 1.  She did have a PSVT episode for which she was seen by cardiology and was placed on a beta-blocker.     Her postop course was going quite well up until 3 days postop when she felt tear as she was bending her knee.  She continued to have pain on a daily basis was followed by erythema especially the lower aspect of the incision.  She was seen by Dr. Granados in his office a week ago and was started on doxycycline.  Unfortunately her symptoms worsened and pain persisted.  She had intermittent chills, no documented fever.  Had decreased appetite, no nausea or vomiting.  No chest pain shortness of breath, no diarrhea or constipation.  She has avoided taking pain medication but more recently she had to reach for oxycodone to control her pain.     After being seen by  today it was felt that she will need aggressive management of her cellulitis/wound infection.  She is directly admitted.  Plan is for I&D, washout, likely poly  exchange tomorrow.      After admit:    She was started on broad-spectrum antibiotics, cultures from incision as well as blood cultures were collected.  CRP and sed rate were noted to be elevated.    She was seen and followed by infectious disease consultants throughout her stay.  PICC line was placed with expected antibiotic duration of 6 weeks.    She underwent right knee irrigation and debridement with poly exchange on 7/25/2023, tolerated surgery well.      Patient was provided pain medications as needed for pain control, along with adductor canal nerve block infusion of Ropivacaine.      Adjustments were made to pain medications to optimize postop pain management. Risks and benefits of opiate medications discussed with patient. LEONDarvinCORBY report was reviewed.    She was seen by PT and has progressed slowly, inpatient rehab was recommended.  She  During her stay she used an IS for atelectasis prophylaxis and was placed on Eliquis along with mechanicals for DVT prophylaxis.    Home medications were resumed as appropriate, and labs were monitored and remained fairly stable.     With the progress she has made, Ms. Elaine is ready for DC to [facility] today.      She will have an Infupump ( instructed on it during this admit).    Discussed with patient regarding plan and she shows understanding and agreement.             Procedures Performed  Procedure(s):  KNEE INCISION AND DRAINAGE WITH POLY EXCHANGE - RIGHT       Pertinent Test Results:    I reviewed the patient's new clinical results.   Results from last 7 days   Lab Units 07/28/23  0416 07/27/23  0530 07/26/23  0341   WBC 10*3/mm3 7.45 8.56 8.40   HEMOGLOBIN g/dL 7.8* 8.2* 8.4*   HEMATOCRIT % 25.2* 27.3* 27.2*   PLATELETS 10*3/mm3 229 254 253     Results from last 7 days   Lab Units 07/27/23  0530 07/26/23  0341 07/24/23  1249   SODIUM mmol/L 141 139 140   POTASSIUM mmol/L 4.5 5.1 4.2   CHLORIDE mmol/L 104 103 103   CO2 mmol/L 22.0 27.0 25.0   BUN  mg/dL 22 15 15   CREATININE mg/dL 0.93 0.65 0.78   CALCIUM mg/dL 8.6 8.5* 9.2   BILIRUBIN mg/dL  --   --  0.5   ALK PHOS U/L  --   --  196*   ALT (SGPT) U/L  --   --  47*   AST (SGOT) U/L  --   --  43*   GLUCOSE mg/dL 104* 143* 117*     I reviewed the patient's new imaging including images and reports.   Latest Reference Range & Units 07/24/23 12:49   Sed Rate 0 - 30 mm/hr 95 (H)   (H): Data is abnormally high   Latest Reference Range & Units 07/24/23 12:49   C-Reactive Protein 0.00 - 0.50 mg/dL 16.82 (H)   (H): Data is abnormally high  CollectedUpdatedProcedureResult Status  07/25/2023 725940307/28/2023 0742  Anaerobic Culture - Tissue, Knee, Right [[id number]]   Tissue from Knee, Right   Preliminary result  Component Value   Anaerobic Culture No anaerobes isolated at 3 days P      07/25/2023 397652307/25/2023 1715  Fungus Culture - Tissue, Knee, Right [[id number]]   Tissue from Knee, Right   In process  Component Value   No component results      07/25/2023 313060307/28/2023 0543  Tissue / Bone Culture - Tissue, Knee, Right [[id number]]    (Abnormal)   Tissue from Knee, Right   Final result  Component Value   Tissue Culture Scant growth (1+) Pseudomonas aeruginosa Abnormal    Gram Stain Few (2+) WBCs per low power field    No organisms seen   Susceptibility     Pseudomonas aeruginosa     NAVIN     Cefepime <=1 Susceptible     Ceftazidime <=1 Susceptible     Ciprofloxacin <=0.25 Susceptible     Gentamicin <=1 Susceptible     Levofloxacin <=0.12 Susceptible     Piperacillin + Tazobactam <=4 Susceptible               Linear View      07/25/2023 913802307/26/2023 1132  AFB Culture - Tissue, Knee, Right [[id number]]   Tissue from Knee, Right   Preliminary result  Component Value   AFB Stain No acid fast bacilli seen on concentrated smear P      07/25/2023 414690/28/2023 0742  Anaerobic Culture - Tissue, Knee, Right [[id number]]   Tissue from Knee, Right   Preliminary result  Component Value   Anaerobic Culture No anaerobes isolated at 3  days P      07/25/2023 425577007/25/2023 1709  Fungus Culture - Tissue, Knee, Right [[id number]]   Tissue from Knee, Right   In process  Component Value   No component results      07/25/2023 311326007/28/2023 0542  Tissue / Bone Culture - Tissue, Knee, Right [[id number]]    (Abnormal)   Tissue from Knee, Right   Final result  Component Value   Tissue Culture Rare Pseudomonas aeruginosa Abnormal    Gram Stain Moderate (3+) WBCs per low power field    No organisms seen   Susceptibility     Pseudomonas aeruginosa     NAVIN     Cefepime <=1 Susceptible     Ceftazidime <=1 Susceptible     Ciprofloxacin <=0.25 Susceptible     Gentamicin <=1 Susceptible     Levofloxacin <=0.12 Susceptible     Piperacillin + Tazobactam <=4 Susceptible               Linear View      07/25/2023 407858007/26/2023 1131  AFB Culture - Tissue, Knee, Right [[id number]]   Tissue from Knee, Right   Preliminary result  Component Value   AFB Stain No acid fast bacilli seen on concentrated smear P      07/24/2023 7611067/27/2023 1515  Blood Culture - Blood, Arm, Right [[id number]]   Blood from Arm, Right   Preliminary result  Component Value   Blood Culture No growth at 3 days P      07/24/2023 3142827/27/2023 1515  Blood Culture - Blood, Arm, Left [[id number]]   Blood from Arm, Left   Preliminary result  Component Value   Blood Culture No growth at 3 days P      07/24/2023 6190067/27/2023 0735  Wound Culture - Wound, Knee, Right [[id number]]     (Abnormal)   Wound from Knee, Right   Final result  Component Value   Wound Culture Light growth (2+) Streptococcus agalactiae (Group B) Abnormal       This organism is considered to be universally susceptible to penicillin.  No further antibiotic testing will be performed. If Clindamycin or Erythromycin is the drug of choice, notify the laboratory within 7 days to request susceptibility testing.    Scant growth (1+) Pseudomonas aeruginosa Abnormal     Rare Escherichia coli Abnormal    Gram Stain No WBCs or organisms seen  "  Susceptibility     Pseudomonas aeruginosa Escherichia coli     NAVIN NAVIN     Ampicillin   4 Susceptible     Ampicillin + Sulbactam   <=2 Susceptible     Cefepime <=1 Susceptible <=1 Susceptible     Ceftazidime <=1 Susceptible <=1 Susceptible     Ceftriaxone   <=1 Susceptible     Ciprofloxacin <=0.25 Susceptible       Gentamicin <=1 Susceptible <=1 Susceptible     Levofloxacin <=0.12 Susceptible <=0.12 Susceptible     Piperacillin + Tazobactam <=4 Susceptible <=4 Susceptible     Tetracycline   <=1 Susceptible     Trimethoprim + Sulfamethoxazole   <=20 Susceptible                 Linear View     Susceptibility Comments    Escherichia coli   Cefazolin sensitivity will not be reported for Enterobacteriaceae in non-urine isolates. If cefazolin is preferred, please call the microbiology lab to request an E-test.  With the exception of urinary-sourced infections, aminoglycosides should not be used as monotherapy.              Physical therapy    Goal Outcome Evaluation:  Plan of Care Reviewed With: patient  Progress: no change  Outcome Evaluation: Pt declined all OOB mobility this session d/t fatigue from recent ambulation with nursing staff this AM. PT reviewed importance of frequent ambulation and OOB mobility. PT reviewed RLE HEP. R knee AROM 8-32 degrees. PT plans to continue current POC, continuing to recommend IPR at DC d/t generalized weakness, decreased functional activity tolerance, and decreased independence/safety with mobility.        Anticipated Discharge Disposition (PT): inpatient rehabilitation facility        Discharge Assessment:       Visit Vitals  /82   Pulse 88   Temp 97.8 °F (36.6 °C) (Oral)   Resp 18   Ht 157.5 cm (62\")   Wt (!) 150 kg (330 lb)   LMP  (LMP Unknown)   SpO2 94%   BMI 60.36 kg/m²     Temp (24hrs), Av.8 °F (36.6 °C), Min:97.7 °F (36.5 °C), Max:97.9 °F (36.6 °C)      General Appearance:    Alert, cooperative, in no acute distress   Lungs:     Clear to auscultation,respirations " regular, even and                   unlabored    Heart:    Regular rhythm and normal rate, normal S1 and S2   Abdomen:     Normal bowel sounds, no masses, no organomegaly, soft        non-tender, non-distended, no guarding, no rebound                 tenderness   Extremities:   CDI dressing surgical knee, Prevena suction dressing. ACB cath present, infupump..  Knee erythema much improved.   Pulses:   Pulses palpable and equal bilaterally   Skin:   No bleeding, bruising or rash   Neurologic:   Cranial nerves 2 - 12 grossly intact, sensation intact, Flexion and dorsiflexion intact bilateral feet.         Discharge Disposition: [facility]        Discharge Medications        New Medications        Instructions Start Date   apixaban 2.5 MG tablet tablet  Commonly known as: ELIQUIS   2.5 mg, Oral, Every 12 Hours Scheduled      docusate sodium 100 MG capsule   100 mg, Oral, 2 Times Daily PRN      piperacillin-tazobactam 4-0.5 GM/100ML solution IVPB  Commonly known as: ZOSYN   4.5 g, Intravenous, Every 8 Hours Scheduled, Infuse over 30 minutes      sennosides-docusate 8.6-50 MG per tablet  Commonly known as: PERICOLACE   2 tablets, Oral, 2 Times Daily PRN             Changes to Medications        Instructions Start Date   zonisamide 100 MG capsule  Commonly known as: ZONEGRAN  What changed:   how much to take  how to take this  when to take this   2 capsules per day             Continue These Medications        Instructions Start Date   acetaminophen 650 MG 8 hr tablet  Commonly known as: TYLENOL   650 mg, Oral, Every 8 Hours, Take scheduled for a week then prn      atorvastatin 10 MG tablet  Commonly known as: LIPITOR   10 mg, Oral, Nightly      cetirizine 10 MG tablet  Commonly known as: zyrTEC   10 mg, Oral, Daily      estradiol 0.5 MG tablet  Commonly known as: ESTRACE   0.5 mg, Oral, Daily      ipratropium 0.06 % nasal spray  Commonly known as: ATROVENT   1 spray, Nasal, As Needed      ketoconazole 2 %  shampoo  Commonly known as: NIZORAL   No dose, route, or frequency recorded.      lansoprazole 30 MG capsule  Commonly known as: PREVACID   30 mg, Oral, Daily      losartan 100 MG tablet  Commonly known as: COZAAR   100 mg, Oral, Daily      multivitamin with minerals tablet tablet   1 tablet, Oral, Daily      nebivolol 5 MG tablet  Commonly known as: BYSTOLIC   5 mg, Oral, Every 24 Hours Scheduled      oxyCODONE 5 MG immediate release tablet  Commonly known as: Roxicodone   5 mg, Oral, Every 4 Hours PRN      ropivacaine 0.2 % infusion (INFUSYSTEM)  Commonly known as: NAROPIN   1 mL/hr (2 mg/hr), Peripheral Nerve, Continuous      venlafaxine XR 75 MG 24 hr capsule  Commonly known as: EFFEXOR-XR   75 mg, Oral, Daily             Stop These Medications      aspirin 325 MG EC tablet              Discharge Diet: Resume prior    Activity at Discharge: Weightbearing as tolerated right lower extremity       Future Appointments   Date Time Provider Department Center   7/28/2023  3:00 PM EMS 2 [facility]   8/8/2023 10:00 AM Florinda Lopez APRN [facility]   1/5/2024 10:30 AM Dno Castelan DNP, APRN [facility]   5/14/2024 10:30 AM Lisa Grimm APRN [facility]   6/11/2024  9:20 AM Florinda Lopez APRN [facility]     Follow-up with infectious disease consultants per their orders, they can follow on the patient at [facility] as well.  Follow-up with Dr. Reynoso in 2 weeks.    Discharge took over 30 min    Dragon disclaimer:  Part of this encounter note is an electronic transcription/translation of spoken language to printed text. The electronic translation of spoken language may permit erroneous, or at times, nonsensical words or phrases to be inadvertently transcribed; Although I have reviewed the note for such errors, some may still exist.       Valerie Concepcion MD  07/28/23  11:46 EDT

## 2023-07-28 NOTE — PROGRESS NOTES
"/48 (BP Location: Left arm, Patient Position: Lying)   Pulse 83   Temp 97.8 °F (36.6 °C) (Oral)   Resp 18   Ht 157.5 cm (62\")   Wt (!) 150 kg (330 lb)   LMP  (LMP Unknown)   SpO2 94%   BMI 60.36 kg/m²     Lab Results (last 24 hours)       Procedure Component Value Units Date/Time    CBC & Differential [[id number]]  (Abnormal) Collected: 07/28/23 0416    Specimen: Blood Updated: 07/28/23 0604    Narrative:      The following orders were created for panel order CBC & Differential.  Procedure                               Abnormality         Status                     ---------                               -----------         ------                     CBC Auto Differential[[id number]]        Abnormal            Final result                 Please view results for these tests on the individual orders.    CBC Auto Differential [[id number]]  (Abnormal) Collected: 07/28/23 0416    Specimen: Blood Updated: 07/28/23 0604     WBC 7.45 10*3/mm3      RBC 2.72 10*6/mm3      Hemoglobin 7.8 g/dL      Hematocrit 25.2 %      MCV 92.6 fL      MCH 28.7 pg      MCHC 31.0 g/dL      RDW 15.3 %      RDW-SD 51.4 fl      MPV 10.9 fL      Platelets 229 10*3/mm3      Neutrophil % 61.0 %      Lymphocyte % 23.6 %      Monocyte % 10.3 %      Eosinophil % 4.4 %      Basophil % 0.3 %      Immature Grans % 0.4 %      Neutrophils, Absolute 4.54 10*3/mm3      Lymphocytes, Absolute 1.76 10*3/mm3      Monocytes, Absolute 0.77 10*3/mm3      Eosinophils, Absolute 0.33 10*3/mm3      Basophils, Absolute 0.02 10*3/mm3      Immature Grans, Absolute 0.03 10*3/mm3      nRBC 0.0 /100 WBC     Tissue / Bone Culture - Tissue, Knee, Right [[id number]]  (Abnormal)  (Susceptibility) Collected: 07/25/23 1543    Specimen: Tissue from Knee, Right Updated: 07/28/23 0506     Tissue Culture Scant growth (1+) Pseudomonas aeruginosa     Gram Stain Few (2+) WBCs per low power field      No organisms seen    Susceptibility        Pseudomonas aeruginosa      NAVIN     "  Cefepime Susceptible      Ceftazidime Susceptible      Ciprofloxacin Susceptible      Gentamicin Susceptible      Levofloxacin Susceptible      Piperacillin + Tazobactam Susceptible                           Tissue / Bone Culture - Tissue, Knee, Right [[id number]]  (Abnormal)  (Susceptibility) Collected: 07/25/23 1530    Specimen: Tissue from Knee, Right Updated: 07/28/23 0542     Tissue Culture Rare Pseudomonas aeruginosa     Gram Stain Moderate (3+) WBCs per low power field      No organisms seen    Susceptibility        Pseudomonas aeruginosa      NAVIN      Cefepime Susceptible      Ceftazidime Susceptible      Ciprofloxacin Susceptible      Gentamicin Susceptible      Levofloxacin Susceptible      Piperacillin + Tazobactam Susceptible                           Blood Culture - Blood, Arm, Right [[id number]]  (Normal) Collected: 07/24/23 1415    Specimen: Blood from Arm, Right Updated: 07/27/23 1515     Blood Culture No growth at 3 days    Blood Culture - Blood, Arm, Left [[id number]]  (Normal) Collected: 07/24/23 1415    Specimen: Blood from Arm, Left Updated: 07/27/23 1515     Blood Culture No growth at 3 days            Imaging Results (Last 24 Hours)       ** No results found for the last 24 hours. **            Patient Care Team:  Comfort Colunga MD as PCP - General (Internal Medicine)  Florinda Lopez APRN as Nurse Practitioner (Interventional Cardiology)  Lisa Grimm APRN as Nurse Practitioner (Nurse Practitioner)    SUBJECTIVE: She reports she is doing okay.  Anticipates going to [facility] later today.  Pain well controlled.    PHYSICAL EXAM  Right knee incision with serosanguineous drainage from the distal aspect when she mobilizes with therapy, otherwise clean, dry and intact  Decreased swelling and erythema about the knee incision  Thigh and calf soft and nontender  Neurovascular intact distally       S/P I&D right knee with poly exchange    Morbid obesity    Essential hypertension     Anxiety and depression    Elevated hemoglobin A1c    Peripheral vascular disease    PSVT (paroxysmal supraventricular tachycardia)    Postoperative wound infection, right knee      PLAN / DISPOSITION: 72-year-old female postop day #3 status post right knee I&D with poly exchange for acute prosthetic knee infection  -Cultures positive for Pseudomonas and group B strep.  Light growth Pseudomonas from deep synovium as well.  Discussed with Dr. Nam Adams yesterday.  Agree with 6 weeks IV antibiotics.  PICC line in place.    -Incision healing well other than some serosanguineous drainage from the distal aspect of the incision especially when she attempts ambulation.   -I have ordered an incisional wound VAC to be placed by PT today.  -Mobilize as tolerated with therapy  -Eliquis for DVT prophylaxis  -Okay with me to transfer to [facility] later today as scheduled if she gets the wound VAC placed and if antibiotic plan in place by infectious disease.  -Follow-up in 2 weeks.    Sunny Reynoso MD  07/28/23  07:39 EDT

## 2023-07-28 NOTE — PLAN OF CARE
Goal Outcome Evaluation:  Plan of Care Reviewed With: patient           Outcome Evaluation: Pt presents with draining R knee incision. PT placed a prevena wound vac to help manage exudate and improve skin integrity and healing potential.

## 2023-07-28 NOTE — PROGRESS NOTES
[facility]    Acute pain service Inpatient Progress Note    Patient Name: Imelda Elaine  :  1950  MRN:  [id number]        Acute Pain  Service Inpatient Progress Note:    Analgesia:Excellent  Pain Score:0/10  LOC: alert and awake  Resp Status: room air  Cardiac: VS stable  Side Effects:None  Catheter Site:clean, dry and dressing intact  Cath type: peripheral nerve cath(InfuSystem)  Infusion rate: Fem/ Add: Basal: 1ml/hr, PIB: 8ml q 8h, PCA: 8ml q 30 min  Catheter Plan:Catheter to remain Insitu

## 2023-07-28 NOTE — CASE MANAGEMENT/SOCIAL WORK
Case Management Discharge Note      Final Note:     Ms. Elaine has a skilled bed at [facility] today, if medically ready.  Confirmed bed with April from facility.  Insurance auth received by the patient's [place] [employer] for the rehab admission.  Updated Ms. Elaine at the bedside, and she is agreeable to DC plan.  [facility] ambulance is scheduled to transport the patient to [facility] at 3pm today.  PCS form on the chart.  Call report to [facility] at ph [phone].   will fax the DC summary to fax [phone].  Thank you.         Selected Continued Care - Admitted Since 7/24/2023       Destination Coordination complete.      Service Provider Selected Services Address Phone Fax Patient Preferred    [facility] [address] [address] [phone] --                               Transportation Services  Ambulance: [facility]    Final Discharge Disposition Code: 03 - skilled nursing facility (SNF)

## 2023-07-28 NOTE — PLAN OF CARE
VSS, room air, NSR, mild c/o right knee pain, infusystem present, scheduled tylenol given. Dressing changed x1 this shift. Plan for PT wound to place wound vac today. D/c to [facility] scheduled for 1500. AM labs pending. Dr. Reynoso, Y, and ID following.       Goal Outcome Evaluation:

## 2023-07-29 LAB
BACTERIA SPEC AEROBE CULT: NORMAL
BACTERIA SPEC AEROBE CULT: NORMAL

## 2023-07-30 LAB
BACTERIA SPEC ANAEROBE CULT: NORMAL
BACTERIA SPEC ANAEROBE CULT: NORMAL
FUNGUS WND CULT: NORMAL
FUNGUS WND CULT: NORMAL
MYCOBACTERIUM SPEC CULT: NORMAL
MYCOBACTERIUM SPEC CULT: NORMAL
NIGHT BLUE STAIN TISS: NORMAL
NIGHT BLUE STAIN TISS: NORMAL

## 2023-08-01 ENCOUNTER — TELEPHONE (OUTPATIENT)
Dept: CARDIOLOGY | Facility: CLINIC | Age: 73
End: 2023-08-01
Payer: MEDICARE

## 2023-08-01 LAB
FUNGUS WND CULT: NORMAL
FUNGUS WND CULT: NORMAL
MYCOBACTERIUM SPEC CULT: NORMAL
MYCOBACTERIUM SPEC CULT: NORMAL
NIGHT BLUE STAIN TISS: NORMAL
NIGHT BLUE STAIN TISS: NORMAL

## 2023-08-10 ENCOUNTER — HOME HEALTH ADMISSION (OUTPATIENT)
Dept: HOME HEALTH SERVICES | Facility: HOME HEALTHCARE | Age: 73
End: 2023-08-10
Payer: COMMERCIAL

## 2023-08-10 ENCOUNTER — TRANSCRIBE ORDERS (OUTPATIENT)
Dept: HOME HEALTH SERVICES | Facility: HOME HEALTHCARE | Age: 73
End: 2023-08-10
Payer: MEDICARE

## 2023-08-10 DIAGNOSIS — T84.53XD INFECTION ASSOCIATED WITH INTERNAL RIGHT KNEE PROSTHESIS, SUBSEQUENT ENCOUNTER: Primary | ICD-10-CM

## 2023-08-11 ENCOUNTER — HOME CARE VISIT (OUTPATIENT)
Dept: HOME HEALTH SERVICES | Facility: HOME HEALTHCARE | Age: 73
End: 2023-08-11
Payer: COMMERCIAL

## 2023-08-11 VITALS
RESPIRATION RATE: 16 BRPM | DIASTOLIC BLOOD PRESSURE: 82 MMHG | OXYGEN SATURATION: 95 % | TEMPERATURE: 98.6 F | SYSTOLIC BLOOD PRESSURE: 142 MMHG | HEART RATE: 86 BPM

## 2023-08-11 PROCEDURE — G0299 HHS/HOSPICE OF RN EA 15 MIN: HCPCS

## 2023-08-11 NOTE — HOME HEALTH
"SOC Note:    Home Health ordered for: disciplines SN/PT/OT.  HHA ordered.     Reason for Hosp/Primary Dx/Co-morbidities: Right total knee 6.29.23 w/ Dr. Reynoso.  Presented to [facility] w/ pain, erythema, and drainage from right knee.  Diagnosed with cellulitis and infection.  Patient had I&D and washout w/ poly exchange.  Patient discharged from rehab with wound vac and PICC line for continuous IV infusion.      Focus of Care: wound vac RLE, PICC /labs, IV infusion education for continuous zosyn infusion d/t wound infection, fall prevention, medication education, pain management.     Patient's goal(s):  \"To get rid of this wound vac and picc line and to get back on my feet.\"    Current Functional status/mobility/DME: Moderate assist.  Use of walker in the home and wheelchair when leaving the home.     HB status/Living Arrangements: Homebound.  Lives in [facility] w/ spouse on 3rd floor.  Elevator in the building.     Skin Integrity/wound status: Incision to right knee; wound vac in place.     Code Status: Full-code    Fall Risk/Safety concerns: High fall risk    Medication issues/Concerns:  None    Additional Problems/Concerns: None    SDOH Barriers (i.e. caregiver concerns, social isolation, transportation, food insecurity, environment, income etc.)/Need for MSW: None    Plan for next visit: wound vac RLE, PICC /labs, IV infusion education for continuous zosyn infusion d/t wound infection, fall prevention, medication education, pain management."

## 2023-08-13 VITALS — TEMPERATURE: 96.9 F | OXYGEN SATURATION: 96 % | RESPIRATION RATE: 20 BRPM

## 2023-08-14 NOTE — HOME HEALTH
Patient seen following an MD appt with Dr Sunny Ly in which wound vac removed for incision assessment and sutures were removed by MD.  Incision dressed with a telfa island drsg and drainage leaking from distal portion.  \Patient wound vac replaced.  Patient will be seen on Tuesday for for change of vac drsg, wound measurements and photos.  IV site care and labs.

## 2023-08-15 ENCOUNTER — LAB REQUISITION (OUTPATIENT)
Dept: LAB | Facility: HOSPITAL | Age: 73
End: 2023-08-15
Payer: MEDICARE

## 2023-08-15 ENCOUNTER — HOME CARE VISIT (OUTPATIENT)
Dept: HOME HEALTH SERVICES | Facility: HOME HEALTHCARE | Age: 73
End: 2023-08-15
Payer: COMMERCIAL

## 2023-08-15 ENCOUNTER — HOME CARE VISIT (OUTPATIENT)
Dept: HOME HEALTH SERVICES | Facility: HOME HEALTHCARE | Age: 73
End: 2023-08-15
Payer: MEDICARE

## 2023-08-15 VITALS
SYSTOLIC BLOOD PRESSURE: 154 MMHG | OXYGEN SATURATION: 97 % | HEART RATE: 73 BPM | DIASTOLIC BLOOD PRESSURE: 82 MMHG | TEMPERATURE: 97.3 F | RESPIRATION RATE: 18 BRPM

## 2023-08-15 VITALS
RESPIRATION RATE: 18 BRPM | OXYGEN SATURATION: 96 % | DIASTOLIC BLOOD PRESSURE: 81 MMHG | HEART RATE: 87 BPM | TEMPERATURE: 98 F | SYSTOLIC BLOOD PRESSURE: 159 MMHG

## 2023-08-15 DIAGNOSIS — T84.53XD INFECTION AND INFLAMMATORY REACTION DUE TO INTERNAL RIGHT KNEE PROSTHESIS, SUBSEQUENT ENCOUNTER: ICD-10-CM

## 2023-08-15 LAB
ALBUMIN SERPL-MCNC: 3.3 G/DL (ref 3.5–5.2)
ALBUMIN/GLOB SERPL: 1 G/DL
ALP SERPL-CCNC: 137 U/L (ref 39–117)
ALT SERPL W P-5'-P-CCNC: 35 U/L (ref 1–33)
ANION GAP SERPL CALCULATED.3IONS-SCNC: 11 MMOL/L (ref 5–15)
AST SERPL-CCNC: 34 U/L (ref 1–32)
BASOPHILS # BLD AUTO: 0.02 10*3/MM3 (ref 0–0.2)
BASOPHILS NFR BLD AUTO: 0.4 % (ref 0–1.5)
BILIRUB SERPL-MCNC: 0.3 MG/DL (ref 0–1.2)
BUN SERPL-MCNC: 13 MG/DL (ref 8–23)
BUN/CREAT SERPL: 15.7 (ref 7–25)
CALCIUM SPEC-SCNC: 8.8 MG/DL (ref 8.6–10.5)
CHLORIDE SERPL-SCNC: 105 MMOL/L (ref 98–107)
CO2 SERPL-SCNC: 25 MMOL/L (ref 22–29)
CREAT SERPL-MCNC: 0.83 MG/DL (ref 0.57–1)
CRP SERPL-MCNC: 3.44 MG/DL (ref 0–0.5)
DEPRECATED RDW RBC AUTO: 54 FL (ref 37–54)
EGFRCR SERPLBLD CKD-EPI 2021: 75.5 ML/MIN/1.73
EOSINOPHIL # BLD AUTO: 0.14 10*3/MM3 (ref 0–0.4)
EOSINOPHIL NFR BLD AUTO: 2.5 % (ref 0.3–6.2)
ERYTHROCYTE [DISTWIDTH] IN BLOOD BY AUTOMATED COUNT: 16 % (ref 12.3–15.4)
ERYTHROCYTE [SEDIMENTATION RATE] IN BLOOD: 80 MM/HR (ref 0–30)
FUNGUS WND CULT: NORMAL
FUNGUS WND CULT: NORMAL
GLOBULIN UR ELPH-MCNC: 3.4 GM/DL
GLUCOSE SERPL-MCNC: 141 MG/DL (ref 65–99)
HCT VFR BLD AUTO: 32 % (ref 34–46.6)
HGB BLD-MCNC: 9.6 G/DL (ref 12–15.9)
IMM GRANULOCYTES # BLD AUTO: 0.02 10*3/MM3 (ref 0–0.05)
IMM GRANULOCYTES NFR BLD AUTO: 0.4 % (ref 0–0.5)
LYMPHOCYTES # BLD AUTO: 1 10*3/MM3 (ref 0.7–3.1)
LYMPHOCYTES NFR BLD AUTO: 18.1 % (ref 19.6–45.3)
MCH RBC QN AUTO: 27.8 PG (ref 26.6–33)
MCHC RBC AUTO-ENTMCNC: 30 G/DL (ref 31.5–35.7)
MCV RBC AUTO: 92.8 FL (ref 79–97)
MONOCYTES # BLD AUTO: 0.46 10*3/MM3 (ref 0.1–0.9)
MONOCYTES NFR BLD AUTO: 8.3 % (ref 5–12)
MYCOBACTERIUM SPEC CULT: NORMAL
MYCOBACTERIUM SPEC CULT: NORMAL
NEUTROPHILS NFR BLD AUTO: 3.9 10*3/MM3 (ref 1.7–7)
NEUTROPHILS NFR BLD AUTO: 70.3 % (ref 42.7–76)
NIGHT BLUE STAIN TISS: NORMAL
NIGHT BLUE STAIN TISS: NORMAL
NRBC BLD AUTO-RTO: 0 /100 WBC (ref 0–0.2)
PLATELET # BLD AUTO: 205 10*3/MM3 (ref 140–450)
PMV BLD AUTO: 11.7 FL (ref 6–12)
POTASSIUM SERPL-SCNC: 4 MMOL/L (ref 3.5–5.2)
PROT SERPL-MCNC: 6.7 G/DL (ref 6–8.5)
RBC # BLD AUTO: 3.45 10*6/MM3 (ref 3.77–5.28)
SODIUM SERPL-SCNC: 141 MMOL/L (ref 136–145)
WBC NRBC COR # BLD: 5.54 10*3/MM3 (ref 3.4–10.8)

## 2023-08-15 PROCEDURE — 85652 RBC SED RATE AUTOMATED: CPT | Performed by: INTERNAL MEDICINE

## 2023-08-15 PROCEDURE — 86140 C-REACTIVE PROTEIN: CPT | Performed by: INTERNAL MEDICINE

## 2023-08-15 PROCEDURE — G0151 HHCP-SERV OF PT,EA 15 MIN: HCPCS

## 2023-08-15 PROCEDURE — 80053 COMPREHEN METABOLIC PANEL: CPT | Performed by: INTERNAL MEDICINE

## 2023-08-15 PROCEDURE — G0152 HHCP-SERV OF OT,EA 15 MIN: HCPCS

## 2023-08-15 PROCEDURE — 85025 COMPLETE CBC W/AUTO DIFF WBC: CPT | Performed by: INTERNAL MEDICINE

## 2023-08-15 PROCEDURE — G0299 HHS/HOSPICE OF RN EA 15 MIN: HCPCS

## 2023-08-15 NOTE — HOME HEALTH
SN visit for wound vac change and IV Abx education. After taking the wound vac down, RN noticed skin breakdown around the incision that seems to be related to the wound vac foam touching healthy skin. The incision itself is closed besides two small open area at the bottom that drain small amounts of serous fluid. RN was not confrotable placing wound vac back on. Call placed to Dr. Grimm's office to confirm whether it needed to be replaced. RN spoke with nurse in the office, Sue who asked for a picture to assess and she agreed that the wound vac may be causing more damage to the surrounding healthy skin because the wound is so small. Waiting on return call from Sue after speaking with the MD. Dry gauze and tape placed over open areas and patient educated on changing it twice a day and as needed for soiling. Also added extension tubing to PICC line so patient can change antibitoic bulb on her own.

## 2023-08-15 NOTE — Clinical Note
Please forward to Dr. Comfort Colunga:    [facility] PT arsenio completed 8/16/23 with PT to address deficits in right knee strength, ROM, functional mobility with frequency 2w4.

## 2023-08-16 VITALS
SYSTOLIC BLOOD PRESSURE: 147 MMHG | OXYGEN SATURATION: 96 % | DIASTOLIC BLOOD PRESSURE: 82 MMHG | RESPIRATION RATE: 16 BRPM | HEART RATE: 77 BPM | TEMPERATURE: 97.8 F

## 2023-08-16 NOTE — HOME HEALTH
Patient is a 72 year old female S/P I&D right knee with poly exchange on 7/24/23. Hospitalized until 7/28/23. Initial TKA completed on 6/29/23. PMH includes but is not limited to: Morbid obesity, essential hypertension, anxiety and depression, elevated hemoglobin A1c, peripheral vascular disease, PSVT (paroxysmal supraventricular tachycardia), Postoperative wound infection, right knee.     Patient seen today for an OT evaluation and is overall doing fairly well with all ADL skills and mobility in the home. She is using a walker at all times, and is able to complete most BADLs mod I, besides bathing in which she has decreased confidence and fear with due to infection of knee, and transfer in and out of tub. HHA will assist patient with bathing to instill confidence. Patient has 5/5 UB strength. At this time, no further skilled OT services are needed. SN/HHA/PT remain in the home.

## 2023-08-17 ENCOUNTER — HOME CARE VISIT (OUTPATIENT)
Dept: HOME HEALTH SERVICES | Facility: HOME HEALTHCARE | Age: 73
End: 2023-08-17
Payer: MEDICARE

## 2023-08-17 VITALS
TEMPERATURE: 97.8 F | OXYGEN SATURATION: 99 % | DIASTOLIC BLOOD PRESSURE: 83 MMHG | SYSTOLIC BLOOD PRESSURE: 152 MMHG | HEART RATE: 72 BPM | RESPIRATION RATE: 16 BRPM

## 2023-08-17 PROCEDURE — G0156 HHCP-SVS OF AIDE,EA 15 MIN: HCPCS

## 2023-08-17 NOTE — HOME HEALTH
PT Eval Note:  73 y/o female with recent initial right TKA 6/29 with Dr. Reynoso, returned home and was having successful rehab, developed infection, readmitted to hospital 7/25 having I+D, washout, and poly exchange, discharged to [facility] 7/28 and then back to home 8/10; lives in 3rd floor apartment with elevator access with spouse; PLOF is IND with all ADLs/IADLs/mobility    Skill/education provided: PT eval completed with implementation of initial HEP    Patient/caregiver response: Agreeable to PT POC    Plan for next visit: Progress right knee strength and ROM    Other pertinent info: NA

## 2023-08-18 ENCOUNTER — HOME CARE VISIT (OUTPATIENT)
Dept: HOME HEALTH SERVICES | Facility: HOME HEALTHCARE | Age: 73
End: 2023-08-18
Payer: MEDICARE

## 2023-08-18 VITALS
SYSTOLIC BLOOD PRESSURE: 130 MMHG | TEMPERATURE: 96.8 F | RESPIRATION RATE: 18 BRPM | HEART RATE: 59 BPM | DIASTOLIC BLOOD PRESSURE: 68 MMHG | OXYGEN SATURATION: 97 %

## 2023-08-18 PROCEDURE — G0299 HHS/HOSPICE OF RN EA 15 MIN: HCPCS

## 2023-08-18 PROCEDURE — G0151 HHCP-SERV OF PT,EA 15 MIN: HCPCS

## 2023-08-18 NOTE — PROGRESS NOTES
"    Cardiology Outpatient Visit      Identification: Imelda Elaine is a 72 y.o. female who resides in [place], [place]    Reason for visit:  NDIAYE (dyspnea on exertion)      Subjective      Patient is a 72-year-old female who returns today for follow-up of her paroxysmal SVT, hypertension hyperlipidemia.  The patient underwent a right knee replacement in June of this year.  Postprocedure she did have an episode of SVT and cardiology was consulted.  Metoprolol was changed to nebivolol as the patient had had wheezing and shortness of breath with metoprolol.  Last month she started having drainage from her incision and was hospitalized last month and and required a debridement with Dr. Reynoso for a infected prosthetic.  Infectious disease is following for out patient IV antibiotics.  She has not had any further episodes of SVT.  She denies chest pain but does get short of breath particularly when walking long distances such as into our office today.  She attributes this to being an active.  She does report her blood pressures have been elevated.     Review of Systems   Constitutional: Negative for malaise/fatigue.   Eyes:  Negative for vision loss in left eye and vision loss in right eye.   Cardiovascular:  Positive for dyspnea on exertion. Negative for chest pain, near-syncope, orthopnea, palpitations, paroxysmal nocturnal dyspnea and syncope.   Musculoskeletal:  Negative for myalgias.   Neurological:  Negative for brief paralysis, excessive daytime sleepiness, focal weakness, numbness, paresthesias and weakness.   All other systems reviewed and are negative.    Allergies   Allergen Reactions    Primidone Other (See Comments)     Hair loss    Topiramate Diarrhea    Tuberculin Tests Swelling and Rash     Pt has chest xrays to check     Zostavax [Zoster Vaccine Live] Swelling and Rash     \"Swelling of injection site of arm\"         Current Outpatient Medications   Medication Instructions    acetaminophen (TYLENOL) 650 " "mg, Oral, Every 8 Hours PRN    atorvastatin (LIPITOR) 10 mg, Oral, Nightly    cetirizine (zyrTEC) 10 MG tablet 1 tablet, Oral, Daily    docusate sodium 100 mg, Oral, 2 Times Daily PRN    empagliflozin (JARDIANCE) 10 mg, Oral, Daily    estradiol (ESTRACE) 0.5 mg, Oral, Daily    ipratropium (ATROVENT) 0.06 % nasal spray 1 spray, Nasal, As Needed    ketoconazole (NIZORAL) 2 % shampoo No dose, route, or frequency recorded.    lansoprazole (PREVACID) 30 mg, Oral, 2 Times Daily, Resume daily after 1 month.    losartan (COZAAR) 100 mg, Oral, Daily    multivitamin with minerals tablet tablet 1 tablet, Oral, Daily    nebivolol (BYSTOLIC) 10 mg, Oral, Every 24 Hours Scheduled    oxyCODONE (ROXICODONE) 5 mg, Oral, Every 4 Hours PRN    piperacillin-tazobactam (ZOSYN) 40.5 (36-4.5) g injection 40.5 g, Subcutaneous Infusion, Daily    ropivacaine (NAROPIN) 1 mL/hr (2 mg/hr), Peripheral Nerve, Continuous    sennosides-docusate (PERICOLACE) 8.6-50 MG per tablet 2 tablets, Oral, 2 Times Daily PRN    venlafaxine XR (EFFEXOR-XR) 75 mg, Oral, Daily    zonisamide (ZONEGRAN) 100 MG capsule 2 capsules per day         Objective     /90 (BP Location: Left arm, Patient Position: Sitting, Cuff Size: Large Adult)   Pulse 74   Ht 157.5 cm (62\")   Wt (!) 150 kg (331 lb 9.6 oz)   SpO2 95%   BMI 60.65 kg/mý       Constitutional:       Appearance: Healthy appearance. Well-developed.   Eyes:      General: Lids are normal. No scleral icterus.     Conjunctiva/sclera: Conjunctivae normal.   HENT:      Head: Normocephalic and atraumatic.   Neck:      Thyroid: No thyromegaly.      Vascular: No carotid bruit or JVD.   Pulmonary:      Effort: Pulmonary effort is normal.      Breath sounds: Normal breath sounds. No wheezing. No rhonchi. No rales.   Cardiovascular:      Normal rate. Regular rhythm.      Murmurs: There is no murmur.      No gallop.  No rub.   Pulses:     Intact distal pulses.   Edema:     Peripheral edema absent.   Abdominal:      " General: There is no distension.      Palpations: Abdomen is soft. There is no abdominal mass.   Musculoskeletal:      Cervical back: Normal range of motion. Skin:     General: Skin is warm and dry.      Findings: No rash.   Neurological:      General: No focal deficit present.      Mental Status: Alert and oriented to person, place, and time.      Gait: Gait is intact.   Psychiatric:         Attention and Perception: Attention normal.         Mood and Affect: Mood normal.         Behavior: Behavior normal.       Result Review  (reviewed with patient):            Lab Results   Component Value Date    GLUCOSE 101 (H) 08/24/2023    BUN 11 08/24/2023    CREATININE 0.73 08/24/2023    EGFR 88.0 08/24/2023    BCR 15.1 08/24/2023    K 3.8 08/24/2023    CO2 26.0 08/24/2023    CALCIUM 8.8 08/24/2023    ALBUMIN 3.2 (L) 08/23/2023    BILITOT 0.4 08/23/2023    AST 26 08/23/2023    ALT 20 08/23/2023     Lab Results   Component Value Date    WBC 5.67 08/24/2023    HGB 9.3 (L) 08/24/2023    HCT 30.7 (L) 08/24/2023    MCV 89.2 08/24/2023     08/24/2023     No results found for: CHOL, CHLPL, TRIG, HDL, LDL, LDLDIRECT  Lab Results   Component Value Date    HGBA1C 5.90 (H) 06/15/2023           Assessment     Diagnoses and all orders for this visit:    1. PSVT (paroxysmal supraventricular tachycardia) (Primary)  Assessment & Plan:  No further SVT episodes  Increase nebivolol to 10 mg daily due to uncontrolled hypertension      2. Mixed hyperlipidemia  Assessment & Plan:  Continue Lipitor 10 mg daily      3. Essential hypertension  Assessment & Plan:  Hypertension is not well controlled  Increase nebivolol 10 mg daily  Continue losartan 100 mg daily  Patient to monitor blood pressures at home and if consistently stays greater than 130/80 despite increase nebivolol she will contact us for further medication adjustments    Orders:  -     Basic Metabolic Panel; Future    Other orders  -     Discontinue: losartan (Cozaar) 50 MG  tablet; Take 1 tablet by mouth Daily.  Dispense: 90 tablet; Refill: 0  -     nebivolol (BYSTOLIC) 10 MG tablet; Take 1 tablet by mouth Daily.  Dispense: 90 tablet; Refill: 1  -     empagliflozin (Jardiance) 10 MG tablet tablet; Take 1 tablet by mouth Daily.  Dispense: 90 tablet; Refill: 1          Plan   Increase nebivolol 10 mg daily for uncontrolled hypertension  Start Jardiance 10 mg daily for cardiovascular risk reduction  Patient to monitor blood pressures at home and if consistently greater than 130/80 she will contact us for further medication adjustments  BMP in 1 to 2 weeks      Follow-up   Return in about 6 months (around 2/29/2024), or if symptoms worsen or fail to improve.      Florinda Lopez, APRN  8/29/2023

## 2023-08-20 VITALS
OXYGEN SATURATION: 97 % | HEART RATE: 76 BPM | DIASTOLIC BLOOD PRESSURE: 82 MMHG | RESPIRATION RATE: 18 BRPM | SYSTOLIC BLOOD PRESSURE: 132 MMHG | TEMPERATURE: 98 F

## 2023-08-20 NOTE — HOME HEALTH
SN visit for PICC line dressing change and wound assessment. Wound vac remains off and has been dc'd. next picc dressing change due 8/25. weekly Labs due 8/22

## 2023-08-22 ENCOUNTER — HOME CARE VISIT (OUTPATIENT)
Dept: HOME HEALTH SERVICES | Facility: HOME HEALTHCARE | Age: 73
End: 2023-08-22
Payer: COMMERCIAL

## 2023-08-22 ENCOUNTER — LAB REQUISITION (OUTPATIENT)
Dept: LAB | Facility: HOSPITAL | Age: 73
End: 2023-08-22
Payer: MEDICARE

## 2023-08-22 VITALS
OXYGEN SATURATION: 96 % | HEART RATE: 70 BPM | SYSTOLIC BLOOD PRESSURE: 148 MMHG | TEMPERATURE: 96.9 F | RESPIRATION RATE: 18 BRPM | DIASTOLIC BLOOD PRESSURE: 74 MMHG

## 2023-08-22 VITALS
HEART RATE: 79 BPM | DIASTOLIC BLOOD PRESSURE: 74 MMHG | TEMPERATURE: 98.1 F | OXYGEN SATURATION: 96 % | RESPIRATION RATE: 18 BRPM | SYSTOLIC BLOOD PRESSURE: 140 MMHG

## 2023-08-22 DIAGNOSIS — T84.53XD INFECTION AND INFLAMMATORY REACTION DUE TO INTERNAL RIGHT KNEE PROSTHESIS, SUBSEQUENT ENCOUNTER: ICD-10-CM

## 2023-08-22 LAB
ALBUMIN SERPL-MCNC: 3.2 G/DL (ref 3.5–5.2)
ALBUMIN/GLOB SERPL: 1 G/DL
ALP SERPL-CCNC: 116 U/L (ref 39–117)
ALT SERPL W P-5'-P-CCNC: 20 U/L (ref 1–33)
ANION GAP SERPL CALCULATED.3IONS-SCNC: 9 MMOL/L (ref 5–15)
AST SERPL-CCNC: 24 U/L (ref 1–32)
BASOPHILS # BLD AUTO: 0.02 10*3/MM3 (ref 0–0.2)
BASOPHILS NFR BLD AUTO: 0.4 % (ref 0–1.5)
BILIRUB SERPL-MCNC: 0.4 MG/DL (ref 0–1.2)
BUN SERPL-MCNC: 13 MG/DL (ref 8–23)
BUN/CREAT SERPL: 14.9 (ref 7–25)
CALCIUM SPEC-SCNC: 8.5 MG/DL (ref 8.6–10.5)
CHLORIDE SERPL-SCNC: 105 MMOL/L (ref 98–107)
CO2 SERPL-SCNC: 27 MMOL/L (ref 22–29)
CREAT SERPL-MCNC: 0.87 MG/DL (ref 0.57–1)
CRP SERPL-MCNC: 4.25 MG/DL (ref 0–0.5)
DEPRECATED RDW RBC AUTO: 53.1 FL (ref 37–54)
EGFRCR SERPLBLD CKD-EPI 2021: 71.3 ML/MIN/1.73
EOSINOPHIL # BLD AUTO: 0.24 10*3/MM3 (ref 0–0.4)
EOSINOPHIL NFR BLD AUTO: 4.7 % (ref 0.3–6.2)
ERYTHROCYTE [DISTWIDTH] IN BLOOD BY AUTOMATED COUNT: 15.7 % (ref 12.3–15.4)
ERYTHROCYTE [SEDIMENTATION RATE] IN BLOOD: 101 MM/HR (ref 0–30)
GLOBULIN UR ELPH-MCNC: 3.2 GM/DL
GLUCOSE SERPL-MCNC: 102 MG/DL (ref 65–99)
HCT VFR BLD AUTO: 32.8 % (ref 34–46.6)
HGB BLD-MCNC: 9.8 G/DL (ref 12–15.9)
IMM GRANULOCYTES # BLD AUTO: 0.01 10*3/MM3 (ref 0–0.05)
IMM GRANULOCYTES NFR BLD AUTO: 0.2 % (ref 0–0.5)
LYMPHOCYTES # BLD AUTO: 0.9 10*3/MM3 (ref 0.7–3.1)
LYMPHOCYTES NFR BLD AUTO: 17.6 % (ref 19.6–45.3)
MCH RBC QN AUTO: 27.5 PG (ref 26.6–33)
MCHC RBC AUTO-ENTMCNC: 29.9 G/DL (ref 31.5–35.7)
MCV RBC AUTO: 92.1 FL (ref 79–97)
MONOCYTES # BLD AUTO: 0.46 10*3/MM3 (ref 0.1–0.9)
MONOCYTES NFR BLD AUTO: 9 % (ref 5–12)
NEUTROPHILS NFR BLD AUTO: 3.49 10*3/MM3 (ref 1.7–7)
NEUTROPHILS NFR BLD AUTO: 68.1 % (ref 42.7–76)
NRBC BLD AUTO-RTO: 0 /100 WBC (ref 0–0.2)
PLATELET # BLD AUTO: 190 10*3/MM3 (ref 140–450)
PMV BLD AUTO: 12.2 FL (ref 6–12)
POTASSIUM SERPL-SCNC: 3.7 MMOL/L (ref 3.5–5.2)
PROT SERPL-MCNC: 6.4 G/DL (ref 6–8.5)
RBC # BLD AUTO: 3.56 10*6/MM3 (ref 3.77–5.28)
SODIUM SERPL-SCNC: 141 MMOL/L (ref 136–145)
WBC NRBC COR # BLD: 5.12 10*3/MM3 (ref 3.4–10.8)

## 2023-08-22 PROCEDURE — 85652 RBC SED RATE AUTOMATED: CPT | Performed by: INTERNAL MEDICINE

## 2023-08-22 PROCEDURE — 86140 C-REACTIVE PROTEIN: CPT | Performed by: INTERNAL MEDICINE

## 2023-08-22 PROCEDURE — G0299 HHS/HOSPICE OF RN EA 15 MIN: HCPCS

## 2023-08-22 PROCEDURE — G0151 HHCP-SERV OF PT,EA 15 MIN: HCPCS

## 2023-08-22 PROCEDURE — 85025 COMPLETE CBC W/AUTO DIFF WBC: CPT | Performed by: INTERNAL MEDICINE

## 2023-08-22 PROCEDURE — 80053 COMPREHEN METABOLIC PANEL: CPT | Performed by: INTERNAL MEDICINE

## 2023-08-22 NOTE — HOME HEALTH
Routine Visit Note:    Skill/education provided: Right knee strengthening/stretching therex to improve quad stengthening and knee flexion ROM; gait training to improve IND with functional mobility    Patient/caregiver response: Improved right knee flexion to 90 deg with overpressure today    Plan for next visit: Progress IND with ambulation, progress right knee strength and ROM    Other pertinent info: NA

## 2023-08-22 NOTE — HOME HEALTH
SN visit for weekly lab draw. CBC, CMP, CRP, ESR collected via venipuncture from left AC. Labs dropped off to [facility] lab. Patient doing well today. Has a hemorrhoid that has been bleeding but no other symptoms. Patient advised to call RN if blood persists.

## 2023-08-23 ENCOUNTER — HOSPITAL ENCOUNTER (OUTPATIENT)
Facility: HOSPITAL | Age: 73
Setting detail: OBSERVATION
LOS: 1 days | Discharge: HOME | End: 2023-08-25
Attending: EMERGENCY MEDICINE | Admitting: INTERNAL MEDICINE
Payer: MEDICARE

## 2023-08-23 ENCOUNTER — APPOINTMENT (OUTPATIENT)
Dept: CT IMAGING | Facility: HOSPITAL | Age: 73
End: 2023-08-23
Payer: MEDICARE

## 2023-08-23 ENCOUNTER — APPOINTMENT (OUTPATIENT)
Dept: GENERAL RADIOLOGY | Facility: HOSPITAL | Age: 73
End: 2023-08-23
Payer: MEDICARE

## 2023-08-23 DIAGNOSIS — I10 ESSENTIAL HYPERTENSION: ICD-10-CM

## 2023-08-23 DIAGNOSIS — K21.9 GASTROESOPHAGEAL REFLUX DISEASE WITHOUT ESOPHAGITIS: ICD-10-CM

## 2023-08-23 DIAGNOSIS — Z96.652 S/P TOTAL KNEE ARTHROPLASTY, LEFT: ICD-10-CM

## 2023-08-23 DIAGNOSIS — Z01.810 ENCOUNTER FOR PRE-OPERATIVE CARDIOVASCULAR CLEARANCE: ICD-10-CM

## 2023-08-23 DIAGNOSIS — R73.09 ELEVATED HEMOGLOBIN A1C: ICD-10-CM

## 2023-08-23 DIAGNOSIS — Z01.818 PRE-OPERATIVE CLEARANCE: ICD-10-CM

## 2023-08-23 DIAGNOSIS — I45.10 RBBB: ICD-10-CM

## 2023-08-23 DIAGNOSIS — I73.9 PERIPHERAL VASCULAR DISEASE: ICD-10-CM

## 2023-08-23 DIAGNOSIS — Z98.890 S/P DEBRIDEMENT: ICD-10-CM

## 2023-08-23 DIAGNOSIS — G25.0 TREMOR, ESSENTIAL: ICD-10-CM

## 2023-08-23 DIAGNOSIS — R06.09 DOE (DYSPNEA ON EXERTION): ICD-10-CM

## 2023-08-23 DIAGNOSIS — K92.2 ACUTE GASTROINTESTINAL BLEEDING: Primary | ICD-10-CM

## 2023-08-23 DIAGNOSIS — D64.9 ANEMIA, UNSPECIFIED TYPE: ICD-10-CM

## 2023-08-23 DIAGNOSIS — R73.03 PREDIABETES: ICD-10-CM

## 2023-08-23 DIAGNOSIS — R79.0 ABNORMAL BLOOD LEVEL OF COPPER: ICD-10-CM

## 2023-08-23 DIAGNOSIS — E66.01 MORBID OBESITY: ICD-10-CM

## 2023-08-23 DIAGNOSIS — Z96.651 STATUS POST RIGHT KNEE REPLACEMENT: ICD-10-CM

## 2023-08-23 DIAGNOSIS — E78.2 MIXED HYPERLIPIDEMIA: ICD-10-CM

## 2023-08-23 DIAGNOSIS — I47.1 PSVT (PAROXYSMAL SUPRAVENTRICULAR TACHYCARDIA): ICD-10-CM

## 2023-08-23 DIAGNOSIS — R94.31 ABNORMAL EKG: ICD-10-CM

## 2023-08-23 DIAGNOSIS — R00.2 PALPITATIONS: ICD-10-CM

## 2023-08-23 DIAGNOSIS — T81.49XA POSTOPERATIVE WOUND INFECTION: ICD-10-CM

## 2023-08-23 LAB
ABO GROUP BLD: NORMAL
ABO GROUP BLD: NORMAL
ALBUMIN SERPL-MCNC: 3.2 G/DL (ref 3.5–5.2)
ALBUMIN/GLOB SERPL: 0.9 G/DL
ALP SERPL-CCNC: 122 U/L (ref 39–117)
ALT SERPL W P-5'-P-CCNC: 20 U/L (ref 1–33)
ANION GAP SERPL CALCULATED.3IONS-SCNC: 11 MMOL/L (ref 5–15)
AST SERPL-CCNC: 26 U/L (ref 1–32)
BASOPHILS # BLD AUTO: 0.02 10*3/MM3 (ref 0–0.2)
BASOPHILS NFR BLD AUTO: 0.4 % (ref 0–1.5)
BILIRUB SERPL-MCNC: 0.4 MG/DL (ref 0–1.2)
BLD GP AB SCN SERPL QL: NEGATIVE
BUN SERPL-MCNC: 13 MG/DL (ref 8–23)
BUN/CREAT SERPL: 15.7 (ref 7–25)
CALCIUM SPEC-SCNC: 8.8 MG/DL (ref 8.6–10.5)
CHLORIDE SERPL-SCNC: 105 MMOL/L (ref 98–107)
CO2 SERPL-SCNC: 26 MMOL/L (ref 22–29)
CREAT SERPL-MCNC: 0.83 MG/DL (ref 0.57–1)
D-LACTATE SERPL-SCNC: 1.6 MMOL/L (ref 0.5–2)
DEPRECATED RDW RBC AUTO: 50.9 FL (ref 37–54)
DEVELOPER EXPIRATION DATE: ABNORMAL
DEVELOPER LOT NUMBER: ABNORMAL
EGFRCR SERPLBLD CKD-EPI 2021: 75.5 ML/MIN/1.73
EOSINOPHIL # BLD AUTO: 0.19 10*3/MM3 (ref 0–0.4)
EOSINOPHIL NFR BLD AUTO: 3.8 % (ref 0.3–6.2)
ERYTHROCYTE [DISTWIDTH] IN BLOOD BY AUTOMATED COUNT: 15.4 % (ref 12.3–15.4)
EXPIRATION DATE: ABNORMAL
FECAL OCCULT BLOOD SCREEN, POC: POSITIVE
GLOBULIN UR ELPH-MCNC: 3.4 GM/DL
GLUCOSE SERPL-MCNC: 110 MG/DL (ref 65–99)
HCT VFR BLD AUTO: 33.4 % (ref 34–46.6)
HGB BLD-MCNC: 10 G/DL (ref 12–15.9)
HOLD SPECIMEN: NORMAL
IMM GRANULOCYTES # BLD AUTO: 0.02 10*3/MM3 (ref 0–0.05)
IMM GRANULOCYTES NFR BLD AUTO: 0.4 % (ref 0–0.5)
LYMPHOCYTES # BLD AUTO: 0.98 10*3/MM3 (ref 0.7–3.1)
LYMPHOCYTES NFR BLD AUTO: 19.5 % (ref 19.6–45.3)
Lab: ABNORMAL
MCH RBC QN AUTO: 27.2 PG (ref 26.6–33)
MCHC RBC AUTO-ENTMCNC: 29.9 G/DL (ref 31.5–35.7)
MCV RBC AUTO: 90.8 FL (ref 79–97)
MONOCYTES # BLD AUTO: 0.45 10*3/MM3 (ref 0.1–0.9)
MONOCYTES NFR BLD AUTO: 8.9 % (ref 5–12)
NEGATIVE CONTROL: POSITIVE
NEUTROPHILS NFR BLD AUTO: 3.37 10*3/MM3 (ref 1.7–7)
NEUTROPHILS NFR BLD AUTO: 67 % (ref 42.7–76)
NRBC BLD AUTO-RTO: 0 /100 WBC (ref 0–0.2)
PLATELET # BLD AUTO: 165 10*3/MM3 (ref 140–450)
PMV BLD AUTO: 11.7 FL (ref 6–12)
POSITIVE CONTROL: POSITIVE
POTASSIUM SERPL-SCNC: 3.7 MMOL/L (ref 3.5–5.2)
PROT SERPL-MCNC: 6.6 G/DL (ref 6–8.5)
RBC # BLD AUTO: 3.68 10*6/MM3 (ref 3.77–5.28)
RH BLD: POSITIVE
RH BLD: POSITIVE
SODIUM SERPL-SCNC: 142 MMOL/L (ref 136–145)
T&S EXPIRATION DATE: NORMAL
WBC NRBC COR # BLD: 5.03 10*3/MM3 (ref 3.4–10.8)
WHOLE BLOOD HOLD COAG: NORMAL
WHOLE BLOOD HOLD SPECIMEN: NORMAL

## 2023-08-23 PROCEDURE — 96374 THER/PROPH/DIAG INJ IV PUSH: CPT

## 2023-08-23 PROCEDURE — 82270 OCCULT BLOOD FECES: CPT | Performed by: EMERGENCY MEDICINE

## 2023-08-23 PROCEDURE — 80053 COMPREHEN METABOLIC PANEL: CPT | Performed by: EMERGENCY MEDICINE

## 2023-08-23 PROCEDURE — 99284 EMERGENCY DEPT VISIT MOD MDM: CPT

## 2023-08-23 PROCEDURE — 86850 RBC ANTIBODY SCREEN: CPT | Performed by: EMERGENCY MEDICINE

## 2023-08-23 PROCEDURE — 25010000002 PIPERACILLIN SOD-TAZOBACTAM PER 1 G: Performed by: INTERNAL MEDICINE

## 2023-08-23 PROCEDURE — 25510000001 IOPAMIDOL PER 1 ML: Performed by: INTERNAL MEDICINE

## 2023-08-23 PROCEDURE — 36415 COLL VENOUS BLD VENIPUNCTURE: CPT

## 2023-08-23 PROCEDURE — 99222 1ST HOSP IP/OBS MODERATE 55: CPT | Performed by: PHYSICIAN ASSISTANT

## 2023-08-23 PROCEDURE — 74177 CT ABD & PELVIS W/CONTRAST: CPT

## 2023-08-23 PROCEDURE — 85025 COMPLETE CBC W/AUTO DIFF WBC: CPT | Performed by: EMERGENCY MEDICINE

## 2023-08-23 PROCEDURE — 86900 BLOOD TYPING SEROLOGIC ABO: CPT | Performed by: EMERGENCY MEDICINE

## 2023-08-23 PROCEDURE — 83605 ASSAY OF LACTIC ACID: CPT | Performed by: EMERGENCY MEDICINE

## 2023-08-23 PROCEDURE — 86900 BLOOD TYPING SEROLOGIC ABO: CPT

## 2023-08-23 PROCEDURE — 86901 BLOOD TYPING SEROLOGIC RH(D): CPT

## 2023-08-23 PROCEDURE — 71045 X-RAY EXAM CHEST 1 VIEW: CPT

## 2023-08-23 PROCEDURE — 86901 BLOOD TYPING SEROLOGIC RH(D): CPT | Performed by: EMERGENCY MEDICINE

## 2023-08-23 RX ORDER — TRAMADOL HYDROCHLORIDE 50 MG/1
50 TABLET ORAL EVERY 4 HOURS PRN
Status: DISCONTINUED | OUTPATIENT
Start: 2023-08-23 | End: 2023-08-25 | Stop reason: HOSPADM

## 2023-08-23 RX ORDER — ACETAMINOPHEN 325 MG/1
650 TABLET ORAL EVERY 6 HOURS PRN
Status: DISCONTINUED | OUTPATIENT
Start: 2023-08-23 | End: 2023-08-25 | Stop reason: HOSPADM

## 2023-08-23 RX ORDER — POLYETHYLENE GLYCOL 3350 17 G/17G
17 POWDER, FOR SOLUTION ORAL DAILY PRN
Status: DISCONTINUED | OUTPATIENT
Start: 2023-08-23 | End: 2023-08-25 | Stop reason: HOSPADM

## 2023-08-23 RX ORDER — PANTOPRAZOLE SODIUM 40 MG/1
40 TABLET, DELAYED RELEASE ORAL
Status: DISCONTINUED | OUTPATIENT
Start: 2023-08-24 | End: 2023-08-23

## 2023-08-23 RX ORDER — SODIUM CHLORIDE 9 MG/ML
40 INJECTION, SOLUTION INTRAVENOUS AS NEEDED
Status: DISCONTINUED | OUTPATIENT
Start: 2023-08-23 | End: 2023-08-25 | Stop reason: HOSPADM

## 2023-08-23 RX ORDER — SODIUM CHLORIDE 0.9 % (FLUSH) 0.9 %
10 SYRINGE (ML) INJECTION EVERY 12 HOURS SCHEDULED
Status: DISCONTINUED | OUTPATIENT
Start: 2023-08-23 | End: 2023-08-25 | Stop reason: HOSPADM

## 2023-08-23 RX ORDER — SODIUM CHLORIDE 0.9 % (FLUSH) 0.9 %
10 SYRINGE (ML) INJECTION AS NEEDED
Status: DISCONTINUED | OUTPATIENT
Start: 2023-08-23 | End: 2023-08-25 | Stop reason: HOSPADM

## 2023-08-23 RX ORDER — AMOXICILLIN 250 MG
2 CAPSULE ORAL 2 TIMES DAILY
Status: DISCONTINUED | OUTPATIENT
Start: 2023-08-23 | End: 2023-08-25 | Stop reason: HOSPADM

## 2023-08-23 RX ORDER — NEBIVOLOL 5 MG/1
5 TABLET ORAL
Status: DISCONTINUED | OUTPATIENT
Start: 2023-08-24 | End: 2023-08-25 | Stop reason: HOSPADM

## 2023-08-23 RX ORDER — BISACODYL 5 MG/1
5 TABLET, DELAYED RELEASE ORAL DAILY PRN
Status: DISCONTINUED | OUTPATIENT
Start: 2023-08-23 | End: 2023-08-25 | Stop reason: HOSPADM

## 2023-08-23 RX ORDER — ONDANSETRON 4 MG/1
4 TABLET, FILM COATED ORAL EVERY 6 HOURS PRN
Status: DISCONTINUED | OUTPATIENT
Start: 2023-08-23 | End: 2023-08-25 | Stop reason: HOSPADM

## 2023-08-23 RX ORDER — ONDANSETRON 2 MG/ML
4 INJECTION INTRAMUSCULAR; INTRAVENOUS EVERY 6 HOURS PRN
Status: DISCONTINUED | OUTPATIENT
Start: 2023-08-23 | End: 2023-08-25 | Stop reason: HOSPADM

## 2023-08-23 RX ORDER — VENLAFAXINE HYDROCHLORIDE 75 MG/1
75 CAPSULE, EXTENDED RELEASE ORAL DAILY
Status: DISCONTINUED | OUTPATIENT
Start: 2023-08-24 | End: 2023-08-25 | Stop reason: HOSPADM

## 2023-08-23 RX ORDER — LOSARTAN POTASSIUM 50 MG/1
100 TABLET ORAL DAILY
Status: DISCONTINUED | OUTPATIENT
Start: 2023-08-24 | End: 2023-08-25 | Stop reason: HOSPADM

## 2023-08-23 RX ORDER — ESTRADIOL 0.5 MG/1
0.5 TABLET ORAL DAILY
Status: DISCONTINUED | OUTPATIENT
Start: 2023-08-24 | End: 2023-08-25 | Stop reason: HOSPADM

## 2023-08-23 RX ORDER — ZONISAMIDE 100 MG/1
200 CAPSULE ORAL DAILY
Status: DISCONTINUED | OUTPATIENT
Start: 2023-08-23 | End: 2023-08-25 | Stop reason: HOSPADM

## 2023-08-23 RX ORDER — BISACODYL 10 MG
10 SUPPOSITORY, RECTAL RECTAL DAILY PRN
Status: DISCONTINUED | OUTPATIENT
Start: 2023-08-23 | End: 2023-08-25 | Stop reason: HOSPADM

## 2023-08-23 RX ORDER — PANTOPRAZOLE SODIUM 40 MG/10ML
40 INJECTION, POWDER, LYOPHILIZED, FOR SOLUTION INTRAVENOUS
Status: DISCONTINUED | OUTPATIENT
Start: 2023-08-23 | End: 2023-08-25 | Stop reason: HOSPADM

## 2023-08-23 RX ORDER — ATORVASTATIN CALCIUM 10 MG/1
10 TABLET, FILM COATED ORAL NIGHTLY
Status: DISCONTINUED | OUTPATIENT
Start: 2023-08-23 | End: 2023-08-25 | Stop reason: HOSPADM

## 2023-08-23 RX ADMIN — PANTOPRAZOLE SODIUM 40 MG: 40 INJECTION, POWDER, FOR SOLUTION INTRAVENOUS at 18:13

## 2023-08-23 RX ADMIN — ATORVASTATIN CALCIUM 10 MG: 10 TABLET, FILM COATED ORAL at 20:23

## 2023-08-23 RX ADMIN — ZONISAMIDE 200 MG: 100 CAPSULE ORAL at 18:13

## 2023-08-23 RX ADMIN — Medication 10 ML: at 20:24

## 2023-08-23 RX ADMIN — PIPERACILLIN SODIUM AND TAZOBACTAM SODIUM 4.5 G: 4; .5 INJECTION, SOLUTION INTRAVENOUS at 20:23

## 2023-08-23 RX ADMIN — IOPAMIDOL 85 ML: 755 INJECTION, SOLUTION INTRAVENOUS at 19:51

## 2023-08-23 NOTE — H&P
"Patient Name: Imelda Elaine  MRN: [id number]  : 1950  DOS: 2023    Attending: Valerie Concepcion MD    Primary Care Provider: Comfort Colunga MD      Chief complaint:  Bloody stool    Subjective   Patient is a pleasant 72 y.o. female presented to [facility] ER with complaints of bloody stools since yesterday morning.  She states she had a large amount of bright red blood with a loose stool yesterday morning.  She states by last night blood was much darker and had a couple blood clots.  She reports she has been having loose stools and she has been on IV antibiotic therapy.  She denies any recent fevers, chills or night sweats.  In ER she was found to have positive fecal occult.    She is known to us from several previous admissions.  Most recently was 2023 for I&D right knee with poly exchange due to postop knee infection. She underwent right knee irrigation and debridement with poly exchange on 2023.  Cultures showed pseudomonas. She was seen by infectious disease and was discharged to [facility] with a PICC line on IV antibiotics.    When seen in ER she is doing fairly well.  She denies pain.  She denies nausea, shortness of breath or chest pain.  No history of DVT or PE.    She has been taking Eliquis twice daily since her right knee surgery.    Allergies:  Allergies   Allergen Reactions    Primidone Other (See Comments)     Hair loss    Topiramate Diarrhea    Tuberculin Tests Swelling and Rash     Pt has chest xrays to check     Zostavax [Zoster Vaccine Live] Swelling and Rash     \"Swelling of injection site of arm\"       Meds:  Tylenol, Eliquis, Lipitor, Zyrtec, docusate, Estrace, Atrovent, ketoconazole shampoo, Prevacid, Cozaar, multivitamin, Bystolic, Roxicodone, Neyda-Colace, Effexor, Zonegran      History:   Past Medical History:   Diagnosis Date    Anesthesia     low bp only with foot surgery, has had surgeries since and no issue per pt report    Arthritis     Back pain     " "Depression     Difficulty walking     Left knee replacement  need right knee replacement    GERD (gastroesophageal reflux disease)     High cholesterol     Hypertension     Knee pain     BILATERAL    Seasonal allergies     Tremor, essential      Past Surgical History:   Procedure Laterality Date    ANTERIOR AND POSTERIOR VAGINAL REPAIR N/A 2017    Vaginal enterocele / anterior posterior colporrhaphy;  Jose Maria Chowdhury MD;  Location:  [facility];  Service:     BLADDER SUSPENSION      MESH TVT?     COLONOSCOPY      Dr Chung - gail    EXPLORATORY LAPAROTOMY      \"BOWEL INTO PELVIC BONE\" /Mesh [place]     EXPLORATORY LAPAROTOMY  2004    AQUILES/ abd sacrocolpopexy/post repair    FOOT SURGERY Left     KNEE ARTHROSCOPY Left     meniscus repair    KNEE INCISION AND DRAINAGE Right 2023    Procedure: KNEE INCISION AND DRAINAGE WITH POLY EXCHANGE - RIGHT;  Surgeon: Sunny Reynoso MD;  Location:  [facility];  Service: Orthopedics;  Laterality: Right;    LAPAROSCOPIC CHOLECYSTECTOMY Right     CT ARTHRP KNE CONDYLE&PLATU MEDIAL&LAT COMPARTMENTS Left 2017    Procedure: TOTAL KNEE ARTHROPLASTY LEFT;  Surgeon: Sunny Reynoso MD;  Location:  [facility];  Service: Orthopedics    TONSILLECTOMY      TOTAL ABDOMINAL HYSTERECTOMY WITH SALPINGO OOPHORECTOMY Bilateral     TOTAL KNEE ARTHROPLASTY Right 2023    Procedure: TOTAL KNEE ARTHROPLASTY WITH CORI ROBOT - RIGHT;  Surgeon: Sunny Reynoso MD;  Location:  [facility];  Service: Robotics - Ortho;  Laterality: Right;    TUBAL ABDOMINAL LIGATION      VENTRAL HERNIA REPAIR N/A 2016    Procedure: LAPAROSCOPIC REPAIR OF ABDOMINAL WALL HERNIA WITH MESH, CONVERTED TO OPEN ;  Surgeon: Bertha Delacruz MD;  Location:  [facility];  Service:      Family History   Problem Relation Age of Onset    Hypertension Mother     Diabetes Mother     Heart attack Mother 59         from MI    Cancer Father     Aneurysm Father 57    " "Hepatitis Brother         Hep B    Kidney disease Brother 58    Alzheimer's disease Maternal Grandmother     Pneumonia Maternal Grandmother     Parkinsonism Maternal Grandmother         Along w/Alzheimer's    Leukemia Maternal Grandfather     No Known Problems Paternal Grandfather     No Known Problems Daughter     No Known Problems Son     Ovarian cancer Neg Hx     Breast cancer Neg Hx      Social History     Tobacco Use    Smoking status: Former     Packs/day: 1.50     Years: 35.00     Pack years: 52.50     Types: Cigarettes     Start date: 10/1/1967     Quit date: 1986     Years since quittin.2    Smokeless tobacco: Never   Vaping Use    Vaping Use: Never used   Substance Use Topics    Alcohol use: No    Drug use: Never   .    Review of Systems  All systems were reviewed and negative except for:  Gastrointestinal: positive for  loose stools    Vital Signs  /80 (BP Location: Left arm, Patient Position: Sitting)   Pulse 64   Temp 97.4 øF (36.3 øC) (Oral)   Resp 20   Ht 157.5 cm (62\")   Wt (!) 147 kg (325 lb)   LMP  (LMP Unknown)   SpO2 95%   BMI 59.44 kg/mý     Physical Exam:    General Appearance:    Alert, cooperative, in no acute distress   Head:    Normocephalic, without obvious abnormality, atraumatic   Eyes:            Lids and lashes normal, conjunctivae and sclerae normal, no   icterus, no pallor, corneas clear,    Ears:    Ears appear intact with no abnormalities noted   Throat:   No oral lesions, no thrush, oral mucosa moist   Neck:   No adenopathy, supple, trachea midline, no thyromegaly    Lungs:     Clear to auscultation,respirations regular, even and unlabored    Heart:    Regular rhythm and normal rate, normal S1 and S2, no murmur, no gallop   Abdomen:     Normal bowel sounds, no masses, no organomegaly, soft non-tender, non-distended, no guarding, no rebound  tenderness.  Obese   Genitalia:    Deferred   Extremities: Left knee incision with distal portion covered by " dressing with drainage noted   Pulses:   Pulses palpable and equal bilaterally   Skin:   No bleeding, bruising or rash   Neurologic:   Cranial nerves 2 - 12 grossly intact. Flexion and dorsiflexion intact bilateral feet.        I reviewed the patient's new clinical results.       Results from last 7 days   Lab Units 08/23/23  1111 08/22/23  1150   WBC 10*3/mm3 5.03 5.12   HEMOGLOBIN g/dL 10.0* 9.8*   HEMATOCRIT % 33.4* 32.8*   PLATELETS 10*3/mm3 165 190     Results from last 7 days   Lab Units 08/23/23  1111 08/22/23  1040   SODIUM mmol/L 142 141   POTASSIUM mmol/L 3.7 3.7   CHLORIDE mmol/L 105 105   CO2 mmol/L 26.0 27.0   BUN mg/dL 13 13   CREATININE mg/dL 0.83 0.87   CALCIUM mg/dL 8.8 8.5*   BILIRUBIN mg/dL 0.4 0.4   ALK PHOS U/L 122* 116   ALT (SGPT) U/L 20 20   AST (SGOT) U/L 26 24   GLUCOSE mg/dL 110* 102*     Lab Results   Component Value Date    HGBA1C 5.90 (H) 06/15/2023        08/23/23 11:15   Fecal Occult Blood Positive !   !: Data is abnormal    Assessment and Plan:     GI bleed    Morbid obesity    Essential hypertension    Anxiety and depression    Elevated hemoglobin A1c    PSVT (paroxysmal supraventricular tachycardia)    S/P I&D right knee with poly exchange 7/25/23    GIB (gastrointestinal bleeding)      Plan  ER admission for gastrointestinal bleeding.  Her H&H is stable at the time.  GI is consulted for evaluation.  Hold Eliquis.  Serial H&H's.  CT abdomen with contrast.  Stool samples per GI.  Twice daily PPI.    1. PT/OT- WBAT RLE  2. Pain control-prns   3. IS-encourage  4. DVT proph- [facility].  Hold Eliquis  5. Bowel regimen  6. Resume home medications as appropriate  7. Monitor post-op labs  8. DC planning for home    Courtesy consults for Dr. Reynoso, orthopedics and Dr. Adams, infectious disease. Established patient    Anxiety and depression: Resume home med regimen.        Hypertension:  Resume home medications as appropriate, formulary substitution when indicated.  Holding parameters.  Prn  medications for elevated blood pressure.     Morbid obesity: Complicates all aspects of care.      FLOR Conner  08/23/23  15:05 EDT

## 2023-08-23 NOTE — CONSULTS
INFECTIOUS DISEASE CONSULT/INITIAL HOSPITAL VISIT    Imelda Elaine  1950  [id number]    Date of consult: 8/23/2023    Admit date: 8/23/2023    Requesting Provider: Dr. Concepcion  Evaluating physician: Nam Adams MD  Reason for Consultation: Continuity of care for her right knee prosthetic joint infection  Chief Complaint: GI bleeding      Subjective   History of present illness:  Imelda Elaine is a  72 y.o.  Yr old female who I have been following for a right knee prosthetic joint infection with Pseudomonas from deep surgical culture. Status post I&D and poly-exchange by Dr. Reynoso on 7/25/23.  Planning for at least 6 weeks of IV antibiotics until 9/4/23. Unfortunately she still has some ongoing swelling although her erythema has improved.  Still has some serous drainage and ongoing elevation of her inflammatory markers. The patient had a short stay at [facility] after her recent [facility] hospitalization and was doing okay at her clinic visit last week although still having some mild serous drainage from her lower portion of her incision site over her right knee.  Unfortunately, she recently started having some severe bright red blood per rectum in the setting of being on Eliquis for DVT prophylaxis after her recent surgery.  She presented to the hospital Today due to this.  She is being evaluated by GI For a likely lower GI bleed.  CT abdomen and pelvis has been ordered and Eliquis has been held. C. Difficile PCR and GI PCR panel are pending. GI considering endoscopic evaluation. ID has been consult and for continuity of care for her prosthetic joint infection.    Past Medical History:   Diagnosis Date    Anesthesia 2002    low bp only with foot surgery, has had surgeries since and no issue per pt report    Arthritis     Back pain     Depression     Difficulty walking     Left knee replacement  need right knee replacement    GERD (gastroesophageal reflux disease)     High cholesterol      "Hypertension     Knee pain     BILATERAL    Seasonal allergies     Tremor, essential        Past Surgical History:   Procedure Laterality Date    ANTERIOR AND POSTERIOR VAGINAL REPAIR N/A 4/17/2017    Vaginal enterocele / anterior posterior colporrhaphy;  Jose Maria Chowdhury MD;  Location:  [facility];  Service:     BLADDER SUSPENSION  1999    MESH TVT?     COLONOSCOPY  2018    Dr Chung - normal    EXPLORATORY LAPAROTOMY  2010    \"BOWEL INTO PELVIC BONE\" /Mesh [place]     EXPLORATORY LAPAROTOMY  04/2004    AQUILES/ abd sacrocolpopexy/post repair    FOOT SURGERY Left     KNEE ARTHROSCOPY Left     meniscus repair    KNEE INCISION AND DRAINAGE Right 7/25/2023    Procedure: KNEE INCISION AND DRAINAGE WITH POLY EXCHANGE - RIGHT;  Surgeon: Sunny Reynoso MD;  Location:  [facility];  Service: Orthopedics;  Laterality: Right;    LAPAROSCOPIC CHOLECYSTECTOMY Right 2000    LA ARTHRP KNE CONDYLE&PLATU MEDIAL&LAT COMPARTMENTS Left 11/16/2017    Procedure: TOTAL KNEE ARTHROPLASTY LEFT;  Surgeon: Sunny Reynoso MD;  Location:  [facility];  Service: Orthopedics    TONSILLECTOMY      TOTAL ABDOMINAL HYSTERECTOMY WITH SALPINGO OOPHORECTOMY Bilateral 1989    TOTAL KNEE ARTHROPLASTY Right 6/29/2023    Procedure: TOTAL KNEE ARTHROPLASTY WITH CORI ROBOT - RIGHT;  Surgeon: Sunny Reynoso MD;  Location:  [facility];  Service: Robotics - Ortho;  Laterality: Right;    TUBAL ABDOMINAL LIGATION      VENTRAL HERNIA REPAIR N/A 6/21/2016    Procedure: LAPAROSCOPIC REPAIR OF ABDOMINAL WALL HERNIA WITH MESH, CONVERTED TO OPEN ;  Surgeon: Bertha Delacruz MD;  Location:  [facility];  Service:        Pediatric History   Patient Parents    Not on file     Other Topics Concern    Not on file   Social History Narrative    Patient consumes 1-2 cups high-caffeine coffee, 1 [employer] sweet tea daily.     Patient lives at home with her .        family history includes Alzheimer's disease in her maternal grandmother; Aneurysm (age of " "onset: 57) in her father; Cancer in her father; Diabetes in her mother; Heart attack (age of onset: 59) in her mother; Hepatitis in her brother; Hypertension in her mother; Kidney disease (age of onset: 58) in her brother; Leukemia in her maternal grandfather; No Known Problems in her daughter, paternal grandfather, and son; Parkinsonism in her maternal grandmother; Pneumonia in her maternal grandmother.    Allergies   Allergen Reactions    Primidone Other (See Comments)     Hair loss    Topiramate Diarrhea    Tuberculin Tests Swelling and Rash     Pt has chest xrays to check     Zostavax [Zoster Vaccine Live] Swelling and Rash     \"Swelling of injection site of arm\"       Immunization History   Administered Date(s) Administered    COVID-19 (MODERNA) BIVALENT 12+YRS 09/04/2022    COVID-19 (MODERNA) Monovalent Original Booster 04/19/2022    COVID-19 (PFIZER) Purple Cap Monovalent 02/06/2021, 02/27/2021, 09/26/2021    Fluad Quad 65+ 09/14/2020    Fluzone High Dose =>65 Years ([employer] ONLY) 10/02/2017, 09/22/2018, 10/04/2019    Influenza, Unspecified 09/14/2020    Pneumococcal Conjugate 13-Valent (PCV13) 10/02/2017    Pneumococcal Polysaccharide (PPSV23) 10/04/2019       Medication:    Current Facility-Administered Medications:     acetaminophen (TYLENOL) tablet 650 mg, 650 mg, Oral, Q6H PRN, Valerie Concepcion MD    atorvastatin (LIPITOR) tablet 10 mg, 10 mg, Oral, Nightly, Grisel Leonardo APRN    sennosides-docusate (PERICOLACE) 8.6-50 MG per tablet 2 tablet, 2 tablet, Oral, BID **AND** polyethylene glycol (MIRALAX) packet 17 g, 17 g, Oral, Daily PRN **AND** bisacodyl (DULCOLAX) EC tablet 5 mg, 5 mg, Oral, Daily PRN **AND** bisacodyl (DULCOLAX) suppository 10 mg, 10 mg, Rectal, Daily PRN, Grisel Leonardo APRN    [START ON 8/24/2023] estradiol (ESTRACE) tablet 0.5 mg, 0.5 mg, Oral, Daily, Grisel Leonardo APRN    [START ON 8/24/2023] losartan (COZAAR) tablet 100 mg, 100 mg, Oral, Daily, Grisel Leonardo APRN   "  [START ON 8/24/2023] nebivolol (BYSTOLIC) tablet 5 mg, 5 mg, Oral, Q24H, Jorden, Grisel, APRN    ondansetron (ZOFRAN) tablet 4 mg, 4 mg, Oral, Q6H PRN **OR** ondansetron (ZOFRAN) injection 4 mg, 4 mg, Intravenous, Q6H PRN, Jorden, Grisel, APRN    pantoprazole (PROTONIX) injection 40 mg, 40 mg, Intravenous, BID CHUY, Peg Gould PA-C, 40 mg at 08/23/23 1813    sodium chloride 0.9 % flush 10 mL, 10 mL, Intravenous, PRN, TaylorNam bettencuort MD    sodium chloride 0.9 % flush 10 mL, 10 mL, Intravenous, Q12H, Jorden Grisel, APRN    sodium chloride 0.9 % flush 10 mL, 10 mL, Intravenous, PRN, Jorden Grisel, APRN    sodium chloride 0.9 % infusion 40 mL, 40 mL, Intravenous, PRN, Jorden Grisel, APRN    traMADol (ULTRAM) tablet 50 mg, 50 mg, Oral, Q4H PRN, Valerie Concepcion MD    [START ON 8/24/2023] venlafaxine XR (EFFEXOR-XR) 24 hr capsule 75 mg, 75 mg, Oral, Daily, Jorden Grisel, APRN    zonisamide (ZONEGRAN) capsule 200 mg, 200 mg, Oral, Daily, Jorden, Grisel, APRN, 200 mg at 08/23/23 1813    Please refer to the medical record for a full medication list    Review of Systems:    Constitutional-- No Fever, chills or sweats. +Fatigue  HEENT-- No new vision, hearing or throat complaints.  No epistaxis or oral sores.  Denies odynophagia or dysphagia.  No odynophagia or dysphagia. No headache, photophobia or neck stiffness.  CV-- No chest pain, palpitation or syncope  Resp-- No SOB/cough/Hemoptysis  GI- No nausea, vomiting. Having some bright red blood per rectum. Denies jaundice or chronic liver disease.  -- No dysuria, hematuria, or flank pain.  Denies hesitancy, urgency or flank pain.  Lymph- no swollen lymph nodes in neck/axilla or groin.   Heme- No active bruising or bleeding; no Hx of DVT or PE.  MS-- Still with some swelling of her right knee and some mild serous drainage from the distal portion of her incision site.  Otherwise, no swelling or pain in the bones or joints of arms/legs.  No  "new back pain.  Neuro-- No acute focal weakness or numbness in the arms or legs.  No seizures.  Skin--No rashes or lesions    Physical Exam:   Vital Signs   Temp:  [97.4 øF (36.3 øC)-98 øF (36.7 øC)] 97.4 øF (36.3 øC)  Heart Rate:  [] 64  Resp:  [20-24] 20  BP: (162-183)/(80-99) 162/80    Temp  Min: 97.4 øF (36.3 øC)  Max: 98 øF (36.7 øC)  BP  Min: 162/80  Max: 183/99  Pulse  Min: 64  Max: 114  Resp  Min: 20  Max: 24  SpO2  Min: 93 %  Max: 96 %    Blood pressure 162/80, pulse 64, temperature 97.4 øF (36.3 øC), temperature source Oral, resp. rate 20, height 157.5 cm (62\"), weight (!) 147 kg (325 lb), SpO2 95 %, not currently breastfeeding.  GENERAL: Morbidly obese.  Sitting on bedside.  Mild distress  HEENT:  Normocephalic, atraumatic.  No external oral lesions noted.  No thrush noted. Ears externally normal, Nose externally normal.  EYES: Anicteric.  No conjunctival injection.  HEART: RRR, no murmur  LUNGS: Clear to auscultation and percussion. No respiratory distress, no use of accessory muscles.  ABDOMEN: Obese abdomen. Soft, nontender, nondistended. No appreciable HSM.  Bowel sounds normal.  MSK: Right knee incision site is mostly intact although some mild serous drainage over the distal portion.  No surrounding erythema.  Still has significant swelling of her right knee.  No increased warmth.  GENITAL: no Donald catheter  SKIN: no generalized rashes.  No peripheral stigmata of infective endocarditis noted.  PSYCHIATRIC: cooperative.  Appropriate mood and affect  EXT:  No cellulitic change.  NEURO: awake alert and oriented x4.  Normal speech and cognition    PICC line site is without any erythema or drainage    Results Review:   I reviewed the patient's new clinical results.    Results from last 7 days   Lab Units 08/23/23  1111 08/22/23  1150   WBC 10*3/mm3 5.03 5.12   HEMOGLOBIN g/dL 10.0* 9.8*   HEMATOCRIT % 33.4* 32.8*   PLATELETS 10*3/mm3 165 190     Results from last 7 days   Lab Units 08/23/23  1111 "   SODIUM mmol/L 142   POTASSIUM mmol/L 3.7   CHLORIDE mmol/L 105   CO2 mmol/L 26.0   BUN mg/dL 13   CREATININE mg/dL 0.83   GLUCOSE mg/dL 110*   CALCIUM mg/dL 8.8     Results from last 7 days   Lab Units 08/23/23  1111   ALK PHOS U/L 122*   BILIRUBIN mg/dL 0.4   ALT (SGPT) U/L 20   AST (SGOT) U/L 26     Results from last 7 days   Lab Units 08/22/23  1150   SED RATE mm/hr 101*     Results from last 7 days   Lab Units 08/22/23  1040   CRP mg/dL 4.25*         Results from last 7 days   Lab Units 08/23/23  1111   LACTATE mmol/L 1.6     Estimated Creatinine Clearance: 87.2 mL/min (by C-G formula based on SCr of 0.83 mg/dL).  CPK          7/24/2023    12:49   Common Labsle   Creatine Kinase 64       Procalitonin Results:       Brief Urine Lab Results       None           No results found for: SITE, ALLENTEST, PHART, DOE9PXN, PO2ART, QFY5KAR, BASEEXCESS, L4EHWVZC, HGBBG, HCTABG, OXYHEMOGLOBI, METHHGBN, CARBOXYHGB, CO2CT, BAROMETRIC, MODALITY, FIO2     Microbiology:  Reviewed.  GI PCR panel and C. Difficile test are pending.      Radiology:  Imaging Results (Last 72 Hours)       ** No results found for the last 72 hours. **            IMPRESSION:     Problems:  Acute GI bleeding with bright red blood per rectum-in the setting of recent Eliquis.  Doubt C. Difficile is the cause as this is usually watery, Nonbloody diarrhea.  Right knee prosthetic joint infection- with Pseudomonas from deep surgical cultures and with group B strep, E. Coli, and Pseudomonas from superficial culture from the right knee.  Status post I&D and poly-exchange by Dr. Reynoso on 7/25/23.  Planning for at least 6 weeks of IV antibiotics until 9/4/23. Unfortunately she still has some ongoing swelling although her erythema has improved.  Still has some serous drainage and ongoing elevation of her inflammatory markers.  She may ultimately require a two-stage revision surgery if this does not work.  Mild normocytic anemia  Morbid obesity-complicate all aspects  of care.  Predispose the patient to poor wound healing and infection  History of left knee replacement in 2017  Hypertension  Hyperlipidemia    RECOMMENDATIONS:      -Continue Zosyn. Pharmacy consulted for dosing given nursing reports that she has hooked up to her continuous infusion dosing that she has been on at home. Dose and can either be 13.5 g IV q24h continuous infusion or 4.5g IV q8h if continuous infusion dosing cannot be administered inpatient.  -GI evaluating the patient. Patient has been started on a PPI. I suspect she may need endoscopy procedure soon  -Eliquis being held  -Follow pending CT abdomen and pelvis  -Follow C. Difficile test and GI PCR panel  -Spore precautions pending C. Difficile test result    I discussed with nursing today    Thank you for asking me to see Imelda Elaine.      Nam Adams MD  8/23/2023

## 2023-08-23 NOTE — NURSING NOTE
"This nurse sent Dr. Adams a message on secure chat regarding at home continuous infusion of zosyn. Per Dr. Adams \"Yes [continue home infusion] or she can be on intermittent dosing here if needed if we cannot do continuous infusion\"   Pharmacy contacted and aware of home infusion.  "

## 2023-08-23 NOTE — CONSULTS
[facility] Consult    Referring Provider: No ref. provider found    PCP: Comfort Colunga MD    Reason for Consultation: Melena, hematochezia    History of present illness:    Imelda Elaine is a 72 y.o. female, PMH includes HTN, anxiety and depression, HL, GERD, PSVT and recent knee arthroplasty 6/2023 on Eliquis, is admitted via ED earlier today for evaluation of melena. Family member is at bedside at time of exam.     Pt underwent right knee arthroplasty 6/2023 and developed subsequent Pseudomonas aeruginosia infection in the joint, requiring IV Zosyn x last 5 weeks. She has generally had 4-5 large, loose BM per day that are light brown in color during this time. She is taking capsule probiotic and Kefir as directed daily. She developed melena interspersed with bright red blood that started yesterday.     Pt does endorse chronic GERD, well controlled with prevacid daily at home.     Patient denies associated fever, chills, abdominal pain, nausea, vomiting, diarrhea, constipation, hematemesis, dysphagia, hematochezia, melena, bloating, weight loss or gain, dysuria, jaundice or bruising.    Labs at time of admission significant for WBC 5.03, Hb 10.0 (consistent with baseline), Hct 33.4, MCV 90.8, MCH 27.2, plt 165, lactate 1.6, hemoccult (+).     Patient denies personal or FHx of PUD, H Pylori, gastritis, pancreatitis, colitis, Celiac disease, UC, Crohn's disease, IBS, colon or gastric cancers. Pt denies EtOH, tobacco, illicit substance or NSAID use. She is s/p CCY, BENEDICTO, bladder suspension, ventral hernia repair.     CSY 2010 with Dr. Chung, no acute findings.     Allergies:  Primidone, Topiramate, Tuberculin tests, and Zostavax [zoster vaccine live]    Scheduled Meds:        Infusions:       PRN Meds:    sodium chloride    Home Meds:  (Not in a hospital admission)      ROS: Review of Systems   Constitutional:  Positive for fatigue. Negative for chills, diaphoresis and unexpected weight change.  "  HENT:  Negative for drooling, facial swelling, mouth sores, nosebleeds, rhinorrhea, sore throat, tinnitus, trouble swallowing and voice change.    Respiratory:  Negative for cough, chest tightness and shortness of breath.    Cardiovascular:  Negative for chest pain, palpitations and leg swelling.   Gastrointestinal:  Positive for blood in stool and diarrhea (loose stools). Negative for abdominal distention, abdominal pain, constipation, nausea and vomiting.   Genitourinary:  Negative for dysuria, flank pain and hematuria.   Musculoskeletal:  Positive for arthralgias. Negative for joint swelling and myalgias.   Skin:  Negative for color change, pallor and rash.   Neurological:  Negative for dizziness, tremors, syncope, weakness and light-headedness.   Psychiatric/Behavioral:  Negative for confusion and hallucinations.    All other systems reviewed and are negative.    PAST MED HX:  Past Medical History:   Diagnosis Date    Anesthesia 2002    low bp only with foot surgery, has had surgeries since and no issue per pt report    Arthritis     Back pain     Depression     Difficulty walking     Left knee replacement  need right knee replacement    GERD (gastroesophageal reflux disease)     High cholesterol     Hypertension     Knee pain     BILATERAL    Seasonal allergies     Tremor, essential        PAST SURG HX:  Past Surgical History:   Procedure Laterality Date    ANTERIOR AND POSTERIOR VAGINAL REPAIR N/A 4/17/2017    Vaginal enterocele / anterior posterior colporrhaphy;  Jose Maria Chowdhury MD;  [facility]  Service:     BLADDER SUSPENSION  1999    MESH TVT?     COLONOSCOPY  2018    Dr Chung - normal    EXPLORATORY LAPAROTOMY  2010    \"BOWEL INTO PELVIC BONE\" /Mesh [place]     EXPLORATORY LAPAROTOMY  04/2004    AQUILES/ abd sacrocolpopexy/post repair    FOOT SURGERY Left     KNEE ARTHROSCOPY Left     meniscus repair    KNEE INCISION AND DRAINAGE Right 7/25/2023    Procedure: KNEE INCISION AND DRAINAGE WITH POLY " EXCHANGE - RIGHT;  Surgeon: Sunny Reynoso MD;  Location:  [facility];  Service: Orthopedics;  Laterality: Right;    LAPAROSCOPIC CHOLECYSTECTOMY Right     SD ARTHRP KNE CONDYLE&PLATU MEDIAL&LAT COMPARTMENTS Left 2017    Procedure: TOTAL KNEE ARTHROPLASTY LEFT;  Surgeon: Sunny Reynoso MD;  Location:  [facility];  Service: Orthopedics    TONSILLECTOMY      TOTAL ABDOMINAL HYSTERECTOMY WITH SALPINGO OOPHORECTOMY Bilateral     TOTAL KNEE ARTHROPLASTY Right 2023    Procedure: TOTAL KNEE ARTHROPLASTY WITH CORI ROBOT - RIGHT;  Surgeon: Sunny Reynoso MD;  Location:  [facility];  Service: Robotics - Ortho;  Laterality: Right;    TUBAL ABDOMINAL LIGATION      VENTRAL HERNIA REPAIR N/A 2016    Procedure: LAPAROSCOPIC REPAIR OF ABDOMINAL WALL HERNIA WITH MESH, CONVERTED TO OPEN ;  Surgeon: Bertha Delacruz MD;  Location:  [facility];  Service:        FAM HX:  Family History   Problem Relation Age of Onset    Hypertension Mother     Diabetes Mother     Heart attack Mother 59         from MI    Cancer Father     Aneurysm Father 57    Hepatitis Brother         Hep B    Kidney disease Brother 58    Alzheimer's disease Maternal Grandmother     Pneumonia Maternal Grandmother     Parkinsonism Maternal Grandmother         Along w/Alzheimer's    Leukemia Maternal Grandfather     No Known Problems Paternal Grandfather     No Known Problems Daughter     No Known Problems Son     Ovarian cancer Neg Hx     Breast cancer Neg Hx        SOC HX:  Social History     Socioeconomic History    Marital status:    Tobacco Use    Smoking status: Former     Packs/day: 1.50     Years: 35.00     Pack years: 52.50     Types: Cigarettes     Start date: 10/1/1967     Quit date: 1986     Years since quittin.2    Smokeless tobacco: Never   Vaping Use    Vaping Use: Never used   Substance and Sexual Activity    Alcohol use: No    Drug use: Never    Sexual activity: Defer     Partners:  "Male     Birth control/protection: Surgical, Abstinence, Post-menopausal, Tubal ligation, Hysterectomy     Comment: Total hysterectomy       PHYSICAL EXAM  /89   Pulse 72   Temp 98 øF (36.7 øC) (Oral)   Resp 24   Ht 157.5 cm (62\")   Wt (!) 147 kg (325 lb)   LMP  (LMP Unknown)   SpO2 96%   BMI 59.44 kg/mý   Wt Readings from Last 3 Encounters:   08/23/23 (!) 147 kg (325 lb)   07/25/23 (!) 150 kg (330 lb)   06/29/23 (!) 150 kg (330 lb)   ,body mass index is 59.44 kg/mý.  Physical Exam  Vitals and nursing note reviewed.   Constitutional:       Appearance: Normal appearance. She is normal weight. She is not ill-appearing or diaphoretic.      Comments: BMI 59.44. PICC line in place.   HENT:      Head: Normocephalic and atraumatic.      Right Ear: External ear normal.      Left Ear: External ear normal.      Nose: Nose normal.      Mouth/Throat:      Mouth: Mucous membranes are moist.      Pharynx: Oropharynx is clear.   Eyes:      Conjunctiva/sclera: Conjunctivae normal.      Pupils: Pupils are equal, round, and reactive to light.   Neck:      Thyroid: No thyromegaly.   Cardiovascular:      Rate and Rhythm: Normal rate and regular rhythm.      Pulses: Normal pulses.      Heart sounds: Normal heart sounds.   Pulmonary:      Effort: Pulmonary effort is normal.      Breath sounds: Normal breath sounds.   Chest:      Comments: PICC line in place  Abdominal:      General: Abdomen is flat. Bowel sounds are normal. There is no distension.      Tenderness: There is no abdominal tenderness.      Comments: Body habitus limits physical exam   Musculoskeletal:      Cervical back: Normal range of motion.   Skin:     General: Skin is warm and dry.      Coloration: Skin is pale.   Neurological:      General: No focal deficit present.      Mental Status: She is oriented to person, place, and time.   Psychiatric:         Mood and Affect: Mood normal.       Results Review:   I reviewed the patient's new clinical results.  I " reviewed the patient's new imaging results and agree with the interpretation.  I reviewed the patient's other test results and agree with the interpretation    Lab Results   Component Value Date    WBC 5.03 08/23/2023    HGB 10.0 (L) 08/23/2023    HGB 9.8 (L) 08/22/2023    HGB 9.6 (L) 08/15/2023    HCT 33.4 (L) 08/23/2023    MCV 90.8 08/23/2023     08/23/2023       No results found for: INR    Lab Results   Component Value Date    GLUCOSE 110 (H) 08/23/2023    BUN 13 08/23/2023    CREATININE 0.83 08/23/2023    EGFRIFNONA 61 09/06/2020    BCR 15.7 08/23/2023     08/23/2023    K 3.7 08/23/2023    CO2 26.0 08/23/2023    CALCIUM 8.8 08/23/2023    ALBUMIN 3.2 (L) 08/23/2023    ALKPHOS 122 (H) 08/23/2023    BILITOT 0.4 08/23/2023    ALT 20 08/23/2023    AST 26 08/23/2023       ASSESSMENTS/PLANS    Lower GI bleed with melena and hematochezia  Postoperative right knee infection with long-term Zosyn use  Anticoagulation due to recent right knee total arthroplasty   - continue trending H/H and transfuse per hospitalist protocol   - hold Eliquis   - obtain CT A/P w/ contrast   - obtain gastrointestinal PCR, C difficile   - BID PPI   - consider endoscopic evaluation this admission if no infectious source for lower GI bleeding is revealed with aforementioned workup    I discussed the patient's findings and my recommendations with patient and family. Thank you very kindly for this consultation. Will continue to follow during this hospitalization.      Peg Gould PA-C  08/23/23  13:37 EDT

## 2023-08-23 NOTE — ED PROVIDER NOTES
"Subjective   History of Present Illness  72-year-old female presents for evaluation of \"bloody stools.\"  Of note, the patient is anticoagulated.  She endorses compliance with her Eliquis.  She denies any prior history of GI bleeds.  She is currently undergoing IV antibiotic therapy via PICC line in her right upper extremity for a Pseudomonas postoperative knee infection.  She states that 2 days ago she began experiencing bright red blood and dark stools with bowel movements.  She notes several bloody bowel movements since that time.  She denies any accompanying abdominal pain.  No rectal pain.  Given her persistent symptoms she came here to the ED to be evaluated.  She denies any fevers.  She notes increased generalized weakness and fatigue when compared to baseline.  She notes shortness of breath with exertion.    Review of Systems   Constitutional:  Positive for fatigue.   Respiratory:  Positive for shortness of breath.    Gastrointestinal:  Positive for blood in stool.   Neurological:  Positive for weakness.   All other systems reviewed and are negative.    Past Medical History:   Diagnosis Date    Anesthesia 2002    low bp only with foot surgery, has had surgeries since and no issue per pt report    Arthritis     Back pain     Depression     Difficulty walking     Left knee replacement  need right knee replacement    GERD (gastroesophageal reflux disease)     High cholesterol     Hypertension     Knee pain     BILATERAL    Seasonal allergies     Tremor, essential        Allergies   Allergen Reactions    Primidone Other (See Comments)     Hair loss    Topiramate Diarrhea    Tuberculin Tests Swelling and Rash     Pt has chest xrays to check     Zostavax [Zoster Vaccine Live] Swelling and Rash     \"Swelling of injection site of arm\"       Past Surgical History:   Procedure Laterality Date    ANTERIOR AND POSTERIOR VAGINAL REPAIR N/A 4/17/2017    Vaginal enterocele / anterior posterior colporrhaphy;  Jose Maria Chowdhury MD; " " Location:  [facility];  Service:     BLADDER SUSPENSION      MESH TVT?     COLONOSCOPY      Dr Chung - normal    EXPLORATORY LAPAROTOMY      \"BOWEL INTO PELVIC BONE\" /[place]     EXPLORATORY LAPAROTOMY  2004    AQUILES/ abd sacrocolpopexy/post repair    FOOT SURGERY Left     KNEE ARTHROSCOPY Left     meniscus repair    KNEE INCISION AND DRAINAGE Right 2023    Procedure: KNEE INCISION AND DRAINAGE WITH POLY EXCHANGE - RIGHT;  Surgeon: Sunny Reynoso MD;  Location:  [facility];  Service: Orthopedics;  Laterality: Right;    LAPAROSCOPIC CHOLECYSTECTOMY Right     WI ARTHRP KNE CONDYLE&PLATU MEDIAL&LAT COMPARTMENTS Left 2017    Procedure: TOTAL KNEE ARTHROPLASTY LEFT;  Surgeon: Sunny Reynoso MD;  Location:  [facility];  Service: Orthopedics    TONSILLECTOMY      TOTAL ABDOMINAL HYSTERECTOMY WITH SALPINGO OOPHORECTOMY Bilateral     TOTAL KNEE ARTHROPLASTY Right 2023    Procedure: TOTAL KNEE ARTHROPLASTY WITH CORI ROBOT - RIGHT;  Surgeon: Sunny Reynoso MD;  Location:  [facility];  Service: Robotics - Ortho;  Laterality: Right;    TUBAL ABDOMINAL LIGATION      VENTRAL HERNIA REPAIR N/A 2016    Procedure: LAPAROSCOPIC REPAIR OF ABDOMINAL WALL HERNIA WITH MESH, CONVERTED TO OPEN ;  Surgeon: Bertha Delacruz MD;  Location:  [facility];  Service:        Family History   Problem Relation Age of Onset    Hypertension Mother     Diabetes Mother     Heart attack Mother 59         from MI    Cancer Father     Aneurysm Father 57    Hepatitis Brother         Hep B    Kidney disease Brother 58    Alzheimer's disease Maternal Grandmother     Pneumonia Maternal Grandmother     Parkinsonism Maternal Grandmother         Along w/Alzheimer's    Leukemia Maternal Grandfather     No Known Problems Paternal Grandfather     No Known Problems Daughter     No Known Problems Son     Ovarian cancer Neg Hx     Breast cancer Neg Hx        Social History     Socioeconomic " History    Marital status:    Tobacco Use    Smoking status: Former     Packs/day: 1.50     Years: 35.00     Pack years: 52.50     Types: Cigarettes     Start date: 10/1/1967     Quit date: 1986     Years since quittin.2    Smokeless tobacco: Never   Vaping Use    Vaping Use: Never used   Substance and Sexual Activity    Alcohol use: No    Drug use: Never    Sexual activity: Defer     Partners: Male     Birth control/protection: Surgical, Abstinence, Post-menopausal, Tubal ligation, Hysterectomy     Comment: Total hysterectomy           Objective   Physical Exam  Vitals and nursing note reviewed.   Constitutional:       General: She is not in acute distress.     Appearance: She is well-developed. She is obese. She is not diaphoretic.   HENT:      Head: Normocephalic and atraumatic.   Eyes:      Pupils: Pupils are equal, round, and reactive to light.   Cardiovascular:      Rate and Rhythm: Normal rate and regular rhythm.      Heart sounds: Normal heart sounds. No murmur heard.    No friction rub. No gallop.   Pulmonary:      Effort: Pulmonary effort is normal. No respiratory distress.      Breath sounds: Normal breath sounds. No wheezing or rales.   Abdominal:      General: Bowel sounds are normal. There is no distension.      Palpations: Abdomen is soft. There is no mass.      Tenderness: There is no abdominal tenderness. There is no guarding or rebound.      Comments: No focal abdominal tenderness, no peritoneal signs, no pain out of proportion to exam   Genitourinary:     Comments: Fecal occult blood test positive  Musculoskeletal:         General: Normal range of motion.      Cervical back: Neck supple.   Skin:     General: Skin is warm and dry.      Findings: No erythema or rash.      Comments: Surgical site to anterior aspect of right knee appears to be healing well without purulence or drainage noted   Neurological:      Mental Status: She is alert and oriented to person, place, and time.       "Comments: Right lower extremity is neurovascularly intact distally with bounding distal pulses normal sensation noted   Psychiatric:         Mood and Affect: Mood normal.         Thought Content: Thought content normal.         Judgment: Judgment normal.       Procedures           ED Course  ED Course as of 08/23/23 1947   Wed Aug 23, 2023   1057 72-year-old female presents for evaluation of \"bloody stools.\"  Of note, the patient is anticoagulated.  She endorses compliance with her Eliquis.  She denies any prior history of GI bleeds.  She is currently undergoing IV antibiotic therapy via PICC line in her right upper extremity for a Pseudomonas postoperative knee infection.  She states that 2 days ago she began experiencing bright red and dark stools with bowel movements that have persisted since that time, prompting her visit to the ED.  On arrival, the patient is nontoxic-appearing.  Nonsurgical abdomen.  Fecal occult blood test is positive.  We will obtain labs, and we will reassess following initial interventions. [DD]   2305 After reviewing the patient's labs, I discussed the patient's case with Dr. Cartagena of gastroenterology, and he will see the patient in consult.  I discussed the patient's case with our hospitalist, Dr. Desai, and the patient will be admitted under her care for further evaluation and treatment.  The patient is aware/agreeable with the plan at this time. [DD]   2736 Dr. Desai notified me that the patient should be going to Dr. AYALA's service as the patient was just admitted under his care within the past month.  I notified his ROSEMARIE, Grisel Leonardo, and the patient will be admitted under his care for further evaluation and treatment. [DD]      ED Course User Index  [DD] Nam Taylor MD                                      Recent Results (from the past 24 hour(s))   Comprehensive Metabolic Panel    Collection Time: 08/23/23 11:11 AM    Specimen: Blood   Result Value Ref Range    Glucose 110 " (H) 65 - 99 mg/dL    BUN 13 8 - 23 mg/dL    Creatinine 0.83 0.57 - 1.00 mg/dL    Sodium 142 136 - 145 mmol/L    Potassium 3.7 3.5 - 5.2 mmol/L    Chloride 105 98 - 107 mmol/L    CO2 26.0 22.0 - 29.0 mmol/L    Calcium 8.8 8.6 - 10.5 mg/dL    Total Protein 6.6 6.0 - 8.5 g/dL    Albumin 3.2 (L) 3.5 - 5.2 g/dL    ALT (SGPT) 20 1 - 33 U/L    AST (SGOT) 26 1 - 32 U/L    Alkaline Phosphatase 122 (H) 39 - 117 U/L    Total Bilirubin 0.4 0.0 - 1.2 mg/dL    Globulin 3.4 gm/dL    A/G Ratio 0.9 g/dL    BUN/Creatinine Ratio 15.7 7.0 - 25.0    Anion Gap 11.0 5.0 - 15.0 mmol/L    eGFR 75.5 >60.0 mL/min/1.73   Type & Screen    Collection Time: 08/23/23 11:11 AM    Specimen: Blood   Result Value Ref Range    ABO Type A     RH type Positive     Antibody Screen Negative     T&S Expiration Date 8/26/2023 11:59:59 PM    Lactic Acid, Plasma    Collection Time: 08/23/23 11:11 AM    Specimen: Blood   Result Value Ref Range    Lactate 1.6 0.5 - 2.0 mmol/L   Green Top (Gel)    Collection Time: 08/23/23 11:11 AM   Result Value Ref Range    Extra Tube Hold for add-ons.    Lavender Top    Collection Time: 08/23/23 11:11 AM   Result Value Ref Range    Extra Tube hold for add-on    Gold Top - SST    Collection Time: 08/23/23 11:11 AM   Result Value Ref Range    Extra Tube Hold for add-ons.    Gray Top    Collection Time: 08/23/23 11:11 AM   Result Value Ref Range    Extra Tube Hold for add-ons.    Light Blue Top    Collection Time: 08/23/23 11:11 AM   Result Value Ref Range    Extra Tube Hold for add-ons.    CBC Auto Differential    Collection Time: 08/23/23 11:11 AM    Specimen: Blood   Result Value Ref Range    WBC 5.03 3.40 - 10.80 10*3/mm3    RBC 3.68 (L) 3.77 - 5.28 10*6/mm3    Hemoglobin 10.0 (L) 12.0 - 15.9 g/dL    Hematocrit 33.4 (L) 34.0 - 46.6 %    MCV 90.8 79.0 - 97.0 fL    MCH 27.2 26.6 - 33.0 pg    MCHC 29.9 (L) 31.5 - 35.7 g/dL    RDW 15.4 12.3 - 15.4 %    RDW-SD 50.9 37.0 - 54.0 fl    MPV 11.7 6.0 - 12.0 fL    Platelets 165 140 -  "450 10*3/mm3    Neutrophil % 67.0 42.7 - 76.0 %    Lymphocyte % 19.5 (L) 19.6 - 45.3 %    Monocyte % 8.9 5.0 - 12.0 %    Eosinophil % 3.8 0.3 - 6.2 %    Basophil % 0.4 0.0 - 1.5 %    Immature Grans % 0.4 0.0 - 0.5 %    Neutrophils, Absolute 3.37 1.70 - 7.00 10*3/mm3    Lymphocytes, Absolute 0.98 0.70 - 3.10 10*3/mm3    Monocytes, Absolute 0.45 0.10 - 0.90 10*3/mm3    Eosinophils, Absolute 0.19 0.00 - 0.40 10*3/mm3    Basophils, Absolute 0.02 0.00 - 0.20 10*3/mm3    Immature Grans, Absolute 0.02 0.00 - 0.05 10*3/mm3    nRBC 0.0 0.0 - 0.2 /100 WBC   POC Occult Blood Stool    Collection Time: 08/23/23 11:15 AM    Specimen: Stool   Result Value Ref Range    Fecal Occult Blood Positive (A)     Lot Number [id number]     Expiration Date 10-25     DEVELOPER LOT NUMBER [id number]     DEVELOPER EXPIRATION DATE 2,026-6     Positive Control Positive     Negative Control Positive (A)    ABO RH Specimen Verification    Collection Time: 08/23/23  2:22 PM    Specimen: Blood   Result Value Ref Range    ABO Type A     RH type Positive      Note: In addition to lab results from this visit, the labs listed above may include labs taken at another facility or during a different encounter within the last 24 hours. Please correlate lab times with ED admission and discharge times for further clarification of the services performed during this visit.    CT Abdomen Pelvis With Contrast    (Results Pending)     Vitals:    08/23/23 1023 08/23/23 1129 08/23/23 1355   BP: (!) 183/99 177/89 162/80   BP Location: Left arm  Left arm   Patient Position: Sitting  Sitting   Pulse: 114 72 64   Resp: 24  20   Temp: 98 øF (36.7 øC)  97.4 øF (36.3 øC)   TempSrc: Oral  Oral   SpO2: 93% 96% 95%   Weight: (!) 147 kg (325 lb)     Height: 157.5 cm (62\")       Medications   sodium chloride 0.9 % flush 10 mL (has no administration in time range)   atorvastatin (LIPITOR) tablet 10 mg (has no administration in time range)   estradiol (ESTRACE) tablet 0.5 mg (has no " administration in time range)   losartan (COZAAR) tablet 100 mg (has no administration in time range)   nebivolol (BYSTOLIC) tablet 5 mg (has no administration in time range)   venlafaxine XR (EFFEXOR-XR) 24 hr capsule 75 mg (has no administration in time range)   zonisamide (ZONEGRAN) capsule 200 mg (200 mg Oral Given 8/23/23 1813)   sodium chloride 0.9 % flush 10 mL (has no administration in time range)   sodium chloride 0.9 % flush 10 mL (has no administration in time range)   sodium chloride 0.9 % infusion 40 mL (has no administration in time range)   sennosides-docusate (PERICOLACE) 8.6-50 MG per tablet 2 tablet (has no administration in time range)     And   polyethylene glycol (MIRALAX) packet 17 g (has no administration in time range)     And   bisacodyl (DULCOLAX) EC tablet 5 mg (has no administration in time range)     And   bisacodyl (DULCOLAX) suppository 10 mg (has no administration in time range)   ondansetron (ZOFRAN) tablet 4 mg (has no administration in time range)     Or   ondansetron (ZOFRAN) injection 4 mg (has no administration in time range)   pantoprazole (PROTONIX) injection 40 mg (40 mg Intravenous Given 8/23/23 1813)   acetaminophen (TYLENOL) tablet 650 mg (has no administration in time range)   traMADol (ULTRAM) tablet 50 mg (has no administration in time range)   Pharmacy Consult - Pharmacy to dose (has no administration in time range)   piperacillin-tazobactam (ZOSYN) 4.5 g in iso-osmotic dextrose 100 mL IVPB (premix) (has no administration in time range)     ECG/EMG Results (last 24 hours)       ** No results found for the last 24 hours. **          No orders to display              Medical Decision Making  Amount and/or Complexity of Data Reviewed  Labs: ordered.    Risk  Prescription drug management.  Decision regarding hospitalization.        Final diagnoses:   Acute gastrointestinal bleeding   Anemia, unspecified type       ED Disposition  ED Disposition       ED Disposition    Decision to Admit    Condition   --    Comment   Level of Care: Med/Surg [1]   Diagnosis: GIB (gastrointestinal bleeding) [274309]   Admitting Physician: ALMITA BOONE [[id number]]   Attending Physician: ALMITA BOONE [[id number]]   Certification: I Certify That Inpatient Hospital Services Are Medically Necessary For Greater Than 2 Midnights                 No follow-up provider specified.       Medication List      No changes were made to your prescriptions during this visit.            Nam Taylor MD  08/23/23 1946

## 2023-08-23 NOTE — Clinical Note
Level of Care: Telemetry [5]   Admitting Physician: ALMITA BOONE [[id number]]   Attending Physician: ALMITA BOONE [[id number]]

## 2023-08-23 NOTE — CASE MANAGEMENT/SOCIAL WORK
Discharge Planning Assessment  [facility]     Patient Name: Imelda Elaine  MRN: [id number]  Today's Date: 8/23/2023    Admit Date: 8/23/2023    Plan: IDP   Discharge Needs Assessment       Row Name 08/23/23 1231       Living Environment    People in Home spouse    Name(s) of People in Home Rangel Elaine    Current Living Arrangements home    Potentially Unsafe Housing Conditions unable to assess    Primary Care Provided by self    Provides Primary Care For no one    Family Caregiver if Needed spouse    Family Caregiver Names Rangel Elaine    Quality of Family Relationships involved;helpful       Resource/Environmental Concerns    Transportation Concerns none       Transition Planning    Patient/Family Anticipates Transition to home with help/services;home with family    Transportation Anticipated family or friend will provide       Discharge Needs Assessment    Readmission Within the Last 30 Days unable to assess    Equipment Currently Used at Home cane, straight;walker, rolling                   Discharge Plan       Row Name 08/23/23 1250       Plan    Plan IDP    Plan Comments MSW met with pt. and her spouse Rangel Elaine at bedside. Pt. lives with her spouse in [facility]. Pt.'s PCP is [facility]. Pt.'s pharmacy is [facility]. Pt.'s insurance is [employer] Medicare Replacement. Pt. reports that she is independent at baseline. Pt. reports she has a cane and rolling walker. No O2 currently. Pt. reports she has HH with [facility] for [facility], aide, and PT. Pt. would like to resume services at d/c. Pt. has transportation when she is medically ready to d/c. Pt. has an advanced directive and ACP documentation on file. CM will continue to follow pt. throughout her stay.    Final Discharge Disposition Code 30 - still a patient                  Continued Care and Services - Admitted Since 8/23/2023    Coordination has not been started for this encounter.       Selected Continued Care - Prior Encounters Includes  continued care and service providers with selected services from prior encounters from 5/25/2023 to 8/23/2023      Discharged on 7/28/2023 Admission date: 7/24/2023 - Discharge disposition: Rehab Facility or Unit (DC - External)      Destination       Service Provider Selected Services Address Phone Fax Patient Preferred    [facility] [address] [address] [phone] --                      Discharged on 6/30/2023 Admission date: 6/29/2023 - Discharge disposition: Home or Self Care      Therapy       Service Provider Selected Services Address Phone Fax Patient Preferred    [facility] [address] [facility] [address] [phone] --                             Demographic Summary       Row Name 08/23/23 1231       General Information    Admission Type inpatient    Referral Source admission list;emergency department    Reason for Consult discharge planning    Preferred Language English                   Functional Status       Row Name 08/23/23 1231       Functional Status, IADL    Medications independent    Meal Preparation independent    Housekeeping independent    Shopping independent       Mental Status Summary    Recent Changes in Mental Status/Cognitive Functioning unable to assess       Employment/    Employment Status retired                   Psychosocial    No documentation.                  Abuse/Neglect    No documentation.                  Legal    No documentation.                  Substance Abuse    No documentation.                  Patient Forms    No documentation.                     COLLINS Cunha

## 2023-08-24 ENCOUNTER — HOME CARE VISIT (OUTPATIENT)
Dept: HOME HEALTH SERVICES | Facility: HOME HEALTHCARE | Age: 73
End: 2023-08-24
Payer: COMMERCIAL

## 2023-08-24 LAB
ADV 40+41 DNA STL QL NAA+NON-PROBE: NOT DETECTED
ANION GAP SERPL CALCULATED.3IONS-SCNC: 11 MMOL/L (ref 5–15)
ASTRO TYP 1-8 RNA STL QL NAA+NON-PROBE: NOT DETECTED
BUN SERPL-MCNC: 11 MG/DL (ref 8–23)
BUN/CREAT SERPL: 15.1 (ref 7–25)
C CAYETANENSIS DNA STL QL NAA+NON-PROBE: NOT DETECTED
C COLI+JEJ+UPSA DNA STL QL NAA+NON-PROBE: NOT DETECTED
C DIFF TOX GENS STL QL NAA+PROBE: NOT DETECTED
CALCIUM SPEC-SCNC: 8.8 MG/DL (ref 8.6–10.5)
CHLORIDE SERPL-SCNC: 105 MMOL/L (ref 98–107)
CO2 SERPL-SCNC: 26 MMOL/L (ref 22–29)
CREAT SERPL-MCNC: 0.73 MG/DL (ref 0.57–1)
CRYPTOSP DNA STL QL NAA+NON-PROBE: NOT DETECTED
DEPRECATED RDW RBC AUTO: 50.2 FL (ref 37–54)
E HISTOLYT DNA STL QL NAA+NON-PROBE: NOT DETECTED
EAEC PAA PLAS AGGR+AATA ST NAA+NON-PRB: NOT DETECTED
EC STX1+STX2 GENES STL QL NAA+NON-PROBE: NOT DETECTED
EGFRCR SERPLBLD CKD-EPI 2021: 88 ML/MIN/1.73
EPEC EAE GENE STL QL NAA+NON-PROBE: NOT DETECTED
ERYTHROCYTE [DISTWIDTH] IN BLOOD BY AUTOMATED COUNT: 15.4 % (ref 12.3–15.4)
ETEC LTA+ST1A+ST1B TOX ST NAA+NON-PROBE: NOT DETECTED
G LAMBLIA DNA STL QL NAA+NON-PROBE: NOT DETECTED
GLUCOSE SERPL-MCNC: 101 MG/DL (ref 65–99)
HCT VFR BLD AUTO: 30.7 % (ref 34–46.6)
HGB BLD-MCNC: 9.3 G/DL (ref 12–15.9)
MCH RBC QN AUTO: 27 PG (ref 26.6–33)
MCHC RBC AUTO-ENTMCNC: 30.3 G/DL (ref 31.5–35.7)
MCV RBC AUTO: 89.2 FL (ref 79–97)
NOROVIRUS GI+II RNA STL QL NAA+NON-PROBE: NOT DETECTED
P SHIGELLOIDES DNA STL QL NAA+NON-PROBE: NOT DETECTED
PLATELET # BLD AUTO: 173 10*3/MM3 (ref 140–450)
PMV BLD AUTO: 11.5 FL (ref 6–12)
POTASSIUM SERPL-SCNC: 3.8 MMOL/L (ref 3.5–5.2)
RBC # BLD AUTO: 3.44 10*6/MM3 (ref 3.77–5.28)
RVA RNA STL QL NAA+NON-PROBE: NOT DETECTED
S ENT+BONG DNA STL QL NAA+NON-PROBE: NOT DETECTED
SAPO I+II+IV+V RNA STL QL NAA+NON-PROBE: NOT DETECTED
SHIGELLA SP+EIEC IPAH ST NAA+NON-PROBE: NOT DETECTED
SODIUM SERPL-SCNC: 142 MMOL/L (ref 136–145)
V CHOL+PARA+VUL DNA STL QL NAA+NON-PROBE: NOT DETECTED
V CHOLERAE DNA STL QL NAA+NON-PROBE: NOT DETECTED
WBC NRBC COR # BLD: 5.67 10*3/MM3 (ref 3.4–10.8)
Y ENTEROCOL DNA STL QL NAA+NON-PROBE: NOT DETECTED

## 2023-08-24 PROCEDURE — 99232 SBSQ HOSP IP/OBS MODERATE 35: CPT | Performed by: PHYSICIAN ASSISTANT

## 2023-08-24 PROCEDURE — 80048 BASIC METABOLIC PNL TOTAL CA: CPT | Performed by: NURSE PRACTITIONER

## 2023-08-24 PROCEDURE — 97165 OT EVAL LOW COMPLEX 30 MIN: CPT

## 2023-08-24 PROCEDURE — G0378 HOSPITAL OBSERVATION PER HR: HCPCS

## 2023-08-24 PROCEDURE — 25010000002 PIPERACILLIN SOD-TAZOBACTAM PER 1 G: Performed by: INTERNAL MEDICINE

## 2023-08-24 PROCEDURE — 85027 COMPLETE CBC AUTOMATED: CPT | Performed by: NURSE PRACTITIONER

## 2023-08-24 PROCEDURE — 87493 C DIFF AMPLIFIED PROBE: CPT | Performed by: PHYSICIAN ASSISTANT

## 2023-08-24 PROCEDURE — 97161 PT EVAL LOW COMPLEX 20 MIN: CPT

## 2023-08-24 PROCEDURE — 87507 IADNA-DNA/RNA PROBE TQ 12-25: CPT | Performed by: PHYSICIAN ASSISTANT

## 2023-08-24 RX ADMIN — LOSARTAN POTASSIUM 100 MG: 50 TABLET, FILM COATED ORAL at 08:22

## 2023-08-24 RX ADMIN — Medication 10 ML: at 20:11

## 2023-08-24 RX ADMIN — NEBIVOLOL 5 MG: 5 TABLET ORAL at 08:23

## 2023-08-24 RX ADMIN — ATORVASTATIN CALCIUM 10 MG: 10 TABLET, FILM COATED ORAL at 20:10

## 2023-08-24 RX ADMIN — PIPERACILLIN SODIUM AND TAZOBACTAM SODIUM 4.5 G: 4; .5 INJECTION, SOLUTION INTRAVENOUS at 13:25

## 2023-08-24 RX ADMIN — VENLAFAXINE HYDROCHLORIDE 75 MG: 75 CAPSULE, EXTENDED RELEASE ORAL at 08:22

## 2023-08-24 RX ADMIN — PIPERACILLIN SODIUM AND TAZOBACTAM SODIUM 4.5 G: 4; .5 INJECTION, SOLUTION INTRAVENOUS at 20:10

## 2023-08-24 RX ADMIN — Medication 10 ML: at 08:28

## 2023-08-24 RX ADMIN — PIPERACILLIN SODIUM AND TAZOBACTAM SODIUM 4.5 G: 4; .5 INJECTION, SOLUTION INTRAVENOUS at 05:58

## 2023-08-24 RX ADMIN — ESTRADIOL 0.5 MG: 0.5 TABLET ORAL at 08:23

## 2023-08-24 NOTE — CONSULTS
INFECTIOUS DISEASE Progress note    Imelda Elaine  1950  [id number]    Date of consult: 8/23/23    Admit date: 8/23/2023    Requesting Provider: Dr. Concepcion  Evaluating physician: Nam Adams MD  Reason for Consultation: Continuity of care for her right knee prosthetic joint infection  Chief Complaint: GI bleeding      Subjective   History of present illness:  Imelda Elaine is a  72 y.o.  Yr old female who I have been following for a right knee prosthetic joint infection with Pseudomonas from deep surgical culture. Status post I&D and poly-exchange by Dr. Reynoso on 7/25/23.  Planning for at least 6 weeks of IV antibiotics until 9/4/23. Unfortunately she still has some ongoing swelling although her erythema has improved.  Still has some serous drainage and ongoing elevation of her inflammatory markers. The patient had a short stay at [facility] after her recent [facility] hospitalization and was doing okay at her clinic visit last week although still having some mild serous drainage from her lower portion of her incision site over her right knee.  Unfortunately, she recently started having some severe bright red blood per rectum in the setting of being on Eliquis for DVT prophylaxis after her recent surgery.  She presented to the hospital Today due to this.  She is being evaluated by GI For a likely lower GI bleed.  CT abdomen and pelvis has been ordered and Eliquis has been held. C. Difficile PCR and GI PCR panel are pending. GI considering endoscopic evaluation. ID has been consult and for continuity of care for her prosthetic joint infection.      Subjective:    8/24/23: The patient states that her Bloody diarrhea Has resolved.  No abdominal pain.  No nausea or vomiting.  Right knee is stable with a new dressing in place.  No fevers.  No new rashes.    Past Medical History:   Diagnosis Date    Anesthesia 2002    low bp only with foot surgery, has had surgeries since and no issue per pt report     "Arthritis     Back pain     Depression     Difficulty walking     Left knee replacement  need right knee replacement    GERD (gastroesophageal reflux disease)     High cholesterol     Hypertension     Knee pain     BILATERAL    Seasonal allergies     Tremor, essential        Past Surgical History:   Procedure Laterality Date    ANTERIOR AND POSTERIOR VAGINAL REPAIR N/A 4/17/2017    Vaginal enterocele / anterior posterior colporrhaphy;  Jose Maria Chowdhury MD;  Location:  [facility];  Service:     BLADDER SUSPENSION  1999    MESH TVT?     COLONOSCOPY  2018    Dr Chung - gail    EXPLORATORY LAPAROTOMY  2010    \"BOWEL INTO PELVIC BONE\" /Mesh [place]     EXPLORATORY LAPAROTOMY  04/2004    AQUILES/ abd sacrocolpopexy/post repair    FOOT SURGERY Left     KNEE ARTHROSCOPY Left     meniscus repair    KNEE INCISION AND DRAINAGE Right 7/25/2023    Procedure: KNEE INCISION AND DRAINAGE WITH POLY EXCHANGE - RIGHT;  Surgeon: Sunny Reynoso MD;  Location:  [facility];  Service: Orthopedics;  Laterality: Right;    LAPAROSCOPIC CHOLECYSTECTOMY Right 2000    WI ARTHRP KNE CONDYLE&PLATU MEDIAL&LAT COMPARTMENTS Left 11/16/2017    Procedure: TOTAL KNEE ARTHROPLASTY LEFT;  Surgeon: Sunny Reynoso MD;  Location:  [facility];  Service: Orthopedics    TONSILLECTOMY      TOTAL ABDOMINAL HYSTERECTOMY WITH SALPINGO OOPHORECTOMY Bilateral 1989    TOTAL KNEE ARTHROPLASTY Right 6/29/2023    Procedure: TOTAL KNEE ARTHROPLASTY WITH CORI ROBOT - RIGHT;  Surgeon: Sunny Reynoso MD;  Location:  [facility];  Service: Robotics - Ortho;  Laterality: Right;    TUBAL ABDOMINAL LIGATION      VENTRAL HERNIA REPAIR N/A 6/21/2016    Procedure: LAPAROSCOPIC REPAIR OF ABDOMINAL WALL HERNIA WITH MESH, CONVERTED TO OPEN ;  Surgeon: Bertha Delacruz MD;  Location:  [facility];  Service:        Pediatric History   Patient Parents    Not on file     Other Topics Concern    Not on file   Social History Narrative    Patient consumes 1-2 cups " "high-caffeine coffee, 1 [employer] sweet tea daily.     Patient lives at home with her .        family history includes Alzheimer's disease in her maternal grandmother; Aneurysm (age of onset: 57) in her father; Cancer in her father; Diabetes in her mother; Heart attack (age of onset: 59) in her mother; Hepatitis in her brother; Hypertension in her mother; Kidney disease (age of onset: 58) in her brother; Leukemia in her maternal grandfather; No Known Problems in her daughter, paternal grandfather, and son; Parkinsonism in her maternal grandmother; Pneumonia in her maternal grandmother.    Allergies   Allergen Reactions    Primidone Other (See Comments)     Hair loss    Topiramate Diarrhea    Tuberculin Tests Swelling and Rash     Pt has chest xrays to check     Zostavax [Zoster Vaccine Live] Swelling and Rash     \"Swelling of injection site of arm\"       Immunization History   Administered Date(s) Administered    COVID-19 (MODERNA) BIVALENT 12+YRS 09/04/2022    COVID-19 (MODERNA) Monovalent Original Booster 04/19/2022    COVID-19 (PFIZER) Purple Cap Monovalent 02/06/2021, 02/27/2021, 09/26/2021    Fluad Quad 65+ 09/14/2020    Fluzone High Dose =>65 Years ([employer] ONLY) 10/02/2017, 09/22/2018, 10/04/2019    Influenza, Unspecified 09/14/2020    Pneumococcal Conjugate 13-Valent (PCV13) 10/02/2017    Pneumococcal Polysaccharide (PPSV23) 10/04/2019       Medication:    Current Facility-Administered Medications:     acetaminophen (TYLENOL) tablet 650 mg, 650 mg, Oral, Q6H PRN, Valerie Concepcion MD    atorvastatin (LIPITOR) tablet 10 mg, 10 mg, Oral, Nightly, Grisel Leonardo APRN, 10 mg at 08/23/23 2023    sennosides-docusate (PERICOLACE) 8.6-50 MG per tablet 2 tablet, 2 tablet, Oral, BID **AND** polyethylene glycol (MIRALAX) packet 17 g, 17 g, Oral, Daily PRN **AND** bisacodyl (DULCOLAX) EC tablet 5 mg, 5 mg, Oral, Daily PRN **AND** bisacodyl (DULCOLAX) suppository 10 mg, 10 mg, Rectal, Daily PRN, Jorden, " Grisel, APRN    estradiol (ESTRACE) tablet 0.5 mg, 0.5 mg, Oral, Daily, Leonardo, Grisel, APRN, 0.5 mg at 08/24/23 0823    losartan (COZAAR) tablet 100 mg, 100 mg, Oral, Daily, Leonardo, Grisel, APRN, 100 mg at 08/24/23 0822    nebivolol (BYSTOLIC) tablet 5 mg, 5 mg, Oral, Q24H, Leonardo, Grisel, APRN, 5 mg at 08/24/23 0823    ondansetron (ZOFRAN) tablet 4 mg, 4 mg, Oral, Q6H PRN **OR** ondansetron (ZOFRAN) injection 4 mg, 4 mg, Intravenous, Q6H PRN, Grisel Leonardo, APRN    pantoprazole (PROTONIX) injection 40 mg, 40 mg, Intravenous, BID CHUY, Peg Gould PA-C, 40 mg at 08/23/23 1813    piperacillin-tazobactam (ZOSYN) 4.5 g in iso-osmotic dextrose 100 mL IVPB (premix), 4.5 g, Intravenous, Q8H, aNm Adams MD, Last Rate: 12.5 mL/hr at 08/24/23 1325, 4.5 g at 08/24/23 1325    sodium chloride 0.9 % flush 10 mL, 10 mL, Intravenous, PRN, Nam Taylor MD    sodium chloride 0.9 % flush 10 mL, 10 mL, Intravenous, Q12H, Grisel Leonardo, APRN, 10 mL at 08/24/23 0828    sodium chloride 0.9 % flush 10 mL, 10 mL, Intravenous, PRN, Grisel Leonardo, APRHERNANDEZ    sodium chloride 0.9 % infusion 40 mL, 40 mL, Intravenous, PRN, Grisel Leonardo, APRN    traMADol (ULTRAM) tablet 50 mg, 50 mg, Oral, Q4H PRN, Valerie Concepcion MD    venlafaxine XR (EFFEXOR-XR) 24 hr capsule 75 mg, 75 mg, Oral, Daily, Jorden, Grisel, APRN, 75 mg at 08/24/23 0822    zonisamide (ZONEGRAN) capsule 200 mg, 200 mg, Oral, Daily, Grisel LeonardoFLOR, 200 mg at 08/23/23 1813    Please refer to the medical record for a full medication list    Review of Systems:  As above    Physical Exam:   Vital Signs   Temp:  [97.2 øF (36.2 øC)-97.8 øF (36.6 øC)] 97.8 øF (36.6 øC)  Heart Rate:  [66-91] 74  Resp:  [18-22] 18  BP: (144-169)/(66-82) 144/66    Temp  Min: 97.2 øF (36.2 øC)  Max: 97.8 øF (36.6 øC)  BP  Min: 144/66  Max: 169/82  Pulse  Min: 66  Max: 91  Resp  Min: 18  Max: 22  SpO2  Min: 94 %  Max: 96 %    Blood pressure 144/66, pulse 74,  "temperature 97.8 øF (36.6 øC), temperature source Oral, resp. rate 18, height 157.5 cm (62\"), weight (!) 147 kg (325 lb), SpO2 96 %, not currently breastfeeding.  GENERAL: Morbidly obese.  Resting in bed.  No acute distress  HEENT:  Normocephalic, atraumatic.  No external oral lesions noted  EYES: Anicteric.  No conjunctival injection.  HEART: RRR, no murmur  LUNGS: Clear to auscultation and percussion. No respiratory distress, no use of accessory muscles.  ABDOMEN: Obese abdomen. Soft, nontender, nondistended. No appreciable HSM.    MSK: Right knee with dressing over her incision site, no surrounding erythema or drainage noted to the dressing.  GENITAL: no Donald catheter  SKIN: no generalized rashes.  No peripheral stigmata of infective endocarditis noted.  PSYCHIATRIC: cooperative.  Appropriate mood and affect  EXT:  No cellulitic change.  NEURO: awake alert and oriented x4.  Normal speech and cognition    PICC line site is without any erythema or drainage    Results Review:   I reviewed the patient's new clinical results.    Results from last 7 days   Lab Units 08/24/23  0330 08/23/23  1111 08/22/23  1150   WBC 10*3/mm3 5.67 5.03 5.12   HEMOGLOBIN g/dL 9.3* 10.0* 9.8*   HEMATOCRIT % 30.7* 33.4* 32.8*   PLATELETS 10*3/mm3 173 165 190     Results from last 7 days   Lab Units 08/24/23  0330   SODIUM mmol/L 142   POTASSIUM mmol/L 3.8   CHLORIDE mmol/L 105   CO2 mmol/L 26.0   BUN mg/dL 11   CREATININE mg/dL 0.73   GLUCOSE mg/dL 101*   CALCIUM mg/dL 8.8     Results from last 7 days   Lab Units 08/23/23  1111   ALK PHOS U/L 122*   BILIRUBIN mg/dL 0.4   ALT (SGPT) U/L 20   AST (SGOT) U/L 26     Results from last 7 days   Lab Units 08/22/23  1150   SED RATE mm/hr 101*     Results from last 7 days   Lab Units 08/22/23  1040   CRP mg/dL 4.25*         Results from last 7 days   Lab Units 08/23/23  1111   LACTATE mmol/L 1.6     Estimated Creatinine Clearance: 99.2 mL/min (by C-G formula based on SCr of 0.73 mg/dL).  CPK  "         7/24/2023    12:49   Common Labsle   Creatine Kinase 64       Procalitonin Results:       Brief Urine Lab Results       None           No results found for: SITE, ALLENTEST, PHART, TZH4PJX, PO2ART, QUN6EDU, BASEEXCESS, V8FLALLG, HGBBG, HCTABG, OXYHEMOGLOBI, METHHGBN, CARBOXYHGB, CO2CT, BAROMETRIC, MODALITY, FIO2     Microbiology:  Reviewed.  GI PCR panel and C. Difficile test are pending.      Radiology:  Imaging Results (Last 72 Hours)       Procedure Component Value Units Date/Time    XR Chest 1 View [[id number]] Collected: 08/23/23 2227     Updated: 08/23/23 2231    Narrative:      XR CHEST 1 VW    Date of Exam: 8/23/2023 10:09 PM EDT    Indication: PICC placement    Comparison: None available.    Findings:  Top normal size of the heart with otherwise unremarkable cardiomediastinal silhouette. There is a right upper extremity PICC line with the tip positioned in the mid SVC. The lungs are grossly clear. No pleural effusion or pneumothorax. No acute osseous   findings.      Impression:      Impression:  Top normal size of the heart with otherwise unremarkable cardiomediastinal silhouette. There is a right upper extremity PICC line with the tip positioned in the mid SVC. The lungs are grossly clear. No pleural effusion or pneumothorax. No acute osseous   findings.      Electronically Signed: Alban Mccarthy    8/23/2023 10:28 PM EDT    Workstation ID: [id number]    CT Abdomen Pelvis With Contrast [[id number]] Collected: 08/23/23 2116     Updated: 08/23/23 2126    Narrative:      CT ABDOMEN PELVIS W CONTRAST    Date of Exam: 8/23/2023 7:42 PM EDT    Indication: Diarrhea  GI bleed, lower.    Comparison: None available.    Technique: Axial CT images were obtained of the abdomen and pelvis following the uneventful intravenous administration of 85 mL Isovue-370. Reconstructed coronal and sagittal images were also obtained. Automated exposure control and iterative   construction methods were used.      Findings:  No  evidence of active GI bleeding.    The lung bases are clear. Unremarkable appearance of the liver. The gallbladder is surgically absent. Normal appearance of the bile ducts. Unremarkable appearance of the spleen, pancreas, adrenal glands, kidneys, ureters, and bladder. The uterus is   surgically absent. There is sigmoid colonic diverticulosis without evidence of diverticulitis. Normal appendix. No bowel obstruction. No definite inflammatory change of the GI tract. There is a small sliding hiatal hernia. There is a duodenal   diverticulum without inflammatory change. No abdominopelvic free fluid or conspicuous fat stranding. No pneumoperitoneum. Unremarkable appearance of the vasculature. No lymphadenopathy. Unremarkable appearance of the body wall soft tissues. No acute or   suspicious bony findings.       Impression:      Impression:  No evidence of active GI bleeding.    Colonic diverticulosis without diverticulitis.    No acute abdominopelvic findings.        Electronically Signed: Alban Mccarthy    8/23/2023 9:23 PM EDT    Workstation ID: [id number]            IMPRESSION:     Problems:  Acute GI bleeding with bright red blood per rectum-in the setting of recent Eliquis.  C. Difficile test and GI PCR panel were negative. GI bleeding has resolved  Right knee prosthetic joint infection- with Pseudomonas from deep surgical cultures and with group B strep, E. Coli, and Pseudomonas from superficial culture from the right knee.  Status post I&D and poly-exchange by Dr. Reynoso on 7/25/23.  Planning for at least 6 weeks of IV antibiotics until 9/4/23. Unfortunately she still has some ongoing swelling although her erythema has improved.  Still has some serous drainage and ongoing elevation of her inflammatory markers.  She may ultimately require a two-stage revision surgery if this does not work.  Mild normocytic anemia  Morbid obesity-complicate all aspects of care.  Predispose the patient to poor wound healing and  infection  History of left knee replacement in 2017  Hypertension  Hyperlipidemia    RECOMMENDATIONS:      -Continue Zosyn.   -Eliquis being held  -C. Difficile test and GI PCR panel-Both negative  -Spore precautions Discontinued    I am okay with the patient's discharge when she is cleared by the other teams.    UM/KRISTINA:  Zosyn 13.5g IV q24h continuous infusion. Anticipate continuing this antibiotic at least until 9/4/23 for a 6 week course.  Weekly CBC with differential, CMP, CRP  Change the PICC line dressing weekly with a Biopatch or Tegaderm CHG gel dressing   Follow-up in my clinic on 9/4/23  Please fax a copy of my orders to [facility] at [phone] and call [phone] with final discharge plans     I discussed with nursing today    Thank you for asking me to see Imelda Elaine.      Nam Adams MD  8/24/2023

## 2023-08-24 NOTE — PROGRESS NOTES
"IM progress note      Imelda Elaine  [id number]  1950     LOS: 1 day     Attending: Valerie Concepcion MD    Primary Care Provider: Comfort Colunga MD      Chief Complaint/Reason for visit:    Chief Complaint   Patient presents with    Black or Bloody Stool       Subjective   Doing well today.  Eating lunch when I saw her.  No nausea or vomiting.  Had a bowel movement with no blood content in it.  C. difficile toxin PCR is negative.  Participated with PT and OT and did well.    Objective        Visit Vitals  /66 (BP Location: Left arm, Patient Position: Lying)   Pulse 74   Temp 97.8 øF (36.6 øC) (Oral)   Resp 18   Ht 157.5 cm (62\")   Wt (!) 147 kg (325 lb)   LMP  (LMP Unknown)   SpO2 96%   BMI 59.44 kg/mý     Temp (24hrs), Av.5 øF (36.4 øC), Min:97.2 øF (36.2 øC), Max:97.8 øF (36.6 øC)      Intake/Output:  No intake or output data in the 24 hours ending 23     Physical Therapy:  Goal Outcome Evaluation:  Plan of Care Reviewed With: patient, spouse  Progress: no change  Outcome Evaluation: Pt presents with decreased functional mobility and decreased independence with mobility. Pt ambulated 40ft in room with CGA and use of cane. Recommend continued skilled IP PT interventions. Recommend D/C home with assist and HHPT.        Anticipated Discharge Disposition (PT): home with assist, home with home health  Physical Exam:     General Appearance:    Alert, cooperative, in no acute distress   Head:    Normocephalic, without obvious abnormality, atraumatic    Lungs:     Normal effort, symmetric chest rise,  clear to      auscultation bilaterally              Heart:    Regular rhythm and normal rate, normal S1 and S2    Abdomen:     Normal bowel sounds, no masses, no organomegaly, soft        non-tender, non-distended, no guarding, no rebound      tenderness.  Obese.   Extremities: Clean dry intact dressing over right knee incision.  No clubbing, cyanosis . No deformities.    Pulses:   " Pulses palpable and equal bilaterally   Skin:   No bleeding, bruising or rash          Results Review:     I reviewed the patient's new clinical results.   Results from last 7 days   Lab Units 08/24/23  0330 08/23/23  1111 08/22/23  1150   WBC 10*3/mm3 5.67 5.03 5.12   HEMOGLOBIN g/dL 9.3* 10.0* 9.8*   HEMATOCRIT % 30.7* 33.4* 32.8*   PLATELETS 10*3/mm3 173 165 190     Results from last 7 days   Lab Units 08/24/23  0330 08/23/23  1111 08/22/23  1040   SODIUM mmol/L 142 142 141   POTASSIUM mmol/L 3.8 3.7 3.7   CHLORIDE mmol/L 105 105 105   CO2 mmol/L 26.0 26.0 27.0   BUN mg/dL 11 13 13   CREATININE mg/dL 0.73 0.83 0.87   CALCIUM mg/dL 8.8 8.8 8.5*   BILIRUBIN mg/dL  --  0.4 0.4   ALK PHOS U/L  --  122* 116   ALT (SGPT) U/L  --  20 20   AST (SGOT) U/L  --  26 24   GLUCOSE mg/dL 101* 110* 102*     I reviewed the patient's new imaging including images and reports.    All medications reviewed.   atorvastatin, 10 mg, Oral, Nightly  estradiol, 0.5 mg, Oral, Daily  losartan, 100 mg, Oral, Daily  nebivolol, 5 mg, Oral, Q24H  pantoprazole, 40 mg, Intravenous, BID AC  piperacillin-tazobactam, 4.5 g, Intravenous, Q8H  senna-docusate sodium, 2 tablet, Oral, BID  sodium chloride, 10 mL, Intravenous, Q12H  venlafaxine XR, 75 mg, Oral, Daily  zonisamide, 200 mg, Oral, Daily      acetaminophen, 650 mg, Q6H PRN  polyethylene glycol, 17 g, Daily PRN   And  bisacodyl, 5 mg, Daily PRN   And  bisacodyl, 10 mg, Daily PRN  ondansetron, 4 mg, Q6H PRN   Or  ondansetron, 4 mg, Q6H PRN  sodium chloride, 10 mL, PRN  sodium chloride, 10 mL, PRN  sodium chloride, 40 mL, PRN  traMADol, 50 mg, Q4H PRN        Assessment & Plan       GI bleed, lower.  With melena and hematochezia.  Resolved.    Morbid obesity    Essential hypertension    Anxiety and depression    Elevated hemoglobin A1c    PSVT (paroxysmal supraventricular tachycardia)    S/P I&D right knee with poly exchange 7/25/23    GIB (gastrointestinal bleeding)        Plan    H&H stable off  anticoagulants.  No evidence of ongoing bleed.  Anticoagulation stopped, no further need for Eliquis at this point.  Continue PPI.  Regular diet.  PT and OT.    Continue antibiotics as recommended by Dr. Nam Grimm with [facility].  To finish 6 weeks course on 9/4/2023.    Possible home discharge tomorrow    Valerie Concepcion MD  08/24/23  19:59 EDT

## 2023-08-24 NOTE — PROGRESS NOTES
"GI Daily Progress Note  Subjective:    Pt sitting up in bed. Reports hunger and is requesting a lunch tray. She had one large BM this morning that was soft, without presence of blood. Hb stable, 9.3. Denies abdominal pain, nausea, vomiting.     CT A/P w/ contrast 8/23: No active GI bleeding. Colonic diverticulosis without diverticulitis. No acute abdominopelvic findings.     Objective:    /76 (BP Location: Left arm, Patient Position: Sitting)   Pulse 66   Temp 97.8 øF (36.6 øC) (Oral)   Resp 18   Ht 157.5 cm (62\")   Wt (!) 147 kg (325 lb)   LMP  (LMP Unknown)   SpO2 96%   BMI 59.44 kg/mý     Physical Exam  Vitals and nursing note reviewed.   Constitutional:       Appearance: Normal appearance. She is normal weight. She is ill-appearing (chronically). She is not diaphoretic.      Comments: BMI 59.44   HENT:      Head: Normocephalic and atraumatic.      Right Ear: External ear normal.      Left Ear: External ear normal.      Nose: Nose normal.      Mouth/Throat:      Mouth: Mucous membranes are moist.      Pharynx: Oropharynx is clear.   Eyes:      Conjunctiva/sclera: Conjunctivae normal.      Pupils: Pupils are equal, round, and reactive to light.   Neck:      Thyroid: No thyromegaly.   Cardiovascular:      Rate and Rhythm: Normal rate and regular rhythm.      Heart sounds: Normal heart sounds.   Pulmonary:      Effort: Pulmonary effort is normal.   Abdominal:      General: Abdomen is flat. Bowel sounds are normal. There is no distension.      Tenderness: There is no abdominal tenderness.   Musculoskeletal:      Cervical back: Normal range of motion.   Skin:     General: Skin is warm and dry.      Coloration: Skin is pale.   Neurological:      General: No focal deficit present.      Mental Status: She is oriented to person, place, and time.   Psychiatric:         Mood and Affect: Mood normal.       Lab  Lab Results   Component Value Date    WBC 5.67 08/24/2023    HGB 9.3 (L) 08/24/2023    HGB 10.0 (L) " 08/23/2023    HGB 9.8 (L) 08/22/2023    MCV 89.2 08/24/2023     08/24/2023       Lab Results   Component Value Date    GLUCOSE 101 (H) 08/24/2023    BUN 11 08/24/2023    CREATININE 0.73 08/24/2023    EGFRIFNONA 61 09/06/2020    BCR 15.1 08/24/2023     08/24/2023    K 3.8 08/24/2023    CO2 26.0 08/24/2023    CALCIUM 8.8 08/24/2023    ALBUMIN 3.2 (L) 08/23/2023    ALKPHOS 122 (H) 08/23/2023    BILITOT 0.4 08/23/2023    ALT 20 08/23/2023    AST 26 08/23/2023       Assessment and Plan:    Lower GI bleed with melena and hematochezia  Postoperative right knee infection with long-term Zosyn use  Anticoagulation due to recent right knee total arthroplasty   - continue trending H/H and transfuse per hospitalist protocol   - hold Eliquis   - CT A/P reviewed with patient, no acute findings   - gastrointestinal PCR, C difficile negative   - BID PPI   - consider endoscopic evaluation this admission if recurrent bleeding, persistent or worsened anemia; pt declines invasive testing at this time   - regular diet ordered    Pt is stable from a GI standpoint. Continue oral diet. Recommend continuing meds as above. She may follow up with [facility] in the outpatient setting as needed.       Peg Gould PA-C  08/24/23  12:26 EDT

## 2023-08-24 NOTE — THERAPY EVALUATION
Patient Name: Imelda Elaine  : 1950    MRN: [id number]                              Today's Date: 2023       Admit Date: 2023    Visit Dx:     ICD-10-CM ICD-9-CM   1. Acute gastrointestinal bleeding  K92.2 578.9   2. Anemia, unspecified type  D64.9 285.9   3. S/P I&D right knee with poly exchange 23  Z98.890 V45.89   4. Postoperative wound infection, right knee  T81.49XA 998.59   5. PSVT (paroxysmal supraventricular tachycardia)  I47.1 427.0   6. Status post right knee replacement  Z96.651 V43.65   7. Encounter for pre-operative cardiovascular clearance  Z01.810 V72.81   8. Pre-operative clearance  Z01.818 V72.84   9. RBBB  I45.10 426.4   10. Abnormal blood level of copper  R79.0 790.6   11. Body mass index 40.0-44.9, adult  Z68.41 V85.41   12. NDIAYE (dyspnea on exertion)  R06.09 786.09   13. Tremor, essential  G25.0 333.1   14. S/P total knee arthroplasty, left  Z96.652 V43.65   15. Peripheral vascular disease  I73.9 443.9   16. Gastroesophageal reflux disease without esophagitis  K21.9 530.81   17. Elevated hemoglobin A1c  R73.09 790.29   18. Abnormal EKG  R94.31 794.31   19. Prediabetes  R73.03 790.29   20. Mixed hyperlipidemia  E78.2 272.2   21. Essential hypertension  I10 401.9   22. Palpitations  R00.2 785.1   23. Morbid obesity  E66.01 278.01     Patient Active Problem List   Diagnosis    Morbid obesity    Palpitations    Essential hypertension    Mixed hyperlipidemia    Prediabetes    Osteoarthritis of left knee    Leukocytosis, mild, likely reactive    Abnormal EKG    Anxiety and depression    BPV (benign positional vertigo)    Elevated hemoglobin A1c    Gastroesophageal reflux disease without esophagitis    Osteopenia    Peripheral vascular disease    S/P total knee arthroplasty, left    Tremor, essential    NDIAYE (dyspnea on exertion)    Body mass index 40.0-44.9, adult    Abnormal blood level of copper    RBBB    Pre-operative clearance    Encounter for pre-operative cardiovascular  "clearance    Status post right knee replacement    PSVT (paroxysmal supraventricular tachycardia)    Postoperative wound infection, right knee    S/P I&D right knee with poly exchange 7/25/23    GI bleed    GIB (gastrointestinal bleeding)     Past Medical History:   Diagnosis Date    Anesthesia 2002    low bp only with foot surgery, has had surgeries since and no issue per pt report    Arthritis     Back pain     Depression     Difficulty walking     Left knee replacement  need right knee replacement    GERD (gastroesophageal reflux disease)     High cholesterol     Hypertension     Knee pain     BILATERAL    Seasonal allergies     Tremor, essential      Past Surgical History:   Procedure Laterality Date    ANTERIOR AND POSTERIOR VAGINAL REPAIR N/A 4/17/2017    Vaginal enterocele / anterior posterior colporrhaphy;  Jose Maria Chowdhury MD;  Location:  [facility];  Service:     BLADDER SUSPENSION  1999    MESH TVT?     COLONOSCOPY  2018    Dr Chung - normal    EXPLORATORY LAPAROTOMY  2010    \"BOWEL INTO PELVIC BONE\" /Mesh [place]     EXPLORATORY LAPAROTOMY  04/2004    AQUILES/ abd sacrocolpopexy/post repair    FOOT SURGERY Left     KNEE ARTHROSCOPY Left     meniscus repair    KNEE INCISION AND DRAINAGE Right 7/25/2023    Procedure: KNEE INCISION AND DRAINAGE WITH POLY EXCHANGE - RIGHT;  Surgeon: Sunny Renyoso MD;  Location:  [facility];  Service: Orthopedics;  Laterality: Right;    LAPAROSCOPIC CHOLECYSTECTOMY Right 2000    [place] ARTHRP KNE CONDYLE&PLATU MEDIAL&LAT COMPARTMENTS Left 11/16/2017    Procedure: TOTAL KNEE ARTHROPLASTY LEFT;  Surgeon: Sunny Reynoso MD;  Location:  [facility];  Service: Orthopedics    TONSILLECTOMY      TOTAL ABDOMINAL HYSTERECTOMY WITH SALPINGO OOPHORECTOMY Bilateral 1989    TOTAL KNEE ARTHROPLASTY Right 6/29/2023    Procedure: TOTAL KNEE ARTHROPLASTY WITH CORI ROBOT - RIGHT;  Surgeon: Sunny Reynoso MD;  Location:  [facility];  Service: Robotics - Ortho;  Laterality: Right; "    TUBAL ABDOMINAL LIGATION      VENTRAL HERNIA REPAIR N/A 6/21/2016    Procedure: LAPAROSCOPIC REPAIR OF ABDOMINAL WALL HERNIA WITH MESH, CONVERTED TO OPEN ;  Surgeon: Bertha Delacruz MD;  Location: [facility];  Service:       General Information       Row Name 08/24/23 1016          Physical Therapy Time and Intention    Document Type evaluation  -AE     Mode of Treatment physical therapy  -AE       Row Name 08/24/23 1016          General Information    Patient Profile Reviewed yes  -AE     Prior Level of Function independent:;all household mobility;gait;transfer;bed mobility;ADL's;dressing  Pt reports owning lift chair at home, has recently switched to stand alone cane instead of RW for mobility. Continues to sponge bathe at this time.  -AE     Existing Precautions/Restrictions fall;other (see comments)  R knee I&D (7/24); WBAT RLE  -AE     Barriers to Rehab medically complex;previous functional deficit  -AE       Row Name 08/24/23 1016          Living Environment    People in Home spouse  -AE       Row Name 08/24/23 1016          Home Main Entrance    Number of Stairs, Main Entrance none  elevator to 3rd floor condo  -AE     Stair Railings, Main Entrance none  -AE       Row Name 08/24/23 1016          Stairs Within Home, Primary    Number of Stairs, Within Home, Primary none  -AE     Stair Railings, Within Home, Primary none  -AE       Row Name 08/24/23 1016          Cognition    Orientation Status (Cognition) oriented x 3  -AE       Row Name 08/24/23 1016          Safety Issues, Functional Mobility    Safety Issues Affecting Function (Mobility) awareness of need for assistance;insight into deficits/self-awareness;safety precaution awareness  -AE     Impairments Affecting Function (Mobility) balance;endurance/activity tolerance;strength;range of motion (ROM)  -AE               User Key  (r) = Recorded By, (t) = Taken By, (c) = Cosigned By      Initials Name Provider Type    AE Ernie Ty, PT Physical  Therapist                   Mobility       Row Name 08/24/23 1019          Bed Mobility    Comment, (Bed Mobility) Received sitting EOB and left UIC.  -AE       Row Name 08/24/23 1019          Transfers    Comment, (Transfers) VCs for hand placement and sequencing. Pt requires increased cues to improve upright posture at times.  -AE       Row Name 08/24/23 1019          Bed-Chair Transfer    Bed-Chair [place] (Transfers) contact guard;1 person assist;verbal cues  -AE     Assistive Device (Bed-Chair Transfers) other (see comments)  stand alone cane  -AE       Row Name 08/24/23 1019          Sit-Stand Transfer    Sit-Stand [place] (Transfers) contact guard;1 person assist;verbal cues  -AE     Assistive Device (Sit-Stand Transfers) other (see comments)  stand alone cane  -AE       Row Name 08/24/23 1019          Gait/Stairs (Locomotion)    [place] Level (Gait) contact guard;1 person assist;verbal cues  -AE     Assistive Device (Gait) other (see comments)  stand alone cane  -AE     Distance in Feet (Gait) 40  -AE     Deviations/Abnormal Patterns (Gait) bilateral deviations;mable decreased;gait speed decreased;base of support, wide;stride length decreased  -AE     Bilateral Gait Deviations forward flexed posture  -AE     Comment, (Gait/Stairs) Pt demo step through gait pattern with slowed mable, decreased step length, and wide KEVIN. Pt required increased cues to improve upright posture at times, pt becomes SOA with activity. Further distance limited by SOA.  -AE       Row Name 08/24/23 1019          Mobility    Extremity Weight-bearing Status right lower extremity  -AE     Right Lower Extremity (Weight-bearing Status) weight-bearing as tolerated (WBAT)  -AE               User Key  (r) = Recorded By, (t) = Taken By, (c) = Cosigned By      Initials Name Provider Type    Ernie Teague, PT Physical Therapist                   Obj/Interventions       Row Name 08/24/23 1026 08/24/23 1024       Range of  Motion Comprehensive    General Range of Motion lower extremity range of motion deficits identified  -AE --    Comment, General Range of Motion RLE knee flexion limited d/t recent sx  -AE RLE knee flexion limited d/t recent sx  -AE      Row Name 08/24/23 1026 08/24/23 1024       Strength Comprehensive (MMT)    General Manual Muscle Testing (MMT) Assessment lower extremity strength deficits identified  -AE --    Comment, General Manual Muscle Testing (MMT) Assessment RLE not formally tested d/t recent knee sx  -AE RLE not formally tested d/t recent knee sx  -AE      Row Name 08/24/23 1026          Balance    Balance Assessment sitting static balance;sitting dynamic balance;sit to stand dynamic balance;standing static balance;standing dynamic balance  -AE     Static Sitting Balance supervision  -AE     Dynamic Sitting Balance standby assist  -AE     Position, Sitting Balance unsupported;sitting edge of bed;sitting in chair  -AE     Sit to Stand Dynamic Balance contact guard;1-person assist;verbal cues  -AE     Static Standing Balance contact guard  -AE     Dynamic Standing Balance contact guard  -AE     Position/Device Used, Standing Balance supported  -AE       Row Name 08/24/23 1026          Sensory Assessment (Somatosensory)    Sensory Assessment (Somatosensory) LE sensation intact  -AE               User Key  (r) = Recorded By, (t) = Taken By, (c) = Cosigned By      Initials Name Provider Type    AE Ernie Ty, PT Physical Therapist                   Goals/Plan       Row Name 08/24/23 1029          Bed Mobility Goal 1 (PT)    Activity/Assistive Device (Bed Mobility Goal 1, PT) sit to supine/supine to sit  -AE     [place] Level/Cues Needed (Bed Mobility Goal 1, PT) modified independence  -AE     Time Frame (Bed Mobility Goal 1, PT) long term goal (LTG);10 days  -AE     Progress/Outcomes (Bed Mobility Goal 1, PT) new goal  -AE       Row Name 08/24/23 1029          Transfer Goal 1 (PT)     Activity/Assistive Device (Transfer Goal 1, PT) sit-to-stand/stand-to-sit;bed-to-chair/chair-to-bed  -AE     [place] Level/Cues Needed (Transfer Goal 1, PT) modified independence  -AE     Time Frame (Transfer Goal 1, PT) long term goal (LTG);10 days  -AE     Progress/Outcome (Transfer Goal 1, PT) new goal  -AE       Row Name 08/24/23 1029          Gait Training Goal 1 (PT)    Activity/Assistive Device (Gait Training Goal 1, PT) gait (walking locomotion);assistive device use  -AE     [place] Level (Gait Training Goal 1, PT) modified independence  -AE     Distance (Gait Training Goal 1, PT) 300ft  -AE     Time Frame (Gait Training Goal 1, PT) long term goal (LTG);10 days  -AE     Progress/Outcome (Gait Training Goal 1, PT) new goal  -AE       Row Name 08/24/23 1029          Therapy Assessment/Plan (PT)    Planned Therapy Interventions (PT) balance training;bed mobility training;gait training;home exercise program;patient/family education;postural re-education;ROM (range of motion);strengthening;transfer training  -AE               User Key  (r) = Recorded By, (t) = Taken By, (c) = Cosigned By      Initials Name Provider Type    AE Ernie Ty, PT Physical Therapist                   Clinical Impression       Row Name 08/24/23 1027          Pain    Pretreatment Pain Rating 0/10 - no pain  -AE     Posttreatment Pain Rating 0/10 - no pain  -AE       Row Name 08/24/23 1027          Plan of Care Review    Plan of Care Reviewed With patient;spouse  -AE     Progress no change  -AE     Outcome Evaluation Pt presents with decreased functional mobility and decreased independence with mobility. Pt ambulated 40ft in room with CGA and use of cane. Recommend continued skilled IP PT interventions. Recommend D/C home with assist and HHPT.  -AE       Row Name 08/24/23 1027          Therapy Assessment/Plan (PT)    Rehab Potential (PT) good, to achieve stated therapy goals  -AE     Criteria for Skilled Interventions Met  (PT) yes  -AE     Therapy Frequency (PT) daily  -AE       Row Name 08/24/23 1027          Vital Signs    Pre Systolic BP Rehab --  VSS; no c/o dizziness/light headedness  -AE     O2 Delivery Pre Treatment room air  -AE     O2 Delivery Intra Treatment room air  -AE     O2 Delivery Post Treatment room air  -AE     Pre Patient Position Sitting  -AE     Intra Patient Position Standing  -AE     Post Patient Position Sitting  -AE       Row Name 08/24/23 1027          Positioning and Restraints    Pre-Treatment Position in bed  -AE     Post Treatment Position chair  -AE     In Chair notified nsg;reclined;call light within reach;encouraged to call for assist;with family/caregiver;waffle cushion;legs elevated  -AE               User Key  (r) = Recorded By, (t) = Taken By, (c) = Cosigned By      Initials Name Provider Type    AE Ernie Ty PT Physical Therapist                   Outcome Measures       Row Name 08/24/23 1030          How much help from another person do you currently need...    Turning from your back to your side while in flat bed without using bedrails? 3  -AE     Moving from lying on back to sitting on the side of a flat bed without bedrails? 3  -AE     Moving to and from a bed to a chair (including a wheelchair)? 3  -AE     Standing up from a chair using your arms (e.g., wheelchair, bedside chair)? 3  -AE     Climbing 3-5 steps with a railing? 3  -AE     To walk in hospital room? 3  -AE     AM-PAC 6 Clicks Score (PT) 18  -AE     Highest level of mobility 6 --> Walked 10 steps or more  -AE       Row Name 08/24/23 1030          Functional Assessment    Outcome Measure Options AM-PAC 6 Clicks Basic Mobility (PT)  -AE               User Key  (r) = Recorded By, (t) = Taken By, (c) = Cosigned By      Initials Name Provider Type    Ernie Teague PT Physical Therapist                                 Physical Therapy Education       Title: PT OT SLP Therapies (In Progress)       Topic: Physical Therapy  (In Progress)       Point: Mobility training (Done)       Learning Progress Summary             Patient Acceptance, E, VU by AE at 8/24/2023 0938                         Point: Home exercise program (Not Started)       Learner Progress:  Not documented in this visit.              Point: Body mechanics (Done)       Learning Progress Summary             Patient Acceptance, E, VU by AE at 8/24/2023 0938                         Point: Precautions (Done)       Learning Progress Summary             Patient Acceptance, E, VU by AE at 8/24/2023 0938                                         User Key       Initials Effective Dates Name Provider Type Discipline    AE 09/21/21 -  Ernie Ty, PT Physical Therapist PT                  PT Recommendation and Plan  Planned Therapy Interventions (PT): balance training, bed mobility training, gait training, home exercise program, patient/family education, postural re-education, ROM (range of motion), strengthening, transfer training  Plan of Care Reviewed With: patient, spouse  Progress: no change  Outcome Evaluation: Pt presents with decreased functional mobility and decreased independence with mobility. Pt ambulated 40ft in room with CGA and use of cane. Recommend continued skilled IP PT interventions. Recommend D/C home with assist and HHPT.     Time Calculation:   PT Evaluation Complexity  History, PT Evaluation Complexity: 1-2 personal factors and/or comorbidities  Examination of Body Systems (PT Eval Complexity): total of 3 or more elements  Clinical Presentation (PT Evaluation Complexity): stable  Clinical Decision Making (PT Evaluation Complexity): low complexity  Overall Complexity (PT Evaluation Complexity): low complexity     PT Charges       Row Name 08/24/23 1031             Time Calculation    Start Time 0938  -AE      PT Received On 08/24/23  -AE      PT Goal Re-Cert Due Date 09/03/23  -AE         Untimed Charges    PT Eval/Re-eval Minutes 48  -AE         Total  Minutes    Untimed Charges Total Minutes 48  -AE       Total Minutes 48  -AE                User Key  (r) = Recorded By, (t) = Taken By, (c) = Cosigned By      Initials Name Provider Type    Ernie Teague, PT Physical Therapist                  Therapy Charges for Today       Code Description Service Date Service Provider Modifiers Qty    [id number] HC PT EVAL LOW COMPLEXITY 4 8/24/2023 Ernie Ty, YANICK GP 1            PT G-Codes  Outcome Measure Options: AM-PAC 6 Clicks Basic Mobility (PT)  AM-PAC 6 Clicks Score (PT): 18  PT Discharge Summary  Anticipated Discharge Disposition (PT): home with assist, home with home health    Ernie Ty PT  8/24/2023

## 2023-08-24 NOTE — PLAN OF CARE
Goal Outcome Evaluation:  Plan of Care Reviewed With: patient, spouse        Progress: no change  Outcome Evaluation: Pt presents with decreased functional mobility and decreased independence with mobility. Pt ambulated 40ft in room with CGA and use of cane. Recommend continued skilled IP PT interventions. Recommend D/C home with assist and HHPT.      Anticipated Discharge Disposition (PT): home with assist, home with home health

## 2023-08-24 NOTE — PLAN OF CARE
Goal Outcome Evaluation:  Plan of Care Reviewed With: patient        Progress: no change  Outcome Evaluation: Pt. presents below baseline with ADLs and functional mobility. Limited by decreased activity tolerance and generalized weakness. Skilled OT services warranted to promote return to PLOF. Recommend home with assist and home health at discharge.      Anticipated Discharge Disposition (OT): home with assist, home with home health

## 2023-08-24 NOTE — PLAN OF CARE
Problem: Adult Inpatient Plan of Care  Goal: Plan of Care Review  Outcome: Ongoing, Progressing  Goal: Patient-Specific Goal (Individualized)  Outcome: Ongoing, Progressing  Goal: Absence of Hospital-Acquired Illness or Injury  Outcome: Ongoing, Progressing  Intervention: Identify and Manage Fall Risk  Recent Flowsheet Documentation  Taken 8/23/2023 2000 by Josiah Alexander, RN  Safety Promotion/Fall Prevention:   nonskid shoes/slippers when out of bed   activity supervised   assistive device/personal items within reach   clutter free environment maintained   safety round/check completed  Intervention: Prevent Skin Injury  Recent Flowsheet Documentation  Taken 8/23/2023 2000 by Josiah Alexander, RN  Body Position: position changed independently  Goal: Optimal Comfort and Wellbeing  Outcome: Ongoing, Progressing  Goal: Readiness for Transition of Care  Outcome: Ongoing, Progressing     Problem: Bleeding (Gastrointestinal Bleeding)  Goal: Hemostasis  Outcome: Ongoing, Progressing   Goal Outcome Evaluation:

## 2023-08-24 NOTE — CASE MANAGEMENT/SOCIAL WORK
Continued Stay Note  [facility]     Patient Name: Imelda Elaine  MRN: [id number]  Today's Date: 8/24/2023    Admit Date: 8/23/2023    Plan: home   Discharge Plan       Row Name 08/24/23 0912       Plan    Plan home    Patient/Family in Agreement with Plan yes    Plan Comments CM spoke with patient at the bedside. We discussed that she is current with [facility]. CM to contact [facility] and let them know she is here. Patient's plan at this time is to return home with [facility]. CM to place new orders if needed. CM following.    Final Discharge Disposition Code 06 - home with home health care                   Discharge Codes    No documentation.                       Viviane Temple RN

## 2023-08-24 NOTE — CASE COMMUNICATION
Patient missed a [facility] visit from [facility] on 8-24-23.     Reason: Inpatient.       For your records only.   As per home health protocol, MD must be notified of missed/cancelled visits; therefore the prescribed frequency was not met.

## 2023-08-24 NOTE — THERAPY EVALUATION
Patient Name: Imelda Elaine  : 1950    MRN: [id number]                              Today's Date: 2023       Admit Date: 2023    Visit Dx:     ICD-10-CM ICD-9-CM   1. Acute gastrointestinal bleeding  K92.2 578.9   2. Anemia, unspecified type  D64.9 285.9   3. S/P I&D right knee with poly exchange 23  Z98.890 V45.89   4. Postoperative wound infection, right knee  T81.49XA 998.59   5. PSVT (paroxysmal supraventricular tachycardia)  I47.1 427.0   6. Status post right knee replacement  Z96.651 V43.65   7. Encounter for pre-operative cardiovascular clearance  Z01.810 V72.81   8. Pre-operative clearance  Z01.818 V72.84   9. RBBB  I45.10 426.4   10. Abnormal blood level of copper  R79.0 790.6   11. Body mass index 40.0-44.9, adult  Z68.41 V85.41   12. NDIAYE (dyspnea on exertion)  R06.09 786.09   13. Tremor, essential  G25.0 333.1   14. S/P total knee arthroplasty, left  Z96.652 V43.65   15. Peripheral vascular disease  I73.9 443.9   16. Gastroesophageal reflux disease without esophagitis  K21.9 530.81   17. Elevated hemoglobin A1c  R73.09 790.29   18. Abnormal EKG  R94.31 794.31   19. Prediabetes  R73.03 790.29   20. Mixed hyperlipidemia  E78.2 272.2   21. Essential hypertension  I10 401.9   22. Palpitations  R00.2 785.1   23. Morbid obesity  E66.01 278.01     Patient Active Problem List   Diagnosis    Morbid obesity    Palpitations    Essential hypertension    Mixed hyperlipidemia    Prediabetes    Osteoarthritis of left knee    Leukocytosis, mild, likely reactive    Abnormal EKG    Anxiety and depression    BPV (benign positional vertigo)    Elevated hemoglobin A1c    Gastroesophageal reflux disease without esophagitis    Osteopenia    Peripheral vascular disease    S/P total knee arthroplasty, left    Tremor, essential    NDIAYE (dyspnea on exertion)    Body mass index 40.0-44.9, adult    Abnormal blood level of copper    RBBB    Pre-operative clearance    Encounter for pre-operative cardiovascular  "clearance    Status post right knee replacement    PSVT (paroxysmal supraventricular tachycardia)    Postoperative wound infection, right knee    S/P I&D right knee with poly exchange 7/25/23    GI bleed    GIB (gastrointestinal bleeding)     Past Medical History:   Diagnosis Date    Anesthesia 2002    low bp only with foot surgery, has had surgeries since and no issue per pt report    Arthritis     Back pain     Depression     Difficulty walking     Left knee replacement  need right knee replacement    GERD (gastroesophageal reflux disease)     High cholesterol     Hypertension     Knee pain     BILATERAL    Seasonal allergies     Tremor, essential      Past Surgical History:   Procedure Laterality Date    ANTERIOR AND POSTERIOR VAGINAL REPAIR N/A 4/17/2017    Vaginal enterocele / anterior posterior colporrhaphy;  Jose Maria Chowdhury MD;  Location:  [facility];  Service:     BLADDER SUSPENSION  1999    MESH TVT?     COLONOSCOPY  2018    Dr Chung - normal    EXPLORATORY LAPAROTOMY  2010    \"BOWEL INTO PELVIC BONE\" /Mesh [place]     EXPLORATORY LAPAROTOMY  04/2004    AQUILES/ abd sacrocolpopexy/post repair    FOOT SURGERY Left     KNEE ARTHROSCOPY Left     meniscus repair    KNEE INCISION AND DRAINAGE Right 7/25/2023    Procedure: KNEE INCISION AND DRAINAGE WITH POLY EXCHANGE - RIGHT;  Surgeon: Sunny Reynoso MD;  Location:  [facility];  Service: Orthopedics;  Laterality: Right;    LAPAROSCOPIC CHOLECYSTECTOMY Right 2000    DE ARTHRP KNE CONDYLE&PLATU MEDIAL&LAT COMPARTMENTS Left 11/16/2017    Procedure: TOTAL KNEE ARTHROPLASTY LEFT;  Surgeon: Sunny Reynoso MD;  Location:  [facility];  Service: Orthopedics    TONSILLECTOMY      TOTAL ABDOMINAL HYSTERECTOMY WITH SALPINGO OOPHORECTOMY Bilateral 1989    TOTAL KNEE ARTHROPLASTY Right 6/29/2023    Procedure: TOTAL KNEE ARTHROPLASTY WITH CORI ROBOT - RIGHT;  Surgeon: Sunny Reynoso MD;  Location:  [facility];  Service: Robotics - Ortho;  Laterality: Right; "    TUBAL ABDOMINAL LIGATION      VENTRAL HERNIA REPAIR N/A 6/21/2016    Procedure: LAPAROSCOPIC REPAIR OF ABDOMINAL WALL HERNIA WITH MESH, CONVERTED TO OPEN ;  Surgeon: Bertha Delacruz MD;  [facility];  Service:       General Information       Row Name 08/24/23 1011          OT Time and Intention    Document Type evaluation  -     Mode of Treatment occupational therapy  -       Row Name 08/24/23 1011          General Information    Patient Profile Reviewed yes  -     Prior Level of Function independent:;all household mobility;ADL's  Cane at baseline  -     Existing Precautions/Restrictions fall;other (see comments)  7/25/23 I&D on R knee  -     Barriers to Rehab medically complex  -       Row Name 08/24/23 1011          Occupational Profile    Environmental Supports and Barriers (Occupational Profile) 3rd floor condo with elevator   -       Row Name 08/24/23 1011          Living Environment    People in Home spouse  -       Row Name 08/24/23 1011          Home Main Entrance    Number of Stairs, Main Entrance none  -       Row Name 08/24/23 1011          Stairs Within Home, Primary    Number of Stairs, Within Home, Primary none  -       Row Name 08/24/23 1011          Cognition    Orientation Status (Cognition) oriented x 4  -       Row Name 08/24/23 1011          Safety Issues, Functional Mobility    Safety Issues Affecting Function (Mobility) safety precautions follow-through/compliance  -     Impairments Affecting Function (Mobility) balance;endurance/activity tolerance;strength;postural/trunk control  -               User Key  (r) = Recorded By, (t) = Taken By, (c) = Cosigned By      Initials Name Provider Type     Simona Ledezma OT Occupational Therapist                     Mobility/ADL's       Row Name 08/24/23 1020          Bed Mobility    Comment, (Bed Mobility) Sitting EOB on arrival  -       Row Name 08/24/23 1020          Transfers    Transfers sit-stand  transfer  -       Row Name 08/24/23 1020          Sit-Stand Transfer    Sit-Stand Roy (Transfers) standby assist  -     Assistive Device (Sit-Stand Transfers) cane, straight  -       Row Name 08/24/23 1020          Activities of Daily Living    BADL Assessment/Intervention toileting;lower body dressing;upper body dressing  -[facility] Name 08/24/23 1020          Toileting Assessment/Training    Roy Level (Toileting) standby assist  -     Assistive Devices (Toileting) commode;grab bar/safety frame  -     Comment, (Toileting) Pt. reports completing task prior to OT arrival with assist to manage lines only.  -       Row Name 08/24/23 1020          Lower Body Dressing Assessment/Training    [place] Level (Lower Body Dressing) don;socks;dependent (less than 25% patient effort)  -     Position (Lower Body Dressing) edge of bed sitting  -     Comment, (Lower Body Dressing) Pt. reports having AE at home to assist with LBD.  -[facility] Name 08/24/23 1020          Upper Body Dressing Assessment/Training    Roy Level (Upper Body Dressing) don;doff;front opening garment;minimum assist (75% patient effort)  -     Position (Upper Body Dressing) edge of bed sitting  -               User Key  (r) = Recorded By, (t) = Taken By, (c) = Cosigned By      Initials Name Provider Type     Simona Ledezma OT Occupational Therapist                   Obj/Interventions       Row Name 08/24/23 1024          Sensory Assessment (Somatosensory)    Sensory Assessment (Somatosensory) UE sensation intact  -[facility] Name 08/24/23 1024          Vision Assessment/Intervention    Visual Impairment/Limitations corrective lenses full-time  -[facility] Name 08/24/23 1024          Range of Motion Comprehensive    General Range of Motion bilateral upper extremity ROM WFL  Simultaneous filing. User may be unaware of other data.  -[facility] Name 08/24/23 1024          Strength Comprehensive  (MMT)    General Manual Muscle Testing (MMT) Assessment upper extremity strength deficits identified  Simultaneous filing. User may be unaware of other data.  -       Row Name 08/24/23 1024          Upper Extremity (Manual Muscle Testing)    Upper Extremity: Manual Muscle Testing (MMT) left UE strength is WFL;right UE strength is WFL  -       Row Name 08/24/23 1024          Motor Skills    Motor Skills functional endurance  -     Functional Endurance decreased activity tolerance  -       Row Name 08/24/23 1024          Balance    Balance Assessment sitting static balance;sitting dynamic balance;standing static balance;standing dynamic balance  Simultaneous filing. User may be unaware of other data.  -     Static Sitting Balance supervision  -     Dynamic Sitting Balance standby assist  -     Position, Sitting Balance unsupported;sitting edge of bed  -     Static Standing Balance standby assist  -     Dynamic Standing Balance contact guard  -     Position/Device Used, Standing Balance supported;cane, straight  -     Balance Interventions sitting;standing;sit to stand;occupation based/functional task;weight shifting activity  -     Comment, Balance CGA for safety, No LOB  -               User Key  (r) = Recorded By, (t) = Taken By, (c) = Cosigned By      Initials Name Provider Type     Simona Ledezma OT Occupational Therapist                   Goals/Plan       Row Name 08/24/23 1026          Transfer Goal 1 (OT)    Activity/Assistive Device (Transfer Goal 1, OT) sit-to-stand/stand-to-sit;toilet;cane, straight  -     [place] Level/Cues Needed (Transfer Goal 1, OT) modified independence  -     Time Frame (Transfer Goal 1, OT) long term goal (LTG);by discharge  -     Progress/Outcome (Transfer Goal 1, OT) goal ongoing  -       Row Name 08/24/23 1026          Dressing Goal 1 (OT)    Activity/Device (Dressing Goal 1, OT) lower body dressing;long-handled shoe horn;reacher;sock-aid   -     [place]/Cues Needed (Dressing Goal 1, OT) modified independence  -LC     Time Frame (Dressing Goal 1, OT) long term goal (LTG);by discharge  -LC     Progress/Outcome (Dressing Goal 1, OT) goal ongoing  -       Row Name 08/24/23 1026          Toileting Goal 1 (OT)    Activity/Device (Toileting Goal 1, OT) adjust/manage clothing;perform perineal hygiene;commode;grab bar/safety frame;toilet paper aid  -     [place] Level/Cues Needed (Toileting Goal 1, OT) modified independence  -LC     Time Frame (Toileting Goal 1, OT) long term goal (LTG);by discharge  -     Progress/Outcome (Toileting Goal 1, OT) goal ongoing  -               User Key  (r) = Recorded By, (t) = Taken By, (c) = Cosigned By      Initials Name Provider Type    Simona Chapman, YADIEL Occupational Therapist                   Clinical Impression       Row Name 08/24/23 1024          Pain Assessment    Pretreatment Pain Rating 0/10 - no pain  -LC     Posttreatment Pain Rating 0/10 - no pain  -LC     Additional Documentation Pain Scale: Word Pre/Post-Treatment (Group)  -       Row Name 08/24/23 1024          Plan of Care Review    Plan of Care Reviewed With patient  -LC     Progress no change  -LC     Outcome Evaluation Pt. presents below baseline with ADLs and functional mobility. Limited by decreased activity tolerance and generalized weakness. Skilled OT services warranted to promote return to PLOF. Recommend home with assist and home health at discharge.  -       Row Name 08/24/23 1024          Therapy Assessment/Plan (OT)    Patient/Family Therapy Goal Statement (OT) To return to PLOF  -     Therapy Frequency (OT) daily  -       Row Name 08/24/23 1024          Therapy Plan Review/Discharge Plan (OT)    Anticipated Discharge Disposition (OT) home with assist;home with home health  -       Row Name 08/24/23 1024          Vital Signs    Pre Systolic BP Rehab 156  -LC     Pre Treatment Diastolic BP 76  -LC     Post Systolic  BP Rehab 152  -LC     Post Treatment Diastolic BP 89  -LC     Pre Patient Position Sitting  -LC     Post Patient Position Sitting  -LC       Row Name 08/24/23 1024          Positioning and Restraints    Pre-Treatment Position in bed  -LC     Post Treatment Position bed  -LC     In Bed notified nsg;sitting EOB;call light within reach;encouraged to call for assist  -LC               User Key  (r) = Recorded By, (t) = Taken By, (c) = Cosigned By      Initials Name Provider Type    Simona Chapman, YADIEL Occupational Therapist                   Outcome Measures       Row Name 08/24/23 1032          How much help from another is currently needed...    Putting on and taking off regular lower body clothing? 2  -LC     Bathing (including washing, rinsing, and drying) 3  -LC     Toileting (which includes using toilet bed pan or urinal) 3  -LC     Putting on and taking off regular upper body clothing 3  -LC     Taking care of personal grooming (such as brushing teeth) 4  -LC     Eating meals 4  -LC     AM-PAC 6 Clicks Score (OT) 19  -LC       Row Name 08/24/23 1030          How much help from another person do you currently need...    Turning from your back to your side while in flat bed without using bedrails? 3  -AE     Moving from lying on back to sitting on the side of a flat bed without bedrails? 3  -AE     Moving to and from a bed to a chair (including a wheelchair)? 3  -AE     Standing up from a chair using your arms (e.g., wheelchair, bedside chair)? 3  -AE     Climbing 3-5 steps with a railing? 3  -AE     To walk in hospital room? 3  -AE     AM-PAC 6 Clicks Score (PT) 18  -AE     Highest level of mobility 6 --> Walked 10 steps or more  -AE       Row Name 08/24/23 1032 08/24/23 1030       Functional Assessment    Outcome Measure Options AM-PAC 6 Clicks Daily Activity (OT)  -LC AM-PAC 6 Clicks Basic Mobility (PT)  -AE              User Key  (r) = Recorded By, (t) = Taken By, (c) = Cosigned By      Initials Name Provider  Type     Simona Ledezma, OT Occupational Therapist    Ernie Teague, PT Physical Therapist                    Occupational Therapy Education       Title: PT OT SLP Therapies (In Progress)       Topic: Occupational Therapy (In Progress)       Point: ADL training (In Progress)       Description:   Instruct learner(s) on proper safety adaptation and remediation techniques during self care or transfers.   Instruct in proper use of assistive devices.                  Learning Progress Summary             Patient Acceptance, E, NR by  at 8/24/2023 0939                         Point: Home exercise program (Not Started)       Description:   Instruct learner(s) on appropriate technique for monitoring, assisting and/or progressing therapeutic exercises/activities.                  Learner Progress:  Not documented in this visit.              Point: Precautions (In Progress)       Description:   Instruct learner(s) on prescribed precautions during self-care and functional transfers.                  Learning Progress Summary             Patient Acceptance, E, NR by  at 8/24/2023 0939                         Point: Body mechanics (In Progress)       Description:   Instruct learner(s) on proper positioning and spine alignment during self-care, functional mobility activities and/or exercises.                  Learning Progress Summary             Patient Acceptance, E, NR by  at 8/24/2023 0939                                         User Key       Initials Effective Dates Name Provider Type Discipline     06/16/21 -  Simona Ledezma OT Occupational Therapist OT                  OT Recommendation and Plan  Therapy Frequency (OT): daily  Plan of Care Review  Plan of Care Reviewed With: patient  Progress: no change  Outcome Evaluation: Pt. presents below baseline with ADLs and functional mobility. Limited by decreased activity tolerance and generalized weakness. Skilled OT services warranted to promote return to  PLOF. Recommend home with assist and home health at discharge.     Time Calculation:   Evaluation Complexity (OT)  Review Occupational Profile/Medical/Therapy History Complexity: brief/low complexity  Assessment, Occupational Performance/Identification of Deficit Complexity: 1-3 performance deficits  Clinical Decision Making Complexity (OT): problem focused assessment/low complexity  Overall Complexity of Evaluation (OT): low complexity     Time Calculation- OT       Row Name 08/24/23 1033             Time Calculation- OT    OT Start Time 0939  -      OT Received On 08/24/23  -      OT Goal Re-Cert Due Date 09/03/23  -         Untimed Charges    OT Eval/Re-eval Minutes 50  -LC         Total Minutes    Untimed Charges Total Minutes 50  -LC       Total Minutes 50  -LC                User Key  (r) = Recorded By, (t) = Taken By, (c) = Cosigned By      Initials Name Provider Type    LC Simona Ledezma OT Occupational Therapist                  Therapy Charges for Today       Code Description Service Date Service Provider Modifiers Qty    [id number] HC OT EVAL LOW COMPLEXITY 4 8/24/2023 Simona Ledezma OT GO 1                 Simona Ledezma OT  8/24/2023

## 2023-08-24 NOTE — PROGRESS NOTES
"/76 (BP Location: Left arm, Patient Position: Sitting)   Pulse 66   Temp 97.8 øF (36.6 øC) (Oral)   Resp 18   Ht 157.5 cm (62\")   Wt (!) 147 kg (325 lb)   LMP  (LMP Unknown)   SpO2 96%   BMI 59.44 kg/mý     Lab Results (last 24 hours)       Procedure Component Value Units Date/Time    Basic Metabolic Panel [[id number]]  (Abnormal) Collected: 08/24/23 0330    Specimen: Blood Updated: 08/24/23 0416     Glucose 101 mg/dL      BUN 11 mg/dL      Creatinine 0.73 mg/dL      Sodium 142 mmol/L      Potassium 3.8 mmol/L      Chloride 105 mmol/L      CO2 26.0 mmol/L      Calcium 8.8 mg/dL      BUN/Creatinine Ratio 15.1     Anion Gap 11.0 mmol/L      eGFR 88.0 mL/min/1.73     Narrative:      GFR Normal >60  Chronic Kidney Disease <60  Kidney Failure <15    The GFR formula is only valid for adults with stable renal function between ages 18 and 70.    CBC (No Diff) [[id number]]  (Abnormal) Collected: 08/24/23 0330    Specimen: Blood Updated: 08/24/23 0356     WBC 5.67 10*3/mm3      RBC 3.44 10*6/mm3      Hemoglobin 9.3 g/dL      Hematocrit 30.7 %      MCV 89.2 fL      MCH 27.0 pg      MCHC 30.3 g/dL      RDW 15.4 %      RDW-SD 50.2 fl      MPV 11.5 fL      Platelets 173 10*3/mm3     Emeigh Draw [[id number]] Collected: 08/23/23 1111    Specimen: Blood Updated: 08/23/23 1515    Narrative:      The following orders were created for panel order Emeigh Draw.  Procedure                               Abnormality         Status                     ---------                               -----------         ------                     Green Top (Gel)[[id number]]                                  Final result               Lavender Top[[id number]]                                     Final result               Gold Top - SST[[id number]]                                   Final result               Hassan Top[[id number]]                                         Final result               Light Blue Top[[id number]]                           "         Final result                 Please view results for these tests on the individual orders.    Gray Top [[id number]] Collected: 08/23/23 1111    Specimen: Blood Updated: 08/23/23 1515     Extra Tube Hold for add-ons.     Comment: Auto resulted.       Gold Top - SST [[id number]] Collected: 08/23/23 1111    Specimen: Blood Updated: 08/23/23 1216     Extra Tube Hold for add-ons.     Comment: Auto resulted.       Green Top (Gel) [[id number]] Collected: 08/23/23 1111    Specimen: Blood Updated: 08/23/23 1216     Extra Tube Hold for add-ons.     Comment: Auto resulted.       Lavender Top [[id number]] Collected: 08/23/23 1111    Specimen: Blood Updated: 08/23/23 1216     Extra Tube hold for add-on     Comment: Auto resulted       Light Blue Top [[id number]] Collected: 08/23/23 1111    Specimen: Blood Updated: 08/23/23 1216     Extra Tube Hold for add-ons.     Comment: Auto resulted       Comprehensive Metabolic Panel [[id number]]  (Abnormal) Collected: 08/23/23 1111    Specimen: Blood Updated: 08/23/23 1213     Glucose 110 mg/dL      BUN 13 mg/dL      Creatinine 0.83 mg/dL      Sodium 142 mmol/L      Potassium 3.7 mmol/L      Chloride 105 mmol/L      CO2 26.0 mmol/L      Calcium 8.8 mg/dL      Total Protein 6.6 g/dL      Albumin 3.2 g/dL      ALT (SGPT) 20 U/L      AST (SGOT) 26 U/L      Alkaline Phosphatase 122 U/L      Total Bilirubin 0.4 mg/dL      Globulin 3.4 gm/dL      Comment: Calculated Result        A/G Ratio 0.9 g/dL      BUN/Creatinine Ratio 15.7     Anion Gap 11.0 mmol/L      eGFR 75.5 mL/min/1.73     Narrative:      GFR Normal >60  Chronic Kidney Disease <60  Kidney Failure <15    The GFR formula is only valid for adults with stable renal function between ages 18 and 70.    Lactic Acid, Plasma [[id number]]  (Normal) Collected: 08/23/23 1111    Specimen: Blood Updated: 08/23/23 1211     Lactate 1.6 mmol/L      Comment: Falsely depressed results may occur on samples drawn from patients receiving N-Acetylcysteine  (NAC) or Metamizole.       CBC & Differential [[id number]]  (Abnormal) Collected: 08/23/23 1111    Specimen: Blood Updated: 08/23/23 1145    Narrative:      The following orders were created for panel order CBC & Differential.  Procedure                               Abnormality         Status                     ---------                               -----------         ------                     CBC Auto Differential[[id number]]        Abnormal            Final result                 Please view results for these tests on the individual orders.    CBC Auto Differential [[id number]]  (Abnormal) Collected: 08/23/23 1111    Specimen: Blood Updated: 08/23/23 1145     WBC 5.03 10*3/mm3      RBC 3.68 10*6/mm3      Hemoglobin 10.0 g/dL      Hematocrit 33.4 %      MCV 90.8 fL      MCH 27.2 pg      MCHC 29.9 g/dL      RDW 15.4 %      RDW-SD 50.9 fl      MPV 11.7 fL      Platelets 165 10*3/mm3      Neutrophil % 67.0 %      Lymphocyte % 19.5 %      Monocyte % 8.9 %      Eosinophil % 3.8 %      Basophil % 0.4 %      Immature Grans % 0.4 %      Neutrophils, Absolute 3.37 10*3/mm3      Lymphocytes, Absolute 0.98 10*3/mm3      Monocytes, Absolute 0.45 10*3/mm3      Eosinophils, Absolute 0.19 10*3/mm3      Basophils, Absolute 0.02 10*3/mm3      Immature Grans, Absolute 0.02 10*3/mm3      nRBC 0.0 /100 WBC     POC Occult Blood Stool [[id number]]  (Abnormal) Resulted: 08/23/23 1115    Specimen: Stool Updated: 08/23/23 1117     Fecal Occult Blood Positive     Lot Number [id number]     Expiration Date 10-25     DEVELOPER LOT NUMBER [id number]     DEVELOPER EXPIRATION DATE 2,026-6     Positive Control Positive     Negative Control Positive            Imaging Results (Last 24 Hours)       Procedure Component Value Units Date/Time    XR Chest 1 View [[id number]] Collected: 08/23/23 2227     Updated: 08/23/23 2231    Narrative:      XR CHEST 1 VW    Date of Exam: 8/23/2023 10:09 PM EDT    Indication: PICC placement    Comparison: None  available.    Findings:  Top normal size of the heart with otherwise unremarkable cardiomediastinal silhouette. There is a right upper extremity PICC line with the tip positioned in the mid SVC. The lungs are grossly clear. No pleural effusion or pneumothorax. No acute osseous   findings.      Impression:      Impression:  Top normal size of the heart with otherwise unremarkable cardiomediastinal silhouette. There is a right upper extremity PICC line with the tip positioned in the mid SVC. The lungs are grossly clear. No pleural effusion or pneumothorax. No acute osseous   findings.      Electronically Signed: Alban Mccarthy    8/23/2023 10:28 PM EDT    Workstation ID: [id number]    CT Abdomen Pelvis With Contrast [[id number]] Collected: 08/23/23 2116     Updated: 08/23/23 2126    Narrative:      CT ABDOMEN PELVIS W CONTRAST    Date of Exam: 8/23/2023 7:42 PM EDT    Indication: Diarrhea  GI bleed, lower.    Comparison: None available.    Technique: Axial CT images were obtained of the abdomen and pelvis following the uneventful intravenous administration of 85 mL Isovue-370. Reconstructed coronal and sagittal images were also obtained. Automated exposure control and iterative   construction methods were used.      Findings:  No evidence of active GI bleeding.    The lung bases are clear. Unremarkable appearance of the liver. The gallbladder is surgically absent. Normal appearance of the bile ducts. Unremarkable appearance of the spleen, pancreas, adrenal glands, kidneys, ureters, and bladder. The uterus is   surgically absent. There is sigmoid colonic diverticulosis without evidence of diverticulitis. Normal appendix. No bowel obstruction. No definite inflammatory change of the GI tract. There is a small sliding hiatal hernia. There is a duodenal   diverticulum without inflammatory change. No abdominopelvic free fluid or conspicuous fat stranding. No pneumoperitoneum. Unremarkable appearance of the vasculature. No  lymphadenopathy. Unremarkable appearance of the body wall soft tissues. No acute or   suspicious bony findings.       Impression:      Impression:  No evidence of active GI bleeding.    Colonic diverticulosis without diverticulitis.    No acute abdominopelvic findings.        Electronically Signed: Alban Mccarthy    8/23/2023 9:23 PM EDT    Workstation ID: [id number]            Patient Care Team:  Comfort Colunga MD as PCP - General (Internal Medicine)  Florinda Lopez APRN as Nurse Practitioner (Interventional Cardiology)  Lisa Grimm APRN as Nurse Practitioner (Nurse Practitioner)    SUBJECTIVE: She reports she is feeling better.  No recent blood in her stool.  Reports her right knee is feeling good with no pain.    PHYSICAL EXAM  Right knee incision is clean, dry and intact except for very minimal mild serous drainage from distal aspect of the incision.  Minimal spotty drainage noted on the distal aspect of the Coverderm placed yesterday morning.  No surrounding erythema no warmth  Thigh and calf soft and nontender  Neurovascular intact distally  Able to perform a straight leg raise with no pain        GI bleed    Morbid obesity    Essential hypertension    Anxiety and depression    Elevated hemoglobin A1c    PSVT (paroxysmal supraventricular tachycardia)    S/P I&D right knee with poly exchange 7/25/23    GIB (gastrointestinal bleeding)      PLAN / DISPOSITION: 72-year-old female admitted for GI bleed yesterday status post right knee I&D with poly exchange on 7/25 for acute prosthetic knee infection.  -Events noted.  Okay with me to discontinue Eliquis and does not need to be on any DVT prophylaxis from my standpoint 1 month out from her right knee surgery.  -Right knee incision almost completely healed with very minimal mild serous drainage from distal aspect of the incision with no surrounding erythema or warmth.  Incisional wound VAC removed about 1 week ago per the patient.  Change dressing as  needed to keep dry until wound remains completely dry.  -Mobilize with therapy as tolerated  -Was scheduled to follow-up with me on Monday.  Since I have seen her in the hospital I will reschedule this follow-up for a couple weeks from now.  -Continue antibiotic management per infectious disease.  Appreciate their care.  -Please call with any questions or concerns.    Sunny Reynoso MD  08/24/23  09:54 EDT

## 2023-08-25 ENCOUNTER — HOME CARE VISIT (OUTPATIENT)
Dept: HOME HEALTH SERVICES | Facility: HOME HEALTHCARE | Age: 73
End: 2023-08-25
Payer: MEDICARE

## 2023-08-25 ENCOUNTER — HOME CARE VISIT (OUTPATIENT)
Dept: HOME HEALTH SERVICES | Facility: HOME HEALTHCARE | Age: 73
End: 2023-08-25
Payer: COMMERCIAL

## 2023-08-25 VITALS
BODY MASS INDEX: 53.92 KG/M2 | HEART RATE: 73 BPM | DIASTOLIC BLOOD PRESSURE: 64 MMHG | TEMPERATURE: 97.9 F | WEIGHT: 293 LBS | RESPIRATION RATE: 18 BRPM | HEIGHT: 62 IN | OXYGEN SATURATION: 95 % | SYSTOLIC BLOOD PRESSURE: 144 MMHG

## 2023-08-25 PROCEDURE — 25010000002 PIPERACILLIN SOD-TAZOBACTAM PER 1 G: Performed by: INTERNAL MEDICINE

## 2023-08-25 PROCEDURE — G0378 HOSPITAL OBSERVATION PER HR: HCPCS

## 2023-08-25 RX ORDER — SENNOSIDES 8.6 MG
650 CAPSULE ORAL EVERY 8 HOURS PRN
Start: 2023-08-25

## 2023-08-25 RX ORDER — LANSOPRAZOLE 30 MG/1
30 CAPSULE, DELAYED RELEASE ORAL 2 TIMES DAILY
Qty: 60 CAPSULE | Refills: 0
Start: 2023-08-25 | End: 2023-09-24

## 2023-08-25 RX ADMIN — ESTRADIOL 0.5 MG: 0.5 TABLET ORAL at 08:11

## 2023-08-25 RX ADMIN — PIPERACILLIN SODIUM AND TAZOBACTAM SODIUM 4.5 G: 4; .5 INJECTION, SOLUTION INTRAVENOUS at 04:47

## 2023-08-25 RX ADMIN — NEBIVOLOL 5 MG: 5 TABLET ORAL at 08:11

## 2023-08-25 RX ADMIN — LOSARTAN POTASSIUM 100 MG: 50 TABLET, FILM COATED ORAL at 08:11

## 2023-08-25 RX ADMIN — VENLAFAXINE HYDROCHLORIDE 75 MG: 75 CAPSULE, EXTENDED RELEASE ORAL at 08:11

## 2023-08-25 NOTE — PLAN OF CARE
Problem: Adult Inpatient Plan of Care  Goal: Readiness for Transition of Care  Outcome: Ongoing, Progressing     Problem: Fall Injury Risk  Goal: Absence of Fall and Fall-Related Injury  Outcome: Ongoing, Progressing  Intervention: Promote Injury-Free Environment  Recent Flowsheet Documentation  Taken 8/24/2023 2000 by Josiah Alexander, RN  Safety Promotion/Fall Prevention:   activity supervised   assistive device/personal items within reach   clutter free environment maintained   nonskid shoes/slippers when out of bed   safety round/check completed   Goal Outcome Evaluation:

## 2023-08-25 NOTE — DISCHARGE PLACEMENT REQUEST
"Imelda Khan (72 y.o. Female)       Date of Birth   1950    Social Security Number       Address   [address] [facility] [address]    Home Phone   [phone]    MRN   [id number]       Faith   Rastafarian    Marital Status                               Admission Date   23    Admission Type   Emergency    Admitting Provider   Valerie Concepcion MD    Attending Provider   Valerie Concepcion MD    Department, Room/Bed   [facility] 5G, S548/1       Discharge Date       Discharge Disposition       Discharge Destination                                 Attending Provider: Valerie Concepcion MD    Allergies: Primidone, Topiramate, Tuberculin Tests, Zostavax [Zoster Vaccine Live]    Isolation: None   Infection: None   Code Status: Prior    Ht: 157.5 cm (62\")   Wt: 147 kg (325 lb)    Admission Cmt: None   Principal Problem: GI bleed [K92.2]                   Active Insurance as of 2023       Primary Coverage       Payor Plan Insurance Group Employer/Plan Group    ANTH MEDICARE REPLACEMENT ANTH MEDICARE ADVANTAGE [id number]       Payor Plan Address Payor Plan Phone Number Payor Plan Fax Number Effective Dates    [address] [phone]  2022 - None Entered    [facility] [address]         Subscriber Name Subscriber Birth Date Member ID       IMELDA KHAN 1950 [id number]                     Emergency Contacts        (Rel.) Home Phone Work Phone Mobile Phone    Rangel Khan (Kevyn) (Spouse) [phone] -- [phone]             [address]  [address] [facility] [address]  Phone:  [phone]  Fax:  [phone]        Patient: ROOM: [facility]-1   Imelda Khan MRN:  [id number]   [address]  [facility] [address] :  1950  SSN:    Phone: [phone] Sex:  F   PCP: Comfort Colunga                Emergency Contact Information      Name Relation Home Work Mobile "     Rangel Elaine (Kevyn) Spouse [phone]          INSURANCE PAYOR PLAN GROUP # SUBSCRIBER ID   Primary:    LEN MEDICARE REPLACEMENT [id number]   Admitting Diagnosis: GI bleed [K92.2]  Order Date:  Aug 24, 2023        Case Management  Consult       (Order ID: [id number])     Diagnosis:         Priority:  Routine Expected Date:   Expiration Date:        Interval:   Count:    Comments: UM/KRISTINA:  1.            Zosyn 13.5g IV q24h continuous infusion. Anticipate continuing this antibiotic at least until 9/4/23 for a 6 week course.  2.            Weekly CBC with differential, CMP, CRP  3.            Change the PICC line dressing weekly with a Biopatch or Tegaderm CHG gel dressing   4.            Follow-up in my clinic on 9/4/23  5.            Please fax a copy of my orders to [facility] at [phone] and call [phone] with final discharge plans   Reason for Consult: Antibiotic plan        Authorizing Provider:Nam Adams MD  Authorizing Provider's NPI: [id number]  Order Entered By: Nam Adams MD 8/24/2023  5:49 PM     Electronically signed by: Nam Adams MD 8/24/2023  5:49 PM

## 2023-08-25 NOTE — CASE COMMUNICATION
Please forward to: Comfort Colunga MD   [address] [place] [address]   Phone: [phone]   Fax: [phone]   NPI: [id number]        Patient missed a PTA visit from [facility] on 8/25/23.    Reason: Patient currently observation in hospital. Plans to discharge today.      For your records only.   As per home health protocol, MD must be notified of missed/cancelled visits; therefore the prescribed frequen cy was not met.

## 2023-08-25 NOTE — CASE MANAGEMENT/SOCIAL WORK
Case Management Discharge Note      Final Note: CM spoke with Ms Elaine at the bedside. [facility] is currently providing her infusion therapy. They will continue and have medication ready for her to  on her way home. Patient has appointment with [facility] on 9-7-23 for a follow up. The patient is aware. [facility] will be seeing the patient and notified of discharge plan. Patient's spouse to take her home when ready.         Selected Continued Care - Admitted Since 8/23/2023       Destination    No services have been selected for the patient.                Durable Medical Equipment    No services have been selected for the patient.                Dialysis/Infusion       Service Provider Selected Services Address Phone Fax Patient Preferred    [facility] [address] [facility] [address] [phone] --              Home Medical Care       Service Provider Selected Services Address Phone Fax Patient Preferred    [facility] ,  Home Rehabilitation [address] [facility] [id number] [phone] --              Therapy    No services have been selected for the patient.                Community Resources    No services have been selected for the patient.                [facility] [place]    No services have been selected for the patient.                    Selected Continued Care - Prior Encounters Includes continued care and service providers with selected services from prior encounters from 5/25/2023 to 8/25/2023      Discharged on 7/28/2023 Admission date: 7/24/2023 - Discharge disposition: Rehab Facility or Unit (DC - External)      Destination       Service Provider Selected Services Address Phone Fax Patient Preferred    [facility] [address] [facility] [id number] [phone] --                      Discharged on 6/30/2023  Admission date: 6/29/2023 - Discharge disposition: Home or Self Care      Therapy       Service Provider Selected Services Address Phone Fax Patient Preferred    [facility] [address] [facility] [address] [phone] --                          Transportation Services  Private: Car    Final Discharge Disposition Code: 06 - home with home health care

## 2023-08-25 NOTE — PROGRESS NOTES
INFECTIOUS DISEASE Progress note    Imelda Elaine  1950  [id number]    Date of consult: 8/23/23    Admit date: 8/23/2023    Requesting Provider: Dr. Concepcion  Evaluating physician: Nam Adams MD  Reason for Consultation: Continuity of care for her right knee prosthetic joint infection  Chief Complaint: GI bleeding      Subjective   History of present illness:  Imelda Elaine is a  72 y.o.  Yr old female who I have been following for a right knee prosthetic joint infection with Pseudomonas from deep surgical culture. Status post I&D and poly-exchange by Dr. Reynoso on 7/25/23.  Planning for at least 6 weeks of IV antibiotics until 9/4/23. Unfortunately she still has some ongoing swelling although her erythema has improved.  Still has some serous drainage and ongoing elevation of her inflammatory markers. The patient had a short stay at [facility] after her recent [facility] hospitalization and was doing okay at her clinic visit last week although still having some mild serous drainage from her lower portion of her incision site over her right knee.  Unfortunately, she recently started having some severe bright red blood per rectum in the setting of being on Eliquis for DVT prophylaxis after her recent surgery.  She presented to the hospital Today due to this.  She is being evaluated by GI For a likely lower GI bleed.  CT abdomen and pelvis has been ordered and Eliquis has been held. C. Difficile PCR and GI PCR panel are pending. GI considering endoscopic evaluation. ID has been consult and for continuity of care for her prosthetic joint infection.      Subjective:    8/24/23: The patient states that her Bloody diarrhea Has resolved.  No abdominal pain.  No nausea or vomiting.  Right knee is stable with a new dressing in place.  No fevers.  No new rashes.    8/25/2023: No fevers.  No bloody diarrhea.  No abdominal pain.  No nausea or vomiting. Right knee without any increased pain or swelling Feels ready  "to go home today.    Past Medical History:   Diagnosis Date    Anesthesia 2002    low bp only with foot surgery, has had surgeries since and no issue per pt report    Arthritis     Back pain     Depression     Difficulty walking     Left knee replacement  need right knee replacement    GERD (gastroesophageal reflux disease)     High cholesterol     Hypertension     Knee pain     BILATERAL    Seasonal allergies     Tremor, essential        Past Surgical History:   Procedure Laterality Date    ANTERIOR AND POSTERIOR VAGINAL REPAIR N/A 4/17/2017    Vaginal enterocele / anterior posterior colporrhaphy;  Jose Maria Chowdhury MD;  Location:  [facility];  Service:     BLADDER SUSPENSION  1999    MESH TVT?     COLONOSCOPY  2018    Dr Chung - normal    EXPLORATORY LAPAROTOMY  2010    \"BOWEL INTO PELVIC BONE\" /[place]     EXPLORATORY LAPAROTOMY  04/2004    AQUILES/ abd sacrocolpopexy/post repair    FOOT SURGERY Left     KNEE ARTHROSCOPY Left     meniscus repair    KNEE INCISION AND DRAINAGE Right 7/25/2023    Procedure: KNEE INCISION AND DRAINAGE WITH POLY EXCHANGE - RIGHT;  Surgeon: Sunny Reynoso MD;  Location:  [facility];  Service: Orthopedics;  Laterality: Right;    LAPAROSCOPIC CHOLECYSTECTOMY Right 2000    [place] ARTHRP KNE CONDYLE&PLATU MEDIAL&LAT COMPARTMENTS Left 11/16/2017    Procedure: TOTAL KNEE ARTHROPLASTY LEFT;  Surgeon: Sunny Reynoso MD;  Location:  [facility];  Service: Orthopedics    TONSILLECTOMY      TOTAL ABDOMINAL HYSTERECTOMY WITH SALPINGO OOPHORECTOMY Bilateral 1989    TOTAL KNEE ARTHROPLASTY Right 6/29/2023    Procedure: TOTAL KNEE ARTHROPLASTY WITH CORI ROBOT - RIGHT;  Surgeon: Sunny Reynoso MD;  Location:  [facility];  Service: Robotics - Ortho;  Laterality: Right;    TUBAL ABDOMINAL LIGATION      VENTRAL HERNIA REPAIR N/A 6/21/2016    Procedure: LAPAROSCOPIC REPAIR OF ABDOMINAL WALL HERNIA WITH MESH, CONVERTED TO OPEN ;  Surgeon: Bertha Delacruz MD;  Location:  [facility];  " "Service:        Pediatric History   Patient Parents    Not on file     Other Topics Concern    Not on file   Social History Narrative    Patient consumes 1-2 cups high-caffeine coffee, 1 [employer] sweet tea daily.     Patient lives at home with her .        family history includes Alzheimer's disease in her maternal grandmother; Aneurysm (age of onset: 57) in her father; Cancer in her father; Diabetes in her mother; Heart attack (age of onset: 59) in her mother; Hepatitis in her brother; Hypertension in her mother; Kidney disease (age of onset: 58) in her brother; Leukemia in her maternal grandfather; No Known Problems in her daughter, paternal grandfather, and son; Parkinsonism in her maternal grandmother; Pneumonia in her maternal grandmother.    Allergies   Allergen Reactions    Primidone Other (See Comments)     Hair loss    Topiramate Diarrhea    Tuberculin Tests Swelling and Rash     Pt has chest xrays to check     Zostavax [Zoster Vaccine Live] Swelling and Rash     \"Swelling of injection site of arm\"       Immunization History   Administered Date(s) Administered    COVID-19 (MODERNA) BIVALENT 12+YRS 09/04/2022    COVID-19 (MODERNA) Monovalent Original Booster 04/19/2022    COVID-19 (PFIZER) Purple Cap Monovalent 02/06/2021, 02/27/2021, 09/26/2021    Fluad Quad 65+ 09/14/2020    Fluzone High Dose =>65 Years ([facility]) 10/02/2017, 09/22/2018, 10/04/2019    Influenza, Unspecified 09/14/2020    Pneumococcal Conjugate 13-Valent (PCV13) 10/02/2017    Pneumococcal Polysaccharide (PPSV23) 10/04/2019       Medication:    Current Facility-Administered Medications:     acetaminophen (TYLENOL) tablet 650 mg, 650 mg, Oral, Q6H PRN, Valerie Concepcion MD    atorvastatin (LIPITOR) tablet 10 mg, 10 mg, Oral, Nightly, Grisel Leonardo APRN, 10 mg at 08/24/23 2010    sennosides-docusate (PERICOLACE) 8.6-50 MG per tablet 2 tablet, 2 tablet, Oral, BID **AND** polyethylene glycol (MIRALAX) packet 17 g, 17 g, Oral, " Daily PRN **AND** bisacodyl (DULCOLAX) EC tablet 5 mg, 5 mg, Oral, Daily PRN **AND** bisacodyl (DULCOLAX) suppository 10 mg, 10 mg, Rectal, Daily PRN, Grisel Leonardo, APRN    estradiol (ESTRACE) tablet 0.5 mg, 0.5 mg, Oral, Daily, Leonardo, Grisel, APRN, 0.5 mg at 08/25/23 0811    losartan (COZAAR) tablet 100 mg, 100 mg, Oral, Daily, Leonardo, Grisel, APRN, 100 mg at 08/25/23 0811    nebivolol (BYSTOLIC) tablet 5 mg, 5 mg, Oral, Q24H, Leonardo, Grisel, APRN, 5 mg at 08/25/23 0811    ondansetron (ZOFRAN) tablet 4 mg, 4 mg, Oral, Q6H PRN **OR** ondansetron (ZOFRAN) injection 4 mg, 4 mg, Intravenous, Q6H PRN, Grisel Leonardo, APRN    pantoprazole (PROTONIX) injection 40 mg, 40 mg, Intravenous, BID CHUY, Peg Gould PA-C, 40 mg at 08/23/23 1813    piperacillin-tazobactam (ZOSYN) 4.5 g in iso-osmotic dextrose 100 mL IVPB (premix), 4.5 g, Intravenous, Q8H, Nam Adams MD, Last Rate: 12.5 mL/hr at 08/25/23 0447, 4.5 g at 08/25/23 0447    sodium chloride 0.9 % flush 10 mL, 10 mL, Intravenous, PRN, Nam Taylor MD    sodium chloride 0.9 % flush 10 mL, 10 mL, Intravenous, Q12H, Grisel Leonardo APRN, 10 mL at 08/24/23 2011    sodium chloride 0.9 % flush 10 mL, 10 mL, Intravenous, PRN, Grisel Leonardo, APRHERNANDEZ    sodium chloride 0.9 % infusion 40 mL, 40 mL, Intravenous, PRN, Jorden Girsel, APRN    traMADol (ULTRAM) tablet 50 mg, 50 mg, Oral, Q4H PRN, Valerie Concepcion MD    venlafaxine XR (EFFEXOR-XR) 24 hr capsule 75 mg, 75 mg, Oral, Daily, Grisel Leonardo APRN, 75 mg at 08/25/23 0811    zonisamide (ZONEGRAN) capsule 200 mg, 200 mg, Oral, Daily, Grisel Leonardo APRN, 200 mg at 08/23/23 1813    Please refer to the medical record for a full medication list    Review of Systems:  As above    Physical Exam:   Vital Signs   Temp:  [97.2 øF (36.2 øC)-98 øF (36.7 øC)] 97.9 øF (36.6 øC)  Heart Rate:  [56-79] 73  Resp:  [18] 18  BP: (144-150)/(64-81) 144/64    Temp  Min: 97.2 øF (36.2 øC)  Max: 98 øF  "(36.7 øC)  BP  Min: 144/64  Max: 150/76  Pulse  Min: 56  Max: 79  Resp  Min: 18  Max: 18  SpO2  Min: 95 %  Max: 96 %    Blood pressure 144/64, pulse 73, temperature 97.9 øF (36.6 øC), temperature source Oral, resp. rate 18, height 157.5 cm (62\"), weight (!) 147 kg (325 lb), SpO2 95 %, not currently breastfeeding.  GENERAL: Morbidly obese.  Sitting up in bed.  No acute distress.  HEENT:  Normocephalic, atraumatic.  No external oral lesions noted  EYES: Anicteric.  No conjunctival injection.  HEART: RRR, no murmur  LUNGS: CTA B.  Nonlabored breathing on room air  ABDOMEN: Obese abdomen. Soft, nontender, nondistended. No appreciable HSM.    MSK: Right knee with dressing over her incision site, no surrounding erythema or drainage noted to the dressing.  GENITAL: no Donald catheter  SKIN: No new rashes  PSYCHIATRIC: cooperative.  Appropriate mood and affect  EXT:  No cellulitic change.  NEURO: awake alert and oriented x4.  Normal speech and cognition    PICC line site is without any erythema or drainage    Results Review:   I reviewed the patient's new clinical results.    Results from last 7 days   Lab Units 08/24/23  0330 08/23/23  1111 08/22/23  1150   WBC 10*3/mm3 5.67 5.03 5.12   HEMOGLOBIN g/dL 9.3* 10.0* 9.8*   HEMATOCRIT % 30.7* 33.4* 32.8*   PLATELETS 10*3/mm3 173 165 190     Results from last 7 days   Lab Units 08/24/23  0330   SODIUM mmol/L 142   POTASSIUM mmol/L 3.8   CHLORIDE mmol/L 105   CO2 mmol/L 26.0   BUN mg/dL 11   CREATININE mg/dL 0.73   GLUCOSE mg/dL 101*   CALCIUM mg/dL 8.8     Results from last 7 days   Lab Units 08/23/23  1111   ALK PHOS U/L 122*   BILIRUBIN mg/dL 0.4   ALT (SGPT) U/L 20   AST (SGOT) U/L 26     Results from last 7 days   Lab Units 08/22/23  1150   SED RATE mm/hr 101*     Results from last 7 days   Lab Units 08/22/23  1040   CRP mg/dL 4.25*         Results from last 7 days   Lab Units 08/23/23  1111   LACTATE mmol/L 1.6     Estimated Creatinine Clearance: 99.2 mL/min (by C-G formula " based on SCr of 0.73 mg/dL).  CPK          7/24/2023    12:49   Common Labsle   Creatine Kinase 64       Procalitonin Results:       Brief Urine Lab Results       None           No results found for: SITE, ALLENTEST, PHART, UBV8BLG, PO2ART, TXN4PQU, BASEEXCESS, P6APVNWY, HGBBG, HCTABG, OXYHEMOGLOBI, METHHGBN, CARBOXYHGB, CO2CT, BAROMETRIC, MODALITY, FIO2     Microbiology:  Reviewed.  GI PCR panel and C. Difficile test are pending.      Radiology:  Imaging Results (Last 72 Hours)       Procedure Component Value Units Date/Time    XR Chest 1 View [[id number]] Collected: 08/23/23 2227     Updated: 08/23/23 2231    Narrative:      XR CHEST 1 VW    Date of Exam: 8/23/2023 10:09 PM EDT    Indication: PICC placement    Comparison: None available.    Findings:  Top normal size of the heart with otherwise unremarkable cardiomediastinal silhouette. There is a right upper extremity PICC line with the tip positioned in the mid SVC. The lungs are grossly clear. No pleural effusion or pneumothorax. No acute osseous   findings.      Impression:      Impression:  Top normal size of the heart with otherwise unremarkable cardiomediastinal silhouette. There is a right upper extremity PICC line with the tip positioned in the mid SVC. The lungs are grossly clear. No pleural effusion or pneumothorax. No acute osseous   findings.      Electronically Signed: Alban Mccarthy    8/23/2023 10:28 PM EDT    Workstation ID: [id number]    CT Abdomen Pelvis With Contrast [[id number]] Collected: 08/23/23 2116     Updated: 08/23/23 2126    Narrative:      CT ABDOMEN PELVIS W CONTRAST    Date of Exam: 8/23/2023 7:42 PM EDT    Indication: Diarrhea  GI bleed, lower.    Comparison: None available.    Technique: Axial CT images were obtained of the abdomen and pelvis following the uneventful intravenous administration of 85 mL Isovue-370. Reconstructed coronal and sagittal images were also obtained. Automated exposure control and iterative   construction  methods were used.      Findings:  No evidence of active GI bleeding.    The lung bases are clear. Unremarkable appearance of the liver. The gallbladder is surgically absent. Normal appearance of the bile ducts. Unremarkable appearance of the spleen, pancreas, adrenal glands, kidneys, ureters, and bladder. The uterus is   surgically absent. There is sigmoid colonic diverticulosis without evidence of diverticulitis. Normal appendix. No bowel obstruction. No definite inflammatory change of the GI tract. There is a small sliding hiatal hernia. There is a duodenal   diverticulum without inflammatory change. No abdominopelvic free fluid or conspicuous fat stranding. No pneumoperitoneum. Unremarkable appearance of the vasculature. No lymphadenopathy. Unremarkable appearance of the body wall soft tissues. No acute or   suspicious bony findings.       Impression:      Impression:  No evidence of active GI bleeding.    Colonic diverticulosis without diverticulitis.    No acute abdominopelvic findings.        Electronically Signed: Alban Mccarthy    8/23/2023 9:23 PM EDT    Workstation ID: [id number]            IMPRESSION:     Problems:  Acute GI bleeding with bright red blood per rectum-in the setting of recent Eliquis.  C. Difficile test and GI PCR panel were negative. GI bleeding has resolved  Right knee prosthetic joint infection- with Pseudomonas from deep surgical cultures and with group B strep, E. Coli, and Pseudomonas from superficial culture from the right knee.  Status post I&D and poly-exchange by Dr. Reynoso on 7/25/23.  Planning for at least 6 weeks of IV antibiotics until 9/4/23. Unfortunately she still has some ongoing swelling although her erythema has improved.  Still has some serous drainage and ongoing elevation of her inflammatory markers.  She may ultimately require a two-stage revision surgery if this does not work.  Mild normocytic anemia  Morbid obesity-complicate all aspects of care.  Predispose the  patient to poor wound healing and infection  History of left knee replacement in 2017  Hypertension  Hyperlipidemia    RECOMMENDATIONS:      -Continue Zosyn.   -Eliquis being held      Okay for discharge home today    UM/KRISTINA:  Zosyn 13.5g IV q24h continuous infusion. Anticipate continuing this antibiotic at least until 9/4/23 for a 6 week course.  Weekly CBC with differential, CMP, CRP  Change the PICC line dressing weekly with a Biopatch or Tegaderm CHG gel dressing   Follow-up in my clinic on 9/7/23  Please fax a copy of my orders to [facility] at [phone] and call [phone] with final discharge plans     I discussed in length with the patient and her  at bedside today    Thank you for asking me to see Imleda Elaine.      Nam Adams MD  8/25/2023

## 2023-08-25 NOTE — DISCHARGE SUMMARY
Patient Name: Imelda Elaine  MRN: [id number]  : 1950  DOS: 2023    Attending: Valerie Concepcion MD    Primary Care Provider: Comfort Colunga MD    Date of Admission:.2023 10:26 AM    Date of Discharge:  2023    Discharge Diagnosis:   GI bleed    Morbid obesity    Essential hypertension    Anxiety and depression    Elevated hemoglobin A1c    PSVT (paroxysmal supraventricular tachycardia)    S/P I&D right knee with poly exchange 23    GIB (gastrointestinal bleeding)      Consults: GI Consult, Mark Brunner MD.   Infectious disease consult, Nam Grimm MD  Orthopedic surgery consult, Sunny parker MD    Hospital Course   At admit:  Patient is a pleasant 72 y.o. female presented to [facility] ER with complaints of bloody stools since yesterday morning.  She states she had a large amount of bright red blood with a loose stool yesterday morning.  She states by last night blood was much darker and had a couple blood clots.  She reports she has been having loose stools and she has been on IV antibiotic therapy.  She denies any recent fevers, chills or night sweats.  In ER she was found to have positive fecal occult.     She is known to us from several previous admissions.  Most recently was 2023 for I&D right knee with poly exchange due to postop knee infection. She underwent right knee irrigation and debridement with poly exchange on 2023.  Cultures showed pseudomonas. She was seen by infectious disease and was discharged to [facility] with a PICC line on IV antibiotics.     When seen in ER she is doing fairly well.  She denies pain.  She denies nausea, shortness of breath or chest pain.  No history of DVT or PE.     She has been taking Eliquis twice daily since her right knee surgery.    After admit:  She was monitor closely with serial H&H's, no transfusion was necessary.    She was seen by gastroenterology, further work-up included CT scan of the abdomen pelvis and stool C.  "difficile PCR which was negative.  Impression on CT scan:Impression:  No evidence of active GI bleeding.     Colonic diverticulosis without diverticulitis.     No acute abdominopelvic findings.     During her stay she was seen by Dr. Nam Grimm with L ERIK and her antibiotics were resumed.  She also was seen by Dr. Reynoso, physical therapy was resumed as well.    She was started on p.o. diet and has tolerated that.  She has declined endoscopic work-up at this point     Anticoagulation is no longer needed at this point and has been discontinued    She has worked with physical therapy and Occupational Therapy, made enough progress to where she is ready for home discharge.    Pertinent Test Results:    I reviewed the patient's new clinical results.   Results from last 7 days   Lab Units 23  0330 23  1111 23  1150   WBC 10*3/mm3 5.67 5.03 5.12   HEMOGLOBIN g/dL 9.3* 10.0* 9.8*   HEMATOCRIT % 30.7* 33.4* 32.8*   PLATELETS 10*3/mm3 173 165 190     Results from last 7 days   Lab Units 23  0330 23  1111 23  1040   SODIUM mmol/L 142 142 141   POTASSIUM mmol/L 3.8 3.7 3.7   CHLORIDE mmol/L 105 105 105   CO2 mmol/L 26.0 26.0 27.0   BUN mg/dL 11 13 13   CREATININE mg/dL 0.73 0.83 0.87   CALCIUM mg/dL 8.8 8.8 8.5*   BILIRUBIN mg/dL  --  0.4 0.4   ALK PHOS U/L  --  122* 116   ALT (SGPT) U/L  --  20 20   AST (SGOT) U/L  --  26 24   GLUCOSE mg/dL 101* 110* 102*     I reviewed the patient's new imaging including images and reports.            Visit Vitals  /64 (BP Location: Left arm, Patient Position: Sitting)   Pulse 73   Temp 97.9 øF (36.6 øC) (Oral)   Resp 18   Ht 157.5 cm (62\")   Wt (!) 147 kg (325 lb)   LMP  (LMP Unknown)   SpO2 95%   BMI 59.44 kg/mý     Temp (24hrs), Av.6 øF (36.4 øC), Min:97.2 øF (36.2 øC), Max:98 øF (36.7 øC)      General Appearance:    Alert, cooperative, in no acute distress   Lungs:     Clear to auscultation,respirations regular, even and                   " unlabored    Heart:    Regular rhythm and normal rate, normal S1 and S2, no            murmur, no gallop, no rub, no click   Abdomen:     Normal bowel sounds, no masses, no organomegaly, soft        non-tender, non-distended, no guarding, no rebound                 tenderness   Extremities:   CDI dressing over knee incision.    Pulses:   Pulses palpable and equal bilaterally   Skin:   No bleeding, bruising or rash            Discharge Disposition: Home          Discharge Medications        Changes to Medications        Instructions Start Date   lansoprazole 30 MG capsule  Commonly known as: PREVACID  What changed:   when to take this  additional instructions   30 mg, Oral, 2 Times Daily, Resume daily after 1 month.      zonisamide 100 MG capsule  Commonly known as: ZONEGRAN  What changed:   how much to take  how to take this  when to take this   2 capsules per day             Continue These Medications        Instructions Start Date   acetaminophen 650 MG 8 hr tablet  Commonly known as: TYLENOL   650 mg, Oral, Every 8 Hours PRN      atorvastatin 10 MG tablet  Commonly known as: LIPITOR   10 mg, Oral, Nightly      cetirizine 10 MG tablet  Commonly known as: zyrTEC   1 tablet, Oral, Daily      docusate sodium 100 MG capsule   100 mg, Oral, 2 Times Daily PRN      estradiol 0.5 MG tablet  Commonly known as: ESTRACE   0.5 mg, Oral, Daily      ipratropium 0.06 % nasal spray  Commonly known as: ATROVENT   1 spray, Nasal, As Needed      ketoconazole 2 % shampoo  Commonly known as: NIZORAL   No dose, route, or frequency recorded.      losartan 100 MG tablet  Commonly known as: COZAAR   100 mg, Oral, Daily      multivitamin with minerals tablet tablet   1 tablet, Oral, Daily      nebivolol 5 MG tablet  Commonly known as: BYSTOLIC   5 mg, Oral, Every 24 Hours Scheduled      oxyCODONE 5 MG immediate release tablet  Commonly known as: Roxicodone   5 mg, Oral, Every 4 Hours PRN      piperacillin-tazobactam 4-0.5 GM/100ML solution  IVPB  Commonly known as: ZOSYN   4.5 g, Intravenous, Every 8 Hours Scheduled, Infuse over 30 minutes      ropivacaine 0.2 % infusion (INFUSYSTEM)  Commonly known as: NAROPIN   1 mL/hr (2 mg/hr), Peripheral Nerve, Continuous      sennosides-docusate 8.6-50 MG per tablet  Commonly known as: PERICOLACE   2 tablets, Oral, 2 Times Daily PRN      venlafaxine XR 75 MG 24 hr capsule  Commonly known as: EFFEXOR-XR   75 mg, Oral, Daily             Stop These Medications      apixaban 2.5 MG tablet tablet  Commonly known as: ELIQUIS              Discharge Diet: Resume prior    Activity at Discharge: Weightbearing as tolerated    Follow-up Appointments  Nam Grimm MD per his orders  Sunny parker MD per his orders         Valerie Concepcion MD  08/25/23  10:36 EDT

## 2023-08-29 ENCOUNTER — HOME CARE VISIT (OUTPATIENT)
Dept: HOME HEALTH SERVICES | Facility: HOME HEALTHCARE | Age: 73
End: 2023-08-29
Payer: COMMERCIAL

## 2023-08-29 ENCOUNTER — OFFICE VISIT (OUTPATIENT)
Dept: CARDIOLOGY | Facility: CLINIC | Age: 73
End: 2023-08-29
Payer: MEDICARE

## 2023-08-29 VITALS
BODY MASS INDEX: 53.92 KG/M2 | WEIGHT: 293 LBS | DIASTOLIC BLOOD PRESSURE: 90 MMHG | OXYGEN SATURATION: 95 % | HEIGHT: 62 IN | SYSTOLIC BLOOD PRESSURE: 150 MMHG | HEART RATE: 74 BPM

## 2023-08-29 DIAGNOSIS — I10 ESSENTIAL HYPERTENSION: ICD-10-CM

## 2023-08-29 DIAGNOSIS — E78.2 MIXED HYPERLIPIDEMIA: ICD-10-CM

## 2023-08-29 DIAGNOSIS — I47.1 PSVT (PAROXYSMAL SUPRAVENTRICULAR TACHYCARDIA): Primary | ICD-10-CM

## 2023-08-29 RX ORDER — LOSARTAN POTASSIUM 50 MG/1
50 TABLET ORAL DAILY
Qty: 90 TABLET | Refills: 0 | Status: SHIPPED | OUTPATIENT
Start: 2023-08-29 | End: 2023-08-29

## 2023-08-29 RX ORDER — NEBIVOLOL 10 MG/1
10 TABLET ORAL
Qty: 90 TABLET | Refills: 1 | Status: SHIPPED | OUTPATIENT
Start: 2023-08-29

## 2023-08-29 RX ORDER — PIPERACILLIN SODIUM, TAZOBACTAM SODIUM 36; 4.5 G/152ML; G/152ML
40.5 INJECTION, POWDER, LYOPHILIZED, FOR SOLUTION INTRAVENOUS DAILY
COMMUNITY
Start: 2023-08-23

## 2023-08-29 NOTE — ASSESSMENT & PLAN NOTE
Hypertension is not well controlled  Increase nebivolol 10 mg daily  Continue losartan 100 mg daily  Patient to monitor blood pressures at home and if consistently stays greater than 130/80 despite increase nebivolol she will contact us for further medication adjustments

## 2023-08-29 NOTE — CASE COMMUNICATION
Please send to Comfort Colunga MD and Dr. Nam Grimm at [facility]    Imelda Elaine missed a SN visit on 8/29/23 due to a cardiology appt.  Patient is scheduled for another SN home visit on 9/1 for a PICC line dressing change and we will collect her weekly [facility] labs via venipuncture at that time.    Thank you,  LEVY Grider

## 2023-08-30 ENCOUNTER — HOME CARE VISIT (OUTPATIENT)
Dept: HOME HEALTH SERVICES | Facility: HOME HEALTHCARE | Age: 73
End: 2023-08-30
Payer: COMMERCIAL

## 2023-08-30 VITALS
SYSTOLIC BLOOD PRESSURE: 130 MMHG | HEART RATE: 84 BPM | DIASTOLIC BLOOD PRESSURE: 82 MMHG | TEMPERATURE: 97.1 F | RESPIRATION RATE: 18 BRPM

## 2023-08-30 PROCEDURE — G0151 HHCP-SERV OF PT,EA 15 MIN: HCPCS

## 2023-08-31 ENCOUNTER — HOME CARE VISIT (OUTPATIENT)
Dept: HOME HEALTH SERVICES | Facility: HOME HEALTHCARE | Age: 73
End: 2023-08-31
Payer: COMMERCIAL

## 2023-08-31 NOTE — CASE COMMUNICATION
Patient missed a AIDE visit from [facility] on 8-31-23}.     Reason: N/A.       For your records only.   As per home health protocol, MD must be notified of missed/cancelled visits; therefore the prescribed frequency was not met.

## 2023-08-31 NOTE — HOME HEALTH
Routine Visit Note:    Skill/education provided: right knee rehab focusing on strengthening RLE and increased flexion of right knee, gait training with SPC with cues for increased knee flexion during swing phase and increased RLE stance phase    Patient/caregiver response: Right knee AROM 0-95 deg, SBA for ambulation with SPC in home, distance limited by fatigue/SOA and pain in right knee     Plan for next visit: Progress right knee AROM and strengthening    Other pertinent info: Patient remains on IV antibiotics through RUE PICC line; she is unable to leave home due to infectious disease and is therefore unable to begin outpatient PT until cleared by ID MD; patient is hopeful that she will have PICC removed on 9/7 and will be able to begin outpatient PT week of 9/11; will benefit from 3 additional home PT visits to continue right TKA rehab and ensure successful surgery.

## 2023-09-01 ENCOUNTER — HOME CARE VISIT (OUTPATIENT)
Dept: HOME HEALTH SERVICES | Facility: HOME HEALTHCARE | Age: 73
End: 2023-09-01
Payer: MEDICARE

## 2023-09-01 ENCOUNTER — HOME CARE VISIT (OUTPATIENT)
Dept: HOME HEALTH SERVICES | Facility: HOME HEALTHCARE | Age: 73
End: 2023-09-01
Payer: COMMERCIAL

## 2023-09-01 ENCOUNTER — LAB REQUISITION (OUTPATIENT)
Dept: LAB | Facility: HOSPITAL | Age: 73
End: 2023-09-01
Payer: MEDICARE

## 2023-09-01 VITALS
OXYGEN SATURATION: 96 % | HEART RATE: 82 BPM | DIASTOLIC BLOOD PRESSURE: 89 MMHG | TEMPERATURE: 98.1 F | RESPIRATION RATE: 18 BRPM | SYSTOLIC BLOOD PRESSURE: 139 MMHG

## 2023-09-01 VITALS
TEMPERATURE: 97.5 F | HEART RATE: 77 BPM | OXYGEN SATURATION: 96 % | DIASTOLIC BLOOD PRESSURE: 84 MMHG | SYSTOLIC BLOOD PRESSURE: 140 MMHG | RESPIRATION RATE: 16 BRPM

## 2023-09-01 DIAGNOSIS — T84.53XD INFECTION AND INFLAMMATORY REACTION DUE TO INTERNAL RIGHT KNEE PROSTHESIS, SUBSEQUENT ENCOUNTER: ICD-10-CM

## 2023-09-01 LAB
ALBUMIN SERPL-MCNC: 3.5 G/DL (ref 3.5–5.2)
ALBUMIN/GLOB SERPL: 1 G/DL
ALP SERPL-CCNC: 120 U/L (ref 39–117)
ALT SERPL W P-5'-P-CCNC: 26 U/L (ref 1–33)
ANION GAP SERPL CALCULATED.3IONS-SCNC: 14 MMOL/L (ref 5–15)
AST SERPL-CCNC: 29 U/L (ref 1–32)
BASOPHILS # BLD AUTO: 0.03 10*3/MM3 (ref 0–0.2)
BASOPHILS NFR BLD AUTO: 0.4 % (ref 0–1.5)
BILIRUB SERPL-MCNC: 0.2 MG/DL (ref 0–1.2)
BUN SERPL-MCNC: 15 MG/DL (ref 8–23)
BUN/CREAT SERPL: 15.5 (ref 7–25)
CALCIUM SPEC-SCNC: 9 MG/DL (ref 8.6–10.5)
CHLORIDE SERPL-SCNC: 103 MMOL/L (ref 98–107)
CO2 SERPL-SCNC: 25 MMOL/L (ref 22–29)
CREAT SERPL-MCNC: 0.97 MG/DL (ref 0.57–1)
CRP SERPL-MCNC: 6.73 MG/DL (ref 0–0.5)
DEPRECATED RDW RBC AUTO: 50.8 FL (ref 37–54)
EGFRCR SERPLBLD CKD-EPI 2021: 62.6 ML/MIN/1.73
EOSINOPHIL # BLD AUTO: 0.27 10*3/MM3 (ref 0–0.4)
EOSINOPHIL NFR BLD AUTO: 3.5 % (ref 0.3–6.2)
ERYTHROCYTE [DISTWIDTH] IN BLOOD BY AUTOMATED COUNT: 15.6 % (ref 12.3–15.4)
ERYTHROCYTE [SEDIMENTATION RATE] IN BLOOD: 70 MM/HR (ref 0–30)
GLOBULIN UR ELPH-MCNC: 3.5 GM/DL
GLUCOSE SERPL-MCNC: 108 MG/DL (ref 65–99)
HCT VFR BLD AUTO: 34.5 % (ref 34–46.6)
HGB BLD-MCNC: 10.3 G/DL (ref 12–15.9)
IMM GRANULOCYTES # BLD AUTO: 0.03 10*3/MM3 (ref 0–0.05)
IMM GRANULOCYTES NFR BLD AUTO: 0.4 % (ref 0–0.5)
LYMPHOCYTES # BLD AUTO: 1.36 10*3/MM3 (ref 0.7–3.1)
LYMPHOCYTES NFR BLD AUTO: 17.5 % (ref 19.6–45.3)
MCH RBC QN AUTO: 26.5 PG (ref 26.6–33)
MCHC RBC AUTO-ENTMCNC: 29.9 G/DL (ref 31.5–35.7)
MCV RBC AUTO: 88.9 FL (ref 79–97)
MONOCYTES # BLD AUTO: 0.64 10*3/MM3 (ref 0.1–0.9)
MONOCYTES NFR BLD AUTO: 8.2 % (ref 5–12)
NEUTROPHILS NFR BLD AUTO: 5.43 10*3/MM3 (ref 1.7–7)
NEUTROPHILS NFR BLD AUTO: 70 % (ref 42.7–76)
NRBC BLD AUTO-RTO: 0 /100 WBC (ref 0–0.2)
PLATELET # BLD AUTO: 280 10*3/MM3 (ref 140–450)
PMV BLD AUTO: 12.3 FL (ref 6–12)
POTASSIUM SERPL-SCNC: 4.3 MMOL/L (ref 3.5–5.2)
PROT SERPL-MCNC: 7 G/DL (ref 6–8.5)
RBC # BLD AUTO: 3.88 10*6/MM3 (ref 3.77–5.28)
SODIUM SERPL-SCNC: 142 MMOL/L (ref 136–145)
WBC NRBC COR # BLD: 7.76 10*3/MM3 (ref 3.4–10.8)

## 2023-09-01 PROCEDURE — 80053 COMPREHEN METABOLIC PANEL: CPT | Performed by: INTERNAL MEDICINE

## 2023-09-01 PROCEDURE — 85652 RBC SED RATE AUTOMATED: CPT | Performed by: INTERNAL MEDICINE

## 2023-09-01 PROCEDURE — G0299 HHS/HOSPICE OF RN EA 15 MIN: HCPCS

## 2023-09-01 PROCEDURE — 85025 COMPLETE CBC W/AUTO DIFF WBC: CPT | Performed by: INTERNAL MEDICINE

## 2023-09-01 PROCEDURE — G0157 HHC PT ASSISTANT EA 15: HCPCS

## 2023-09-01 PROCEDURE — 86140 C-REACTIVE PROTEIN: CPT | Performed by: INTERNAL MEDICINE

## 2023-09-01 NOTE — HOME HEALTH
"Routine Visit Note: Greeted at door by . Patient first seen with no apparent signs of distress. Patient states that she is feeling \"pretty good \" and reports that her pain is \"getting better\" / reports minimal compliance with HEP.    Skill/education provided: Instructed gait training, therapetuic exercise, and ROM exercise today. Educated patient on HEP, pain management strategies, and fall prevention.     Patient/caregiver response: Patient tolerated all treatment well today, with no visable signs of distress, excessive fatigue, or exacerbation of pain. Patient verbalizes understanding of all provided education and appears on track to meet her goals. Right Knee AROM : 0-95. Patient has difficulty progressing with ROM/exercise due to SOA. Requires occasional seated rest breaks to recover.     Plan for next visit: Patient would benefit from continued skilled PT to address deficits in BLE strength, ROM, gait, and to improve overall functional mobility / to facilitate proper post-surgical care. Progress as tolerated next visit."

## 2023-09-01 NOTE — CASE COMMUNICATION
Patient missed a [facility] visit from [facility] on 9-1-23.     Reason: n/a.       For your records only.   As per home health protocol, MD must be notified of missed/cancelled visits; therefore the prescribed frequency was not met.

## 2023-09-01 NOTE — HOME HEALTH
SN routine visit:    Weekly ID labs collected via venipuncture and dropped off to [facility] lab.  PICC line dressing change performed.  Next Picc line dressing change due 9/8/23 if PICC still in place. Patient has MD appt with ID on 9/7 and patient is expecting to have it discontinued at that visit.

## 2023-09-05 ENCOUNTER — LAB REQUISITION (OUTPATIENT)
Dept: LAB | Facility: HOSPITAL | Age: 73
End: 2023-09-05
Payer: MEDICARE

## 2023-09-05 ENCOUNTER — HOME CARE VISIT (OUTPATIENT)
Dept: HOME HEALTH SERVICES | Facility: HOME HEALTHCARE | Age: 73
End: 2023-09-05
Payer: COMMERCIAL

## 2023-09-05 ENCOUNTER — HOME CARE VISIT (OUTPATIENT)
Dept: HOME HEALTH SERVICES | Facility: HOME HEALTHCARE | Age: 73
End: 2023-09-05
Payer: MEDICARE

## 2023-09-05 VITALS
HEART RATE: 86 BPM | OXYGEN SATURATION: 96 % | RESPIRATION RATE: 18 BRPM | TEMPERATURE: 98.1 F | DIASTOLIC BLOOD PRESSURE: 86 MMHG | SYSTOLIC BLOOD PRESSURE: 148 MMHG

## 2023-09-05 VITALS
HEART RATE: 65 BPM | DIASTOLIC BLOOD PRESSURE: 60 MMHG | RESPIRATION RATE: 18 BRPM | OXYGEN SATURATION: 95 % | SYSTOLIC BLOOD PRESSURE: 122 MMHG | TEMPERATURE: 96.6 F

## 2023-09-05 DIAGNOSIS — T84.53XD INFECTION AND INFLAMMATORY REACTION DUE TO INTERNAL RIGHT KNEE PROSTHESIS, SUBSEQUENT ENCOUNTER: ICD-10-CM

## 2023-09-05 LAB
ALBUMIN SERPL-MCNC: 3.5 G/DL (ref 3.5–5.2)
ALBUMIN/GLOB SERPL: 1 G/DL
ALP SERPL-CCNC: 122 U/L (ref 39–117)
ALT SERPL W P-5'-P-CCNC: 26 U/L (ref 1–33)
ANION GAP SERPL CALCULATED.3IONS-SCNC: 12 MMOL/L (ref 5–15)
AST SERPL-CCNC: 37 U/L (ref 1–32)
BASOPHILS # BLD AUTO: 0.03 10*3/MM3 (ref 0–0.2)
BASOPHILS NFR BLD AUTO: 0.4 % (ref 0–1.5)
BILIRUB SERPL-MCNC: 0.3 MG/DL (ref 0–1.2)
BUN SERPL-MCNC: 18 MG/DL (ref 8–23)
BUN/CREAT SERPL: 20 (ref 7–25)
CALCIUM SPEC-SCNC: 9.2 MG/DL (ref 8.6–10.5)
CHLORIDE SERPL-SCNC: 105 MMOL/L (ref 98–107)
CO2 SERPL-SCNC: 24 MMOL/L (ref 22–29)
CREAT SERPL-MCNC: 0.9 MG/DL (ref 0.57–1)
CRP SERPL-MCNC: 4.36 MG/DL (ref 0–0.5)
DEPRECATED RDW RBC AUTO: 50.2 FL (ref 37–54)
EGFRCR SERPLBLD CKD-EPI 2021: 68.5 ML/MIN/1.73
EOSINOPHIL # BLD AUTO: 0.22 10*3/MM3 (ref 0–0.4)
EOSINOPHIL NFR BLD AUTO: 3.1 % (ref 0.3–6.2)
ERYTHROCYTE [DISTWIDTH] IN BLOOD BY AUTOMATED COUNT: 15.3 % (ref 12.3–15.4)
ERYTHROCYTE [SEDIMENTATION RATE] IN BLOOD: 89 MM/HR (ref 0–30)
FUNGUS WND CULT: NORMAL
FUNGUS WND CULT: NORMAL
GLOBULIN UR ELPH-MCNC: 3.6 GM/DL
GLUCOSE SERPL-MCNC: 118 MG/DL (ref 65–99)
HCT VFR BLD AUTO: 34.8 % (ref 34–46.6)
HGB BLD-MCNC: 10.5 G/DL (ref 12–15.9)
IMM GRANULOCYTES # BLD AUTO: 0.03 10*3/MM3 (ref 0–0.05)
IMM GRANULOCYTES NFR BLD AUTO: 0.4 % (ref 0–0.5)
LYMPHOCYTES # BLD AUTO: 1.08 10*3/MM3 (ref 0.7–3.1)
LYMPHOCYTES NFR BLD AUTO: 15.4 % (ref 19.6–45.3)
MCH RBC QN AUTO: 26.6 PG (ref 26.6–33)
MCHC RBC AUTO-ENTMCNC: 30.2 G/DL (ref 31.5–35.7)
MCV RBC AUTO: 88.3 FL (ref 79–97)
MONOCYTES # BLD AUTO: 0.56 10*3/MM3 (ref 0.1–0.9)
MONOCYTES NFR BLD AUTO: 8 % (ref 5–12)
MYCOBACTERIUM SPEC CULT: NORMAL
MYCOBACTERIUM SPEC CULT: NORMAL
NEUTROPHILS NFR BLD AUTO: 5.08 10*3/MM3 (ref 1.7–7)
NEUTROPHILS NFR BLD AUTO: 72.7 % (ref 42.7–76)
NIGHT BLUE STAIN TISS: NORMAL
NIGHT BLUE STAIN TISS: NORMAL
NRBC BLD AUTO-RTO: 0 /100 WBC (ref 0–0.2)
PLATELET # BLD AUTO: 281 10*3/MM3 (ref 140–450)
PMV BLD AUTO: 11.8 FL (ref 6–12)
POTASSIUM SERPL-SCNC: 4.2 MMOL/L (ref 3.5–5.2)
PROT SERPL-MCNC: 7.1 G/DL (ref 6–8.5)
RBC # BLD AUTO: 3.94 10*6/MM3 (ref 3.77–5.28)
SODIUM SERPL-SCNC: 141 MMOL/L (ref 136–145)
WBC NRBC COR # BLD: 7 10*3/MM3 (ref 3.4–10.8)

## 2023-09-05 PROCEDURE — G0151 HHCP-SERV OF PT,EA 15 MIN: HCPCS

## 2023-09-05 PROCEDURE — 85652 RBC SED RATE AUTOMATED: CPT | Performed by: INTERNAL MEDICINE

## 2023-09-05 PROCEDURE — 85025 COMPLETE CBC W/AUTO DIFF WBC: CPT | Performed by: INTERNAL MEDICINE

## 2023-09-05 PROCEDURE — 86140 C-REACTIVE PROTEIN: CPT | Performed by: INTERNAL MEDICINE

## 2023-09-05 PROCEDURE — 80053 COMPREHEN METABOLIC PANEL: CPT | Performed by: INTERNAL MEDICINE

## 2023-09-05 PROCEDURE — G0299 HHS/HOSPICE OF RN EA 15 MIN: HCPCS

## 2023-09-05 NOTE — HOME HEALTH
SN routine visit:  Patient doing well. Labs collected via venipuncture and dropped off at [facility] lab  Plans to get PICC removed on thursday, patient to let us know.

## 2023-09-07 ENCOUNTER — HOME CARE VISIT (OUTPATIENT)
Dept: HOME HEALTH SERVICES | Facility: HOME HEALTHCARE | Age: 73
End: 2023-09-07
Payer: MEDICARE

## 2023-09-07 ENCOUNTER — HOME CARE VISIT (OUTPATIENT)
Dept: HOME HEALTH SERVICES | Facility: HOME HEALTHCARE | Age: 73
End: 2023-09-07
Payer: COMMERCIAL

## 2023-09-07 VITALS
OXYGEN SATURATION: 95 % | SYSTOLIC BLOOD PRESSURE: 128 MMHG | RESPIRATION RATE: 16 BRPM | TEMPERATURE: 97.9 F | DIASTOLIC BLOOD PRESSURE: 77 MMHG | HEART RATE: 76 BPM

## 2023-09-07 PROCEDURE — G0156 HHCP-SVS OF AIDE,EA 15 MIN: HCPCS

## 2023-09-07 RX ORDER — LOSARTAN POTASSIUM 100 MG/1
100 TABLET ORAL DAILY
Qty: 90 TABLET | Refills: 1 | Status: SHIPPED | OUTPATIENT
Start: 2023-09-07

## 2023-09-07 NOTE — CASE COMMUNICATION
Please send to Dr. Comfort Colunga    Patient missed a skilled nursing visit from [facility] on 9/7/23.     Reason: PICC line removed and antibitoics discontinued in office of Dr. Nam Grimm this afternoon. No need for PICC line dressing change visit this week. Will plan to see her once weekly starting next week.       For your records only.   As per home health protocol, MD must be notified of missed/cancelled visits; therefo re the prescribed frequency was not met.

## 2023-09-07 NOTE — TELEPHONE ENCOUNTER
Lab Results   Component Value Date    GLUCOSE 118 (H) 09/05/2023    BUN 18 09/05/2023    CREATININE 0.90 09/05/2023    EGFR 68.5 09/05/2023    BCR 20.0 09/05/2023    K 4.2 09/05/2023    CO2 24.0 09/05/2023    CALCIUM 9.2 09/05/2023    ALBUMIN 3.5 09/05/2023    BILITOT 0.3 09/05/2023    AST 37 (H) 09/05/2023    ALT 26 09/05/2023

## 2023-09-08 ENCOUNTER — HOME CARE VISIT (OUTPATIENT)
Dept: HOME HEALTH SERVICES | Facility: HOME HEALTHCARE | Age: 73
End: 2023-09-08
Payer: COMMERCIAL

## 2023-09-08 VITALS
DIASTOLIC BLOOD PRESSURE: 72 MMHG | RESPIRATION RATE: 18 BRPM | OXYGEN SATURATION: 94 % | TEMPERATURE: 97.3 F | SYSTOLIC BLOOD PRESSURE: 126 MMHG | HEART RATE: 76 BPM

## 2023-09-08 PROCEDURE — G0151 HHCP-SERV OF PT,EA 15 MIN: HCPCS

## 2023-09-12 ENCOUNTER — HOME CARE VISIT (OUTPATIENT)
Dept: HOME HEALTH SERVICES | Facility: HOME HEALTHCARE | Age: 73
End: 2023-09-12
Payer: COMMERCIAL

## 2023-09-12 ENCOUNTER — HOME CARE VISIT (OUTPATIENT)
Dept: HOME HEALTH SERVICES | Facility: HOME HEALTHCARE | Age: 73
End: 2023-09-12
Payer: MEDICARE

## 2023-09-12 VITALS
HEART RATE: 79 BPM | OXYGEN SATURATION: 98 % | RESPIRATION RATE: 18 BRPM | SYSTOLIC BLOOD PRESSURE: 132 MMHG | TEMPERATURE: 98.1 F | DIASTOLIC BLOOD PRESSURE: 82 MMHG

## 2023-09-12 PROCEDURE — G0299 HHS/HOSPICE OF RN EA 15 MIN: HCPCS

## 2023-09-12 NOTE — CASE COMMUNICATION
Please send to Dr. Comfort Colunga    Patient has finished her IV antibiotics and no longer requires a PICC line. Her PICC was removed in the office on 9/7/23 and has been referred to outpatient therapy. Patient no longer has any need for skilled services by home care and was discharged from home care services on 9/12/23.    Thank you,  LEVY Grider

## 2023-09-12 NOTE — HOME HEALTH
[facility]     Patient has finished her IV antibiotics and no longer requires a PICC line. Her PICC was removed in the office on 9/7/23 and has been referred to outpatient therapy. Patient no longer has any need for skilled services by home care and was discharged from home care services on 9/12/23.

## 2023-09-21 ENCOUNTER — TRANSCRIBE ORDERS (OUTPATIENT)
Dept: LAB | Facility: HOSPITAL | Age: 73
End: 2023-09-21

## 2023-09-21 ENCOUNTER — LAB (OUTPATIENT)
Dept: LAB | Facility: HOSPITAL | Age: 73
End: 2023-09-21

## 2023-09-21 DIAGNOSIS — R78.81 PSEUDOMONAL BACTEREMIA: ICD-10-CM

## 2023-09-21 DIAGNOSIS — B96.5 PSEUDOMONAL BACTEREMIA: ICD-10-CM

## 2023-09-21 DIAGNOSIS — T84.53XD INFECTION AND INFLAMMATORY REACTION DUE TO INTERNAL RIGHT KNEE PROSTHESIS, SUBSEQUENT ENCOUNTER: ICD-10-CM

## 2023-09-21 DIAGNOSIS — T84.53XD INFECTION AND INFLAMMATORY REACTION DUE TO INTERNAL RIGHT KNEE PROSTHESIS, SUBSEQUENT ENCOUNTER: Primary | ICD-10-CM

## 2023-09-21 LAB
BASOPHILS # BLD AUTO: 0.03 10*3/MM3 (ref 0–0.2)
BASOPHILS NFR BLD AUTO: 0.4 % (ref 0–1.5)
CRP SERPL-MCNC: 3.61 MG/DL (ref 0–0.5)
DEPRECATED RDW RBC AUTO: 48.1 FL (ref 37–54)
EOSINOPHIL # BLD AUTO: 0.24 10*3/MM3 (ref 0–0.4)
EOSINOPHIL NFR BLD AUTO: 3.3 % (ref 0.3–6.2)
ERYTHROCYTE [DISTWIDTH] IN BLOOD BY AUTOMATED COUNT: 15.6 % (ref 12.3–15.4)
ERYTHROCYTE [SEDIMENTATION RATE] IN BLOOD: 95 MM/HR (ref 0–30)
HCT VFR BLD AUTO: 36.5 % (ref 34–46.6)
HGB BLD-MCNC: 10.8 G/DL (ref 12–15.9)
IMM GRANULOCYTES # BLD AUTO: 0.02 10*3/MM3 (ref 0–0.05)
IMM GRANULOCYTES NFR BLD AUTO: 0.3 % (ref 0–0.5)
LYMPHOCYTES # BLD AUTO: 1.07 10*3/MM3 (ref 0.7–3.1)
LYMPHOCYTES NFR BLD AUTO: 14.8 % (ref 19.6–45.3)
MCH RBC QN AUTO: 25.1 PG (ref 26.6–33)
MCHC RBC AUTO-ENTMCNC: 29.6 G/DL (ref 31.5–35.7)
MCV RBC AUTO: 84.7 FL (ref 79–97)
MONOCYTES # BLD AUTO: 0.54 10*3/MM3 (ref 0.1–0.9)
MONOCYTES NFR BLD AUTO: 7.5 % (ref 5–12)
NEUTROPHILS NFR BLD AUTO: 5.33 10*3/MM3 (ref 1.7–7)
NEUTROPHILS NFR BLD AUTO: 73.7 % (ref 42.7–76)
NRBC BLD AUTO-RTO: 0 /100 WBC (ref 0–0.2)
PLATELET # BLD AUTO: 229 10*3/MM3 (ref 140–450)
PMV BLD AUTO: 11.4 FL (ref 6–12)
RBC # BLD AUTO: 4.31 10*6/MM3 (ref 3.77–5.28)
WBC NRBC COR # BLD: 7.23 10*3/MM3 (ref 3.4–10.8)

## 2023-09-21 PROCEDURE — 85652 RBC SED RATE AUTOMATED: CPT

## 2023-09-21 PROCEDURE — 85025 COMPLETE CBC W/AUTO DIFF WBC: CPT

## 2023-09-21 PROCEDURE — 36415 COLL VENOUS BLD VENIPUNCTURE: CPT

## 2023-09-21 PROCEDURE — 86140 C-REACTIVE PROTEIN: CPT

## 2023-10-18 ENCOUNTER — LAB (OUTPATIENT)
Dept: LAB | Facility: HOSPITAL | Age: 73
End: 2023-10-18
Payer: MEDICARE

## 2023-10-18 ENCOUNTER — TRANSCRIBE ORDERS (OUTPATIENT)
Dept: PHYSICAL THERAPY | Facility: HOSPITAL | Age: 73
End: 2023-10-18
Payer: MEDICARE

## 2023-10-18 ENCOUNTER — TRANSCRIBE ORDERS (OUTPATIENT)
Dept: LAB | Facility: HOSPITAL | Age: 73
End: 2023-10-18
Payer: MEDICARE

## 2023-10-18 DIAGNOSIS — E66.01 MORBID OBESITY: ICD-10-CM

## 2023-10-18 DIAGNOSIS — I89.0 LYMPHEDEMA: Primary | ICD-10-CM

## 2023-10-18 DIAGNOSIS — T84.53XD INFECTION OF TOTAL RIGHT KNEE REPLACEMENT, SUBSEQUENT ENCOUNTER: Primary | ICD-10-CM

## 2023-10-18 DIAGNOSIS — L03.115 CELLULITIS OF RIGHT FOOT: ICD-10-CM

## 2023-10-18 DIAGNOSIS — B96.5 PSEUDOMONAS (MALLEI) CAUSING DISEASES CLASSD ELSWHR: ICD-10-CM

## 2023-10-18 DIAGNOSIS — T84.53XD INFECTION OF TOTAL RIGHT KNEE REPLACEMENT, SUBSEQUENT ENCOUNTER: ICD-10-CM

## 2023-10-18 LAB
BASOPHILS # BLD AUTO: 0.02 10*3/MM3 (ref 0–0.2)
BASOPHILS NFR BLD AUTO: 0.3 % (ref 0–1.5)
CRP SERPL-MCNC: 3.49 MG/DL (ref 0–0.5)
DEPRECATED RDW RBC AUTO: 49.6 FL (ref 37–54)
EOSINOPHIL # BLD AUTO: 0.29 10*3/MM3 (ref 0–0.4)
EOSINOPHIL NFR BLD AUTO: 4.3 % (ref 0.3–6.2)
ERYTHROCYTE [DISTWIDTH] IN BLOOD BY AUTOMATED COUNT: 16.6 % (ref 12.3–15.4)
ERYTHROCYTE [SEDIMENTATION RATE] IN BLOOD: 88 MM/HR (ref 0–30)
HCT VFR BLD AUTO: 35.1 % (ref 34–46.6)
HGB BLD-MCNC: 10.5 G/DL (ref 12–15.9)
IMM GRANULOCYTES # BLD AUTO: 0.02 10*3/MM3 (ref 0–0.05)
IMM GRANULOCYTES NFR BLD AUTO: 0.3 % (ref 0–0.5)
LYMPHOCYTES # BLD AUTO: 1.4 10*3/MM3 (ref 0.7–3.1)
LYMPHOCYTES NFR BLD AUTO: 20.8 % (ref 19.6–45.3)
MCH RBC QN AUTO: 24.7 PG (ref 26.6–33)
MCHC RBC AUTO-ENTMCNC: 29.9 G/DL (ref 31.5–35.7)
MCV RBC AUTO: 82.6 FL (ref 79–97)
MONOCYTES # BLD AUTO: 0.49 10*3/MM3 (ref 0.1–0.9)
MONOCYTES NFR BLD AUTO: 7.3 % (ref 5–12)
NEUTROPHILS NFR BLD AUTO: 4.52 10*3/MM3 (ref 1.7–7)
NEUTROPHILS NFR BLD AUTO: 67 % (ref 42.7–76)
NRBC BLD AUTO-RTO: 0 /100 WBC (ref 0–0.2)
PLATELET # BLD AUTO: 205 10*3/MM3 (ref 140–450)
PMV BLD AUTO: 11.6 FL (ref 6–12)
RBC # BLD AUTO: 4.25 10*6/MM3 (ref 3.77–5.28)
WBC NRBC COR # BLD: 6.74 10*3/MM3 (ref 3.4–10.8)

## 2023-10-18 PROCEDURE — 36415 COLL VENOUS BLD VENIPUNCTURE: CPT

## 2023-10-18 PROCEDURE — 85652 RBC SED RATE AUTOMATED: CPT

## 2023-10-18 PROCEDURE — 85025 COMPLETE CBC W/AUTO DIFF WBC: CPT

## 2023-10-18 PROCEDURE — 86140 C-REACTIVE PROTEIN: CPT

## 2023-11-01 ENCOUNTER — LAB (OUTPATIENT)
Dept: LAB | Facility: HOSPITAL | Age: 73
End: 2023-11-01
Payer: MEDICARE

## 2023-11-01 ENCOUNTER — TRANSCRIBE ORDERS (OUTPATIENT)
Dept: LAB | Facility: HOSPITAL | Age: 73
End: 2023-11-01
Payer: MEDICARE

## 2023-11-01 DIAGNOSIS — L03.115 CELLULITIS OF RIGHT FOOT: ICD-10-CM

## 2023-11-01 DIAGNOSIS — B96.5 ARTHRITIS DUE TO PSEUDOMONAS: ICD-10-CM

## 2023-11-01 DIAGNOSIS — T84.53XD INFECTION OF TOTAL RIGHT KNEE REPLACEMENT, SUBSEQUENT ENCOUNTER: ICD-10-CM

## 2023-11-01 DIAGNOSIS — M00.80 ARTHRITIS DUE TO PSEUDOMONAS: ICD-10-CM

## 2023-11-01 DIAGNOSIS — T84.53XD INFECTION OF TOTAL RIGHT KNEE REPLACEMENT, SUBSEQUENT ENCOUNTER: Primary | ICD-10-CM

## 2023-11-01 LAB
BASOPHILS # BLD AUTO: 0.04 10*3/MM3 (ref 0–0.2)
BASOPHILS NFR BLD AUTO: 0.6 % (ref 0–1.5)
CHROMATIN AB SERPL-ACNC: 14 IU/ML (ref 0–14)
CRP SERPL-MCNC: 3.06 MG/DL (ref 0–0.5)
DEPRECATED RDW RBC AUTO: 50.1 FL (ref 37–54)
EOSINOPHIL # BLD AUTO: 0.23 10*3/MM3 (ref 0–0.4)
EOSINOPHIL NFR BLD AUTO: 3.6 % (ref 0.3–6.2)
ERYTHROCYTE [DISTWIDTH] IN BLOOD BY AUTOMATED COUNT: 16.9 % (ref 12.3–15.4)
ERYTHROCYTE [SEDIMENTATION RATE] IN BLOOD: 59 MM/HR (ref 0–30)
HCT VFR BLD AUTO: 36 % (ref 34–46.6)
HGB BLD-MCNC: 10.7 G/DL (ref 12–15.9)
IMM GRANULOCYTES # BLD AUTO: 0.02 10*3/MM3 (ref 0–0.05)
IMM GRANULOCYTES NFR BLD AUTO: 0.3 % (ref 0–0.5)
LYMPHOCYTES # BLD AUTO: 1.08 10*3/MM3 (ref 0.7–3.1)
LYMPHOCYTES NFR BLD AUTO: 17.1 % (ref 19.6–45.3)
MCH RBC QN AUTO: 24.3 PG (ref 26.6–33)
MCHC RBC AUTO-ENTMCNC: 29.7 G/DL (ref 31.5–35.7)
MCV RBC AUTO: 81.6 FL (ref 79–97)
MONOCYTES # BLD AUTO: 0.45 10*3/MM3 (ref 0.1–0.9)
MONOCYTES NFR BLD AUTO: 7.1 % (ref 5–12)
NEUTROPHILS NFR BLD AUTO: 4.5 10*3/MM3 (ref 1.7–7)
NEUTROPHILS NFR BLD AUTO: 71.3 % (ref 42.7–76)
NRBC BLD AUTO-RTO: 0 /100 WBC (ref 0–0.2)
PLATELET # BLD AUTO: 197 10*3/MM3 (ref 140–450)
PMV BLD AUTO: 11.2 FL (ref 6–12)
RBC # BLD AUTO: 4.41 10*6/MM3 (ref 3.77–5.28)
WBC NRBC COR # BLD: 6.32 10*3/MM3 (ref 3.4–10.8)

## 2023-11-01 PROCEDURE — 86431 RHEUMATOID FACTOR QUANT: CPT

## 2023-11-01 PROCEDURE — 36415 COLL VENOUS BLD VENIPUNCTURE: CPT

## 2023-11-01 PROCEDURE — 86140 C-REACTIVE PROTEIN: CPT

## 2023-11-01 PROCEDURE — 85652 RBC SED RATE AUTOMATED: CPT

## 2023-11-01 PROCEDURE — 85025 COMPLETE CBC W/AUTO DIFF WBC: CPT

## 2023-11-27 ENCOUNTER — HOSPITAL ENCOUNTER (OUTPATIENT)
Dept: PHYSICAL THERAPY | Facility: HOSPITAL | Age: 73
Setting detail: THERAPIES SERIES
Discharge: HOME | End: 2023-11-27
Payer: MEDICARE

## 2023-11-27 DIAGNOSIS — I89.0 LYMPHEDEMA: Primary | ICD-10-CM

## 2023-11-27 PROCEDURE — 97161 PT EVAL LOW COMPLEX 20 MIN: CPT

## 2023-11-28 NOTE — THERAPY EVALUATION
"  Physical Therapy Lymphedema Initial Evaluation  [facility]     Patient Name: Imelda Elaine  : 1950  MRN: [id number]  Today's Date: 2023      Visit Date: 2023    Visit Dx:    ICD-10-CM ICD-9-CM   1. Lymphedema  I89.0 457.1       Patient Active Problem List   Diagnosis    Morbid obesity    Palpitations    Essential hypertension    Mixed hyperlipidemia    Prediabetes    Osteoarthritis of left knee    Leukocytosis, mild, likely reactive    Abnormal EKG    Anxiety and depression    BPV (benign positional vertigo)    Elevated hemoglobin A1c    Gastroesophageal reflux disease without esophagitis    Osteopenia    Peripheral vascular disease    S/P total knee arthroplasty, left    Tremor, essential    NDIAYE (dyspnea on exertion)    Body mass index 40.0-44.9, adult    Abnormal blood level of copper    RBBB    Pre-operative clearance    Encounter for pre-operative cardiovascular clearance    Status post right knee replacement    PSVT (paroxysmal supraventricular tachycardia)    Postoperative wound infection, right knee    S/P I&D right knee with poly exchange 23    GI bleed    GIB (gastrointestinal bleeding)        Past Medical History:   Diagnosis Date    Anesthesia     low bp only with foot surgery, has had surgeries since and no issue per pt report    Arthritis     Back pain     Depression     Difficulty walking     Left knee replacement  need right knee replacement    GERD (gastroesophageal reflux disease)     High cholesterol     Hypertension     Knee pain     BILATERAL    Seasonal allergies     Tremor, essential         Past Surgical History:   Procedure Laterality Date    ANTERIOR AND POSTERIOR VAGINAL REPAIR N/A 2017    Vaginal enterocele / anterior posterior colporrhaphy;  Jose Maria Chowdhury MD;  Location: [facility];  Service:     BLADDER SUSPENSION      MESH TVT?     COLONOSCOPY      Dr Chung - normal    EXPLORATORY LAPAROTOMY      \"BOWEL INTO PELVIC BONE\" /Mesh [place] " [place]     EXPLORATORY LAPAROTOMY  04/2004    AQUILES/ abd sacrocolpopexy/post repair    FOOT SURGERY Left     KNEE ARTHROSCOPY Left     meniscus repair    KNEE INCISION AND DRAINAGE Right 7/25/2023    Procedure: KNEE INCISION AND DRAINAGE WITH POLY EXCHANGE - RIGHT;  Surgeon: Sunny Reynoso MD;  Location:  [facility];  Service: Orthopedics;  Laterality: Right;    LAPAROSCOPIC CHOLECYSTECTOMY Right 2000    NE ARTHRP KNE CONDYLE&PLATU MEDIAL&LAT COMPARTMENTS Left 11/16/2017    Procedure: TOTAL KNEE ARTHROPLASTY LEFT;  Surgeon: Sunny Reynoso MD;  Location:  [facility];  Service: Orthopedics    TONSILLECTOMY      TOTAL ABDOMINAL HYSTERECTOMY WITH SALPINGO OOPHORECTOMY Bilateral 1989    TOTAL KNEE ARTHROPLASTY Right 6/29/2023    Procedure: TOTAL KNEE ARTHROPLASTY WITH CORI ROBOT - RIGHT;  Surgeon: Sunny Reynoso MD;  Location:  [facility];  Service: Robotics - Ortho;  Laterality: Right;    TUBAL ABDOMINAL LIGATION      VENTRAL HERNIA REPAIR N/A 6/21/2016    Procedure: LAPAROSCOPIC REPAIR OF ABDOMINAL WALL HERNIA WITH MESH, CONVERTED TO OPEN ;  Surgeon: Bertha Delacruz MD;  Location:  [facility];  Service:        Visit Dx:    ICD-10-CM ICD-9-CM   1. Lymphedema  I89.0 457.1        Patient History       Row Name 11/27/23 1000             History    Chief Complaint Balance Problems;Difficulty Walking;Difficulty with daily activities;Fatigue/poor endurance;Impaired sensation;Joint stiffness;Joint swelling;Muscle weakness;Numbness;Swelling;Tightness  -LF      Type of Pain Back pain;Hip pain;Lower Extremity / Leg  -LF      Date Current Problem(s) Began 06/06/23  -LF      Brief Description of Current Complaint ReportsBLE R>L swelling started after her knee surgery in June. Has post-op infection and underwent ID w/ poly exchange in July, followed by long course antibiotics.  Regular compression socks roll down.  She finds that [place]a ankle socks help control foot and ankle swelling.  H/o L TKR about 5 years ago   -LF      Patient/Caregiver Goals Relieve pain;Improve mobility;Improve strength;Know what to do to help the symptoms;Decrease swelling  -LF      Hand Dominance left-handed  -LF      Occupation/sports/leisure activities None  -LF      Patient seeing anyone else for problem(s)? [facility]  -LF         Pain     Pain Location Knee  -LF      Pain at Present 2  -LF         Fall Risk Assessment    Any falls in the past year: No  -LF         Services    Prior Rehab/Home Health Experiences Yes  -LF      Are you currently receiving Home Health services No  -LF      Do you plan to receive Home Health services in the near future No  -LF         Daily Activities    Primary Language English  -LF      Are you able to read Yes  -LF      Are you able to write Yes  -LF      How does patient learn best? Listening;Reading;Demonstration;Pictures/Video  -LF      Pt Participated in POC and Goals Yes  -LF         Safety    Are you being hurt, hit, or frightened by anyone at home or in your life? No  -LF      Are you being neglected by a caregiver No  -LF      Have you had any of the following issues with Anxiety  -LF                User Key  (r) = Recorded By, (t) = Taken By, (c) = Cosigned By      Initials Name Provider Type    LF Irina Wong, PT Physical Therapist                     Lymphedema       Row Name 11/27/23 1000             Lymphedema Assessment    Lymphedema Classification RLE:;LLE:;secondary;stage 2 (Spontaneously Irreversible)  -LF      Infections or Cellulitis? yes  -LF      Lymphedema Assessment Comments Also presents with possble component lipedema due to increased adiposity waist to knees (milder into calves), soft, tender, reports easy bruising, fat pads at knees  -LF         Posture/Observations    Posture/Observations Comments ambulates w/ SC, antalgic gait, decreased mable.  Indep. sit>supine, [place] supine>sit.  -LF         General ROM    GENERAL ROM COMMENTS LE limited by soft tissue approximation. Unable to  cross legs. uses socks aid for socks.  Stands and steps into pants, able to reach down and pull up  -LF         MMT (Manual Muscle Testing)    General MMT Comments WFL's for general mobility.  -LF         Lymphedema Edema Assessment    Ptting Edema Category By severity  -LF      Pitting Edema Mild;Moderate  -LF      Stemmer Sign bilateral:;negative  -LF      Edema Assessment Comment pitting edema feet to distal calf, also localized fibrotic R medial knee lobe  thigh tissue soft and less dense  -LF         Skin Changes/Observations    Location/Assessment Lower Extremity  -LF      Lower Extremity Conditions bilateral:;intact;clean;dry  -LF      Lower Extremity Color/Pigment bilateral:;hyperpigmented;fibrosis  -LF      Skin Observations Comment fat pads at knees, medial knee lobes  -LF         Lymphedema Sensation    Lymphedema Sensation Tests light touch  -LF      Lymphedema Light Touch RLE:;LLE:;mild impairment  -LF      Lymphedema Sensation Comments along R incisional scar.  B lower legs are very tender to touch  -LF         Lymphedema Pulses/Capillary Refill    Lymphedema Pulses/Capillary Refill lower extremity pulses;capillary refill  -LF      Dorsalis Pedis Pulse right:;left:;+2 normal  -LF      Capillary Refill lower extremity capillary refill  -LF      Lower Extremity Capillary Refill right:;left:;less than 3 seconds  -LF         Lymphedema Measurements    Measurement Type(s) Volumetric  -LF      Volumetric Areas Lower extremities  -LF      Volumetric LE Distance interval (cm) 5  -LF      Lymphedema Measurements Comments MTH/midfoot:  R 21/21, L 21.5/21.4  -LF         LLE Volumetric (cm)    Reference Point 0 26.5  -LF      5 31.8 cm  -LF      10 38.8 cm  -LF      15 52 cm  -LF      20 61.3 cm  -LF      25 67.5 cm  -LF      30 67.8 cm  -LF      35 73.5 cm  -LF      40 74.5 cm  -LF      45 78 cm  -LF      50 79.8 cm  -LF      LLE Volume Calculation- 5cm 20520.6  -LF         RLE Volumetric (cm)    Reference Point  0 26  -LF      5 31 cm  -LF      10 40.6 cm  -LF      15 51.5 cm  -LF      20 64.8 cm  -LF      25 66.7 cm  -LF      30 77 cm  -LF      35 76.5 cm  -LF      40 81.4 cm  -LF      45 80.5 cm  -LF      50 79 cm  -LF      RLE Volume Calculation- 5cm 60392.1  -LF         Manual Lymphatic Drainage    Manual Lymphatic Drainage opened regional lymph nodes;extremity treatment  -LF      Opened Regional Lymph Nodes inguinal  -LF      Inguinal right  -LF      Extremity Treatment simple/brief MLD  -LF      Simple/Brief MLD RLE at knee  -LF         Compression/Skin Care    Compression/Skin Care bandaging  -LF      Bandage Layers cotton elastic stocking- single layer (comment size);cotton elastic stocking- double layer (comment size)  -LF      Bandaging Comments BLE: compressogrip size 4 MTH to distal calf doubled, size 8 distal calf to knee doubled  -LF                User Key  (r) = Recorded By, (t) = Taken By, (c) = Cosigned By      Initials Name Provider Type     Irina Wong, PT Physical Therapist                        Therapy Education  Education Details: Educated on components of treatment for managing lymphedema including compression, elevation, exercise, MLD, skin care, weight management.  Discussed compression options  Given: HEP, Symptoms/condition management, Edema management  Program: New  How Provided: Verbal, Demonstration  Provided to: Patient  Level of Understanding: Verbalized         PT OP Goals       Row Name 11/27/23 1000          PT Short Term Goals    STG Date to Achieve 12/25/23  -LF     STG 1 Patient independent with simple compression bandaging to promote symptom reduction  -LF     STG 1 Progress New  -        Long Term Goals    LTG Date to Achieve 01/22/24  -LF     LTG 1 Patient to report 4 on LEFS for walking between rooms  -LF     LTG 1 Progress New  -LF     LTG 2 Patient to report 3 on LEFS for getting into or out of a car  -LF     LTG 2 Progress New  -LF     LTG 3 BLE tenderness improved by  50% to allow for improved tolerance when donning compression garments  -LF     LTG 3 Progress New  -LF     LTG 4 LE total circumferntial measurements decreased by at least 8cm. to promote decreased pain, improved ROM and mobility, and decrease risk for infection.  -LF     LTG 4 Progress New  -LF     LTG 5 Patient independent with self-managment of lymphedema to included skin care, self-MLD, compression, exercise, and elevation.  -LF     LTG 5 Progress New  -LF     LTG 6 Patient to be measured and fit with compression garment/system for long-term self-management of lymphedema.  -LF     LTG 6 Progress New  -LF        Time Calculation    PT Goal Re-Cert Due Date 02/25/24  -               User Key  (r) = Recorded By, (t) = Taken By, (c) = Cosigned By      Initials Name Provider Type     Irina Wong, PT Physical Therapist                     PT Assessment/Plan       Row Name 11/27/23 1000          PT Assessment    Functional Limitations Impaired gait;Performance in self-care ADL;Other (comment)  lymphedema management  -     Impairments Gait;Edema;Impaired flexibility;Pain;Impaired lymphatic circulation  -     Assessment Comments Patient presents with stable signs and symptoms of moderate complexity with BLE edema/lymphema R>L, complicated by recent TKR with post-op infection.  Contributing to self-care and mobility limitations.  Will benefit from continued treatment for maximal reduction in symptoms in order to promote improved functional mobility, and decrease risk for infection reoccurence.  Mobility limitations, as well as tapering of lower legs will limit compression options.  She would be appropriate for a compression pump to promote venous and lymphatic return for improved self-management of symptoms.  -     Please refer to paper survey for additional self-reported information Yes  -LF     Rehab Potential Good  -LF     Patient/caregiver participated in establishment of treatment plan and goals Yes   -LF     Patient would benefit from skilled therapy intervention Yes  -LF        PT Plan    PT Frequency 2x/week  -LF     Planned CPT's? PT EVAL LOW COMPLEXITY: 99930;PT THER PROC EA 15 MIN: 53862;PT THER ACT EA 15 MIN: 97792;PT MANUAL THERAPY EA 15 MIN: 20458;PT SELF CARE/HOME MGMT/TRAIN EA 15: 23982  -LF     PT Plan Comments cont. per POC.  -LF               User Key  (r) = Recorded By, (t) = Taken By, (c) = Cosigned By      Initials Name Provider Type    Irina Guevara PT Physical Therapist                       Outcome Measure Options: Lower Extremity Functional Scale (LEFS)  Lower Extremity Functional Index  Any of your usual work, housework or school activities: Moderate difficulty  Your usual hobbies, recreational or sporting activities: A little bit of difficulty  Getting into or out of the bath: Quite a bit of difficulty  Walking between rooms: A little bit of difficulty  Putting on your shoes or socks: Moderate difficulty  Squatting: Moderate difficulty  Lifting an object, like a bag of groceries from the floor: Quite a bit of difficulty  Performing light activities around your home: A little bit of difficulty  Performing heavy activities around your home: Quite a bit of difficulty  Getting into or out of a car: Moderate difficulty  Walking 2 blocks: Extreme difficulty or unable to perform activity  Walking a mile: Extreme difficulty or unable to perform activity  Going up or down 10 stairs (about 1 flight of stairs): Quite a bit of difficulty  Standing for 1 hour: Extreme difficulty or unable to perform activity  Sitting for 1 hour: No difficulty  Running on even ground: Extreme difficulty or unable to perform activity  Running on uneven ground: Extreme difficulty or unable to perform activity  Making sharp turns while running fast: Extreme difficulty or unable to perform activity  Hopping: Extreme difficulty or unable to perform activity  Rolling over in bed: Quite a bit of difficulty  Total:  26      Time Calculation:   Start Time: 1000  Untimed Charges  PT Eval/Re-eval Minutes: 75  Total Minutes  Untimed Charges Total Minutes: 75   Total Minutes: 75   Therapy Charges for Today       Code Description Service Date Service Provider Modifiers Qty    94333122065 HC PT EVAL LOW COMPLEXITY 4 11/27/2023 Irina Wong, PT GP 1            PT G-Codes  Outcome Measure Options: Lower Extremity Functional Scale (LEFS)  Total: 26         Irina Wong, PT  11/28/2023

## 2023-12-08 ENCOUNTER — HOSPITAL ENCOUNTER (OUTPATIENT)
Dept: PHYSICAL THERAPY | Facility: HOSPITAL | Age: 73
Setting detail: THERAPIES SERIES
Discharge: HOME | End: 2023-12-08
Payer: MEDICARE

## 2023-12-08 DIAGNOSIS — I89.0 LYMPHEDEMA: Primary | ICD-10-CM

## 2023-12-08 PROCEDURE — 97140 MANUAL THERAPY 1/> REGIONS: CPT

## 2023-12-08 NOTE — THERAPY TREATMENT NOTE
Outpatient Physical Therapy Lymphedema Treatment Note  [facility]     Patient Name: Imelda Elaine  : 1950  MRN: [id number]  Today's Date: 2023        Visit Date: 2023    Visit Dx:    ICD-10-CM ICD-9-CM   1. Lymphedema  I89.0 457.1       Patient Active Problem List   Diagnosis    Morbid obesity    Palpitations    Essential hypertension    Mixed hyperlipidemia    Prediabetes    Osteoarthritis of left knee    Leukocytosis, mild, likely reactive    Abnormal EKG    Anxiety and depression    BPV (benign positional vertigo)    Elevated hemoglobin A1c    Gastroesophageal reflux disease without esophagitis    Osteopenia    Peripheral vascular disease    S/P total knee arthroplasty, left    Tremor, essential    NDIAYE (dyspnea on exertion)    Body mass index 40.0-44.9, adult    Abnormal blood level of copper    RBBB    Pre-operative clearance    Encounter for pre-operative cardiovascular clearance    Status post right knee replacement    PSVT (paroxysmal supraventricular tachycardia)    Postoperative wound infection, right knee    S/P I&D right knee with poly exchange 23    GI bleed    GIB (gastrointestinal bleeding)         Lymphedema       Row Name 23 1120             Subjective Pain    Able to rate subjective pain? yes  -LF      Pre-Treatment Pain Level 2  -LF      Post-Treatment Pain Level 2  -LF      Subjective Pain Comment legs are tender  -LF         Subjective    Subjective Comments reports compressogrip did not work out, had slid down before she made it to the car  -LF         Lymphedema Edema Assessment    Edema Assessment Comment trace-mild pitting edema feet to distal calf, also localized fibrotic R medial knee lobe thigh tissue soft and less dense  -LF         Skin Changes/Observations    Location/Assessment Lower Extremity  -LF      Lower Extremity Conditions bilateral:;intact;clean;dry  -LF      Lower Extremity Color/Pigment bilateral:;hyperpigmented;fibrosis  -LF      Skin  Observations Comment fat pads at knees, medial knee lobes  -LF         Manual Lymphatic Drainage    Manual Lymphatic Drainage initial sequence;opened regional lymph nodes;extremity treatment  -LF      Initial Sequence supraclavicular;diaphragmatic breathing  -LF      Supraclavicular right;left  -LF      Diaphragmatic Breathing completed  -LF      Opened Regional Lymph Nodes inguinal  -LF      Inguinal right  -LF      Extremity Treatment MLD to full limb  -LF      MLD to Full Limb RLE  -LF         Compression/Skin Care    Compression/Skin Care skin care;wrapping location;bandaging;compression garment  -LF      Skin Care moisturizing lotion applied  -LF      Wrapping Location lower extremity  -LF      Wrapping Location LE right:;foot to groin  -LF      Bandage Layers cotton elastic stocking- single layer (comment size);cotton elastic stocking- double layer (comment size)  -LF      Bandaging Comments RLE:  compressogrip size 12 knee to groin and doubled  -LF      Compression Garment Comments RLE:  issued inelastic velcro garment and modified for improved fit; used liner sock underneath  -LF                User Key  (r) = Recorded By, (t) = Taken By, (c) = Cosigned By      Initials Name Provider Type     Irina Wong, PT Physical Therapist                       PT Assessment/Plan       Row Name 12/08/23 1120          PT Assessment    Assessment Comments modified compression today for better fit.  will likely do better with some type of compression otoniel with a knee high garment for a long-term option  -LF        PT Plan    PT Plan Comments cont. per POC  -LF               User Key  (r) = Recorded By, (t) = Taken By, (c) = Cosigned By      Initials Name Provider Type     Irina Wong, PT Physical Therapist                        OP Exercises       Row Name 12/08/23 1120             Subjective    Subjective Comments reports compressogrip did not work out, had slid down before she made it to the car  -LF          Subjective Pain    Able to rate subjective pain? yes  -LF      Pre-Treatment Pain Level 2  -LF      Post-Treatment Pain Level 2  -LF      Subjective Pain Comment legs are tender  -LF         Total Minutes    40536 - PT Manual Therapy Minutes 65  -LF                User Key  (r) = Recorded By, (t) = Taken By, (c) = Cosigned By      Initials Name Provider Type    Irina Guevara PT Physical Therapist                            Manual Rx (last 36 hours)       Manual Treatments       Row Name 12/08/23 1120             Total Minutes    49965 - PT Manual Therapy Minutes 65  -LF                User Key  (r) = Recorded By, (t) = Taken By, (c) = Cosigned By      Initials Name Provider Type    Irina Guevara PT Physical Therapist                         Time Calculation:   Start Time: 1120  Timed Charges  38724 - PT Manual Therapy Minutes: 65  Total Minutes  Timed Charges Total Minutes: 65   Total Minutes: 65                 Irina Wong PT  12/8/2023

## 2023-12-12 ENCOUNTER — HOSPITAL ENCOUNTER (OUTPATIENT)
Dept: PHYSICAL THERAPY | Facility: HOSPITAL | Age: 73
Setting detail: THERAPIES SERIES
Discharge: HOME | End: 2023-12-12
Payer: MEDICARE

## 2023-12-12 DIAGNOSIS — I89.0 LYMPHEDEMA: Primary | ICD-10-CM

## 2023-12-12 PROCEDURE — 97140 MANUAL THERAPY 1/> REGIONS: CPT

## 2023-12-12 NOTE — THERAPY TREATMENT NOTE
Outpatient Physical Therapy Lymphedema Treatment Note  [facility]     Patient Name: Imelda Elaine  : 1950  MRN: [id number]  Today's Date: 2023        Visit Date: 2023    Visit Dx:    ICD-10-CM ICD-9-CM   1. Lymphedema  I89.0 457.1       Patient Active Problem List   Diagnosis    Morbid obesity    Palpitations    Essential hypertension    Mixed hyperlipidemia    Prediabetes    Osteoarthritis of left knee    Leukocytosis, mild, likely reactive    Abnormal EKG    Anxiety and depression    BPV (benign positional vertigo)    Elevated hemoglobin A1c    Gastroesophageal reflux disease without esophagitis    Osteopenia    Peripheral vascular disease    S/P total knee arthroplasty, left    Tremor, essential    NDIAYE (dyspnea on exertion)    Body mass index 40.0-44.9, adult    Abnormal blood level of copper    RBBB    Pre-operative clearance    Encounter for pre-operative cardiovascular clearance    Status post right knee replacement    PSVT (paroxysmal supraventricular tachycardia)    Postoperative wound infection, right knee    S/P I&D right knee with poly exchange 23    GI bleed    GIB (gastrointestinal bleeding)         Lymphedema       Row Name 23 1300             Subjective Pain    Able to rate subjective pain? yes  -LF      Pre-Treatment Pain Level 0  -LF      Post-Treatment Pain Level 0  -LF      Subjective Pain Comment increases with prolonged standing  -LF         Subjective    Subjective Comments still having trouble with compression not staying in place  -LF         Lymphedema Edema Assessment    Edema Assessment Comment trace-mild pitting edema feet to distal calf, also localized fibrotic R medial knee lobe thigh tissue soft and less dense  -LF         Skin Changes/Observations    Location/Assessment Lower Extremity  -LF      Lower Extremity Conditions bilateral:;intact;clean;dry  -LF      Lower Extremity Color/Pigment bilateral:;hyperpigmented;fibrosis  -LF      Skin  Observations Comment fat pads at knees, medial knee lobes  -LF         Lymphedema Measurements    Lymphedema Measurements Comments MTH/midfoot: R 20.8/20.8, L 21.4/20.8  Tibial T. R76.4  -LF         LLE Volumetric (cm)    Reference Point 0 24.5  -LF         RLE Volumetric (cm)    Reference Point 0 24.5  -LF         LLE Volumetric (cm)    5 31.2 cm  -LF      10 42.3 cm  -LF      15 51.5 cm  -LF      20 61.8 cm  -LF         RLE Volumetric (cm)    5 32.3 cm  -LF      10 42.5 cm  -LF      15 53.5 cm  -LF      20 62.5 cm  -LF         Manual Lymphatic Drainage    Manual Lymphatic Drainage initial sequence;opened regional lymph nodes;extremity treatment  -LF      Initial Sequence supraclavicular;diaphragmatic breathing  -LF      Supraclavicular right;left  -LF      Diaphragmatic Breathing completed  -LF      Opened Regional Lymph Nodes inguinal  -LF      Inguinal right;left  -LF      Extremity Treatment MLD to full limb  -LF      MLD to Full Limb BLE  -LF         Compression/Skin Care    Compression/Skin Care skin care;wrapping location;bandaging;compression garment  -LF      Skin Care moisturizing lotion applied  -LF      Wrapping Location lower extremity  -LF      Wrapping Location LE right:;foot to groin  -LF      Bandage Layers cotton elastic stocking- single layer (comment size);cotton elastic stocking- double layer (comment size)  -LF      Compression Garment Comments reapplied liner sock and velcro garment.  Discussed compression legging options.  She has an athletic style otoniel at home she plans to try first  -LF                User Key  (r) = Recorded By, (t) = Taken By, (c) = Cosigned By      Initials Name Provider Type     Irina Wong PT Physical Therapist                         PT Assessment/Plan       Row Name 12/12/23 2375          PT Assessment    Assessment Comments improved measurements at foot and ankle.  Will try ready to wear compression or athletic style otoniel.  Discussed readjusting velcro  garment when it slides.  will most likely need custom garments for better, more functional fit  -LF        PT Plan    PT Plan Comments cont. per POC  -LF               User Key  (r) = Recorded By, (t) = Taken By, (c) = Cosigned By      Initials Name Provider Type    Irina Geuvara PT Physical Therapist                        OP Exercises       Row Name 12/12/23 1300             Subjective    Subjective Comments still having trouble with compression not staying in place  -LF         Subjective Pain    Able to rate subjective pain? yes  -LF      Pre-Treatment Pain Level 0  -LF      Post-Treatment Pain Level 0  -LF      Subjective Pain Comment increases with prolonged standing  -LF         Total Minutes    10922 - PT Manual Therapy Minutes 65  -LF                User Key  (r) = Recorded By, (t) = Taken By, (c) = Cosigned By      Initials Name Provider Type    Irina Guevara PT Physical Therapist                      Manual Rx (last 36 hours)       Manual Treatments       Row Name 12/12/23 1300             Total Minutes    96932 - PT Manual Therapy Minutes 65  -LF                User Key  (r) = Recorded By, (t) = Taken By, (c) = Cosigned By      Initials Name Provider Type    Irina Guevara PT Physical Therapist                    Time Calculation:   Start Time: 1300  Timed Charges  09464 - PT Manual Therapy Minutes: 65  Total Minutes  Timed Charges Total Minutes: 65   Total Minutes: 65       Irina Wong PT  12/12/2023

## 2023-12-29 ENCOUNTER — HOSPITAL ENCOUNTER (OUTPATIENT)
Dept: PHYSICAL THERAPY | Facility: HOSPITAL | Age: 73
Setting detail: THERAPIES SERIES
Discharge: HOME | End: 2023-12-29
Payer: MEDICARE

## 2023-12-29 DIAGNOSIS — I89.0 LYMPHEDEMA: Primary | ICD-10-CM

## 2023-12-29 PROCEDURE — 97140 MANUAL THERAPY 1/> REGIONS: CPT

## 2023-12-29 NOTE — THERAPY PROGRESS REPORT/RE-CERT
Outpatient Physical Therapy Lymphedema Progress Note  [facility]     Patient Name: Imelda Elaine  : 1950  MRN: [id number]  Today's Date: 2023        Visit Date: 2023    Visit Dx:    ICD-10-CM ICD-9-CM   1. Lymphedema  I89.0 457.1       Patient Active Problem List   Diagnosis    Morbid obesity    Palpitations    Essential hypertension    Mixed hyperlipidemia    Prediabetes    Osteoarthritis of left knee    Leukocytosis, mild, likely reactive    Abnormal EKG    Anxiety and depression    BPV (benign positional vertigo)    Elevated hemoglobin A1c    Gastroesophageal reflux disease without esophagitis    Osteopenia    Peripheral vascular disease    S/P total knee arthroplasty, left    Tremor, essential    NDIAYE (dyspnea on exertion)    Body mass index 40.0-44.9, adult    Abnormal blood level of copper    RBBB    Pre-operative clearance    Encounter for pre-operative cardiovascular clearance    Status post right knee replacement    PSVT (paroxysmal supraventricular tachycardia)    Postoperative wound infection, right knee    S/P I&D right knee with poly exchange 23    GI bleed    GIB (gastrointestinal bleeding)         Lymphedema       Row Name 23 1000             Subjective Pain    Able to rate subjective pain? yes  -CA      Pre-Treatment Pain Level 0  -CA      Post-Treatment Pain Level 0  -CA      Subjective Pain Comment Pt notes pain with prolonged activity and irritation at her R knee, but denies pain at this time  -CA         Subjective    Subjective Comments Notes all compression falls down to her ankles shortly after walking, including compression from last treatment. She continues to use her Bombas socks as they help control her feet.  -CA         Lymphedema Assessment    Lymphedema Classification RLE:;LLE:;secondary;stage 2 (Spontaneously Irreversible)  -CA      Lymphedema Assessment Comments Lipedema component noted along the LE with tissue texture, fat pads along pre-tibial  regions, and lobular adiposity along the distal medial thighs.  -CA         Posture/Observations    Posture/Observations Comments Ambulates without an AD, carrying a SPC. She demonstrates increased lateral shift with WBing to progress the contralateral leg. Min A with LE for supine to sit.  -CA         General ROM    GENERAL ROM COMMENTS limited by body habitus in hip adduction, hip flexion, and knee flexion  -CA         MMT (Manual Muscle Testing)    General MMT Comments WFL for general ADLs.  -CA         Lymphedema Edema Assessment    Ptting Edema Category By severity  -CA      Pitting Edema Mild;Moderate  -CA      Stemmer Sign bilateral:;negative  -CA      Edema Assessment Comment trace pitting from calf to B foot dorsum. Fibrotic region noted in R distal medial thigh lobe  -CA         Skin Changes/Observations    Location/Assessment Lower Extremity  -CA      Lower Extremity Conditions bilateral:;intact;clean;dry  -CA      Lower Extremity Color/Pigment bilateral:;hyperpigmented;fibrosis  -CA      Skin Observations Comment Fat pads/lobes at knees and distal medial thighs  -CA         Lymphedema Sensation    Lymphedema Sensation Tests light touch  -CA      Lymphedema Light Touch RLE:;LLE:;mild impairment  -CA         Lymphedema Pulses/Capillary Refill    Lymphedema Pulses/Capillary Refill lower extremity pulses;capillary refill  -CA      Dorsalis Pedis Pulse right:;left:;+2 normal  -CA      Capillary Refill lower extremity capillary refill  -CA      Lower Extremity Capillary Refill right:;left:;less than 3 seconds  -CA         Lymphedema Measurements    Measurement Type(s) Volumetric  -CA      Volumetric Areas Lower extremities  -CA      Volumetric LE Distance interval (cm) 5  -CA      Lymphedema Measurements Comments MTP/midfoot L 21.8/22.5. R  21.2/21  -CA         LLE Volumetric (cm)    Reference Point 0 26  -CA      5 30.8 cm  -CA      10 40.5 cm  -CA      15 52 cm  -CA      20 62 cm  -CA      25 66.7 cm  -CA       30 71.5 cm  -CA      35 69.5 cm  -CA      40 76 cm  -CA      45 78 cm  -CA      50 80 cm  -CA      LLE Volume Calculation- 5cm 49993  -CA         RLE Volumetric (cm)    Reference Point 0 25  -CA      5 30.5 cm  -CA      10 39.8 cm  -CA      15 50.5 cm  -CA      20 62.3 cm  -CA      25 67 cm  -CA      30 74.9 cm  -CA      35 75 cm  -CA      40 79.9 cm  -CA      45 80.3 cm  -CA      50 81 cm  -CA      RLE Volume Calculation- 5cm 08496.1  -CA         Manual Lymphatic Drainage    Manual Lymphatic Drainage initial sequence;opened regional lymph nodes;extremity treatment  -CA      Initial Sequence supraclavicular;diaphragmatic breathing  -CA      Supraclavicular right;left  -CA      Opened Regional Lymph Nodes inguinal  -CA      Inguinal right;left  -CA      Extremity Treatment MLD to full limb  -CA      MLD to Full Limb B LE  -CA         Compression/Skin Care    Compression/Skin Care skin care;compression garment  -CA      Skin Care moisturizing lotion applied  -CA      Wrapping Location lower extremity  -CA      Wrapping Location LE --  -CA      Bandage Layers --  -CA      Compression Garment Comments Pt requested donning her bombas socks and holding on LE compression today.  -CA                User Key  (r) = Recorded By, (t) = Taken By, (c) = Cosigned By      Initials Name Provider Type    Dorothy Cral, PT Physical Therapist                     PT Assessment/Plan       Row Name 12/29/23 1000          PT Assessment    Functional Limitations Impaired gait;Performance in self-care ADL;Other (comment)  lymphedema management  -CA     Impairments Gait;Edema;Impaired flexibility;Pain;Impaired lymphatic circulation  -CA     Assessment Comments The patient has undergone 4 weeks or more of conservative therapy management for lymphedema (I89.0). Despite consistency with activity/exercise, elevation, and compression, this patient continues to demonstrate chronic and progressive lymphedema, showing no signs of  resolution. This patient struggles with hyperpigmentation as a result of the lymphedema and remains at risk for complications including infections and wounds. PT is recommending a pneumatic compression pump to assist with long term home care and self-management. Pt is progressing towards her current goals which remain appropriate. She has demonstrated 101 cc of loss on the L LE and 388 cc on the R LE with current treatment. Pt's body habitus makes compression from the foot to thigh challenging as her compression continues to fall resulting in a tripping/mobility hazard. Masha compression may be available in addition to custom compressoin as a management for her edema.  -CA     Please refer to paper survey for additional self-reported information No  -CA     Rehab Potential Good  -CA     Patient/caregiver participated in establishment of treatment plan and goals Yes  -CA     Patient would benefit from skilled therapy intervention Yes  -CA        PT Plan    PT Frequency 2x/week  -CA     PT Plan Comments continue with PT tx pre POC  -CA               User Key  (r) = Recorded By, (t) = Taken By, (c) = Cosigned By      Initials Name Provider Type    Dorothy Carl, PT Physical Therapist                     Manual Rx (last 36 hours)       Manual Treatments       Row Name 12/29/23 1000             Total Minutes    55763 - PT Manual Therapy Minutes 55  -CA                User Key  (r) = Recorded By, (t) = Taken By, (c) = Cosigned By      Initials Name Provider Type    Dorothy Carl, PT Physical Therapist                     PT OP Goals       Row Name 12/29/23 1000          PT Short Term Goals    STG Date to Achieve 12/25/23  -CA     STG 1 Patient independent with simple compression bandaging to promote symptom reduction  -CA     STG 1 Progress Progressing  -CA        Long Term Goals    LTG Date to Achieve 01/22/24  -CA     LTG 1 Patient to report 4 on LEFS for walking between rooms  -CA     LTG 1 Progress New   -CA     LTG 2 Patient to report 3 on LEFS for getting into or out of a car  -CA     LTG 2 Progress New  -CA     LTG 3 BLE tenderness improved by 50% to allow for improved tolerance when donning compression garments  -CA     LTG 3 Progress Progressing  -CA     LTG 4 LE total circumferntial measurements decreased by at least 8cm. to promote decreased pain, improved ROM and mobility, and decrease risk for infection.  -CA     LTG 4 Progress Progressing  -CA     LTG 5 Patient independent with self-managment of lymphedema to included skin care, self-MLD, compression, exercise, and elevation.  -CA     LTG 5 Progress Progressing  -CA     LTG 6 Patient to be measured and fit with compression garment/system for long-term self-management of lymphedema.  -CA     LTG 6 Progress Progressing  -CA        Time Calculation    PT Goal Re-Cert Due Date 02/25/24  -CA               User Key  (r) = Recorded By, (t) = Taken By, (c) = Cosigned By      Initials Name Provider Type    Dorothy Carl, PT Physical Therapist                    Therapy Education  Education Details: Educated on process to acquire compression pumps. Advised on otoniel options such as bioflect garments which may help manage her edema without causing issues with falling/tripping.  Given: HEP, Symptoms/condition management, Edema management  Program: Reinforced, Progressed  How Provided: Verbal, Demonstration  Provided to: Patient  Level of Understanding: Verbalized              Time Calculation:   Start Time: 1015  Stop Time: 1110  Time Calculation (min): 55 min  Timed Charges  69729 - PT Manual Therapy Minutes: 55  Total Minutes  Timed Charges Total Minutes: 55   Total Minutes: 55   Therapy Charges for Today       Code Description Service Date Service Provider Modifiers Qty    74496475480 HC PT MANUAL THERAPY EA 15 MIN 12/29/2023 Dorothy Desai, PT  4                      Dorothy Desai PT  12/29/2023

## 2024-01-02 ENCOUNTER — HOSPITAL ENCOUNTER (OUTPATIENT)
Dept: PHYSICAL THERAPY | Facility: HOSPITAL | Age: 74
Setting detail: THERAPIES SERIES
Discharge: HOME | End: 2024-01-02
Payer: MEDICARE

## 2024-01-02 DIAGNOSIS — I89.0 LYMPHEDEMA: Primary | ICD-10-CM

## 2024-01-02 PROCEDURE — 97140 MANUAL THERAPY 1/> REGIONS: CPT

## 2024-01-02 NOTE — THERAPY TREATMENT NOTE
Outpatient Physical Therapy Lymphedema Treatment Note  [facility]     Patient Name: Imelda Elaine  : 1950  MRN: [id number]  Today's Date: 2024        Visit Date: 2024    Visit Dx:    ICD-10-CM ICD-9-CM   1. Lymphedema  I89.0 457.1       Patient Active Problem List   Diagnosis    Morbid obesity    Palpitations    Essential hypertension    Mixed hyperlipidemia    Prediabetes    Osteoarthritis of left knee    Leukocytosis, mild, likely reactive    Abnormal EKG    Anxiety and depression    BPV (benign positional vertigo)    Elevated hemoglobin A1c    Gastroesophageal reflux disease without esophagitis    Osteopenia    Peripheral vascular disease    S/P total knee arthroplasty, left    Tremor, essential    NDIAYE (dyspnea on exertion)    Body mass index 40.0-44.9, adult    Abnormal blood level of copper    RBBB    Pre-operative clearance    Encounter for pre-operative cardiovascular clearance    Status post right knee replacement    PSVT (paroxysmal supraventricular tachycardia)    Postoperative wound infection, right knee    S/P I&D right knee with poly exchange 23    GI bleed    GIB (gastrointestinal bleeding)         Lymphedema       Row Name 24 1000             Subjective Pain    Able to rate subjective pain? yes  -LF      Pre-Treatment Pain Level 0  -LF      Post-Treatment Pain Level 0  -LF         Subjective    Subjective Comments Still unpacking from recent move, looking forprevious compression leggings she had  -LF         Lymphedema Assessment    Lymphedema Classification RLE:;LLE:;secondary;stage 2 (Spontaneously Irreversible)  -LF         Posture/Observations    Posture/Observations Comments Ambulates without an AD, carrying a SPC. She demonstrates increased lateral shift with WBing to progress the contralateral leg. Min A with LE for supine to sit.  -LF         Lymphedema Edema Assessment    Ptting Edema Category By severity  -LF      Pitting Edema Mild;Moderate  -LF       Stemmer Sign bilateral:;negative  -LF      Edema Assessment Comment trace pitting from calf to B foot dorsum. Fibrotic region noted in R distal medial thigh lobe  -LF         Skin Changes/Observations    Location/Assessment Lower Extremity  -LF      Lower Extremity Conditions bilateral:;intact;clean;dry  -LF      Lower Extremity Color/Pigment bilateral:;hyperpigmented;fibrosis  -LF      Skin Observations Comment Fat pads/lobes at knees and distal medial thighs  -LF         Lymphedema Sensation    Lymphedema Sensation Tests light touch  -LF      Lymphedema Light Touch RLE:;LLE:;mild impairment  -LF         Lymphedema Pulses/Capillary Refill    Lymphedema Pulses/Capillary Refill lower extremity pulses;capillary refill  -LF      Dorsalis Pedis Pulse right:;left:;+2 normal  -LF      Capillary Refill lower extremity capillary refill  -LF      Lower Extremity Capillary Refill right:;left:;less than 3 seconds  -LF         Lymphedema Measurements    Measurement Type(s) Volumetric  -LF      Volumetric Areas Lower extremities  -LF      Volumetric LE Distance interval (cm) 5  -LF      Lymphedema Measurements Comments MTP/midfoot L 21.8/21.5. R 21.2/21  -LF         LLE Volumetric (cm)    Reference Point 0 25.3  -LF      5 31.3 cm  -LF      10 41 cm  -LF      15 51.8 cm  -LF      20 59.5 cm  -LF      25 66.5 cm  -LF      LLE Volume Calculation- 5cm 5294.5  -LF         RLE Volumetric (cm)    Reference Point 0 24.8  -LF      5 31.3 cm  -LF      10 41.8 cm  -LF      15 52 cm  -LF      20 63 cm  -LF      25 67 cm  -LF      RLE Volume Calculation- 5cm 5526.2  -LF         Manual Lymphatic Drainage    Manual Lymphatic Drainage initial sequence;opened regional lymph nodes;extremity treatment  -LF      Initial Sequence supraclavicular;diaphragmatic breathing  -LF      Supraclavicular right;left  -LF      Opened Regional Lymph Nodes inguinal  -LF      Inguinal right;left  -LF      Extremity Treatment MLD to full limb  -LF      MLD to Full  Limb BLE  -LF         Compression/Skin Care    Compression/Skin Care skin care;compression garment  -LF      Skin Care moisturizing lotion applied  -LF      Wrapping Location lower extremity  -LF      Compression Garment Comments held LE compression due to issues with not staying in place - patient to try her compression leggings at home if found, otherwise will explore other options.  -LF                User Key  (r) = Recorded By, (t) = Taken By, (c) = Cosigned By      Initials Name Provider Type    Irina Guevara PT Physical Therapist                     PT Assessment/Plan       Row Name 01/02/24 1000          PT Assessment    Assessment Comments Overall measurements stable.  Fibrotic area R medial knee does soften with treatment  -LF        PT Plan    PT Plan Comments cont. per POC  -LF               User Key  (r) = Recorded By, (t) = Taken By, (c) = Cosigned By      Initials Name Provider Type    Irina Guevara PT Physical Therapist                      OP Exercises       Row Name 01/02/24 1000             Subjective    Subjective Comments Still unpacking from recent move, looking forprevious compression leggings she had  -LF         Subjective Pain    Able to rate subjective pain? yes  -LF      Pre-Treatment Pain Level 0  -LF      Post-Treatment Pain Level 0  -LF         Total Minutes    19330 - PT Manual Therapy Minutes 60  -LF                User Key  (r) = Recorded By, (t) = Taken By, (c) = Cosigned By      Initials Name Provider Type    Irina Guevara PT Physical Therapist                      Manual Rx (last 36 hours)       Manual Treatments       Row Name 01/02/24 1000             Total Minutes    61250 - PT Manual Therapy Minutes 60  -LF                User Key  (r) = Recorded By, (t) = Taken By, (c) = Cosigned By      Initials Name Provider Type    Irina Guevara PT Physical Therapist                        Time Calculation:   Start Time: 1000  Timed Charges  99752 - PT  Manual Therapy Minutes: 60  Total Minutes  Timed Charges Total Minutes: 60   Total Minutes: 60         Irina Wong, PT  1/2/2024

## 2024-01-15 ENCOUNTER — HOSPITAL ENCOUNTER (OUTPATIENT)
Dept: PHYSICAL THERAPY | Facility: HOSPITAL | Age: 74
Setting detail: THERAPIES SERIES
Discharge: HOME | End: 2024-01-15
Payer: MEDICARE

## 2024-01-15 DIAGNOSIS — I89.0 LYMPHEDEMA: Primary | ICD-10-CM

## 2024-01-15 PROCEDURE — 97140 MANUAL THERAPY 1/> REGIONS: CPT

## 2024-01-15 NOTE — THERAPY EVALUATION
"  Physical Therapy Lymphedema Initial Evaluation  [facility]     Patient Name: Imelda Elaine  : 1950  MRN: [id number]  Today's Date: 1/15/2024      Visit Date: 01/15/2024    Visit Dx:    ICD-10-CM ICD-9-CM   1. Lymphedema  I89.0 457.1       Patient Active Problem List   Diagnosis    Morbid obesity    Palpitations    Essential hypertension    Mixed hyperlipidemia    Prediabetes    Osteoarthritis of left knee    Leukocytosis, mild, likely reactive    Abnormal EKG    Anxiety and depression    BPV (benign positional vertigo)    Elevated hemoglobin A1c    Gastroesophageal reflux disease without esophagitis    Osteopenia    Peripheral vascular disease    S/P total knee arthroplasty, left    Tremor, essential    NDIAYE (dyspnea on exertion)    Body mass index 40.0-44.9, adult    Abnormal blood level of copper    RBBB    Pre-operative clearance    Encounter for pre-operative cardiovascular clearance    Status post right knee replacement    PSVT (paroxysmal supraventricular tachycardia)    Postoperative wound infection, right knee    S/P I&D right knee with poly exchange 23    GI bleed    GIB (gastrointestinal bleeding)        Past Medical History:   Diagnosis Date    Anesthesia     low bp only with foot surgery, has had surgeries since and no issue per pt report    Arthritis     Back pain     Depression     Difficulty walking     Left knee replacement  need right knee replacement    GERD (gastroesophageal reflux disease)     High cholesterol     Hypertension     Knee pain     BILATERAL    Seasonal allergies     Tremor, essential         Past Surgical History:   Procedure Laterality Date    ANTERIOR AND POSTERIOR VAGINAL REPAIR N/A 2017    Vaginal enterocele / anterior posterior colporrhaphy;  Jose Maria Chowdhury MD;  Location: [facility];  Service:     BLADDER SUSPENSION      MESH TVT?     COLONOSCOPY      Dr Chung - normal    EXPLORATORY LAPAROTOMY      \"BOWEL INTO PELVIC BONE\" /Mesh [place] " [place]     EXPLORATORY LAPAROTOMY  04/2004    AQUILES/ abd sacrocolpopexy/post repair    FOOT SURGERY Left     KNEE ARTHROSCOPY Left     meniscus repair    KNEE INCISION AND DRAINAGE Right 7/25/2023    Procedure: KNEE INCISION AND DRAINAGE WITH POLY EXCHANGE - RIGHT;  Surgeon: Sunny Reynoso MD;  Location:  [facility];  Service: Orthopedics;  Laterality: Right;    LAPAROSCOPIC CHOLECYSTECTOMY Right 2000    CT ARTHRP KNE CONDYLE&PLATU MEDIAL&LAT COMPARTMENTS Left 11/16/2017    Procedure: TOTAL KNEE ARTHROPLASTY LEFT;  Surgeon: Sunny Reynoso MD;  Location:  [facility];  Service: Orthopedics    TONSILLECTOMY      TOTAL ABDOMINAL HYSTERECTOMY WITH SALPINGO OOPHORECTOMY Bilateral 1989    TOTAL KNEE ARTHROPLASTY Right 6/29/2023    Procedure: TOTAL KNEE ARTHROPLASTY WITH CORI ROBOT - RIGHT;  Surgeon: Sunny Reynoso MD;  Location:  [facility];  Service: Robotics - Ortho;  Laterality: Right;    TUBAL ABDOMINAL LIGATION      VENTRAL HERNIA REPAIR N/A 6/21/2016    Procedure: LAPAROSCOPIC REPAIR OF ABDOMINAL WALL HERNIA WITH MESH, CONVERTED TO OPEN ;  Surgeon: Bertha Delacruz MD;  Location:  [facility];  Service:        Visit Dx:    ICD-10-CM ICD-9-CM   1. Lymphedema  I89.0 457.1            Lymphedema       Row Name 01/15/24 1300             Subjective Pain    Able to rate subjective pain? yes  -LF      Pre-Treatment Pain Level 0  -LF      Post-Treatment Pain Level 0  -LF         Subjective    Subjective Comments hoping to get compression pump soon  -LF         Lymphedema Assessment    Lymphedema Classification RLE:;LLE:;secondary;stage 2 (Spontaneously Irreversible)  -LF         Posture/Observations    Posture/Observations Comments Min A with LE for supine to sit.  -LF         Lymphedema Edema Assessment    Ptting Edema Category By severity  -LF      Pitting Edema Mild;Moderate  -LF      Stemmer Sign bilateral:;negative  -LF      Edema Assessment Comment trace pitting from calf to B foot dorsum. Fibrotic  region noted in R distal medial thigh lobe  -LF         Skin Changes/Observations    Location/Assessment Lower Extremity  -LF      Lower Extremity Conditions bilateral:;intact;clean;dry  -LF      Lower Extremity Color/Pigment bilateral:;hyperpigmented;fibrosis  -LF      Skin Observations Comment Fat pads/lobes at knees and distal medial thighs  -LF         Lymphedema Sensation    Lymphedema Sensation Tests light touch  -LF      Lymphedema Light Touch RLE:;LLE:;mild impairment  -LF         Lymphedema Pulses/Capillary Refill    Lymphedema Pulses/Capillary Refill lower extremity pulses;capillary refill  -LF      Dorsalis Pedis Pulse right:;left:;+2 normal  -LF      Capillary Refill lower extremity capillary refill  -LF      Lower Extremity Capillary Refill right:;left:;less than 3 seconds  -LF         Lymphedema Measurements    Measurement Type(s) Volumetric  -LF      Volumetric Areas Lower extremities  -LF      Volumetric LE Distance interval (cm) 5  -LF      Lymphedema Measurements Comments MTP/midfoot L 21.3/20.8. R 21.5/20.5  -LF         LLE Volumetric (cm)    Reference Point 0 24.4  -LF      5 30.8 cm  -LF      10 39.9 cm  -LF      15 53.8 cm  -LF      20 62.5 cm  -LF      25 66.3 cm  -LF      LLE Volume Calculation- 5cm 5465.8  -LF         RLE Volumetric (cm)    Reference Point 0 23.5  -LF      5 30.5 cm  -LF      10 39.5 cm  -LF      15 51.4 cm  -LF      20 61 cm  -LF      25 64.5 cm  -LF      RLE Volume Calculation- 5cm 5178  -LF         Manual Lymphatic Drainage    Manual Lymphatic Drainage initial sequence;opened regional lymph nodes;extremity treatment  -LF      Initial Sequence supraclavicular;diaphragmatic breathing  -LF      Supraclavicular right;left  -LF      Opened Regional Lymph Nodes inguinal  -LF      Inguinal right;left  -LF      Extremity Treatment MLD to full limb  -LF      MLD to Full Limb BLE  -LF         Compression/Skin Care    Compression/Skin Care skin care;compression garment  -LF       Skin Care moisturizing lotion applied  -LF      Wrapping Location lower extremity  -LF      Compression Garment Comments discussed compression options.  She wants to proceed with trying otoniel style garment and plans to order one from [employer] that she's tried before.  otherwise provided info/sizing for [employer] and [employer]  -LF                User Key  (r) = Recorded By, (t) = Taken By, (c) = Cosigned By      Initials Name Provider Type     Irina Wong, PT Physical Therapist                         OP Exercises       Row Name 01/15/24 1300 01/15/24 1000          Subjective    Subjective Comments hoping to get compression pump soon  -LF --        Subjective Pain    Able to rate subjective pain? yes  -LF --     Pre-Treatment Pain Level 0  -LF --     Post-Treatment Pain Level 0  -LF --        Total Minutes    43182 - PT Manual Therapy Minutes -- 55  -LF               User Key  (r) = Recorded By, (t) = Taken By, (c) = Cosigned By      Initials Name Provider Type    Irina Guevara, PT Physical Therapist                    Manual Rx (last 36 hours)       Manual Treatments       Row Name 01/15/24 1000             Total Minutes    74834 - PT Manual Therapy Minutes 55  -LF                User Key  (r) = Recorded By, (t) = Taken By, (c) = Cosigned By      Initials Name Provider Type    Irina Guevara, PT Physical Therapist                         PT Assessment/Plan       Row Name 01/15/24 1000          PT Assessment    Assessment Comments Overall maintaining some reduction.  Awaiting approval for compression pump.  have also advised to acquire compression leggings to wear for daytime, and use in conjunction with her ankle socks.  -LF        PT Plan    PT Plan Comments cont. per POC  -LF               User Key  (r) = Recorded By, (t) = Taken By, (c) = Cosigned By      Initials Name Provider Type    Irina Guevara, PT Physical Therapist                     Time Calculation:   Start Time:  1000  Timed Charges  36980 - PT Manual Therapy Minutes: 55  Total Minutes  Timed Charges Total Minutes: 55   Total Minutes: 55       Irina Wong, PT  1/15/2024

## 2024-01-22 ENCOUNTER — HOSPITAL ENCOUNTER (OUTPATIENT)
Dept: PHYSICAL THERAPY | Facility: HOSPITAL | Age: 74
Setting detail: THERAPIES SERIES
Discharge: HOME | End: 2024-01-22
Payer: MEDICARE

## 2024-01-22 DIAGNOSIS — I89.0 LYMPHEDEMA: Primary | ICD-10-CM

## 2024-01-22 PROCEDURE — 97140 MANUAL THERAPY 1/> REGIONS: CPT

## 2024-01-23 NOTE — THERAPY TREATMENT NOTE
Outpatient Physical Therapy Lymphedema Treatment Note  [facility]     Patient Name: Imelda Elaine  : 1950  MRN: [id number]  Today's Date: 2024        Visit Date: 2024    Visit Dx:    ICD-10-CM ICD-9-CM   1. Lymphedema  I89.0 457.1       Patient Active Problem List   Diagnosis    Morbid obesity    Palpitations    Essential hypertension    Mixed hyperlipidemia    Prediabetes    Osteoarthritis of left knee    Leukocytosis, mild, likely reactive    Abnormal EKG    Anxiety and depression    BPV (benign positional vertigo)    Elevated hemoglobin A1c    Gastroesophageal reflux disease without esophagitis    Osteopenia    Peripheral vascular disease    S/P total knee arthroplasty, left    Tremor, essential    NDIAYE (dyspnea on exertion)    Body mass index 40.0-44.9, adult    Abnormal blood level of copper    RBBB    Pre-operative clearance    Encounter for pre-operative cardiovascular clearance    Status post right knee replacement    PSVT (paroxysmal supraventricular tachycardia)    Postoperative wound infection, right knee    S/P I&D right knee with poly exchange 23    GI bleed    GIB (gastrointestinal bleeding)           24 0940   Subjective Pain   Able to rate subjective pain? yes   Pre-Treatment Pain Level 0   Post-Treatment Pain Level 0   Subjective   Subjective Comments no new concerns   Lymphedema Edema Assessment   Ptting Edema Category By severity   Pitting Edema Mild;Moderate   Stemmer Sign bilateral:;negative   Edema Assessment Comment trace pitting from calf to B foot dorsum. Fibrotic region noted in R distal medial thigh lobe (softens with MLD)   Skin Changes/Observations   Location/Assessment Lower Extremity   Lower Extremity Conditions bilateral:;intact;clean;dry   Lower Extremity Color/Pigment bilateral:;hyperpigmented;fibrosis   Manual Lymphatic Drainage   Manual Lymphatic Drainage initial sequence;opened regional lymph nodes;extremity treatment   Initial Sequence  supraclavicular;diaphragmatic breathing   Supraclavicular right;left   Opened Regional Lymph Nodes inguinal   Inguinal right;left   Extremity Treatment MLD to full limb   MLD to Full Limb BLE   Compression/Skin Care   Compression/Skin Care Comments Issued foam chip pack with size 14 compressogrip to help secure at medial knee              01/22/24 1000   PT Assessment   Assessment Comments symptoms stable from last visit.  Continues with area localized edema R medial knee, softens with MLD.  Provided wt foam chip pack for home use to help maintain reduction   PT Plan   PT Plan Comments Will follow-up in 2 weeks.  Anticipate she will have her compression pump by then with time to use.  Also plans to see if she can find her old compression at home, or if she needs to order new.  She plans to go with athletic style otoniel for now.  Discussed that if these along with daily compression pump use don't maintain her swelling, then custom compression would be the next step               Manual Rx (last 36 hours)       Manual Treatments       Row Name 01/22/24 1000             Total Minutes    98268 - PT Manual Therapy Minutes 50  -LF                User Key  (r) = Recorded By, (t) = Taken By, (c) = Cosigned By      Initials Name Provider Type    LF Irina Wong, PT Physical Therapist                        Time Calculation:   Start Time: 1000  Timed Charges  53188 - PT Manual Therapy Minutes: 50  Total Minutes  Timed Charges Total Minutes: 50   Total Minutes: 50   Therapy Charges for Today       Code Description Service Date Service Provider Modifiers Qty    [id number]  PT MANUAL THERAPY EA 15 MIN 1/22/2024 Irina Wong, PT GP 3              Irina Wong, PT  1/23/2024

## 2024-02-05 ENCOUNTER — HOSPITAL ENCOUNTER (OUTPATIENT)
Dept: PHYSICAL THERAPY | Facility: HOSPITAL | Age: 74
Setting detail: THERAPIES SERIES
Discharge: HOME | End: 2024-02-05
Payer: MEDICARE

## 2024-02-05 DIAGNOSIS — I89.0 LYMPHEDEMA: Primary | ICD-10-CM

## 2024-02-05 PROCEDURE — 97140 MANUAL THERAPY 1/> REGIONS: CPT

## 2024-02-05 NOTE — THERAPY DISCHARGE NOTE
Outpatient Physical Therapy Lymphedema Treatment Note/Discharge Summary  [facility]     Patient Name: Imelda Elaine  : 1950  MRN: [id number]  Today's Date: 2024      Visit Date: 2024    Visit Dx:    ICD-10-CM ICD-9-CM   1. Lymphedema  I89.0 457.1       Patient Active Problem List   Diagnosis    Morbid obesity    Palpitations    Essential hypertension    Mixed hyperlipidemia    Prediabetes    Osteoarthritis of left knee    Leukocytosis, mild, likely reactive    Abnormal EKG    Anxiety and depression    BPV (benign positional vertigo)    Elevated hemoglobin A1c    Gastroesophageal reflux disease without esophagitis    Osteopenia    Peripheral vascular disease    S/P total knee arthroplasty, left    Tremor, essential    NDIAYE (dyspnea on exertion)    Body mass index 40.0-44.9, adult    Abnormal blood level of copper    RBBB    Pre-operative clearance    Encounter for pre-operative cardiovascular clearance    Status post right knee replacement    PSVT (paroxysmal supraventricular tachycardia)    Postoperative wound infection, right knee    S/P I&D right knee with poly exchange 23    GI bleed    GIB (gastrointestinal bleeding)         Lymphedema       Row Name 24 1000             Subjective Pain    Able to rate subjective pain? yes  -LF      Pre-Treatment Pain Level 0  -LF      Post-Treatment Pain Level 0  -LF         Subjective    Subjective Comments Received compression pump and has been able to use the last 3 days  -LF         Lymphedema Assessment    Lymphedema Classification RLE:;LLE:;secondary;stage 2 (Spontaneously Irreversible)  -LF         Lymphedema Edema Assessment    Ptting Edema Category By severity  -LF      Pitting Edema Mild;Moderate  -LF      Stemmer Sign bilateral:;negative  -LF         Skin Changes/Observations    Location/Assessment Lower Extremity  -LF      Lower Extremity Conditions bilateral:;intact;clean;dry  -LF      Lower Extremity Color/Pigment  bilateral:;hyperpigmented;fibrosis  -LF         Lymphedema Measurements    Measurement Type(s) Volumetric  -LF      Volumetric Areas Lower extremities  -LF      Volumetric LE Distance interval (cm) 5  -LF      Lymphedema Measurements Comments MTP/midfoot L 21.5/21 R 20.8/20.5  -LF         LLE Volumetric (cm)    Reference Point 0 24.5  -LF      5 28 cm  -LF      10 38.5 cm  -LF      15 51 cm  -LF      20 58 cm  -LF      25 67 cm  -LF      30 72 cm  -LF      LLE Volume Calculation- 5cm 7123.9  -LF         RLE Volumetric (cm)    Reference Point 0 23.7  -LF      5 29.4 cm  -LF      10 38.8 cm  -LF      15 50.3 cm  -LF      20 60.3 cm  -LF      25 65 cm  -LF      30 72 cm  -LF      35 78 cm  -LF      40 79 cm  -LF      45 78.5 cm  -LF      50 81.5 cm  -LF      RLE Volume Calculation- 5cm 73386.8  -LF         Manual Lymphatic Drainage    Manual Lymphatic Drainage initial sequence;opened regional lymph nodes;extremity treatment  -LF      Initial Sequence supraclavicular;diaphragmatic breathing  -LF      Supraclavicular right;left  -LF      Opened Regional Lymph Nodes inguinal  -LF      Inguinal right;left  -LF      Extremity Treatment MLD to full limb  -LF      MLD to Full Limb BLE  -LF         Compression/Skin Care    Compression/Skin Care skin care;compression garment  -LF      Skin Care moisturizing lotion applied  -LF      Wrapping Location lower extremity  -LF      Compression/Skin Care Comments Provided with size 12 compressogrip to use at thigh.  Can use single layer with pump to see if this helps the garment fit better, but can also wear double layer for general daytime compression.  Can incorporate the foam chip pack at medial thigh with foam, or with general compression during the day  -LF                User Key  (r) = Recorded By, (t) = Taken By, (c) = Cosigned By      Initials Name Provider Type    LF Irina Wong PT Physical Therapist                       PT Assessment/Plan       Row Name 02/05/24 1000           PT Assessment    Assessment Comments Has achieved some reduction in LE measurements.  Anticipate maintenance an/or further improvement now that she has compression pump to use 1-2x/day. Reinforced importance of daily compression as component for managing symptoms.  She still plans on trying a compression legging.  If not able to do this, I advised her to return to be fit with custom garments.  -LF        PT Plan    PT Plan Comments d/c to home program  -LF               User Key  (r) = Recorded By, (t) = Taken By, (c) = Cosigned By      Initials Name Provider Type    Irina Guevara, PT Physical Therapist                          OP Exercises       Row Name 02/05/24 1000             Subjective    Subjective Comments Received compression pump and has been able to use the last 3 days  -LF         Subjective Pain    Able to rate subjective pain? yes  -LF      Pre-Treatment Pain Level 0  -LF      Post-Treatment Pain Level 0  -LF         Total Minutes    53437 - PT Manual Therapy Minutes 55  -LF                User Key  (r) = Recorded By, (t) = Taken By, (c) = Cosigned By      Initials Name Provider Type    Irina Guevara, PT Physical Therapist                              Manual Rx (last 36 hours)       Manual Treatments       Row Name 02/05/24 1000             Total Minutes    79538 - PT Manual Therapy Minutes 55  -LF                User Key  (r) = Recorded By, (t) = Taken By, (c) = Cosigned By      Initials Name Provider Type     Irina Wong, PT Physical Therapist                     PT OP Goals       Row Name 02/05/24 1000          PT Short Term Goals    STG Date to Achieve 12/25/23  -LF     STG 1 Patient independent with simple compression bandaging to promote symptom reduction  -LF     STG 1 Progress Partially Met  -LF        Long Term Goals    LTG Date to Achieve 01/22/24  -LF     LTG 1 Patient to report 4 on LEFS for walking between rooms  -LF     LTG 1 Progress Met  -LF     LTG 2  "Patient to report 3 on LEFS for getting into or out of a car  -     LTG 2 Progress Met  -LF     LTG 3 BLE tenderness improved by 50% to allow for improved tolerance when donning compression garments  -LF     LTG 3 Progress Not Met  -LF     LTG 3 Progress Comments \"improved some\"  -LF     LTG 4 LE total circumferntial measurements decreased by at least 8cm. to promote decreased pain, improved ROM and mobility, and decrease risk for infection.  -LF     LTG 4 Progress Met  -LF     LTG 5 Patient independent with self-managment of lymphedema to included skin care, self-MLD, compression, exercise, and elevation.  -LF     LTG 5 Progress Met  -LF     LTG 6 Patient to be measured and fit with compression garment/system for long-term self-management of lymphedema.  -LF     LTG 6 Progress Not Met  -LF     LTG 6 Progress Comments recommendations made for compression leggings.  patient plans to try this, otherwise would recommend custom garments for appropriate fit  -               User Key  (r) = Recorded By, (t) = Taken By, (c) = Cosigned By      Initials Name Provider Type    Irina Guevara, PT Physical Therapist                         Outcome Measure Options: Lower Extremity Functional Scale (LEFS)  Lower Extremity Functional Index  Any of your usual work, housework or school activities: No difficulty  Your usual hobbies, recreational or sporting activities: No difficulty  Getting into or out of the bath: A little bit of difficulty  Walking between rooms: No difficulty  Putting on your shoes or socks: No difficulty  Squatting: No difficulty  Lifting an object, like a bag of groceries from the floor: No difficulty  Performing light activities around your home: No difficulty  Performing heavy activities around your home: No difficulty  Getting into or out of a car: No difficulty  Walking 2 blocks: A little bit of difficulty  Walking a mile: Extreme difficulty or unable to perform activity  Going up or down 10 stairs " (about 1 flight of stairs): A little bit of difficulty  Standing for 1 hour: Moderate difficulty  Sitting for 1 hour: No difficulty  Running on even ground: Extreme difficulty or unable to perform activity  Running on uneven ground: Extreme difficulty or unable to perform activity  Making sharp turns while running fast: Extreme difficulty or unable to perform activity  Hopping: Extreme difficulty or unable to perform activity  Rolling over in bed: A little bit of difficulty  Total: 54      Time Calculation:   Start Time: 1000  Timed Charges  26936 - PT Manual Therapy Minutes: 55  Total Minutes  Timed Charges Total Minutes: 55   Total Minutes: 55         PT G-Codes  Outcome Measure Options: Lower Extremity Functional Scale (LEFS)  Total: 54             Irina Wong, PT  2/5/2024

## 2024-02-13 ENCOUNTER — PREP FOR SURGERY (OUTPATIENT)
Dept: OTHER | Facility: HOSPITAL | Age: 74
End: 2024-02-13
Payer: MEDICARE

## 2024-02-13 DIAGNOSIS — Z12.11 SPECIAL SCREENING FOR MALIGNANT NEOPLASMS, COLON: Primary | ICD-10-CM

## 2024-02-13 RX ORDER — SODIUM CHLORIDE 0.9 % (FLUSH) 0.9 %
3 SYRINGE (ML) INJECTION EVERY 12 HOURS SCHEDULED
OUTPATIENT
Start: 2024-02-13

## 2024-02-13 RX ORDER — DEXTROSE, SODIUM CHLORIDE, SODIUM LACTATE, POTASSIUM CHLORIDE, AND CALCIUM CHLORIDE 5; .6; .31; .03; .02 G/100ML; G/100ML; G/100ML; G/100ML; G/100ML
100 INJECTION, SOLUTION INTRAVENOUS CONTINUOUS
OUTPATIENT
Start: 2024-02-13

## 2024-02-13 RX ORDER — SODIUM CHLORIDE 0.9 % (FLUSH) 0.9 %
10 SYRINGE (ML) INJECTION AS NEEDED
OUTPATIENT
Start: 2024-02-13

## 2024-02-13 RX ORDER — SODIUM CHLORIDE 9 MG/ML
40 INJECTION, SOLUTION INTRAVENOUS AS NEEDED
OUTPATIENT
Start: 2024-02-13

## 2024-02-14 PROBLEM — Z12.11 SPECIAL SCREENING FOR MALIGNANT NEOPLASMS, COLON: Status: ACTIVE | Noted: 2024-02-13

## 2024-02-20 NOTE — PROGRESS NOTES
"    Cardiology Outpatient Visit      Identification: Imelda Elaine is a 73 y.o. female who resides in [place], [place].     Reason for visit:  Palpitations        Subjective      Imelda returns to the office today.  She is doing well and denies any cardiovascular complaints.  She had changes made to her blood pressure medication at last visit and her blood pressure is now well-controlled.  No palpitations.    Review of Systems   Cardiovascular: Negative.    Respiratory: Negative.         Allergies   Allergen Reactions    Primidone Other (See Comments)     Hair loss    Topiramate Diarrhea    Tuberculin Tests Swelling and Rash     Pt has chest xrays to check     Zostavax [Zoster Vaccine Live] Swelling and Rash     \"Swelling of injection site of arm\"         Current Outpatient Medications   Medication Instructions    atorvastatin (LIPITOR) 10 mg, Oral, Nightly    cetirizine (zyrTEC) 10 MG tablet 1 tablet, Oral, Daily    estradiol (ESTRACE) 0.5 mg, Oral, Daily    ipratropium (ATROVENT) 0.06 % nasal spray 1 spray, Nasal, As Needed    ketoconazole (NIZORAL) 2 % shampoo No dose, route, or frequency recorded.    lansoprazole (PREVACID) 30 mg, Oral, Daily    losartan (COZAAR) 100 mg, Oral, Daily    multivitamin with minerals tablet tablet 1 tablet, Oral, Daily    venlafaxine XR (EFFEXOR-XR) 75 mg, Oral, Daily         Objective     /78 (BP Location: Right arm, Patient Position: Sitting, Cuff Size: Large Adult)   Pulse 63   Ht 157.5 cm (62\")   Wt (!) 145 kg (319 lb)   SpO2 97%   BMI 58.35 kg/m²       Constitutional:       Appearance: Healthy appearance.   Eyes:      General: No scleral icterus.  Neck:      Thyroid: No thyroid mass.      Vascular: No carotid bruit or JVD. JVD normal.   Pulmonary:      Effort: Pulmonary effort is normal.      Breath sounds: Normal breath sounds.   Cardiovascular:      Normal rate. Regular rhythm.      Murmurs: There is no murmur.      No gallop.    Edema:     Peripheral edema absent. "   Skin:     General: Skin is warm. There is no cyanosis.   Neurological:      General: No focal deficit present.      Mental Status: Alert.   Psychiatric:         Attention and Perception: Attention normal.         Result Review  (reviewed with patient):              Lab Results   Component Value Date    WBC 6.32 11/01/2023    HGB 10.7 (L) 11/01/2023    HCT 36.0 11/01/2023    MCV 81.6 11/01/2023     11/01/2023     Labs (10/17/2023):  Glucose 109, creat 1.18, BUN 23, Na 146, K 4.6, AST 25, ALT 19  Chol 165, Trig 99, HDL 56, LDL 89  TSH 1.19  HA1C 5.9        Assessment     Diagnoses and all orders for this visit:    1. Palpitations (Primary)  Overview:  Echo (8/8/2017): Normal LVEF.  Grade 1 diastolic dysfunction.  Mild MR and TR.  10 day event monitor (7/27/2017): Normal.  Echo (11/12/2020): LVEF 62%.RVSP < 35 mmHg. No significant structural or functional valvular disease.    Assessment & Plan:  Asymptomatic      2. Essential hypertension  Overview:  Target blood pressure <130/80 mmHg    Assessment & Plan:  Controlled  Continue present medical therapy    Orders:  -     losartan (COZAAR) 100 MG tablet; Take 1 tablet by mouth Daily.  Dispense: 90 tablet; Refill: 3    3. Pure hypercholesterolemia  -     atorvastatin (LIPITOR) 10 MG tablet; Take 1 tablet by mouth Every Night.  Dispense: 90 tablet; Refill: 3          Plan   Continue present medical therapy      Follow-up   1 year with cardiology APRN or as needed        Willie Kay MD, [facility], [facility]  2/21/2024

## 2024-02-21 ENCOUNTER — OFFICE VISIT (OUTPATIENT)
Dept: CARDIOLOGY | Facility: CLINIC | Age: 74
End: 2024-02-21
Payer: MEDICARE

## 2024-02-21 VITALS
SYSTOLIC BLOOD PRESSURE: 128 MMHG | DIASTOLIC BLOOD PRESSURE: 78 MMHG | HEART RATE: 63 BPM | OXYGEN SATURATION: 97 % | WEIGHT: 293 LBS | BODY MASS INDEX: 53.92 KG/M2 | HEIGHT: 62 IN

## 2024-02-21 DIAGNOSIS — E78.2 MIXED HYPERLIPIDEMIA: ICD-10-CM

## 2024-02-21 DIAGNOSIS — R00.2 PALPITATIONS: Primary | ICD-10-CM

## 2024-02-21 DIAGNOSIS — I10 ESSENTIAL HYPERTENSION: ICD-10-CM

## 2024-02-21 PROBLEM — I73.9 PERIPHERAL VASCULAR DISEASE: Status: RESOLVED | Noted: 2021-06-17 | Resolved: 2024-02-21

## 2024-02-21 PROBLEM — K92.2 GI BLEED: Status: RESOLVED | Noted: 2023-08-23 | Resolved: 2024-02-21

## 2024-02-21 PROBLEM — R29.818 SUSPECTED SLEEP APNEA: Status: RESOLVED | Noted: 2018-03-20 | Resolved: 2024-02-21

## 2024-02-21 PROBLEM — E78.00 PURE HYPERCHOLESTEROLEMIA: Status: RESOLVED | Noted: 2021-06-17 | Resolved: 2024-02-21

## 2024-02-21 PROBLEM — Z01.818 PRE-OPERATIVE CLEARANCE: Status: RESOLVED | Noted: 2023-06-06 | Resolved: 2024-02-21

## 2024-02-21 PROBLEM — Z01.810 ENCOUNTER FOR PRE-OPERATIVE CARDIOVASCULAR CLEARANCE: Status: RESOLVED | Noted: 2023-06-06 | Resolved: 2024-02-21

## 2024-02-21 PROBLEM — G47.10 HYPERSOMNIA: Status: RESOLVED | Noted: 2018-03-20 | Resolved: 2024-02-21

## 2024-02-21 PROBLEM — R06.09 DOE (DYSPNEA ON EXERTION): Status: RESOLVED | Noted: 2021-06-17 | Resolved: 2024-02-21

## 2024-02-21 PROBLEM — Z96.652 S/P TOTAL KNEE ARTHROPLASTY, LEFT: Status: RESOLVED | Noted: 2021-06-17 | Resolved: 2024-02-21

## 2024-02-21 PROBLEM — R94.31 ABNORMAL EKG: Status: RESOLVED | Noted: 2020-10-14 | Resolved: 2024-02-21

## 2024-02-21 PROBLEM — R06.02 SHORTNESS OF BREATH ON EXERTION: Status: RESOLVED | Noted: 2021-06-17 | Resolved: 2024-02-21

## 2024-02-21 PROBLEM — E66.01 MORBID OBESITY WITH BODY MASS INDEX (BMI) OF 50.0 TO 59.9 IN ADULT: Status: ACTIVE | Noted: 2024-02-13

## 2024-02-21 PROBLEM — I47.10 PSVT (PAROXYSMAL SUPRAVENTRICULAR TACHYCARDIA): Status: RESOLVED | Noted: 2023-06-30 | Resolved: 2024-02-21

## 2024-02-21 PROBLEM — Z12.11 SPECIAL SCREENING FOR MALIGNANT NEOPLASMS, COLON: Status: RESOLVED | Noted: 2024-02-13 | Resolved: 2024-02-21

## 2024-02-21 PROBLEM — K92.2 GIB (GASTROINTESTINAL BLEEDING): Status: RESOLVED | Noted: 2023-08-23 | Resolved: 2024-02-21

## 2024-02-21 PROBLEM — Z90.710 STATUS POST ABDOMINAL HYSTERECTOMY: Status: RESOLVED | Noted: 2017-02-15 | Resolved: 2024-02-21

## 2024-02-21 PROBLEM — R79.0 ABNORMAL BLOOD LEVEL OF COPPER: Status: RESOLVED | Noted: 2021-11-16 | Resolved: 2024-02-21

## 2024-02-21 PROBLEM — T81.49XA POSTOPERATIVE WOUND INFECTION: Status: RESOLVED | Noted: 2023-07-24 | Resolved: 2024-02-21

## 2024-02-21 PROBLEM — E66.01 MORBID OBESITY: Status: RESOLVED | Noted: 2017-02-14 | Resolved: 2024-02-21

## 2024-02-21 PROBLEM — D72.829 LEUKOCYTOSIS: Status: RESOLVED | Noted: 2017-11-17 | Resolved: 2024-02-21

## 2024-02-21 PROBLEM — Z96.652 S/P TOTAL KNEE ARTHROPLASTY, LEFT: Status: RESOLVED | Noted: 2017-11-17 | Resolved: 2024-02-21

## 2024-02-21 PROCEDURE — 3078F DIAST BP <80 MM HG: CPT | Performed by: INTERNAL MEDICINE

## 2024-02-21 PROCEDURE — 99213 OFFICE O/P EST LOW 20 MIN: CPT | Performed by: INTERNAL MEDICINE

## 2024-02-21 PROCEDURE — 3074F SYST BP LT 130 MM HG: CPT | Performed by: INTERNAL MEDICINE

## 2024-02-21 PROCEDURE — 1160F RVW MEDS BY RX/DR IN RCRD: CPT | Performed by: INTERNAL MEDICINE

## 2024-02-21 PROCEDURE — 1159F MED LIST DOCD IN RCRD: CPT | Performed by: INTERNAL MEDICINE

## 2024-02-21 RX ORDER — ATORVASTATIN CALCIUM 10 MG/1
10 TABLET, FILM COATED ORAL NIGHTLY
Qty: 90 TABLET | Refills: 3 | Status: SHIPPED | OUTPATIENT
Start: 2024-02-21

## 2024-02-21 RX ORDER — LOSARTAN POTASSIUM 100 MG/1
100 TABLET ORAL DAILY
Qty: 90 TABLET | Refills: 3 | Status: SHIPPED | OUTPATIENT
Start: 2024-02-21

## 2024-02-21 RX ORDER — LANSOPRAZOLE 30 MG/1
30 CAPSULE, DELAYED RELEASE ORAL DAILY
COMMUNITY

## 2024-02-21 NOTE — LETTER
"February 21, 2024     Comfort Colunga MD  [address]  [facility] [address]  [facility] [address]    Patient: Imelda Elaine   YOB: 1950   Date of Visit: 2/21/2024     Dear Comfort Colunga MD:       Thank you for referring Imelda Elaine to me for evaluation. Below are the relevant portions of my assessment and plan of care.    If you have questions, please do not hesitate to call me. I look forward to following Imelda along with you.         Sincerely,        French Kay IV, MD        CC: No Recipients    French Kay IV, MD  02/21/24 1057  Signed      Cardiology Outpatient Visit      Identification: Imelda Elaine is a 73 y.o. female who resides in [place], [place].     Reason for visit:  Palpitations        Subjective     Imelda returns to the office today.  She is doing well and denies any cardiovascular complaints.  She had changes made to her blood pressure medication at last visit and her blood pressure is now well-controlled.  No palpitations.    Review of Systems   Cardiovascular: Negative.    Respiratory: Negative.         Allergies   Allergen Reactions   • Primidone Other (See Comments)     Hair loss   • Topiramate Diarrhea   • Tuberculin Tests Swelling and Rash     Pt has chest xrays to check    • Zostavax [Zoster Vaccine Live] Swelling and Rash     \"Swelling of injection site of arm\"         Current Outpatient Medications   Medication Instructions   • atorvastatin (LIPITOR) 10 mg, Oral, Nightly   • cetirizine (zyrTEC) 10 MG tablet 1 tablet, Oral, Daily   • estradiol (ESTRACE) 0.5 mg, Oral, Daily   • ipratropium (ATROVENT) 0.06 % nasal spray 1 spray, Nasal, As Needed   • ketoconazole (NIZORAL) 2 % shampoo No dose, route, or frequency recorded.   • lansoprazole (PREVACID) 30 mg, Oral, Daily   • losartan (COZAAR) 100 mg, Oral, Daily   • multivitamin with minerals tablet tablet 1 tablet, Oral, Daily   • venlafaxine XR (EFFEXOR-XR) 75 mg, Oral, Daily " "        Objective    /78 (BP Location: Right arm, Patient Position: Sitting, Cuff Size: Large Adult)   Pulse 63   Ht 157.5 cm (62\")   Wt (!) 145 kg (319 lb)   SpO2 97%   BMI 58.35 kg/m²       Constitutional:       Appearance: Healthy appearance.   Eyes:      General: No scleral icterus.  Neck:      Thyroid: No thyroid mass.      Vascular: No carotid bruit or JVD. JVD normal.   Pulmonary:      Effort: Pulmonary effort is normal.      Breath sounds: Normal breath sounds.   Cardiovascular:      Normal rate. Regular rhythm.      Murmurs: There is no murmur.      No gallop.    Edema:     Peripheral edema absent.   Skin:     General: Skin is warm. There is no cyanosis.   Neurological:      General: No focal deficit present.      Mental Status: Alert.   Psychiatric:         Attention and Perception: Attention normal.         Result Review (reviewed with patient):              Lab Results   Component Value Date    WBC 6.32 11/01/2023    HGB 10.7 (L) 11/01/2023    HCT 36.0 11/01/2023    MCV 81.6 11/01/2023     11/01/2023     Labs (10/17/2023):  Glucose 109, creat 1.18, BUN 23, Na 146, K 4.6, AST 25, ALT 19  Chol 165, Trig 99, HDL 56, LDL 89  TSH 1.19  HA1C 5.9        Assessment    Diagnoses and all orders for this visit:    1. Palpitations (Primary)  Overview:  Echo (8/8/2017): Normal LVEF.  Grade 1 diastolic dysfunction.  Mild MR and TR.  10 day event monitor (7/27/2017): Normal.  Echo (11/12/2020): LVEF 62%.RVSP < 35 mmHg. No significant structural or functional valvular disease.    Assessment & Plan:  Asymptomatic      2. Essential hypertension  Overview:  Target blood pressure <130/80 mmHg    Assessment & Plan:  Controlled  Continue present medical therapy    Orders:  -     losartan (COZAAR) 100 MG tablet; Take 1 tablet by mouth Daily.  Dispense: 90 tablet; Refill: 3    3. Pure hypercholesterolemia  -     atorvastatin (LIPITOR) 10 MG tablet; Take 1 tablet by mouth Every Night.  Dispense: 90 tablet; " Refill: 3          Plan  Continue present medical therapy      Follow-up   1 year with cardiology APRN or as needed        Willie Kay MD, [facility], [facility]  2/21/2024

## 2024-02-26 RX ORDER — NEBIVOLOL 10 MG/1
10 TABLET ORAL
Qty: 90 TABLET | Refills: 1 | OUTPATIENT
Start: 2024-02-26

## 2024-02-28 RX ORDER — NEBIVOLOL 10 MG/1
10 TABLET ORAL DAILY
Qty: 90 TABLET | Refills: 3 | Status: SHIPPED | OUTPATIENT
Start: 2024-02-28

## 2024-02-28 NOTE — TELEPHONE ENCOUNTER
Patient called and reported that she was taking nebivolol and reported in error that she was not. She is requesting a refill.

## 2024-03-04 ENCOUNTER — TRANSCRIBE ORDERS (OUTPATIENT)
Dept: ADMINISTRATIVE | Facility: HOSPITAL | Age: 74
End: 2024-03-04
Payer: MEDICARE

## 2024-03-04 DIAGNOSIS — Z12.31 VISIT FOR SCREENING MAMMOGRAM: Primary | ICD-10-CM

## 2024-04-09 ENCOUNTER — HOSPITAL ENCOUNTER (OUTPATIENT)
Dept: MAMMOGRAPHY | Facility: HOSPITAL | Age: 74
Discharge: HOME | End: 2024-04-09
Admitting: INTERNAL MEDICINE
Payer: MEDICARE

## 2024-04-09 DIAGNOSIS — Z12.31 VISIT FOR SCREENING MAMMOGRAM: ICD-10-CM

## 2024-04-09 PROCEDURE — 77063 BREAST TOMOSYNTHESIS BI: CPT

## 2024-04-09 PROCEDURE — 77067 SCR MAMMO BI INCL CAD: CPT

## 2024-05-22 RX ORDER — SODIUM, POTASSIUM,MAG SULFATES 17.5-3.13G
SOLUTION, RECONSTITUTED, ORAL ORAL
Qty: 354 ML | Refills: 0 | Status: SHIPPED | OUTPATIENT
Start: 2024-05-22

## 2024-05-28 ENCOUNTER — TELEPHONE (OUTPATIENT)
Dept: GASTROENTEROLOGY | Facility: CLINIC | Age: 74
End: 2024-05-28
Payer: MEDICARE

## 2024-05-28 NOTE — TELEPHONE ENCOUNTER
Provider: DR. BRUCE    Caller: GAIL KHAN    Relationship to Patient: SELF    Pharmacy: [facility] ([phone])    Phone Number: [phone]    Reason for Call: PT CALLED STATING THAT HER COLONOSCOPY PREP REQUIRES A PRIOR AUTH AND THAT THE INSURANCE COMPANY SHOULD BE REACHING OUT TO THE OFFICE IN REFERENCE TO IT// PLEASE CALL PT BACK WITH UPDATE

## 2024-05-29 ENCOUNTER — ANESTHESIA EVENT (OUTPATIENT)
Dept: GASTROENTEROLOGY | Facility: HOSPITAL | Age: 74
End: 2024-05-29
Payer: MEDICARE

## 2024-05-29 ENCOUNTER — PRE-ADMISSION TESTING (OUTPATIENT)
Dept: PREADMISSION TESTING | Facility: HOSPITAL | Age: 74
End: 2024-05-29
Payer: MEDICARE

## 2024-05-29 VITALS — HEIGHT: 62 IN | WEIGHT: 293 LBS | BODY MASS INDEX: 53.92 KG/M2

## 2024-05-29 LAB
APTT PPP: 32.5 SECONDS (ref 22–39)
DEPRECATED RDW RBC AUTO: 53.1 FL (ref 37–54)
ERYTHROCYTE [DISTWIDTH] IN BLOOD BY AUTOMATED COUNT: 16.4 % (ref 12.3–15.4)
HCT VFR BLD AUTO: 39.7 % (ref 34–46.6)
HGB BLD-MCNC: 12.2 G/DL (ref 12–15.9)
INR PPP: 0.98 (ref 0.89–1.12)
MCH RBC QN AUTO: 27.4 PG (ref 26.6–33)
MCHC RBC AUTO-ENTMCNC: 30.7 G/DL (ref 31.5–35.7)
MCV RBC AUTO: 89 FL (ref 79–97)
PLATELET # BLD AUTO: 174 10*3/MM3 (ref 140–450)
PMV BLD AUTO: 11.9 FL (ref 6–12)
POTASSIUM SERPL-SCNC: 5.1 MMOL/L (ref 3.5–5.2)
PROTHROMBIN TIME: 13.1 SECONDS (ref 12.2–14.5)
RBC # BLD AUTO: 4.46 10*6/MM3 (ref 3.77–5.28)
WBC NRBC COR # BLD AUTO: 6.41 10*3/MM3 (ref 3.4–10.8)

## 2024-05-29 PROCEDURE — 85730 THROMBOPLASTIN TIME PARTIAL: CPT

## 2024-05-29 PROCEDURE — 93005 ELECTROCARDIOGRAM TRACING: CPT

## 2024-05-29 PROCEDURE — 36415 COLL VENOUS BLD VENIPUNCTURE: CPT

## 2024-05-29 PROCEDURE — 85610 PROTHROMBIN TIME: CPT

## 2024-05-29 PROCEDURE — 93010 ELECTROCARDIOGRAM REPORT: CPT | Performed by: INTERNAL MEDICINE

## 2024-05-29 PROCEDURE — 85027 COMPLETE CBC AUTOMATED: CPT

## 2024-05-29 PROCEDURE — 84132 ASSAY OF SERUM POTASSIUM: CPT

## 2024-05-30 LAB
QT INTERVAL: 420 MS
QTC INTERVAL: 446 MS

## 2024-05-31 ENCOUNTER — OFFICE VISIT (OUTPATIENT)
Dept: OBSTETRICS AND GYNECOLOGY | Facility: CLINIC | Age: 74
End: 2024-05-31
Payer: MEDICARE

## 2024-05-31 VITALS
WEIGHT: 293 LBS | DIASTOLIC BLOOD PRESSURE: 88 MMHG | BODY MASS INDEX: 53.92 KG/M2 | SYSTOLIC BLOOD PRESSURE: 130 MMHG | HEIGHT: 62 IN

## 2024-05-31 DIAGNOSIS — E66.01 MORBID OBESITY WITH BMI OF 60.0-69.9, ADULT: ICD-10-CM

## 2024-05-31 DIAGNOSIS — Z01.411 ENCOUNTER FOR GYNECOLOGICAL EXAMINATION WITH ABNORMAL FINDING: Primary | ICD-10-CM

## 2024-05-31 DIAGNOSIS — Z79.890 POSTMENOPAUSAL HORMONE REPLACEMENT THERAPY: ICD-10-CM

## 2024-05-31 RX ORDER — ESTRADIOL 0.5 MG/1
0.5 TABLET ORAL DAILY
Qty: 90 TABLET | Refills: 3 | Status: SHIPPED | OUTPATIENT
Start: 2024-05-31

## 2024-05-31 NOTE — PROGRESS NOTES
"Chief Complaint  Imelda Elaine is a 73 y.o.  female presenting for Gynecologic Exam and Med Refill (Estradiol 0.5 mg (#90 with refills).  [facility] pharmacy.)    History of Present Illness  Imelda is a very pleasant 72yo woman, , here for gyn exam.  Her surgical history is listed below, and it includes, in part, bilateral tubal sterilization, BENEDICTO/BSO, A&P repair.  She denies having hardly any urinary incontinence now.  She limits caffeine, and rarely has any INGE / no urge incont.  Denies any gyn complaints.  She is still using oral ERT, and adamantly wishes to continue it.    States she has tried weaning off the ERT in the past, and has had terrible return of VMS.  She also wishes to continue the oral form; declines a change to transdermal tx.    HTN, well controlled  Hyperlipidemia, well controlled  Otherwise, ROS neg    Last mammogram 24  Colonoscopy is scheduled for 24  DEXA scan was done < 2 months ago  (sign MISAEL & get report from [facility])      The following portions of the patient's history were reviewed and updated as appropriate: allergies, current medications, past family history, past medical history, past social history, past surgical history, and problem list.    Allergies   Allergen Reactions    Primidone Other (See Comments)     Hair loss    Topiramate Diarrhea    Tuberculin Tests Swelling and Rash     Pt has chest xrays to check     Zostavax [Zoster Vaccine Live] Swelling and Rash     \"Swelling of injection site of arm\"         Current Outpatient Medications:     estradiol (ESTRACE) 0.5 MG tablet, Take 1 tablet by mouth Daily., Disp: 90 tablet, Rfl: 3    atorvastatin (LIPITOR) 10 MG tablet, Take 1 tablet by mouth Every Night., Disp: 90 tablet, Rfl: 3    cetirizine (zyrTEC) 10 MG tablet, Take 1 tablet by mouth Daily. Indications: Hayfever, Disp: , Rfl:     ipratropium (ATROVENT) 0.06 % nasal spray, 1 spray into the nostril(s) as directed by provider As Needed for " Rhinitis. Indications: Hayfever, Disp: , Rfl:     ketoconazole (NIZORAL) 2 % shampoo, Apply 1 Application topically to the appropriate area as directed 2 (Two) Times a Week., Disp: , Rfl:     lansoprazole (Prevacid) 30 MG capsule, Take 1 capsule by mouth Daily., Disp: , Rfl:     losartan (COZAAR) 100 MG tablet, Take 1 tablet by mouth Daily., Disp: 90 tablet, Rfl: 3    multivitamin with minerals tablet tablet, Take 1 tablet by mouth Daily., Disp: , Rfl:     nebivolol (BYSTOLIC) 10 MG tablet, Take 1 tablet by mouth Daily., Disp: 90 tablet, Rfl: 3    sodium-potassium-magnesium sulfates (Suprep Bowel Prep Kit) 17.5-3.13-1.6 GM/177ML solution oral solution, Use as directed by provider for colonoscopy prep, Disp: 354 mL, Rfl: 0    venlafaxine XR (EFFEXOR-XR) 75 MG 24 hr capsule, Take 1 capsule by mouth Daily., Disp: , Rfl:     Past Medical History:   Diagnosis Date    Anesthesia 2002    low bp only with foot surgery, has had surgeries since and no issue per pt report    Anesthesia complication 2023    low BP in recovery after knee surgery, cardiology involved    Arthritis     Back pain     Depression     Difficulty walking     Left knee replacement  need right knee replacement    GERD (gastroesophageal reflux disease)     GI bleed     unsure exact date but prior to 2000    High cholesterol     Hypertension     Infection 2023    right knee, history of knee replacement prior, treated by ID    Knee pain     BILATERAL    Post-menopause on HRT (hormone replacement therapy)     S/P total knee arthroplasty, left     Seasonal allergies     Status post abdominal hysterectomy     1989    Tremor, essential         Past Surgical History:   Procedure Laterality Date    ANTERIOR AND POSTERIOR VAGINAL REPAIR N/A 4/17/2017    Vaginal enterocele / anterior posterior colporrhaphy;  Jose Maria Chowdhury MD;  Location: [facility] OR;  Service:     BLADDER SUSPENSION  1999    MESH TVT?     COLONOSCOPY  2018    Dr Chung - normal    EXPLORATORY LAPAROTOMY   "2010    \"BOWEL INTO PELVIC BONE\" /[place]     EXPLORATORY LAPAROTOMY  04/2004    AQUILES/ abd sacrocolpopexy/post repair    FOOT SURGERY Left     KNEE ARTHROSCOPY Left     meniscus repair    KNEE INCISION AND DRAINAGE Right 7/25/2023    Procedure: KNEE INCISION AND DRAINAGE WITH POLY EXCHANGE - RIGHT;  Surgeon: Sunny Reynoso MD;  Location:  [facility];  Service: Orthopedics;  Laterality: Right;    LAPAROSCOPIC CHOLECYSTECTOMY Right 2000    NH ARTHRP KNE CONDYLE&PLATU MEDIAL&LAT COMPARTMENTS Left 11/16/2017    Procedure: TOTAL KNEE ARTHROPLASTY LEFT;  Surgeon: Sunny Reynoso MD;  Location:  [facility] OR;  Service: Orthopedics    TONSILLECTOMY      TOTAL ABDOMINAL HYSTERECTOMY WITH SALPINGO OOPHORECTOMY Bilateral 1989    TOTAL KNEE ARTHROPLASTY Right 6/29/2023    Procedure: TOTAL KNEE ARTHROPLASTY WITH CORI ROBOT - RIGHT;  Surgeon: Sunny Reynoso MD;  Location:  [facility] OR;  Service: Robotics - Ortho;  Laterality: Right;    TUBAL ABDOMINAL LIGATION      VENTRAL HERNIA REPAIR N/A 6/21/2016    Procedure: LAPAROSCOPIC REPAIR OF ABDOMINAL WALL HERNIA WITH MESH, CONVERTED TO OPEN ;  Surgeon: Bertha Delacruz MD;  Location:  [facility] OR;  Service:        Objective  /88   Ht 157.5 cm (62\")   Wt (!) 153 kg (338 lb)   LMP  (LMP Unknown)   Breastfeeding No   BMI 61.82 kg/m²     Physical Exam  Vitals and nursing note reviewed. Exam conducted with a chaperone present.   Constitutional:       General: She is not in acute distress.     Appearance: Normal appearance. She is obese. She is not ill-appearing.   HENT:      Head: Normocephalic.   Neck:      Thyroid: No thyroid mass or thyromegaly.   Cardiovascular:      Rate and Rhythm: Normal rate and regular rhythm.      Heart sounds: Normal heart sounds. No murmur heard.  Pulmonary:      Effort: Pulmonary effort is normal. No respiratory distress.      Breath sounds: Normal breath sounds.   Chest:   Breasts:     Right: No inverted nipple, mass " or nipple discharge.      Left: No inverted nipple, mass or nipple discharge.   Abdominal:      Palpations: Abdomen is soft. There is no mass.      Tenderness: There is no abdominal tenderness.   Genitourinary:     General: Normal vulva.      Labia:         Right: No rash, tenderness or lesion.         Left: No rash, tenderness or lesion.       Vagina: Prolapsed vaginal walls present. No vaginal discharge, erythema or tenderness.      Uterus: Absent.       Adnexa:         Right: No mass or tenderness.          Left: No mass or tenderness.        Comments: Vaginal mucosa is pink / well estrogenized / poor tone.  Anus appears wnl.  No rectal exam performed.  Lymphadenopathy:      Upper Body:      Right upper body: No supraclavicular or axillary adenopathy.      Left upper body: No supraclavicular or axillary adenopathy.   Skin:     General: Skin is warm and dry.   Neurological:      Mental Status: She is alert and oriented to person, place, and time.   Psychiatric:         Mood and Affect: Mood normal.         Behavior: Behavior normal.         Assessment/Plan   Diagnoses and all orders for this visit:    1. Encounter for gynecological examination with abnormal finding (Primary)    2. Morbid obesity with BMI of 60.0-69.9, adult    3. Postmenopausal hormone replacement therapy  -     estradiol (ESTRACE) 0.5 MG tablet; Take 1 tablet by mouth Daily.  Dispense: 90 tablet; Refill: 3        Procedures    40 to 64: Counseling/Anticipatory Guidance Discussed: nutrition, physical activity, screenings, self-breast exam, and HRT  (with risks including, but not limited to blood clots and DVTs, heart attacks, strokes, pulmonary emboli, and sudden death.  Also couns re: increased risk of breast cancer with prolonged use (> 5 yrs of Tx).  We also discussed lowering her risk for thromboembolic events if she were to change to a transdermal form of ERT.  She verbalized good understanding of all this counseling.  And wishes to continue  the oral Tx at current dose.    Return in about 1 year (around 5/31/2025) for Annual physical, GET last DEXA.    Florinda Mullen, APRN  05/31/2024

## 2024-06-05 ENCOUNTER — HOSPITAL ENCOUNTER (OUTPATIENT)
Facility: HOSPITAL | Age: 74
Setting detail: HOSPITAL OUTPATIENT SURGERY
Discharge: HOME | End: 2024-06-05
Attending: INTERNAL MEDICINE | Admitting: INTERNAL MEDICINE
Payer: MEDICARE

## 2024-06-05 ENCOUNTER — ANESTHESIA (OUTPATIENT)
Dept: GASTROENTEROLOGY | Facility: HOSPITAL | Age: 74
End: 2024-06-05
Payer: MEDICARE

## 2024-06-05 VITALS
RESPIRATION RATE: 16 BRPM | OXYGEN SATURATION: 98 % | HEART RATE: 73 BPM | SYSTOLIC BLOOD PRESSURE: 156 MMHG | TEMPERATURE: 97.2 F | DIASTOLIC BLOOD PRESSURE: 84 MMHG

## 2024-06-05 DIAGNOSIS — Z12.11 SPECIAL SCREENING FOR MALIGNANT NEOPLASMS, COLON: ICD-10-CM

## 2024-06-05 LAB — POTASSIUM SERPL-SCNC: 4.6 MMOL/L (ref 3.5–5.2)

## 2024-06-05 PROCEDURE — 88305 TISSUE EXAM BY PATHOLOGIST: CPT | Performed by: INTERNAL MEDICINE

## 2024-06-05 PROCEDURE — 25810000003 LACTATED RINGERS PER 1000 ML: Performed by: NURSE ANESTHETIST, CERTIFIED REGISTERED

## 2024-06-05 PROCEDURE — 25010000002 PROPOFOL 10 MG/ML EMULSION: Performed by: NURSE ANESTHETIST, CERTIFIED REGISTERED

## 2024-06-05 PROCEDURE — 84132 ASSAY OF SERUM POTASSIUM: CPT | Performed by: ANESTHESIOLOGY

## 2024-06-05 DEVICE — DEV CLIP ENDO RESOLUTION360 CONTRL ROT 235CM: Type: IMPLANTABLE DEVICE | Site: CECUM | Status: FUNCTIONAL

## 2024-06-05 RX ORDER — LIDOCAINE HYDROCHLORIDE 10 MG/ML
0.5 INJECTION, SOLUTION EPIDURAL; INFILTRATION; INTRACAUDAL; PERINEURAL ONCE AS NEEDED
Status: CANCELLED | OUTPATIENT
Start: 2024-06-05

## 2024-06-05 RX ORDER — ONDANSETRON 2 MG/ML
4 INJECTION INTRAMUSCULAR; INTRAVENOUS ONCE AS NEEDED
Status: DISCONTINUED | OUTPATIENT
Start: 2024-06-05 | End: 2024-06-05 | Stop reason: HOSPADM

## 2024-06-05 RX ORDER — IPRATROPIUM BROMIDE AND ALBUTEROL SULFATE 2.5; .5 MG/3ML; MG/3ML
3 SOLUTION RESPIRATORY (INHALATION) ONCE AS NEEDED
Status: DISCONTINUED | OUTPATIENT
Start: 2024-06-05 | End: 2024-06-05 | Stop reason: HOSPADM

## 2024-06-05 RX ORDER — SODIUM CHLORIDE, SODIUM LACTATE, POTASSIUM CHLORIDE, CALCIUM CHLORIDE 600; 310; 30; 20 MG/100ML; MG/100ML; MG/100ML; MG/100ML
9 INJECTION, SOLUTION INTRAVENOUS CONTINUOUS
Status: CANCELLED | OUTPATIENT
Start: 2024-06-05

## 2024-06-05 RX ORDER — PROPOFOL 10 MG/ML
VIAL (ML) INTRAVENOUS AS NEEDED
Status: DISCONTINUED | OUTPATIENT
Start: 2024-06-05 | End: 2024-06-05 | Stop reason: SURG

## 2024-06-05 RX ORDER — MIDAZOLAM HYDROCHLORIDE 1 MG/ML
0.5 INJECTION INTRAMUSCULAR; INTRAVENOUS
Status: CANCELLED | OUTPATIENT
Start: 2024-06-05

## 2024-06-05 RX ORDER — FAMOTIDINE 10 MG/ML
20 INJECTION, SOLUTION INTRAVENOUS ONCE
Status: CANCELLED | OUTPATIENT
Start: 2024-06-05 | End: 2024-06-05

## 2024-06-05 RX ORDER — SODIUM CHLORIDE 0.9 % (FLUSH) 0.9 %
10 SYRINGE (ML) INJECTION EVERY 12 HOURS SCHEDULED
Status: CANCELLED | OUTPATIENT
Start: 2024-06-05

## 2024-06-05 RX ORDER — SODIUM CHLORIDE 9 MG/ML
40 INJECTION, SOLUTION INTRAVENOUS AS NEEDED
Status: CANCELLED | OUTPATIENT
Start: 2024-06-05

## 2024-06-05 RX ORDER — FAMOTIDINE 20 MG/1
20 TABLET, FILM COATED ORAL ONCE
Status: CANCELLED | OUTPATIENT
Start: 2024-06-05 | End: 2024-06-05

## 2024-06-05 RX ORDER — SODIUM CHLORIDE, SODIUM LACTATE, POTASSIUM CHLORIDE, CALCIUM CHLORIDE 600; 310; 30; 20 MG/100ML; MG/100ML; MG/100ML; MG/100ML
INJECTION, SOLUTION INTRAVENOUS CONTINUOUS PRN
Status: DISCONTINUED | OUTPATIENT
Start: 2024-06-05 | End: 2024-06-05 | Stop reason: SURG

## 2024-06-05 RX ORDER — SODIUM CHLORIDE 0.9 % (FLUSH) 0.9 %
10 SYRINGE (ML) INJECTION AS NEEDED
Status: CANCELLED | OUTPATIENT
Start: 2024-06-05

## 2024-06-05 RX ORDER — LIDOCAINE HYDROCHLORIDE 10 MG/ML
INJECTION, SOLUTION EPIDURAL; INFILTRATION; INTRACAUDAL; PERINEURAL AS NEEDED
Status: DISCONTINUED | OUTPATIENT
Start: 2024-06-05 | End: 2024-06-05 | Stop reason: SURG

## 2024-06-05 RX ADMIN — PROPOFOL 50 MG: 10 INJECTION, EMULSION INTRAVENOUS at 14:20

## 2024-06-05 RX ADMIN — SODIUM CHLORIDE, POTASSIUM CHLORIDE, SODIUM LACTATE AND CALCIUM CHLORIDE: 600; 310; 30; 20 INJECTION, SOLUTION INTRAVENOUS at 14:12

## 2024-06-05 RX ADMIN — PROPOFOL 100 MG: 10 INJECTION, EMULSION INTRAVENOUS at 14:16

## 2024-06-05 RX ADMIN — PROPOFOL 50 MG: 10 INJECTION, EMULSION INTRAVENOUS at 14:24

## 2024-06-05 RX ADMIN — PROPOFOL 50 MG: 10 INJECTION, EMULSION INTRAVENOUS at 14:28

## 2024-06-05 RX ADMIN — SODIUM CHLORIDE, POTASSIUM CHLORIDE, SODIUM LACTATE AND CALCIUM CHLORIDE: 600; 310; 30; 20 INJECTION, SOLUTION INTRAVENOUS at 14:16

## 2024-06-05 RX ADMIN — LIDOCAINE HYDROCHLORIDE 100 MG: 10 INJECTION, SOLUTION EPIDURAL; INFILTRATION; INTRACAUDAL; PERINEURAL at 14:16

## 2024-06-05 RX ADMIN — PROPOFOL 50 MG: 10 INJECTION, EMULSION INTRAVENOUS at 14:32

## 2024-06-05 NOTE — H&P
"Gastroenterology Consult Note    Reason for Consultation: Screening    Patient Care Team:  Comfort Colunga MD as PCP - General (Internal Medicine)  Florinda Lopez APRN as Nurse Practitioner (Interventional Cardiology)  French Kay IV, MD as Consulting Physician (Interventional Cardiology)  Lisa Grimm APRN as Nurse Practitioner (Nurse Practitioner)    Chief complaint: Screening      History of present illness: The patient is a 73-year-old with a history of colon polyps and a family history of polyps in her daughter.  She is here for colonoscopy.  She has no complaints.    Allergies   Allergen Reactions    Primidone Other (See Comments)     Hair loss    Topiramate Diarrhea    Tuberculin Tests Swelling and Rash     Pt has chest xrays to check     Zostavax [Zoster Vaccine Live] Swelling and Rash     \"Swelling of injection site of arm\"     Prior to Admission medications    Medication Sig Start Date End Date Taking? Authorizing Provider   atorvastatin (LIPITOR) 10 MG tablet Take 1 tablet by mouth Every Night. 2/21/24  Yes French Kay IV, MD   cetirizine (zyrTEC) 10 MG tablet Take 1 tablet by mouth Daily. Indications: Hayfever   Yes Gris Chapin MD   estradiol (ESTRACE) 0.5 MG tablet Take 1 tablet by mouth Daily. 5/31/24  Yes Florinda Mullen APRN   ipratropium (ATROVENT) 0.06 % nasal spray 1 spray into the nostril(s) as directed by provider As Needed for Rhinitis. Indications: Hayfever 9/6/17  Yes Gris Chapin MD   ketoconazole (NIZORAL) 2 % shampoo Apply 1 Application topically to the appropriate area as directed 2 (Two) Times a Week. 12/7/22  Yes Gris Chapin MD   lansoprazole (Prevacid) 30 MG capsule Take 1 capsule by mouth Daily.   Yes Gris Chapin MD   losartan (COZAAR) 100 MG tablet Take 1 tablet by mouth Daily. 2/21/24  Yes French Kay IV, MD   multivitamin with minerals tablet tablet Take 1 tablet by mouth Daily. " "  Yes Provider, MD Gris   nebivolol (BYSTOLIC) 10 MG tablet Take 1 tablet by mouth Daily. 2/28/24  Yes Florinda Lopez APRN   sodium-potassium-magnesium sulfates (Suprep Bowel Prep Kit) 17.5-3.13-1.6 GM/177ML solution oral solution Use as directed by provider for colonoscopy prep 5/22/24  Yes uJve Chung MD   venlafaxine XR (EFFEXOR-XR) 75 MG 24 hr capsule Take 1 capsule by mouth Daily. 2/10/17  Yes Provider, MD Gris      No current facility-administered medications for this encounter.      Past Medical History:   Diagnosis Date    Anesthesia 2002    low bp only with foot surgery, has had surgeries since and no issue per pt report    Anesthesia complication 2023    low BP in recovery after knee surgery, cardiology involved    Arthritis     Back pain     Depression     Difficulty walking     Left knee replacement  need right knee replacement    GERD (gastroesophageal reflux disease)     GI bleed     unsure exact date but prior to 2000    High cholesterol     Hypertension     Infection 2023    right knee, history of knee replacement prior, treated by ID    Knee pain     BILATERAL    Post-menopause on HRT (hormone replacement therapy)     S/P total knee arthroplasty, left     Seasonal allergies     Status post abdominal hysterectomy     1989    Tremor, essential      Past Surgical History:   Procedure Laterality Date    ANTERIOR AND POSTERIOR VAGINAL REPAIR N/A 4/17/2017    Vaginal enterocele / anterior posterior colporrhaphy;  Jose Maria Chowdhury MD;  Location: [facility] OR;  Service:     BLADDER SUSPENSION  1999    MESH TVT?     COLONOSCOPY  2018    Dr Chung - normal    EXPLORATORY LAPAROTOMY  2010    \"BOWEL INTO PELVIC BONE\" /[place]     EXPLORATORY LAPAROTOMY  04/2004    AQUILES/ abd sacrocolpopexy/post repair    FOOT SURGERY Left     KNEE ARTHROSCOPY Left     meniscus repair    KNEE INCISION AND DRAINAGE Right 7/25/2023    Procedure: KNEE INCISION AND DRAINAGE WITH POLY EXCHANGE - RIGHT;  " Surgeon: Sunny Reynoso MD;  Location:  [facility];  Service: Orthopedics;  Laterality: Right;    LAPAROSCOPIC CHOLECYSTECTOMY Right     [place] ARTHRP KNE CONDYLE&PLATU MEDIAL&LAT COMPARTMENTS Left 2017    Procedure: TOTAL KNEE ARTHROPLASTY LEFT;  Surgeon: Sunny Reynoso MD;  Location:  [facility];  Service: Orthopedics    TONSILLECTOMY      TOTAL ABDOMINAL HYSTERECTOMY WITH SALPINGO OOPHORECTOMY Bilateral     TOTAL KNEE ARTHROPLASTY Right 2023    Procedure: TOTAL KNEE ARTHROPLASTY WITH CORI ROBOT - RIGHT;  Surgeon: Sunny Reynoso MD;  Location:  [facility];  Service: Robotics - Ortho;  Laterality: Right;    TUBAL ABDOMINAL LIGATION      VENTRAL HERNIA REPAIR N/A 2016    Procedure: LAPAROSCOPIC REPAIR OF ABDOMINAL WALL HERNIA WITH MESH, CONVERTED TO OPEN ;  Surgeon: Bretha Delacruz MD;  Location:  [facility] OR;  Service:      Family History   Problem Relation Age of Onset    Hypertension Mother     Diabetes Mother     Heart attack Mother 59         from MI    Cancer Father     Aneurysm Father 57    Hepatitis Brother         Hep B    Kidney disease Brother 58    Alzheimer's disease Maternal Grandmother     Pneumonia Maternal Grandmother     Parkinsonism Maternal Grandmother         Along w/Alzheimer’s    Leukemia Maternal Grandfather     No Known Problems Paternal Grandfather     No Known Problems Daughter     No Known Problems Son     Ovarian cancer Neg Hx     Breast cancer Neg Hx      GI Family History  Colon Polyps  Social History     Tobacco Use   Smoking Status Former    Current packs/day: 0.00    Average packs/day: 1.5 packs/day for 35.0 years (52.5 ttl pk-yrs)    Types: Cigarettes    Start date: 10/1/1967    Quit date: 1986    Years since quittin.9   Smokeless Tobacco Never     Social History     Substance and Sexual Activity   Alcohol Use No      Social History     Substance and Sexual Activity   Drug Use Never        ------  Review of systems:    Please refer to review of systems and pertinent positives in the history of present illness, all other groups tested are negative.    Vital Signs   Temp:  [98 °F (36.7 °C)] 98 °F (36.7 °C)  Heart Rate:  [68] 68  Resp:  [16] 16  BP: (146)/(78) 146/78    Physical Exam:   General Appearance: Alert, in no acute distress  Head: Normocephalic, without obvious abnormality, atraumatic  Eyes: Lids and lashes normal, conjunctivae and sclerae normal, no icterus, no pallor  Ears: Ears appear intact with no abnormalities noted  Neck: No adenopathy, supple, trachea midline, no thyromegaly, no JVD  Lungs: respirations regular, even and unlabored Heart: Regular rhythm and normal rate  Chest Wall: Symmetrical respiratory expansion  Abdomen: No masses, no organomegaly, soft nontender, nondistended, no guarding, no rebound tenderness  Extremities:  Moves all extremities well, no edema, no cyanosis, no redness  Skin: No bleeding, bruising or rash  Neurologic: Cranial nerves 2 - 12 grossly intact, no focal deficits       LABS:   Lab Results (last 48 hours)       Procedure Component Value Units Date/Time    Potassium [[id number]]  (Normal) Collected: 06/05/24 1249    Specimen: Blood Updated: 06/05/24 1309     Potassium 4.6 mmol/L      Comment: Slight hemolysis detected by analyzer. Result may be falsely elevated.             RADIOLOGY:  Imaging Results (Last 24 Hours)       ** No results found for the last 24 hours. **            Assessment & Plan       Morbid obesity with body mass index (BMI) of 50.0 to 59.9 in adult      Impressions and plan #1 history of colon polyps and family history of colon polyps: We will plan a colonoscopy to evaluate and remove any polyps that may be present.  Her last colonoscopy had some prep issues but was done on 9/17/2018.    I discussed the patient's findings and my recommendations with patient and family    uJve Chung MD  06/05/24  14:13 EDT

## 2024-06-05 NOTE — ANESTHESIA PREPROCEDURE EVALUATION
Anesthesia Evaluation     Patient summary reviewed and Nursing notes reviewed   no history of anesthetic complications:   NPO Solid Status: > 8 hours  NPO Liquid Status: > 2 hours           Airway   Mallampati: II  TM distance: >3 FB  Neck ROM: full  No difficulty expected  Dental - normal exam     Pulmonary - negative pulmonary ROS and normal exam    breath sounds clear to auscultation  Cardiovascular - normal exam    ECG reviewed  Rhythm: regular  Rate: normal    (+) hypertension, dysrhythmias (RBBB)    ROS comment: 2023 Stress Echo:  ·  Myocardial perfusion imaging indicates a normal myocardial perfusion study with no evidence of ischemia.  ·  Left ventricular ejection fraction is hyperdynamic (Calculated EF > 70%).  ·  No obvious coronary calcification noted on CT attenuation correction images.  ·  Impressions are consistent with a low risk study.      Neuro/Psych- negative ROS  GI/Hepatic/Renal/Endo    (+) morbid obesity, GERD, GI bleeding     Musculoskeletal     Abdominal    Substance History      OB/GYN          Other   arthritis,                 Anesthesia Plan    ASA 3     general     intravenous induction     Anesthetic plan, risks, benefits, and alternatives have been provided, discussed and informed consent has been obtained with: patient.    Plan discussed with CRNA.    CODE STATUS:

## 2024-06-05 NOTE — OP NOTE
Colonoscopy with polypectomy and biopsy and clipping      Instrument: Adult video colonoscope      Medications: As per anesthesia      Preop diagnosis: History of polyps      Postop diagnosis: Colon polyps, diverticulosis      Indications: The patient is a 73-year-old with a history of colon polyps and a family history of colon polyps in her daughter.        Procedure: After the patient was informed of the risks, benefits, alternatives to the procedure, informed consent was obtained.  The endoscope was inserted into the rectum advanced to the cecum.  The cecum identified by appendiceal orifice ileocecal valve.  Just at the edge of the cecum was a adenomatous appearing polyp.  This was about 11 mm in size.  It was removed in a piecemeal fashion with a cold snare.  1 clip was placed.  There was no significant bleeding at the conclusion.  There was diverticuli in the right colon and transverse colon.  In the transverse colon was a 5 mm polyp cold snared and removed.  Post polypectomy site looked good.  There was diverticuli in the descending colon and sigmoid colon.  Retroflexion was performed in the rectum.  There was a 13-minute withdrawal time.  There was some stool in areas but a reasonable look was thought to have been achieved.  There was no significant bleeding.  The patient tolerated the procedure well and there were no immediate complications.      Impressions and plan #1 cecal polyp removed and clipped    #2 transverse colon polyp removed    #3 pandiverticulosis    Plan follow-up pathology and will discuss reevaluation when the pathology returns.        CC Comfort Colunga MD

## 2024-06-05 NOTE — PERIOPERATIVE NURSING NOTE
Pt recovered. Vss. Aox4. Dr. Chung spoke with patient, report received and in patient chart. Discharge instructions given to pt spouse, questions answered and understanding verbalized. Pt tolerating clear liquids, denies nausea, and pain. Pt has met discharge criteria.

## 2024-06-05 NOTE — ANESTHESIA POSTPROCEDURE EVALUATION
Patient: Imelda Elaine    Procedure Summary       Date: 06/05/24 Room / Location:  [facility]    Anesthesia Start: 1412 Anesthesia Stop: 1451    Procedure: COLONOSCOPY WITH POSSIBLE POLYPECTOMY, BIOPSY, AND CONTROL OF GI BLEEDING Diagnosis:       BMI 60.0-69.9, adult      Special screening for malignant neoplasms, colon      (BMI 60.0-69.9, adult [Z68.44])      (Special screening for malignant neoplasms, colon [Z12.11])    Surgeons: Juve Chung MD Provider: Jesus Polanco MD    Anesthesia Type: general ASA Status: 3            Anesthesia Type: general    Vitals          Post Anesthesia Care and Evaluation    Patient location during evaluation: PACU  Patient participation: complete - patient participated  Level of consciousness: awake and alert  Pain score: 0  Pain management: adequate    Airway patency: patent  Anesthetic complications: No anesthetic complications  PONV Status: none  Cardiovascular status: hemodynamically stable and acceptable  Respiratory status: nonlabored ventilation and acceptable  Hydration status: acceptable

## 2024-06-07 LAB
CYTO UR: NORMAL
LAB AP CASE REPORT: NORMAL
LAB AP CLINICAL INFORMATION: NORMAL
PATH REPORT.FINAL DX SPEC: NORMAL
PATH REPORT.GROSS SPEC: NORMAL

## 2024-10-30 ENCOUNTER — HOSPITAL ENCOUNTER (OUTPATIENT)
Dept: GENERAL RADIOLOGY | Facility: HOSPITAL | Age: 74
Discharge: HOME | End: 2024-10-30
Admitting: INTERNAL MEDICINE
Payer: MEDICARE

## 2024-10-30 ENCOUNTER — TRANSCRIBE ORDERS (OUTPATIENT)
Dept: GENERAL RADIOLOGY | Facility: HOSPITAL | Age: 74
End: 2024-10-30
Payer: MEDICARE

## 2024-10-30 DIAGNOSIS — R05.3 PERSISTENT COUGH FOR 3 WEEKS OR LONGER: Primary | ICD-10-CM

## 2024-10-30 DIAGNOSIS — R05.3 PERSISTENT COUGH FOR 3 WEEKS OR LONGER: ICD-10-CM

## 2024-10-30 PROCEDURE — 71046 X-RAY EXAM CHEST 2 VIEWS: CPT

## 2024-11-06 ENCOUNTER — TRANSCRIBE ORDERS (OUTPATIENT)
Dept: ADMINISTRATIVE | Facility: HOSPITAL | Age: 74
End: 2024-11-06
Payer: MEDICARE

## 2024-11-06 DIAGNOSIS — R91.1 LUNG NODULE: Primary | ICD-10-CM

## 2024-11-16 ENCOUNTER — HOSPITAL ENCOUNTER (OUTPATIENT)
Facility: HOSPITAL | Age: 74
Discharge: HOME | End: 2024-11-16
Payer: MEDICARE

## 2024-11-16 DIAGNOSIS — R91.1 LUNG NODULE: ICD-10-CM

## 2024-11-16 PROCEDURE — 25510000001 IOPAMIDOL 61 % SOLUTION: Performed by: INTERNAL MEDICINE

## 2024-11-16 PROCEDURE — 71260 CT THORAX DX C+: CPT

## 2024-11-16 RX ORDER — IOPAMIDOL 612 MG/ML
100 INJECTION, SOLUTION INTRAVASCULAR
Status: COMPLETED | OUTPATIENT
Start: 2024-11-16 | End: 2024-11-16

## 2024-11-16 RX ADMIN — IOPAMIDOL 90 ML: 612 INJECTION, SOLUTION INTRAVENOUS at 11:07

## 2024-12-03 ENCOUNTER — PATIENT OUTREACH (OUTPATIENT)
Dept: ONCOLOGY | Facility: CLINIC | Age: 74
End: 2024-12-03
Payer: MEDICARE

## 2024-12-03 ENCOUNTER — OFFICE VISIT (OUTPATIENT)
Dept: PULMONOLOGY | Facility: CLINIC | Age: 74
End: 2024-12-03
Payer: MEDICARE

## 2024-12-03 VITALS
OXYGEN SATURATION: 96 % | SYSTOLIC BLOOD PRESSURE: 122 MMHG | DIASTOLIC BLOOD PRESSURE: 80 MMHG | HEIGHT: 62 IN | HEART RATE: 67 BPM | BODY MASS INDEX: 61.82 KG/M2 | TEMPERATURE: 97.9 F

## 2024-12-03 DIAGNOSIS — E66.01 MORBID OBESITY WITH BMI OF 60.0-69.9, ADULT: Primary | ICD-10-CM

## 2024-12-03 PROCEDURE — 99205 OFFICE O/P NEW HI 60 MIN: CPT | Performed by: INTERNAL MEDICINE

## 2024-12-03 PROCEDURE — 3079F DIAST BP 80-89 MM HG: CPT | Performed by: INTERNAL MEDICINE

## 2024-12-03 PROCEDURE — 3074F SYST BP LT 130 MM HG: CPT | Performed by: INTERNAL MEDICINE

## 2024-12-03 RX ORDER — ALBUTEROL SULFATE 90 UG/1
2 INHALANT RESPIRATORY (INHALATION) EVERY 4 HOURS PRN
COMMUNITY

## 2024-12-03 NOTE — PROGRESS NOTES
Initial Pulmonary Consult Note    Subjective   Reason for consultation/Chief Complaint: Abnormal CT Chest    Imelda Elaine is a 73 y.o. female is being seen for consultation today at the request of Juve Chung MD    History of Present Illness    Ms. Elaine is a 74yo F who is referred for an abnormal CT scan of the chest. She had a chest xray performed secondary to cough which showed a new 1.9cm indeterminate nodular density in the retrocardiac region of the lateral view and a CT chest was recommended. CT chest was performed on 11/16/24 and showed no pulmonary nodule. A round benign appearing cyst was seen posterior to the trachea and along the right lateral margin of the mid-esophagus. 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     She also complains of a chronic cough.     Active Ambulatory Problems     Diagnosis Date Noted    Palpitations 08/15/2017    Essential hypertension 09/07/2017    Mixed hyperlipidemia 11/16/2017    Prediabetes 11/16/2017    Osteoarthritis of left knee 11/16/2017    Anxiety and depression 06/17/2021    BPV (benign positional vertigo) 06/17/2021    Elevated hemoglobin A1c 06/17/2021    Gastroesophageal reflux disease without esophagitis 06/17/2021    Osteopenia 06/17/2021    Tremor, essential 06/17/2021    RBBB 06/06/2023    Status post right knee replacement 06/29/2023    S/P I&D right knee with poly exchange 7/25/23 07/26/2023    Morbid obesity with body mass index (BMI) of 50.0 to 59.9 in adult 02/13/2024     Resolved Ambulatory Problems     Diagnosis Date Noted    Incisional hernia 06/21/2016    Post-menopausal 02/14/2017    Enterocele 02/14/2017    Morbid obesity 02/14/2017    Status post abdominal hysterectomy 02/15/2017    S/P total knee arthroplasty, left 11/17/2017    Leukocytosis, mild, likely reactive 11/17/2017    Rectocele 01/02/2018    Bladder prolapse, female, acquired 06/25/2016    Suspected sleep apnea  "03/20/2018    Hypersomnia 03/20/2018    Snoring 03/20/2018    Abnormal EKG 10/14/2020    Shortness of breath 12/03/2020    Cardiomegaly 06/17/2021    Nonrheumatic mitral (valve) insufficiency 06/17/2021    Non-rheumatic tricuspid valve insufficiency 06/17/2021    Other nonrheumatic aortic valve disorders 06/17/2021    Peripheral vascular disease 06/17/2021    Pure hypercholesterolemia 06/17/2021    S/P total knee arthroplasty, left 06/17/2021    NDIAYE (dyspnea on exertion) 06/17/2021    Shortness of breath on exertion 06/17/2021    Body mass index 40.0-44.9, adult 06/17/2021    Abnormal blood level of copper 11/16/2021    Pre-operative clearance 06/06/2023    Encounter for pre-operative cardiovascular clearance 06/06/2023    PSVT (paroxysmal supraventricular tachycardia) 06/30/2023    Postoperative wound infection, right knee 07/24/2023    GI bleed 08/23/2023    GIB (gastrointestinal bleeding) 08/23/2023    Special screening for malignant neoplasms, colon 02/13/2024     Past Medical History:   Diagnosis Date    Anesthesia 2002    Anesthesia complication 2023    Arthritis     Back pain     Depression     Difficulty walking     GERD (gastroesophageal reflux disease)     High cholesterol     Hypertension     Infection 2023    Knee pain     Post-menopause on HRT (hormone replacement therapy)     Seasonal allergies        Past Surgical History:   Procedure Laterality Date    ANTERIOR AND POSTERIOR VAGINAL REPAIR N/A 4/17/2017    Vaginal enterocele / anterior posterior colporrhaphy;  Jose Maria Chowdhury MD;  Location:  [facility];  Service:     BLADDER SUSPENSION  1999    MESH TVT?     COLONOSCOPY  2018    Dr Chung - normal    COLONOSCOPY N/A 6/5/2024    Procedure: COLONOSCOPY WITH POLYPECTOMY AND BIOPSY AND CLIPPING;  Surgeon: Juve Chung MD;  Location:  [facility];  Service: Gastroenterology;  Laterality: N/A;    EXPLORATORY LAPAROTOMY  2010    \"BOWEL INTO PELVIC BONE\" /Mesh [place]     EXPLORATORY LAPAROTOMY  " 2004    AQUILES/ abd sacrocolpopexy/post repair    FOOT SURGERY Left     KNEE ARTHROSCOPY Left     meniscus repair    KNEE INCISION AND DRAINAGE Right 2023    Procedure: KNEE INCISION AND DRAINAGE WITH POLY EXCHANGE - RIGHT;  Surgeon: Sunny Reynoso MD;  Location:  [facility];  Service: Orthopedics;  Laterality: Right;    LAPAROSCOPIC CHOLECYSTECTOMY Right     MD ARTHRP KNE CONDYLE&PLATU MEDIAL&LAT COMPARTMENTS Left 2017    Procedure: TOTAL KNEE ARTHROPLASTY LEFT;  Surgeon: Sunny Reynoso MD;  Location:  [facility];  Service: Orthopedics    TONSILLECTOMY      TOTAL ABDOMINAL HYSTERECTOMY WITH SALPINGO OOPHORECTOMY Bilateral     TOTAL KNEE ARTHROPLASTY Right 2023    Procedure: TOTAL KNEE ARTHROPLASTY WITH CORI ROBOT - RIGHT;  Surgeon: Sunny Reynoso MD;  Location:  [facility];  Service: Robotics - Ortho;  Laterality: Right;    TUBAL ABDOMINAL LIGATION      VENTRAL HERNIA REPAIR N/A 2016    Procedure: LAPAROSCOPIC REPAIR OF ABDOMINAL WALL HERNIA WITH MESH, CONVERTED TO OPEN ;  Surgeon: Bertha Delacruz MD;  Location:  [facility];  Service:        Family History   Problem Relation Age of Onset    Hypertension Mother     Diabetes Mother     Heart attack Mother 59         from MI    Cancer Father     Aneurysm Father 57    Hepatitis Brother         Hep B    Kidney disease Brother 58    Alzheimer's disease Maternal Grandmother     Pneumonia Maternal Grandmother     Parkinsonism Maternal Grandmother         Along w/Alzheimer’s    Leukemia Maternal Grandfather     No Known Problems Paternal Grandfather     No Known Problems Daughter     No Known Problems Son     Ovarian cancer Neg Hx     Breast cancer Neg Hx        Social History     Socioeconomic History    Marital status:    Tobacco Use    Smoking status: Former     Current packs/day: 0.00     Average packs/day: 1.5 packs/day for 35.0 years (52.5 ttl pk-yrs)     Types: Cigarettes     Start date: 10/1/1967      "Quit date: 1986     Years since quittin.4    Smokeless tobacco: Never   Vaping Use    Vaping status: Never Used   Substance and Sexual Activity    Alcohol use: No    Drug use: Never    Sexual activity: Defer     Partners: Male     Birth control/protection: Surgical, Abstinence, Post-menopausal, Tubal ligation, Hysterectomy     Comment: Total hysterectomy       Allergies   Allergen Reactions    Primidone Other (See Comments)     Hair loss    Topiramate Diarrhea    Tuberculin Tests Swelling and Rash     Pt has chest xrays to check     Zostavax [Zoster Vaccine Live] Swelling and Rash     \"Swelling of injection site of arm\"       Current Outpatient Medications   Medication Sig Dispense Refill    albuterol sulfate  (90 Base) MCG/ACT inhaler Inhale 2 puffs Every 4 (Four) Hours As Needed for Wheezing.      atorvastatin (LIPITOR) 10 MG tablet Take 1 tablet by mouth Every Night. 90 tablet 3    cetirizine (zyrTEC) 10 MG tablet Take 1 tablet by mouth Daily. Indications: Hayfever      estradiol (ESTRACE) 0.5 MG tablet Take 1 tablet by mouth Daily. 90 tablet 3    ipratropium (ATROVENT) 0.06 % nasal spray Administer 1 spray into the nostril(s) as directed by provider As Needed for Rhinitis. Indications: Hayfever      ketoconazole (NIZORAL) 2 % shampoo Apply 1 Application topically to the appropriate area as directed 2 (Two) Times a Week.      lansoprazole (Prevacid) 30 MG capsule Take 1 capsule by mouth Daily.      losartan (COZAAR) 100 MG tablet Take 1 tablet by mouth Daily. 90 tablet 3    multivitamin with minerals tablet tablet Take 1 tablet by mouth Daily.      nebivolol (BYSTOLIC) 10 MG tablet Take 1 tablet by mouth Daily. 90 tablet 3    venlafaxine XR (EFFEXOR-XR) 75 MG 24 hr capsule Take 1 capsule by mouth Daily.       No current facility-administered medications for this visit.       Review of Systems  All other systems were reviewed and are negative.  Exceptions are included in the HPI or as otherwise " "noted above.     Objective   Blood pressure 122/80, pulse 67, temperature 97.9 °F (36.6 °C), height 157.5 cm (62\"), SpO2 96%, not currently breastfeeding.  Physical Exam  Constitutional:       General: She is not in acute distress.     Appearance: She is well-developed. She is obese.   HENT:      Head: Normocephalic and atraumatic.      Right Ear: External ear normal.      Left Ear: External ear normal.      Nose: Nose normal.   Eyes:      Pupils: Pupils are equal, round, and reactive to light.   Neck:      Trachea: No tracheal deviation.   Pulmonary:      Effort: Pulmonary effort is normal. No respiratory distress.   Musculoskeletal:         General: Normal range of motion.      Cervical back: Normal range of motion and neck supple.   Skin:     General: Skin is warm.      Findings: No erythema or rash.      Nails: There is no clubbing.   Neurological:      Mental Status: She is alert and oriented to person, place, and time.   Psychiatric:         Behavior: Behavior normal.         Thought Content: Thought content normal.         Judgment: Judgment normal.         PFTs:  No PFTs available.     Imaging:  CT chest from 11/16/24 was reviewed and showed no pulmonary nodule. A round benign appearing cyst was seen posterior to the trachea and along the right lateral margin of the mid-esophagus. 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     Assessment & Plan   Diagnoses and all orders for this visit:    1. Morbid obesity with BMI of 60.0-69.9, adult (Primary)  -     Ambulatory Referral to Bariatric Surgery        Discussion:  Ms. Elaine is a 72yo F who is referred for an abnormal CT scan of the chest.     1. Abnormal CT Chest  - There is a round benign appearing cyst posterior to the trache which was also present on CT chest from 2020 and is unchanged. 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     2. Chronic Cough  - She had a complete pulmonary workup a few years ago which was negative.   - I suspect that her cough is mostly related to a hiatal hernia and resultant GERD. We discussed that she should continue ot sleep upright, avoid eating within 2-3 hours of bedtime and weight loss. I have also given her a handout on GERD.     3. Obesity  - This is likely contributing to a lot of her chronic symptoms.   - She is interested in medical weight loss.   - I have placed a referral to Bariatric Surgery/Weight loss with [facility].     She may follow up with us as needed.          Imelda Elaine  reports that she quit smoking about 38 years ago. Her smoking use included cigarettes. She started smoking about 57 years ago. She has a 52.5 pack-year smoking history. She has never used smokeless tobacco.       I have spent 61 minutes reviewing the patient record, face to face with the patient in discussion of test results and plans for further management and in documentation and coordination of care. Excluding time spent on other separate services such as performing procedures or test interpretation, if applicable.        Aurora V Case, DO  Pulmonary and Critical Care Medicine  Note Electronically Signed

## 2024-12-05 ENCOUNTER — PATIENT ROUNDING (BHMG ONLY) (OUTPATIENT)
Dept: PULMONOLOGY | Facility: CLINIC | Age: 74
End: 2024-12-05
Payer: MEDICARE

## 2024-12-05 NOTE — PROGRESS NOTES
December 5, 2024    Hello, may I speak with Imelda Elaine?    My name is PARVEEN      I am  with [facility]  [address] [facility] [address] [phone].    Before we get started may I verify your date of birth? 1950    I am calling to officially welcome you to our practice and ask about your recent visit. Is this a good time to talk? yes    Tell me about your visit with us. What things went well?  PATIENT WAS HIGHLY SATISFIED WITH HER VISIT ON TUESDAY. SHE LOVED THE NEW FACILITY COMPARED TO THE OLD LOCATION. SHE FELT IT WAS VERY COMFORTABLE AND CLEAN. SHE STATED THAT THE LOBBY WAS COLD BUT BEARABLE WITH HEAVY WINTER COAT ON. SHE FOUND THE CHECK IN/OUT PROCESS TO BE VERY FAST AND EASY. SHE HAD UPDATED HER MYCHART INFORMATION BEFORE COMING IN TO MAKE THE PROCESS FASTER. SHE WAS IMPRESSED WITH HOW LITTLE SHE HAS TO WAIT BEFORE GETTING ESCORTED TO A ROOM. THE  STAFF WERE FRIENDLY AND PROFESSIONAL. SHE WAS IMPRESSED WITH THE MEDICAL STAFF IN THE BACK AND THOUGHT THEY WERE VERY INFORMATIVE AND KIND. PATIENT LOVED  AND WAS VERY PLEASED WITH HER VISIT. SHE FELT THAT  FULLY LISTENED TO HER CONCERNS AND PROVIDED GREAT FEEDBACK. SHE ALSO LIKED HER PERSONALITY. OVERALL, PATIENT SAYS SHE HAD A GREAT VISIT AND LOOKS FORWARD TO RETURNING.       We're always looking for ways to make our patients' experiences even better. Do you have recommendations on ways we may improve?  no    Overall were you satisfied with your first visit to our practice? yes       I appreciate you taking the time to speak with me today. Is there anything else I can do for you? YES. SHE WAS CURIOUS ON THE STATUS OF BARIATRIC REFERRAL. I INFORMED HER THAT IT WAS SUBMITTED BY  AND PENDING REVIEW.        Thank you, and have a great day.

## 2025-02-25 DIAGNOSIS — E78.2 MIXED HYPERLIPIDEMIA: ICD-10-CM

## 2025-02-25 RX ORDER — NEBIVOLOL 10 MG/1
10 TABLET ORAL DAILY
Qty: 90 TABLET | Refills: 3 | Status: SHIPPED | OUTPATIENT
Start: 2025-02-25

## 2025-02-25 RX ORDER — ATORVASTATIN CALCIUM 10 MG/1
10 TABLET, FILM COATED ORAL NIGHTLY
Qty: 90 TABLET | Refills: 0 | Status: SHIPPED | OUTPATIENT
Start: 2025-02-25

## 2025-02-25 NOTE — TELEPHONE ENCOUNTER
Caller: Neymar Elainemartinez WILDER    Relationship: Self    Best call back number:[phone]    Requested Prescriptions:   Requested Prescriptions     Pending Prescriptions Disp Refills    atorvastatin (LIPITOR) 10 MG tablet 90 tablet 3     Sig: Take 1 tablet by mouth Every Night.        Pharmacy where request should be sent: [facility] [id number] [address] [facility] [phone] [facility] [phone] FX     Last office visit with prescribing clinician: 8/29/2023   Last telemedicine visit with prescribing clinician: Visit date not found   Next office visit with prescribing clinician: 6/24/2025     Additional details provided by patient:     Does the patient have less than a 3 day supply:  [x] Yes  [] No    Would you like a call back once the refill request has been completed: [x] Yes [] No    If the office needs to give you a call back, can they leave a voicemail: [x] Yes [] No    Ortiz Maldonado Rep   02/25/25 08:41 EST

## 2025-03-27 ENCOUNTER — TRANSCRIBE ORDERS (OUTPATIENT)
Dept: ADMINISTRATIVE | Facility: HOSPITAL | Age: 75
End: 2025-03-27
Payer: MEDICARE

## 2025-03-27 DIAGNOSIS — Z12.31 SCREENING MAMMOGRAM FOR BREAST CANCER: Primary | ICD-10-CM

## 2025-04-05 LAB
NCCN CRITERIA FLAG: ABNORMAL
TYRER CUZICK SCORE: 1.8

## 2025-04-08 ENCOUNTER — RESULTS FOLLOW-UP (OUTPATIENT)
Facility: HOSPITAL | Age: 75
End: 2025-04-08
Payer: MEDICARE

## 2025-04-10 ENCOUNTER — HOSPITAL ENCOUNTER (OUTPATIENT)
Facility: HOSPITAL | Age: 75
Discharge: HOME | End: 2025-04-10
Admitting: INTERNAL MEDICINE
Payer: MEDICARE

## 2025-04-10 DIAGNOSIS — Z12.31 SCREENING MAMMOGRAM FOR BREAST CANCER: ICD-10-CM

## 2025-04-10 PROCEDURE — 77063 BREAST TOMOSYNTHESIS BI: CPT

## 2025-04-10 PROCEDURE — 77067 SCR MAMMO BI INCL CAD: CPT

## 2025-04-18 ENCOUNTER — DOCUMENTATION (OUTPATIENT)
Dept: GENETICS | Facility: HOSPITAL | Age: 75
End: 2025-04-18
Payer: MEDICARE

## 2025-05-29 ENCOUNTER — TELEPHONE (OUTPATIENT)
Dept: OBSTETRICS AND GYNECOLOGY | Facility: CLINIC | Age: 75
End: 2025-05-29
Payer: MEDICARE

## 2025-05-29 DIAGNOSIS — Z79.890 POSTMENOPAUSAL HORMONE REPLACEMENT THERAPY: ICD-10-CM

## 2025-05-29 RX ORDER — ESTRADIOL 0.5 MG/1
0.5 TABLET ORAL DAILY
Qty: 30 TABLET | Refills: 0 | Status: SHIPPED | OUTPATIENT
Start: 2025-05-29

## 2025-05-29 NOTE — TELEPHONE ENCOUNTER
One time refill sent to listed pharmacy to cover her until her next appointment.     Thanks,  Sara Hoyt MD

## 2025-06-08 NOTE — PROGRESS NOTES
"Subjective   Chief Complaint   Patient presents with    Gynecologic Exam     Annual.     Imelda Elaine is a 74 y.o. year old  who is post-menopausal.  She is S/P BENEDICTO, BSO, and A&P repair, presenting to be seen for her annual exam. She is doing well, and has no GYN complaints. She has been on HRT for about 35 years, and has tried multiple times to come off of them in the past, but could never tolerate the side effects.     This past year she has been on hormone replacement therapy.  There has not been vaginal bleeding in the last 12 months.  Menopausal symptoms are not present.    Up to date on colonoscopy and mammogram   Per patient, reports having a normal DEXA last year at [facility]    SEXUAL Hx:  She is not currently sexually active.  In the past year there there has been NO new sexual partners.    Condoms are never used.  She would not like to be screened for STD's at today's exam.  [place] is painful: n/a  HEALTH Hx:  She exercises regularly: no (and has no plans to become more active).  She has concerns about domestic violence: no.    The following portions of the patient's history were reviewed and updated as appropriate:problem list, current medications, allergies, past family history, past medical history, past social history, and past surgical history.    Social History    Tobacco Use      Smoking status: Former        Packs/day: 0.00        Years: 1.5 packs/day for 35.0 years (52.5 ttl pk-yrs)        Types: Cigarettes        Start date: 10/1/1967        Quit date: 1986        Years since quittin.0      Smokeless tobacco: Never         Objective   /84 (BP Location: Left arm, Patient Position: Sitting, Cuff Size: Large Adult)   Ht 157.5 cm (62.01\")   Wt (!) 158 kg (348 lb 12.8 oz)   LMP  (LMP Unknown)   Breastfeeding No   BMI 63.78 kg/m²     General:  well developed; well nourished  no acute distress  mentation appropriate  obese - Body mass index is 63.78 kg/m².   Breasts:  " Examined in supine position  Symmetric without masses or skin dimpling  Nipples normal without inversion, lesions or discharge  There are no palpable axillary nodes  Fibrocystic changes are present both breasts without a discrete mass   Pelvis: Exam limited by  body habitus  Clinical staff was present for exam  External genitalia:  normal appearance of the external genitalia including Bartholin's and [place]'s glands.  :  urethral meatus normal; urethra normal:  Vaginal:  atrophic mucosal changes are present;  Cervix:  absent.  Uterus:  absent.  Adnexa:  absent, bilateral.  Rectal:  digital rectal exam not performed; anus visually normal appearing.  Stage 1-2 posterior vaginal prolapse, asymptomatic        Assessment   Annual GYN exam s/p hysterectomy   Menopausal female currently on HRT - without significant symptoms affecting activities of daily living  She is up to date on all relevant gynecologic and colorectal screenings     Plan   Pap was not done today.  I explained to Imelda that the Pap smears are no longer recommended in patient's after hysterectomy.   I stressed to Imelda that she still should be seen to be seen yearly for a full physical including breast and pelvic exam.   She was encouraged to get yearly mammograms.  She should report any palpable breast lump(s) or skin changes regardless of mammographic findings.  I explained to Imelda that notification regarding her mammogram results will come from the center performing the study.  Our office will not be routinely calling with mammogram results.  It is her responsibility to make sure that the results from the mammogram are communicated to her by the breast center.  If she has any questions about the results, she is welcome to call our office anytime.  Extensively discussed risks versus benefits of continuing on HRT, and patient denies any contraindications to estrogen containing therapy.  Patient elects to continue on HRT at this time, and  prescription sent to listed pharmacy.  Records request for DEXA scan results sent to A today.   The importance of keeping all planned follow-up and taking all medications as prescribed was emphasized.  Follow up for annual exam in 1 year or sooner PRN      New Medications Ordered This Visit   Medications    estradiol (ESTRACE) 0.5 MG tablet     Sig: Take 1 tablet by mouth Daily.     Dispense:  90 tablet     Refill:  3          This note was electronically signed.    Sara Hoyt MD   June 9, 2025

## 2025-06-09 ENCOUNTER — OFFICE VISIT (OUTPATIENT)
Dept: OBSTETRICS AND GYNECOLOGY | Facility: CLINIC | Age: 75
End: 2025-06-09
Payer: MEDICARE

## 2025-06-09 VITALS
WEIGHT: 293 LBS | DIASTOLIC BLOOD PRESSURE: 84 MMHG | BODY MASS INDEX: 53.92 KG/M2 | SYSTOLIC BLOOD PRESSURE: 132 MMHG | HEIGHT: 62 IN

## 2025-06-09 DIAGNOSIS — Z01.419 WOMEN'S ANNUAL ROUTINE GYNECOLOGICAL EXAMINATION: Primary | ICD-10-CM

## 2025-06-09 DIAGNOSIS — Z79.890 POSTMENOPAUSAL HORMONE REPLACEMENT THERAPY: ICD-10-CM

## 2025-06-09 DIAGNOSIS — Z79.890 HORMONE REPLACEMENT THERAPY (HRT): ICD-10-CM

## 2025-06-09 RX ORDER — ESTRADIOL 0.5 MG/1
0.5 TABLET ORAL DAILY
Qty: 90 TABLET | Refills: 3 | Status: SHIPPED | OUTPATIENT
Start: 2025-06-09

## 2025-06-18 NOTE — PROGRESS NOTES
"    Cardiology Outpatient Visit      Identification: Imelda Elaine is a 74 y.o. female who resides in [place], [place]    Reason for visit:  Palpitations (1-Yr F/U)      Subjective      Patient is a 74-year-old female who returns today for follow-up of her palpitations, right bundle branch block and cardiac risk factors.  Since her last visit she has not had any hospitalizations or new medical diagnoses.  She denies chest pain, dyspnea, orthopnea, palpitations or syncope.  She reports palpitations have been well-controlled on Bystolic.  Her blood pressures are borderline today.  She is due to see her primary care provider and she is going to discuss Wegovy.  She understands she needs to lose weight.    Review of Systems   Constitutional: Negative for malaise/fatigue.   Eyes:  Negative for vision loss in left eye and vision loss in right eye.   Cardiovascular:  Negative for chest pain, dyspnea on exertion, near-syncope, orthopnea, palpitations, paroxysmal nocturnal dyspnea and syncope.   Musculoskeletal:  Negative for myalgias.   Neurological:  Negative for brief paralysis, excessive daytime sleepiness, focal weakness, numbness, paresthesias and weakness.   All other systems reviewed and are negative.      Allergies   Allergen Reactions    Primidone Other (See Comments)     Hair loss    Topiramate Diarrhea    Tuberculin Tests Swelling and Rash     Pt has chest xrays to check     Zostavax [Zoster Vaccine Live] Swelling and Rash     \"Swelling of injection site of arm\"         Current Outpatient Medications   Medication Instructions    amLODIPine (NORVASC) 2.5 mg, Oral, Daily    atorvastatin (LIPITOR) 20 mg, Oral, Nightly    cetirizine (ZYRTEC) 10 mg, Daily    estradiol (ESTRACE) 0.5 mg, Oral, Daily    ipratropium (ATROVENT) 0.06 % nasal spray 1 spray, As Needed    ketoconazole (NIZORAL) 2 % shampoo 1 Application, 2 Times Weekly    lansoprazole (PREVACID) 30 mg, Daily    losartan (COZAAR) 100 mg, Oral, Daily    " "nebivolol (BYSTOLIC) 10 mg, Oral, Daily    venlafaxine XR (EFFEXOR-XR) 75 mg, Daily         Objective     /84 (BP Location: Right arm, Patient Position: Sitting, Cuff Size: Large Adult)   Pulse 76   Ht 154.9 cm (61\")   Wt (!) 159 kg (351 lb 6.4 oz)   SpO2 95%   BMI 66.40 kg/m²       Constitutional:       Appearance: Healthy appearance. Well-developed.   Eyes:      General: Lids are normal. No scleral icterus.     Conjunctiva/sclera: Conjunctivae normal.   HENT:      Head: Normocephalic and atraumatic.   Neck:      Thyroid: No thyromegaly.      Vascular: No carotid bruit or JVD.   Pulmonary:      Effort: Pulmonary effort is normal.      Breath sounds: Normal breath sounds. No wheezing. No rhonchi. No rales.   Cardiovascular:      Normal rate. Regular rhythm.      Murmurs: There is no murmur.      No gallop.  No rub.   Pulses:     Intact distal pulses.   Edema:     Peripheral edema absent.   Abdominal:      General: There is no distension.      Palpations: Abdomen is soft. There is no abdominal mass.   Musculoskeletal:      Cervical back: Normal range of motion. Skin:     General: Skin is warm and dry.      Findings: No rash.   Neurological:      General: No focal deficit present.      Mental Status: Alert and oriented to person, place, and time.      Gait: Gait is intact.   Psychiatric:         Attention and Perception: Attention normal.         Mood and Affect: Mood normal.         Behavior: Behavior normal.         Result Review  (reviewed with patient):            Lab Results   Component Value Date    GLUCOSE 118 (H) 09/05/2023    BUN 18 09/05/2023    CREATININE 0.90 09/05/2023     09/05/2023    K 4.6 06/05/2024     09/05/2023    CALCIUM 9.2 09/05/2023    PROTEINTOT 7.1 09/05/2023    ALBUMIN 3.5 09/05/2023    ALT 26 09/05/2023    AST 37 (H) 09/05/2023    ALKPHOS 122 (H) 09/05/2023    BILITOT 0.3 09/05/2023    GLOB 3.6 09/05/2023    AGRATIO 1.0 09/05/2023    BCR 20.0 09/05/2023    ANIONGAP " "12.0 09/05/2023    EGFR 68.5 09/05/2023     Lab Results   Component Value Date    WBC 6.41 05/29/2024    HGB 12.2 05/29/2024    HCT 39.7 05/29/2024    MCV 89.0 05/29/2024     05/29/2024     No results found for: \"CHOL\", \"CHLPL\", \"TRIG\", \"HDL\", \"LDL\", \"LDLDIRECT\"  Lab Results   Component Value Date    HGBA1C 5.90 (H) 06/15/2023           Assessment     Diagnoses and all orders for this visit:    1. Palpitations (Primary)  Overview:  Echo (8/8/2017): Normal LVEF.  Grade 1 diastolic dysfunction.  Mild MR and TR.  10 day event monitor (7/27/2017): Normal.  Echo (11/12/2020): LVEF 62%.RVSP < 35 mmHg. No significant structural or functional valvular disease.    Assessment & Plan:  Well-controlled on Bystolic      2. Essential hypertension  Overview:  Target blood pressure <130/80 mmHg    Assessment & Plan:  Blood pressures not well-controlled  Start amlodipine 2.5 mg daily      3. RBBB  Assessment & Plan:  No symptoms of high degree AV block      4. Mixed hyperlipidemia  Assessment & Plan:  Increase Lipitor to 20 mg daily as last LDL was elevated at 106    Orders:  -     atorvastatin (LIPITOR) 20 MG tablet; Take 1 tablet by mouth Every Night.  Dispense: 90 tablet; Refill: 1    Other orders  -     amLODIPine (NORVASC) 2.5 MG tablet; Take 1 tablet by mouth Daily.  Dispense: 90 tablet; Refill: 1          Plan   Start amlodipine 2.5 mg daily to achieve blood pressure less than 130/80  Increase Lipitor to 20 mg daily as most recent LDL was 106  Continue other medications as prescribed  Follow-up with PCP.  Patient would benefit from addition of semaglutide for cardiovascular risk reduction      Follow-up   Return in about 1 year (around 6/24/2026), or if symptoms worsen or fail to improve, for Follow-up with Dr. Kay next visit.    Florinda Lopez, APRN  6/24/2025  "

## 2025-06-24 ENCOUNTER — OFFICE VISIT (OUTPATIENT)
Dept: CARDIOLOGY | Facility: CLINIC | Age: 75
End: 2025-06-24
Payer: MEDICARE

## 2025-06-24 VITALS
SYSTOLIC BLOOD PRESSURE: 134 MMHG | BODY MASS INDEX: 55.32 KG/M2 | HEIGHT: 61 IN | WEIGHT: 293 LBS | DIASTOLIC BLOOD PRESSURE: 84 MMHG | HEART RATE: 76 BPM | OXYGEN SATURATION: 95 %

## 2025-06-24 DIAGNOSIS — E78.2 MIXED HYPERLIPIDEMIA: ICD-10-CM

## 2025-06-24 DIAGNOSIS — I10 ESSENTIAL HYPERTENSION: ICD-10-CM

## 2025-06-24 DIAGNOSIS — R00.2 PALPITATIONS: Primary | ICD-10-CM

## 2025-06-24 DIAGNOSIS — I45.10 RBBB: ICD-10-CM

## 2025-06-24 PROCEDURE — 1160F RVW MEDS BY RX/DR IN RCRD: CPT | Performed by: NURSE PRACTITIONER

## 2025-06-24 PROCEDURE — 99214 OFFICE O/P EST MOD 30 MIN: CPT | Performed by: NURSE PRACTITIONER

## 2025-06-24 PROCEDURE — 3079F DIAST BP 80-89 MM HG: CPT | Performed by: NURSE PRACTITIONER

## 2025-06-24 PROCEDURE — 3075F SYST BP GE 130 - 139MM HG: CPT | Performed by: NURSE PRACTITIONER

## 2025-06-24 PROCEDURE — 1159F MED LIST DOCD IN RCRD: CPT | Performed by: NURSE PRACTITIONER

## 2025-06-24 RX ORDER — ATORVASTATIN CALCIUM 20 MG/1
20 TABLET, FILM COATED ORAL NIGHTLY
Qty: 90 TABLET | Refills: 1 | Status: SHIPPED | OUTPATIENT
Start: 2025-06-24

## 2025-06-24 RX ORDER — AMLODIPINE BESYLATE 2.5 MG/1
2.5 TABLET ORAL DAILY
Qty: 90 TABLET | Refills: 1 | Status: SHIPPED | OUTPATIENT
Start: 2025-06-24

## 2025-07-21 ENCOUNTER — TELEPHONE (OUTPATIENT)
Dept: CARDIOLOGY | Facility: CLINIC | Age: 75
End: 2025-07-21
Payer: MEDICARE

## 2025-07-21 NOTE — TELEPHONE ENCOUNTER
Patient reports PCP told her to call our office to get Wegovy approved. Did you want me to try and send to Pending sale to [facility] pharmacy?

## 2025-07-22 NOTE — TELEPHONE ENCOUNTER
Left detailed message for the patient. Advised that her insurance did not cover GLP-1 medications.

## 2025-08-06 ENCOUNTER — APPOINTMENT (OUTPATIENT)
Facility: HOSPITAL | Age: 75
End: 2025-08-06
Payer: MEDICARE

## 2025-08-06 ENCOUNTER — HOSPITAL ENCOUNTER (EMERGENCY)
Facility: HOSPITAL | Age: 75
Discharge: HOME | End: 2025-08-06
Attending: FAMILY MEDICINE | Admitting: EMERGENCY MEDICINE
Payer: MEDICARE

## 2025-08-06 VITALS
TEMPERATURE: 99.6 F | SYSTOLIC BLOOD PRESSURE: 109 MMHG | BODY MASS INDEX: 53.92 KG/M2 | DIASTOLIC BLOOD PRESSURE: 64 MMHG | RESPIRATION RATE: 22 BRPM | HEART RATE: 93 BPM | WEIGHT: 293 LBS | OXYGEN SATURATION: 95 % | HEIGHT: 62 IN

## 2025-08-06 DIAGNOSIS — B34.9 VIRAL SYNDROME: Primary | ICD-10-CM

## 2025-08-06 LAB
ALBUMIN SERPL-MCNC: 3.7 G/DL (ref 3.5–5.2)
ALBUMIN/GLOB SERPL: 1.2 G/DL
ALP SERPL-CCNC: 159 U/L (ref 39–117)
ALT SERPL W P-5'-P-CCNC: 17 U/L (ref 1–33)
ANION GAP SERPL CALCULATED.3IONS-SCNC: 12.4 MMOL/L (ref 5–15)
AST SERPL-CCNC: 35 U/L (ref 1–32)
B PARAPERT DNA SPEC QL NAA+PROBE: NOT DETECTED
B PERT DNA SPEC QL NAA+PROBE: NOT DETECTED
BASOPHILS # BLD AUTO: 0 10*3/MM3 (ref 0–0.2)
BASOPHILS NFR BLD AUTO: 0 % (ref 0–1.5)
BILIRUB SERPL-MCNC: 0.6 MG/DL (ref 0–1.2)
BUN SERPL-MCNC: 18.1 MG/DL (ref 8–23)
BUN/CREAT SERPL: 18.3 (ref 7–25)
C PNEUM DNA NPH QL NAA+NON-PROBE: NOT DETECTED
CALCIUM SPEC-SCNC: 9.3 MG/DL (ref 8.6–10.5)
CHLORIDE SERPL-SCNC: 104 MMOL/L (ref 98–107)
CO2 SERPL-SCNC: 23.6 MMOL/L (ref 22–29)
CREAT SERPL-MCNC: 0.99 MG/DL (ref 0.57–1)
DEPRECATED RDW RBC AUTO: 44.8 FL (ref 37–54)
EGFRCR SERPLBLD CKD-EPI 2021: 60.3 ML/MIN/1.73
EOSINOPHIL # BLD AUTO: 0.03 10*3/MM3 (ref 0–0.4)
EOSINOPHIL NFR BLD AUTO: 0.3 % (ref 0.3–6.2)
ERYTHROCYTE [DISTWIDTH] IN BLOOD BY AUTOMATED COUNT: 13.5 % (ref 12.3–15.4)
FLUAV SUBTYP SPEC NAA+PROBE: NOT DETECTED
FLUBV RNA NPH QL NAA+NON-PROBE: NOT DETECTED
GLOBULIN UR ELPH-MCNC: 3.2 GM/DL
GLUCOSE SERPL-MCNC: 125 MG/DL (ref 65–99)
HADV DNA SPEC NAA+PROBE: NOT DETECTED
HCOV 229E RNA SPEC QL NAA+PROBE: NOT DETECTED
HCOV HKU1 RNA SPEC QL NAA+PROBE: NOT DETECTED
HCOV NL63 RNA SPEC QL NAA+PROBE: NOT DETECTED
HCOV OC43 RNA SPEC QL NAA+PROBE: NOT DETECTED
HCT VFR BLD AUTO: 43.6 % (ref 34–46.6)
HGB BLD-MCNC: 14.2 G/DL (ref 12–15.9)
HMPV RNA NPH QL NAA+NON-PROBE: NOT DETECTED
HOLD SPECIMEN: NORMAL
HPIV1 RNA ISLT QL NAA+PROBE: NOT DETECTED
HPIV2 RNA SPEC QL NAA+PROBE: NOT DETECTED
HPIV3 RNA NPH QL NAA+PROBE: NOT DETECTED
HPIV4 P GENE NPH QL NAA+PROBE: NOT DETECTED
IMM GRANULOCYTES # BLD AUTO: 0.02 10*3/MM3 (ref 0–0.05)
IMM GRANULOCYTES NFR BLD AUTO: 0.2 % (ref 0–0.5)
LYMPHOCYTES # BLD AUTO: 0.3 10*3/MM3 (ref 0.7–3.1)
LYMPHOCYTES NFR BLD AUTO: 3 % (ref 19.6–45.3)
M PNEUMO IGG SER IA-ACNC: NOT DETECTED
MCH RBC QN AUTO: 28.9 PG (ref 26.6–33)
MCHC RBC AUTO-ENTMCNC: 32.6 G/DL (ref 31.5–35.7)
MCV RBC AUTO: 88.6 FL (ref 79–97)
MONOCYTES # BLD AUTO: 0.17 10*3/MM3 (ref 0.1–0.9)
MONOCYTES NFR BLD AUTO: 1.7 % (ref 5–12)
NEUTROPHILS NFR BLD AUTO: 9.62 10*3/MM3 (ref 1.7–7)
NEUTROPHILS NFR BLD AUTO: 94.8 % (ref 42.7–76)
PLATELET # BLD AUTO: 151 10*3/MM3 (ref 140–450)
PMV BLD AUTO: 11.5 FL (ref 6–12)
POTASSIUM SERPL-SCNC: 4.3 MMOL/L (ref 3.5–5.2)
PROT SERPL-MCNC: 6.9 G/DL (ref 6–8.5)
RBC # BLD AUTO: 4.92 10*6/MM3 (ref 3.77–5.28)
RHINOVIRUS RNA SPEC NAA+PROBE: NOT DETECTED
RSV RNA NPH QL NAA+NON-PROBE: NOT DETECTED
SARS-COV-2 RNA RESP QL NAA+PROBE: NOT DETECTED
SODIUM SERPL-SCNC: 140 MMOL/L (ref 136–145)
WBC NRBC COR # BLD AUTO: 10.14 10*3/MM3 (ref 3.4–10.8)
WHOLE BLOOD HOLD COAG: NORMAL
WHOLE BLOOD HOLD SPECIMEN: NORMAL

## 2025-08-06 PROCEDURE — 99283 EMERGENCY DEPT VISIT LOW MDM: CPT | Performed by: FAMILY MEDICINE

## 2025-08-06 PROCEDURE — 36415 COLL VENOUS BLD VENIPUNCTURE: CPT

## 2025-08-06 PROCEDURE — 71045 X-RAY EXAM CHEST 1 VIEW: CPT

## 2025-08-06 PROCEDURE — 0202U NFCT DS 22 TRGT SARS-COV-2: CPT | Performed by: FAMILY MEDICINE

## 2025-08-06 PROCEDURE — 85025 COMPLETE CBC W/AUTO DIFF WBC: CPT | Performed by: FAMILY MEDICINE

## 2025-08-06 PROCEDURE — 80053 COMPREHEN METABOLIC PANEL: CPT | Performed by: FAMILY MEDICINE

## 2025-08-06 RX ORDER — SODIUM CHLORIDE 0.9 % (FLUSH) 0.9 %
10 SYRINGE (ML) INJECTION AS NEEDED
Status: DISCONTINUED | OUTPATIENT
Start: 2025-08-06 | End: 2025-08-06 | Stop reason: HOSPADM

## (undated) DEVICE — INTENDED FOR TISSUE SEPARATION, AND OTHER PROCEDURES THAT REQUIRE A SHARP SURGICAL BLADE TO PUNCTURE OR CUT.: Brand: BARD-PARKER ® STAINLESS STEEL BLADES

## (undated) DEVICE — SUT PDS 1 CT1 18IN Z741D

## (undated) DEVICE — DRAPE,TOP,102X53,STERILE: Brand: MEDLINE

## (undated) DEVICE — SUT ETHIB 2/0 CX46D

## (undated) DEVICE — BLANKT WARM UPPR/BDY ARM/OUT 57X196CM

## (undated) DEVICE — GLV SURG PREMIERPRO MIC LTX PF SZ8 BRN

## (undated) DEVICE — DISPOSABLE TOURNIQUET CUFF SINGLE BLADDER, DUAL PORT AND QUICK CONNECT CONNECTOR: Brand: COLOR CUFF

## (undated) DEVICE — KT ORCA ORCAPOD DISP STRL

## (undated) DEVICE — GLV SURG SENSICARE PI LF PF 7.0

## (undated) DEVICE — UNDRPD COMFRT GLD DRYPAD 36X57IN

## (undated) DEVICE — SYR LUERLOK 50ML

## (undated) DEVICE — Device

## (undated) DEVICE — NDL HYPO ECLPS SFTY 22G 1 1/2IN

## (undated) DEVICE — 3 BONE CEMENT MIXER: Brand: MIXEVAC

## (undated) DEVICE — CONTAINER,SPECIMEN,OR STERILE,4OZ: Brand: MEDLINE

## (undated) DEVICE — LUBE JELLY FOIL PACK 1.4 OZ: Brand: MEDLINE INDUSTRIES, INC.

## (undated) DEVICE — SYS SKIN CLS DERMABOND PRINEO W/22CM MESH TP

## (undated) DEVICE — SUT PDS 2 0 CT1 27IN CLR Z259H

## (undated) DEVICE — TUBING, SUCTION, 1/4" X 10', STRAIGHT: Brand: MEDLINE

## (undated) DEVICE — NERVE BLOCK SUPPORT KIT/BLUE: Brand: MEDLINE INDUSTRIES, INC.

## (undated) DEVICE — NDL SPINE 18G 31/2IN PNK

## (undated) DEVICE — TRY EPID SFTY 18G 3.5IN 1T7680

## (undated) DEVICE — NAVIO FLAT MARKERS: Brand: NAVIO

## (undated) DEVICE — KT PUMP PAIN ONQ CBLOC SELCTAFLO 400ML

## (undated) DEVICE — NEEDLE, QUINCKE, 18GX3.5": Brand: MEDLINE

## (undated) DEVICE — DRSNG GZ CURAD XEROFORM NONADHR OVERWRAP 5X9IN

## (undated) DEVICE — HYBRID CO2 TUBING/CAP SET FOR OLYMPUS® SCOPES & CO2 SOURCE: Brand: ERBE

## (undated) DEVICE — SOL POVIDONE IODINE 10PCT 3/4OZ STRL

## (undated) DEVICE — 3M™ IOBAN™ 2 ANTIMICROBIAL INCISE DRAPE 6651EZ: Brand: IOBAN™ 2

## (undated) DEVICE — BNDG ELAS W/CLIP 6IN 10YD LF STRL

## (undated) DEVICE — STRYKER PERFORMANCE SERIES SAGITTAL BLADE: Brand: STRYKER PERFORMANCE SERIES

## (undated) DEVICE — DECANT BG O JET

## (undated) DEVICE — PAD ARMBRD SURG CONVOL 7.5X20X2IN

## (undated) DEVICE — THE SINGLE USE ETRAP – POLYP TRAP IS USED FOR SUCTION RETRIEVAL OF ENDOSCOPICALLY REMOVED POLYPS.: Brand: ETRAP

## (undated) DEVICE — TBG PENCL TELESCP MEGADYNE SMOKE EVAC 10FT

## (undated) DEVICE — TRAP FLD MINIVAC MEGADYNE 100ML

## (undated) DEVICE — STERILE PVP: Brand: MEDLINE INDUSTRIES, INC.

## (undated) DEVICE — UNDERCAST PADDING: Brand: DEROYAL

## (undated) DEVICE — CVR HNDL LT SURG ACCSSRY BLU STRL

## (undated) DEVICE — SHEET,DRAPE,53X77,STERILE: Brand: MEDLINE

## (undated) DEVICE — ST NERV BLCK CONT CONTIPLEX ECHO CLSD 18G 4IN

## (undated) DEVICE — CVR FTSWITCH UNIV

## (undated) DEVICE — ST. VAGINAL PACKING X-RAY DETECTABLE: Brand: DEROYAL

## (undated) DEVICE — GLV SURG SENSICARE PI LF PF 7.5

## (undated) DEVICE — MEDI-VAC YANKAUER SUCTION HANDLE W/BULBOUS TIP: Brand: CARDINAL HEALTH

## (undated) DEVICE — BNDG ELAS ELITE V/CLOSE 6IN 5YD LF STRL

## (undated) DEVICE — KT PUMP INFUBLOCK MDL 2100 PMKITSOLIS

## (undated) DEVICE — ENCORE® LATEX MICRO SIZE 8, STERILE LATEX POWDER-FREE SURGICAL GLOVE: Brand: ENCORE

## (undated) DEVICE — PAD,ABDOMINAL,8"X10",ST,LF: Brand: MEDLINE

## (undated) DEVICE — ANTIBACTERIAL UNDYED BRAIDED (POLYGLACTIN 910), SYNTHETIC ABSORBABLE SUTURE: Brand: COATED VICRYL

## (undated) DEVICE — ADAPT ST INFUS ADMIN SYR 70IN

## (undated) DEVICE — INTENDED USE FOR SURGICAL MARKING ON INTACT SKIN, ALSO PROVIDES A PERMANENT METHOD OF IDENTIFYING OBJECTS IN THE OPERATING ROOM: Brand: WRITESITE® REGULAR TIP SKIN MARKER

## (undated) DEVICE — PENCL SMOKE/EVAC MEGADYNE TELESCP 10FT

## (undated) DEVICE — DRSNG PAD ABD 8X10IN STRL

## (undated) DEVICE — SINGLE-USE POLYPECTOMY SNARE: Brand: SENSATION SHORT THROW

## (undated) DEVICE — 3M™ IOBAN™ 2 ANTIMICROBIAL INCISE DRAPE 6650EZ: Brand: IOBAN™ 2

## (undated) DEVICE — GLV SURG SENSICARE PI LF PF 6.5

## (undated) DEVICE — RECIPROCATING BLADE, DOUBLE SIDED, OFFSET  (70.0 X 0.8 X 12.5MM)

## (undated) DEVICE — MEDI-VAC NON-CONDUCTIVE SUCTION TUBING: Brand: CARDINAL HEALTH

## (undated) DEVICE — SIGMA LCS HIGH PERFORMANCE INSTRUMENTS STERILE THREADED PINS: Brand: SIGMA LCS HIGH PERFORMANCE

## (undated) DEVICE — GLV SURG PREMIERPRO MIC LTX PF SZ7 BRN

## (undated) DEVICE — ADAPT CLN LUM OLYMP AIR/H20

## (undated) DEVICE — SPNG GZ WOVN 4X4IN 12PLY 10/BX STRL

## (undated) DEVICE — SHT AIR TRANSFR COMFRT GLIDE LAT 40X80IN

## (undated) DEVICE — INTRO ACCSR BLNT TP

## (undated) DEVICE — SIGMA LCS HIGH PERFORMANCE STERILE THREADED HEADED PINS: Brand: SIGMA LCS HIGH PERFORMANCE

## (undated) DEVICE — INST FRCP BIOPSY RADIALJAW4 W NDL 2.8MM 240CM JUMBO BX40 ORN

## (undated) DEVICE — PK KN TOTL 10

## (undated) DEVICE — LEX VAGINAL HYSTERECTOMY: Brand: MEDLINE INDUSTRIES, INC.

## (undated) DEVICE — FIRST STEP BEDSIDE ADD WATER KIT - RESEALABLE STAND-UP POUCH, ENDOSCOPIC CLEANING PAD - 1 POUCH: Brand: FIRST STEP BEDSIDE ADD WATER KIT - RESEALABLE STAND-UP POUCH, ENDOSCOPIC CLEANIN

## (undated) DEVICE — STRAP STIRUP WO/RNG 19X3.5IN DISP

## (undated) DEVICE — ST LINER SAFECAP GRN RED CP STRL

## (undated) DEVICE — SOLIDIFIER LIQ PREMISORB 1500CC

## (undated) DEVICE — SPNG LAP PREWSH SFTPK 18X18IN STRL PK/5

## (undated) DEVICE — SAFELINER SUCTION CANISTER 1000CC: Brand: DEROYAL

## (undated) DEVICE — GLV SURG SIGNATURE TOUCH PF LTX 7.5 STRL BX/50

## (undated) DEVICE — A SINGLE USE BEAD MOLD FOR MOLDING BEADS OUT OF CERAMIC BONE GRAFT SUBSTITUTES. THE BEAD MOLD CONSISTS OF CAVITIES IN THREE DIFFERENT SIZES. THE CAVITIES ARE FILLED WITH CERAMIC BONE GRAFT SUBSTITUTE AND EVENLY DISTRIBUTED ACROSS THE MOLD USING THE SPATULA. THE CERAMIC BONE GRAFT SUBSTITUTE SETS IN THE CAVITIES AND THE CERAMIC BONE GRAFT SUBSTITUTE BEADS ARE THEN REMOVED FROM THE MOLD.: Brand: CERAMENT™ BEAD TRAY

## (undated) DEVICE — BNDG ELAS CO-FLEX SLF ADHR 6IN 5YD LF STRL

## (undated) DEVICE — SUT ETHLN 2/0 PS 18IN 585H

## (undated) DEVICE — SUT PDS CLOSURE CT1 1/0 27IN Z341H

## (undated) DEVICE — VAGINAL PACKING: Brand: DEROYAL

## (undated) DEVICE — REAL INTELLIGENCE 5  MM                                    CYLINDRICAL BUR: Brand: REAL INTELLIGENCE

## (undated) DEVICE — FLTR HME STR UNIV W/SMPL PORT

## (undated) DEVICE — CANN NASL CO2 DIVIDED A/

## (undated) DEVICE — PATIENT RETURN ELECTRODE, SINGLE-USE, CONTACT QUALITY MONITORING, ADULT, WITH 9FT CORD, FOR PATIENTS WEIGING OVER 33LBS. (15KG): Brand: MEGADYNE

## (undated) DEVICE — PAD,ARMBOARD,CONV,FOAM,2X8X20",12PR/CS: Brand: MEDLINE

## (undated) DEVICE — ST EXT MICROBORE FIX M LL 38IN

## (undated) DEVICE — CONTN GRAD MEAS TRIANG 32OZ BLK

## (undated) DEVICE — IRRIGATOR BULB ASEPTO 60CC STRL

## (undated) DEVICE — SUT MONOCRYL PLS ANTIB UND 3/0  PS1 27IN

## (undated) DEVICE — SOL LR 1000ML

## (undated) DEVICE — SUT NUROLON 0 CT1 30IN 5424H

## (undated) DEVICE — SUT ETHLN 0 LP XLH 72IN D5854

## (undated) DEVICE — SUT PDS 1 CT1 36IN Z347H

## (undated) DEVICE — SYS CLS SKIN PREMIERPRO EXOFINFUSION 22CM

## (undated) DEVICE — CANNULA,ADULT,SOFT-TOUCH,7TUBE,SC: Brand: MEDLINE

## (undated) DEVICE — SUT VIC 0 UR5 27IN DYED J376H

## (undated) DEVICE — SOL IRR H2O BTL 1000ML STRL

## (undated) DEVICE — GLV SURG PREMIERPRO MIC LTX PF SZ8.5 BRN